# Patient Record
Sex: MALE | Race: WHITE | Employment: OTHER | URBAN - METROPOLITAN AREA
[De-identification: names, ages, dates, MRNs, and addresses within clinical notes are randomized per-mention and may not be internally consistent; named-entity substitution may affect disease eponyms.]

---

## 2017-05-01 ENCOUNTER — OFFICE VISIT (OUTPATIENT)
Dept: FAMILY MEDICINE CLINIC | Age: 67
End: 2017-05-01

## 2017-05-01 VITALS
WEIGHT: 200 LBS | HEART RATE: 62 BPM | DIASTOLIC BLOOD PRESSURE: 80 MMHG | BODY MASS INDEX: 28 KG/M2 | SYSTOLIC BLOOD PRESSURE: 122 MMHG | OXYGEN SATURATION: 98 % | HEIGHT: 71 IN

## 2017-05-01 DIAGNOSIS — G20 PARKINSON'S DISEASE (HCC): ICD-10-CM

## 2017-05-01 DIAGNOSIS — Z00.00 WELL ADULT EXAM: Primary | ICD-10-CM

## 2017-05-01 PROCEDURE — 90732 PPSV23 VACC 2 YRS+ SUBQ/IM: CPT | Performed by: FAMILY MEDICINE

## 2017-05-01 PROCEDURE — 99397 PER PM REEVAL EST PAT 65+ YR: CPT | Performed by: FAMILY MEDICINE

## 2017-05-01 PROCEDURE — 90471 IMMUNIZATION ADMIN: CPT | Performed by: FAMILY MEDICINE

## 2017-05-08 ENCOUNTER — OFFICE VISIT (OUTPATIENT)
Dept: NEUROLOGY | Age: 67
End: 2017-05-08

## 2017-05-08 VITALS
DIASTOLIC BLOOD PRESSURE: 72 MMHG | HEIGHT: 72 IN | BODY MASS INDEX: 26.95 KG/M2 | WEIGHT: 199 LBS | SYSTOLIC BLOOD PRESSURE: 119 MMHG | HEART RATE: 58 BPM

## 2017-05-08 DIAGNOSIS — G20 PARKINSON'S DISEASE (HCC): Primary | ICD-10-CM

## 2017-05-08 PROCEDURE — 99213 OFFICE O/P EST LOW 20 MIN: CPT | Performed by: PSYCHIATRY & NEUROLOGY

## 2017-06-10 ENCOUNTER — HOSPITAL ENCOUNTER (OUTPATIENT)
Dept: OTHER | Age: 67
Discharge: HOME | End: 2017-06-10
Attending: FAMILY MEDICINE | Admitting: FAMILY MEDICINE

## 2017-06-10 LAB
A/G RATIO: 1.7 (ref 1.1–2.2)
ALBUMIN SERPL-MCNC: 4.4 G/DL (ref 3.4–5)
ALP BLD-CCNC: 77 U/L (ref 40–129)
ALT SERPL-CCNC: 9 U/L (ref 10–40)
ANION GAP SERPL CALCULATED.3IONS-SCNC: 15 MMOL/L (ref 3–16)
AST SERPL-CCNC: 14 U/L (ref 15–37)
BILIRUB SERPL-MCNC: 0.7 MG/DL (ref 0–1)
BUN BLDV-MCNC: 9 MG/DL (ref 7–20)
CALCIUM SERPL-MCNC: 8.7 MG/DL (ref 8.3–10.6)
CHLORIDE BLD-SCNC: 102 MMOL/L (ref 99–110)
CHOLESTEROL, FASTING: 177 MG/DL (ref 0–199)
CO2: 25 MMOL/L (ref 21–32)
CREAT SERPL-MCNC: 0.7 MG/DL (ref 0.8–1.3)
GFR AFRICAN AMERICAN: >60
GFR NON-AFRICAN AMERICAN: >60
GLOBULIN: 2.6 G/DL
GLUCOSE FASTING: 91 MG/DL (ref 70–99)
HDLC SERPL-MCNC: 41 MG/DL (ref 40–60)
LDL CHOLESTEROL CALCULATED: 126 MG/DL
POTASSIUM SERPL-SCNC: 4.4 MMOL/L (ref 3.5–5.1)
SODIUM BLD-SCNC: 142 MMOL/L (ref 136–145)
TOTAL PROTEIN: 7 G/DL (ref 6.4–8.2)
TRIGLYCERIDE, FASTING: 48 MG/DL (ref 0–150)
VLDLC SERPL CALC-MCNC: 10 MG/DL

## 2017-07-12 ENCOUNTER — OFFICE VISIT (OUTPATIENT)
Dept: CARDIOLOGY CLINIC | Age: 67
End: 2017-07-12

## 2017-07-12 VITALS
WEIGHT: 197.4 LBS | HEART RATE: 60 BPM | DIASTOLIC BLOOD PRESSURE: 60 MMHG | BODY MASS INDEX: 24.67 KG/M2 | SYSTOLIC BLOOD PRESSURE: 110 MMHG

## 2017-07-12 DIAGNOSIS — I48.0 PAROXYSMAL ATRIAL FIBRILLATION (HCC): Primary | ICD-10-CM

## 2017-07-12 DIAGNOSIS — R00.2 PALPITATIONS: ICD-10-CM

## 2017-07-12 PROCEDURE — 99214 OFFICE O/P EST MOD 30 MIN: CPT | Performed by: NURSE PRACTITIONER

## 2017-07-12 PROCEDURE — 93000 ELECTROCARDIOGRAM COMPLETE: CPT | Performed by: NURSE PRACTITIONER

## 2017-07-27 PROCEDURE — 93224 XTRNL ECG REC UP TO 48 HRS: CPT | Performed by: INTERNAL MEDICINE

## 2017-08-05 DIAGNOSIS — I48.0 PAROXYSMAL ATRIAL FIBRILLATION (HCC): Primary | ICD-10-CM

## 2017-08-16 ENCOUNTER — OFFICE VISIT (OUTPATIENT)
Dept: CARDIOLOGY CLINIC | Age: 67
End: 2017-08-16

## 2017-08-16 VITALS
WEIGHT: 191.6 LBS | HEART RATE: 57 BPM | SYSTOLIC BLOOD PRESSURE: 110 MMHG | BODY MASS INDEX: 23.95 KG/M2 | DIASTOLIC BLOOD PRESSURE: 70 MMHG

## 2017-08-16 DIAGNOSIS — I48.0 PAROXYSMAL ATRIAL FIBRILLATION (HCC): Primary | ICD-10-CM

## 2017-08-16 DIAGNOSIS — I47.1 PSVT (PAROXYSMAL SUPRAVENTRICULAR TACHYCARDIA) (HCC): ICD-10-CM

## 2017-08-16 PROCEDURE — 93000 ELECTROCARDIOGRAM COMPLETE: CPT | Performed by: INTERNAL MEDICINE

## 2017-08-16 PROCEDURE — 93268 ECG RECORD/REVIEW: CPT | Performed by: INTERNAL MEDICINE

## 2017-08-16 PROCEDURE — 99214 OFFICE O/P EST MOD 30 MIN: CPT | Performed by: INTERNAL MEDICINE

## 2017-08-24 DIAGNOSIS — I48.0 PAROXYSMAL ATRIAL FIBRILLATION (HCC): ICD-10-CM

## 2017-11-06 ENCOUNTER — OFFICE VISIT (OUTPATIENT)
Dept: NEUROLOGY | Age: 67
End: 2017-11-06

## 2017-11-06 VITALS
DIASTOLIC BLOOD PRESSURE: 64 MMHG | HEART RATE: 53 BPM | SYSTOLIC BLOOD PRESSURE: 104 MMHG | WEIGHT: 198 LBS | BODY MASS INDEX: 26.82 KG/M2 | HEIGHT: 72 IN

## 2017-11-06 DIAGNOSIS — G20 PARKINSON'S DISEASE (HCC): Primary | ICD-10-CM

## 2017-11-06 PROCEDURE — G8417 CALC BMI ABV UP PARAM F/U: HCPCS | Performed by: PSYCHIATRY & NEUROLOGY

## 2017-11-06 PROCEDURE — G8427 DOCREV CUR MEDS BY ELIG CLIN: HCPCS | Performed by: PSYCHIATRY & NEUROLOGY

## 2017-11-06 PROCEDURE — 1036F TOBACCO NON-USER: CPT | Performed by: PSYCHIATRY & NEUROLOGY

## 2017-11-06 PROCEDURE — 1123F ACP DISCUSS/DSCN MKR DOCD: CPT | Performed by: PSYCHIATRY & NEUROLOGY

## 2017-11-06 PROCEDURE — G8484 FLU IMMUNIZE NO ADMIN: HCPCS | Performed by: PSYCHIATRY & NEUROLOGY

## 2017-11-06 PROCEDURE — 99213 OFFICE O/P EST LOW 20 MIN: CPT | Performed by: PSYCHIATRY & NEUROLOGY

## 2017-11-06 PROCEDURE — 4040F PNEUMOC VAC/ADMIN/RCVD: CPT | Performed by: PSYCHIATRY & NEUROLOGY

## 2017-11-06 PROCEDURE — 3017F COLORECTAL CA SCREEN DOC REV: CPT | Performed by: PSYCHIATRY & NEUROLOGY

## 2017-11-06 NOTE — PROGRESS NOTES
[facility]   Neurology followup    Subjective:   CC/HP  History was obtained from patient and his wife  Patient has known Parkinson's disease. His Parkinson symptoms seem relatively stable. Patient states that his symptoms are stable  Patient has Some tremors. He has noticed occasional difficulty with swallowing.   Patient still has some bradykinesia  Patient has chronic paroxysmal atrial fibrillation  Patient has known depression which seems to be improved  Patient states that his right hip pain is much improved    REVIEW OF SYSTEMS    Constitutional:  []   Chills   [x]  Fatigue   []  Fevers   []  Malaise   [x]  Weight loss     [] Denies all of the above    Respiratory:   []  Cough    [x]  Shortness of breath         [] Denies all of the above     Cardiovascular:   []  Chest pain    []  Exertional chest pressure/discomfort           [] Palpitations    []  Syncope     [x] Denies all of the above        Past Medical History:   Diagnosis Date    [facility] ([facility]     No significant past medical history     [facility]      Family History   Problem Relation Age of Onset    Heart Disease Mother     Diabetes Mother     High Blood Pressure Mother     Diabetes Father     High Blood Pressure Father     High Blood Pressure Brother     High Cholesterol Neg Hx     Kidney Disease Neg Hx      Social History     Social History    Marital status:      Spouse name: N/A    Number of children: 2    Years of education: N/A     Occupational History    [employer]      Social History Main Topics    Smoking status: Former Smoker    Smokeless tobacco: Never Used    Alcohol use No    Drug use: No    Sexual activity: Not Asked     Other Topics Concern    None     Social History Narrative    None        Objective:  Exam:  /64   Pulse 53   Ht 6' (1.829 m)   Wt 198 lb (89.8 kg)   BMI 26.85 kg/m²   This is a well-nourished patient in no acute distress  Patient is awake, alert and oriented x3. Speech is normal.  Pupils are equal round reacting to light. Extraocular movements intact. Face symmetrical. Tongue midline. Motor exam shows normal symmetrical strength. Deep tendon reflexes normal. Plantar reflexes downgoing. Tone increased bilaterally especially on the right side. Mild cogwheeling on the right side. Sensory exam normal. Coordination normal. Gait Shows decreased arm swing on the right side. No carotid bruit. No neck stiffness. Data :  MRI of brain images showed minimal nonspecific white matter changes but was otherwise normal.    Impression :  Parkinson's disease  Mild ataxia  Bradykinesia, improved Minimal tremor in the right arm  Tolerating Sinemet fairly well without side effects  Depression, improved  Paroxysmal atrial fibrillation  Right hip pain,improved  X-ray of the right hip did not show any significant degenerative change    Plan :  Discussed with patient and his wife  Continue Sinemet 25/100 one tablet 4 times daily   Side effects were discussed. Parkinson's Disease Medical and Surgical Treatment options reviewed: Treatment options for Parkinson Disease reviewed as appropriate with patient including but not limited to non-pharmacological treatment, pharmacological treatment and surgical treatment. Please note a portion of  this chart was generated using dragon dictation software. Although every effort was made to ensure the accuracy of this automated transcription, some errors in transcription may have occurred.

## 2017-12-07 ENCOUNTER — OFFICE VISIT (OUTPATIENT)
Dept: CARDIOLOGY CLINIC | Age: 67
End: 2017-12-07

## 2017-12-07 VITALS
DIASTOLIC BLOOD PRESSURE: 72 MMHG | SYSTOLIC BLOOD PRESSURE: 118 MMHG | BODY MASS INDEX: 27.01 KG/M2 | HEART RATE: 54 BPM | WEIGHT: 199.4 LBS | HEIGHT: 72 IN | OXYGEN SATURATION: 97 %

## 2017-12-07 DIAGNOSIS — I47.1 PSVT (PAROXYSMAL SUPRAVENTRICULAR TACHYCARDIA) (HCC): ICD-10-CM

## 2017-12-07 DIAGNOSIS — R00.0 WIDE-COMPLEX TACHYCARDIA: ICD-10-CM

## 2017-12-07 DIAGNOSIS — I48.0 PAROXYSMAL ATRIAL FIBRILLATION (HCC): Primary | ICD-10-CM

## 2017-12-07 PROBLEM — I47.10 PSVT (PAROXYSMAL SUPRAVENTRICULAR TACHYCARDIA): Status: ACTIVE | Noted: 2017-12-07

## 2017-12-07 PROCEDURE — 99214 OFFICE O/P EST MOD 30 MIN: CPT | Performed by: INTERNAL MEDICINE

## 2017-12-07 PROCEDURE — 1036F TOBACCO NON-USER: CPT | Performed by: INTERNAL MEDICINE

## 2017-12-07 PROCEDURE — 1123F ACP DISCUSS/DSCN MKR DOCD: CPT | Performed by: INTERNAL MEDICINE

## 2017-12-07 PROCEDURE — G8484 FLU IMMUNIZE NO ADMIN: HCPCS | Performed by: INTERNAL MEDICINE

## 2017-12-07 PROCEDURE — 93000 ELECTROCARDIOGRAM COMPLETE: CPT | Performed by: INTERNAL MEDICINE

## 2017-12-07 PROCEDURE — G8427 DOCREV CUR MEDS BY ELIG CLIN: HCPCS | Performed by: INTERNAL MEDICINE

## 2017-12-07 PROCEDURE — 4040F PNEUMOC VAC/ADMIN/RCVD: CPT | Performed by: INTERNAL MEDICINE

## 2017-12-07 PROCEDURE — G8417 CALC BMI ABV UP PARAM F/U: HCPCS | Performed by: INTERNAL MEDICINE

## 2017-12-07 PROCEDURE — 3017F COLORECTAL CA SCREEN DOC REV: CPT | Performed by: INTERNAL MEDICINE

## 2017-12-07 NOTE — PROGRESS NOTES
[address]   Cardiac Follow up       Chief Concern: PAF, PSVT    HPI:  Ariadna Rollins is a 78 y.o. male who is here in follow up for PSVT as well as PAF. Although he has a history of atrial fibrillation, his EKG and monitor showed evidence of PSVT. The patient states he never had SVT before. He was received multiple doses of adenosine, 6 mg, followed by 12 mg and 12 mg. Despite this, the patient never had a pause in his rhythm nor did he convert. However, his blood pressure continued to drop. Cardioversion was considered at that time. He was moved to a trauma room for cardioversion. The fentanyl was given when he suddenly developed a pause in his rhythm. He then converted to normal sinus rhythm with bigeminy. Although unusual, it appears that he had an approximate 10-15 minute delay in response to adenosine. He converted to SR and stable, then he subsequently discharged home. He saw [facility] and had a combination of 24 hours of Holter and 30 days of event monitors. The 24 hour Holter monitor on 7/13/17 showed SR with a brief PAT with nocturnal bradycardia with average HR 61 (). The 30 day monitor from 7/13/17 to 8/10/17 showed SR with nocturnal bradycardia with wide complex tachycardia with brief PAT with average HR: 62 bpm (). On 7/17/17 WCT with cycle length 280 (total 6 seconds). Metoprolol was instructed to start on 7/12/2017, but he did not take routine. He only used twice but unable to recall the exact the date and time. He stated he had worsening palpitation for 2 days while his mother passed away but he did not wear the monitor. His first episode of Afib occurred 15-20 years ago. He reports he was drinking a lot of caffeine at the time. On average, he has had a PAF episode about once a year. The longest episode lasted five days, occurring in September 2014. His HR was 150's per EKG.   He has been able to tell when he goes in and out of Afib because

## 2018-03-08 ENCOUNTER — TELEPHONE (OUTPATIENT)
Dept: CARDIOLOGY CLINIC | Age: 68
End: 2018-03-08

## 2018-03-08 ENCOUNTER — OFFICE VISIT (OUTPATIENT)
Dept: CARDIOLOGY CLINIC | Age: 68
End: 2018-03-08

## 2018-03-08 VITALS
BODY MASS INDEX: 27.78 KG/M2 | DIASTOLIC BLOOD PRESSURE: 62 MMHG | SYSTOLIC BLOOD PRESSURE: 98 MMHG | WEIGHT: 204.8 LBS | HEART RATE: 138 BPM

## 2018-03-08 DIAGNOSIS — I48.0 PAROXYSMAL ATRIAL FIBRILLATION (HCC): Primary | ICD-10-CM

## 2018-03-08 DIAGNOSIS — R42 DIZZINESS: ICD-10-CM

## 2018-03-08 DIAGNOSIS — R06.09 DYSPNEA ON EXERTION: ICD-10-CM

## 2018-03-08 PROCEDURE — G8427 DOCREV CUR MEDS BY ELIG CLIN: HCPCS | Performed by: NURSE PRACTITIONER

## 2018-03-08 PROCEDURE — 4040F PNEUMOC VAC/ADMIN/RCVD: CPT | Performed by: NURSE PRACTITIONER

## 2018-03-08 PROCEDURE — 1123F ACP DISCUSS/DSCN MKR DOCD: CPT | Performed by: NURSE PRACTITIONER

## 2018-03-08 PROCEDURE — G8484 FLU IMMUNIZE NO ADMIN: HCPCS | Performed by: NURSE PRACTITIONER

## 2018-03-08 PROCEDURE — 99214 OFFICE O/P EST MOD 30 MIN: CPT | Performed by: NURSE PRACTITIONER

## 2018-03-08 PROCEDURE — G8417 CALC BMI ABV UP PARAM F/U: HCPCS | Performed by: NURSE PRACTITIONER

## 2018-03-08 PROCEDURE — 1036F TOBACCO NON-USER: CPT | Performed by: NURSE PRACTITIONER

## 2018-03-08 PROCEDURE — 3017F COLORECTAL CA SCREEN DOC REV: CPT | Performed by: NURSE PRACTITIONER

## 2018-03-08 RX ORDER — FUROSEMIDE 20 MG/1
20 TABLET ORAL DAILY
Qty: 30 TABLET | Refills: 3 | Status: SHIPPED | OUTPATIENT
Start: 2018-03-08 | End: 2018-03-13 | Stop reason: ALTCHOICE

## 2018-03-08 RX ORDER — DIGOXIN 125 MCG
125 TABLET ORAL DAILY
Qty: 30 TABLET | Refills: 3 | Status: SHIPPED | OUTPATIENT
Start: 2018-03-08 | End: 2018-03-13 | Stop reason: SDUPTHER

## 2018-03-08 ASSESSMENT — ENCOUNTER SYMPTOMS
COUGH: 0
GASTROINTESTINAL NEGATIVE: 1
SHORTNESS OF BREATH: 1
WHEEZING: 0

## 2018-03-08 NOTE — PATIENT INSTRUCTIONS
diet.   ? Stay at a healthy weight. Lose weight if you need to.   ? Limit alcohol to 2 drinks a day for men and 1 drink a day for women. Too much alcohol can cause health problems. ? Avoid colds and flu. Get a pneumococcal vaccine shot. If you have had one before, ask your doctor whether you need another dose. Get a flu shot every year. If you must be around people with colds or flu, wash your hands often. Activity  ? If your doctor recommends it, get more exercise. Walking is a good choice. Bit by bit, increase the amount you walk every day. Try for at least 30 minutes on most days of the week. You also may want to swim, bike, or do other activities. Your doctor may suggest that you join a cardiac rehabilitation program so that you can have help increasing your physical activity safely. ? Start light exercise if your doctor says it is okay. Even a small amount will help you get stronger, have more energy, and manage stress. Walking is an easy way to get exercise. Start out by walking a little more than you did in the hospital. Gradually increase the amount you walk. ? When you exercise, watch for signs that your heart is working too hard. You are pushing too hard if you cannot talk while you are exercising. If you become short of breath or dizzy or have chest pain, sit down and rest immediately. ? Check your pulse regularly. Place two fingers on the artery at the palm side of your wrist, in line with your thumb. If your heartbeat seems uneven or fast, talk to your doctor. When should you call for help? Call 911 anytime you think you may need emergency care. For example, call if:  ? You have symptoms of a heart attack. These may include:  Chest pain or pressure, or a strange feeling in the chest.  Sweating. Shortness of breath. Nausea or vomiting. Pain, pressure, or a strange feeling in the back, neck, jaw, or upper belly or in one or both shoulders or arms. Lightheadedness or sudden weakness.   A

## 2018-03-08 NOTE — LETTER
[address] [place] [id number]  Phone: [phone]  Fax: [phone]    Vaishali Burns NP        March 8, 2018     Maite Baig, [address] [place] [address] [employer]    Patient: Abbi Gallardo  MR Number: [id number]  YOB: 1950  Date of Visit: 3/8/2018    Dear Dr. Maite Baig:      If you have questions, please do not hesitate to call me. I look forward to following Alejandro Laguna along with you.     Sincerely,        Vaishali Burns NP Clear bilaterally, pupils equal, round and reactive to light.

## 2018-03-08 NOTE — TELEPHONE ENCOUNTER
Wife says he is skipping beats, he thinks he is a fib. Says this has been going for a week, he is not sleeping or eating well because he feels so bad. She would like him seen this week, pradeep is worried something is terribly wrong and that he needs to be seen. She says they are needing to switch md's anyway due to they do not want to drive to [facility]      Assessment:  1. Paroxysmal Atrial fibrillation: symptomatic. [id number] Vasc score 1. In SB today per EKG. Has brief PAT episodes usually lasting less than two minutes. He has prolonged episodes 1-2 times/year. This AF burden is still probably not sufficient to justify daily pharmacologic preventive therapy. 2. PSVT- documented on ER ECGs and received adenosine. His palpitations are improved on Metoprolol 25 mg every am.  3. Wide complex tachycardia- 24 hours Holter and 30 days event monitor.      Plan:  1. Continue Metoprolol 25 mg daily  2. Monitor episodes of palpitations and contact our office for a worsening frequency or duration of episodes  3.   Follow up in 6 month or sooner if needed        Tae Ellison M.D.

## 2018-03-08 NOTE — PROGRESS NOTES
tartrate (LOPRESSOR) 25 MG tablet Take 1 tablet by mouth daily In the morning only 12/7/17  Yes Brandin Orozco MD   carbidopa-levodopa (SINEMET)  MG per tablet TAKE 1 TABLET AT 7:30 AM,  11:30 AM, 3:30 PM AND 7:30  PM 11/16/17  Yes Noe Haskins MD   aspirin 81 MG tablet Take 81 mg by mouth daily   Yes Historical Provider, MD   omeprazole (PRILOSEC) 20 MG delayed release capsule Take 20 mg by mouth as needed    Historical Provider, MD        Allergies:  Sertraline     ROS:   Review of Systems   Constitutional: Negative. HENT: Negative. Respiratory: Positive for shortness of breath. Negative for cough and wheezing. Cardiovascular: Positive for chest pain and palpitations. Negative for leg swelling. Gastrointestinal: Negative. Genitourinary: Negative. Musculoskeletal: Negative. Skin: Negative. Neurological: Positive for headaches. Hematological: Negative. Psychiatric/Behavioral: Negative. Physical Examination:    Vitals:    03/08/18 1422   BP: 98/62   Pulse: 138   Weight: 204 lb 12.8 oz (92.9 kg)           Physical Exam   Constitutional: He is oriented to person, place, and time. He appears well-developed and well-nourished. HENT:   Head: Normocephalic and atraumatic. Eyes: Conjunctivae are normal. Pupils are equal, round, and reactive to light. Neck: Normal range of motion. Neck supple. Cardiovascular: Normal heart sounds and intact distal pulses. An irregular rhythm present. Tachycardia present. Pulmonary/Chest: Breath sounds normal.   Abdominal: Soft. Musculoskeletal: Normal range of motion. Neurological: He is alert and oriented to person, place, and time. Skin: Skin is warm and dry. Psychiatric: He has a normal mood and affect.  His behavior is normal. Judgment and thought content normal.       Lab Data:    CBC:   Lab Results   Component Value Date    WBC 11.1 07/02/2017    WBC 10.5 10/30/2016    WBC 4.7 04/22/2016    RBC 4.82 07/02/2017    RBC Tobacco Cessation Counseling: NA  2. Retake of BP if >140/90:   NA  3. Documentation to PCP/referring for new patient:  Sent to PCP at close of office visit  4. CAD patient on anti-platelet: NA  5. CAD patient on STATIN therapy:  NA  6. Patient with CHF and aFib on anticoagulation:  No   7. Patient Education:  Yes   8. BB for LVSD :  NA   9. ACE/ARB for LVSD:  NA   10.  Left Ventricular Ejection Fraction (LVEF) Assessment:  Yes

## 2018-03-09 LAB
ANION GAP SERPL CALCULATED.3IONS-SCNC: 16 MMOL/L (ref 3–16)
BUN BLDV-MCNC: 12 MG/DL (ref 7–20)
CALCIUM SERPL-MCNC: 8.6 MG/DL (ref 8.3–10.6)
CHLORIDE BLD-SCNC: 101 MMOL/L (ref 99–110)
CO2: 27 MMOL/L (ref 21–32)
CREAT SERPL-MCNC: 0.8 MG/DL (ref 0.8–1.3)
GFR AFRICAN AMERICAN: >60
GFR NON-AFRICAN AMERICAN: >60
GLUCOSE BLD-MCNC: 107 MG/DL (ref 70–99)
HCT VFR BLD CALC: 44.8 % (ref 40.5–52.5)
HEMOGLOBIN: 15.1 G/DL (ref 13.5–17.5)
MAGNESIUM: 2.3 MG/DL (ref 1.8–2.4)
MCH RBC QN AUTO: 31.3 PG (ref 26–34)
MCHC RBC AUTO-ENTMCNC: 33.7 G/DL (ref 31–36)
MCV RBC AUTO: 92.8 FL (ref 80–100)
PDW BLD-RTO: 13.5 % (ref 12.4–15.4)
PLATELET # BLD: 233 K/UL (ref 135–450)
PMV BLD AUTO: 8.9 FL (ref 5–10.5)
POTASSIUM SERPL-SCNC: 4 MMOL/L (ref 3.5–5.1)
RBC # BLD: 4.83 M/UL (ref 4.2–5.9)
SODIUM BLD-SCNC: 144 MMOL/L (ref 136–145)
WBC # BLD: 5.3 K/UL (ref 4–11)

## 2018-03-12 ENCOUNTER — HOSPITAL ENCOUNTER (OUTPATIENT)
Dept: NON INVASIVE DIAGNOSTICS | Age: 68
Discharge: HOME | End: 2018-03-12
Attending: NURSE PRACTITIONER | Admitting: NURSE PRACTITIONER

## 2018-03-12 DIAGNOSIS — I48.0 PAROXYSMAL ATRIAL FIBRILLATION (HCC): ICD-10-CM

## 2018-03-12 LAB
LV EF: 50 %
LVEF MODALITY: NORMAL

## 2018-03-13 ENCOUNTER — OFFICE VISIT (OUTPATIENT)
Dept: CARDIOLOGY CLINIC | Age: 68
End: 2018-03-13

## 2018-03-13 VITALS
BODY MASS INDEX: 27.59 KG/M2 | WEIGHT: 203.4 LBS | HEART RATE: 123 BPM | SYSTOLIC BLOOD PRESSURE: 100 MMHG | DIASTOLIC BLOOD PRESSURE: 60 MMHG

## 2018-03-13 DIAGNOSIS — I47.1 PSVT (PAROXYSMAL SUPRAVENTRICULAR TACHYCARDIA) (HCC): ICD-10-CM

## 2018-03-13 DIAGNOSIS — I48.0 PAROXYSMAL ATRIAL FIBRILLATION (HCC): ICD-10-CM

## 2018-03-13 PROCEDURE — 3017F COLORECTAL CA SCREEN DOC REV: CPT | Performed by: NURSE PRACTITIONER

## 2018-03-13 PROCEDURE — G8427 DOCREV CUR MEDS BY ELIG CLIN: HCPCS | Performed by: NURSE PRACTITIONER

## 2018-03-13 PROCEDURE — 99214 OFFICE O/P EST MOD 30 MIN: CPT | Performed by: NURSE PRACTITIONER

## 2018-03-13 PROCEDURE — 4040F PNEUMOC VAC/ADMIN/RCVD: CPT | Performed by: NURSE PRACTITIONER

## 2018-03-13 PROCEDURE — G8417 CALC BMI ABV UP PARAM F/U: HCPCS | Performed by: NURSE PRACTITIONER

## 2018-03-13 PROCEDURE — 1123F ACP DISCUSS/DSCN MKR DOCD: CPT | Performed by: NURSE PRACTITIONER

## 2018-03-13 PROCEDURE — 1036F TOBACCO NON-USER: CPT | Performed by: NURSE PRACTITIONER

## 2018-03-13 PROCEDURE — G8484 FLU IMMUNIZE NO ADMIN: HCPCS | Performed by: NURSE PRACTITIONER

## 2018-03-13 RX ORDER — DIGOXIN 125 MCG
250 TABLET ORAL DAILY
Qty: 60 TABLET | Refills: 5 | Status: SHIPPED | OUTPATIENT
Start: 2018-03-13 | End: 2018-05-29 | Stop reason: CLARIF

## 2018-03-15 ENCOUNTER — OFFICE VISIT (OUTPATIENT)
Dept: PULMONOLOGY | Age: 68
End: 2018-03-15

## 2018-03-15 ENCOUNTER — HOSPITAL ENCOUNTER (OUTPATIENT)
Dept: SLEEP MEDICINE | Age: 68
Discharge: HOME | End: 2018-03-15
Attending: INTERNAL MEDICINE | Admitting: INTERNAL MEDICINE

## 2018-03-15 VITALS
WEIGHT: 204 LBS | HEART RATE: 103 BPM | HEIGHT: 72 IN | BODY MASS INDEX: 27.63 KG/M2 | SYSTOLIC BLOOD PRESSURE: 111 MMHG | DIASTOLIC BLOOD PRESSURE: 60 MMHG | OXYGEN SATURATION: 98 %

## 2018-03-15 DIAGNOSIS — I48.0 PAROXYSMAL ATRIAL FIBRILLATION (HCC): Chronic | ICD-10-CM

## 2018-03-15 DIAGNOSIS — G47.10 HYPERSOMNIA: ICD-10-CM

## 2018-03-15 DIAGNOSIS — R06.83 SNORING: ICD-10-CM

## 2018-03-15 DIAGNOSIS — G20 PARKINSON'S DISEASE (HCC): Chronic | ICD-10-CM

## 2018-03-15 DIAGNOSIS — G47.10 HYPERSOMNIA: Primary | ICD-10-CM

## 2018-03-15 DIAGNOSIS — K21.9 GERD WITHOUT ESOPHAGITIS: Chronic | ICD-10-CM

## 2018-03-15 PROCEDURE — 3017F COLORECTAL CA SCREEN DOC REV: CPT | Performed by: INTERNAL MEDICINE

## 2018-03-15 PROCEDURE — 1036F TOBACCO NON-USER: CPT | Performed by: INTERNAL MEDICINE

## 2018-03-15 PROCEDURE — G8417 CALC BMI ABV UP PARAM F/U: HCPCS | Performed by: INTERNAL MEDICINE

## 2018-03-15 PROCEDURE — 1123F ACP DISCUSS/DSCN MKR DOCD: CPT | Performed by: INTERNAL MEDICINE

## 2018-03-15 PROCEDURE — G8427 DOCREV CUR MEDS BY ELIG CLIN: HCPCS | Performed by: INTERNAL MEDICINE

## 2018-03-15 PROCEDURE — 95810 POLYSOM 6/> YRS 4/> PARAM: CPT | Performed by: INTERNAL MEDICINE

## 2018-03-15 PROCEDURE — 99204 OFFICE O/P NEW MOD 45 MIN: CPT | Performed by: INTERNAL MEDICINE

## 2018-03-15 PROCEDURE — 4040F PNEUMOC VAC/ADMIN/RCVD: CPT | Performed by: INTERNAL MEDICINE

## 2018-03-15 PROCEDURE — G8484 FLU IMMUNIZE NO ADMIN: HCPCS | Performed by: INTERNAL MEDICINE

## 2018-03-15 ASSESSMENT — ENCOUNTER SYMPTOMS
NAUSEA: 0
ABDOMINAL DISTENTION: 0
ABDOMINAL PAIN: 0
CHEST TIGHTNESS: 0
SHORTNESS OF BREATH: 0
APNEA: 0
PHOTOPHOBIA: 0
RHINORRHEA: 0
CHOKING: 0
VOMITING: 0
ALLERGIC/IMMUNOLOGIC NEGATIVE: 1
EYE PAIN: 0

## 2018-03-15 ASSESSMENT — SLEEP AND FATIGUE QUESTIONNAIRES
HOW LIKELY ARE YOU TO NOD OFF OR FALL ASLEEP WHILE SITTING QUIETLY AFTER LUNCH WITHOUT ALCOHOL: 2
HOW LIKELY ARE YOU TO NOD OFF OR FALL ASLEEP WHILE SITTING AND READING: 3
NECK CIRCUMFERENCE (INCHES): 15
HOW LIKELY ARE YOU TO NOD OFF OR FALL ASLEEP WHEN YOU ARE A PASSENGER IN A CAR FOR AN HOUR WITHOUT A BREAK: 1
HOW LIKELY ARE YOU TO NOD OFF OR FALL ASLEEP WHILE LYING DOWN TO REST IN THE AFTERNOON WHEN CIRCUMSTANCES PERMIT: 3
HOW LIKELY ARE YOU TO NOD OFF OR FALL ASLEEP WHILE WATCHING TV: 3
HOW LIKELY ARE YOU TO NOD OFF OR FALL ASLEEP WHILE SITTING AND TALKING TO SOMEONE: 0
ESS TOTAL SCORE: 14
HOW LIKELY ARE YOU TO NOD OFF OR FALL ASLEEP IN A CAR, WHILE STOPPED FOR A FEW MINUTES IN TRAFFIC: 0
HOW LIKELY ARE YOU TO NOD OFF OR FALL ASLEEP WHILE SITTING INACTIVE IN A PUBLIC PLACE: 2

## 2018-03-15 NOTE — PROGRESS NOTES
tartrate (LOPRESSOR) 25 MG tablet Take 1 tablet by mouth daily In the morning only 3/13/18  Yes Republicjack Patton CNP   digoxin (LANOXIN) 125 MCG tablet Take 2 tablets by mouth daily 3/13/18  Yes Republic PattonCAPRICE   carbidopa-levodopa (SINEMET)  MG per tablet TAKE 1 TABLET AT 7:30 AM,  11:30 AM, 3:30 PM AND 7:30  PM 11/16/17  Yes Elaine Nuñez MD   omeprazole (PRILOSEC) 20 MG delayed release capsule Take 20 mg by mouth as needed   Yes Historical Provider, MD       Allergies as of 03/15/2018 - Review Complete 03/15/2018   Allergen Reaction Noted    Sertraline Anxiety 08/17/2015       Patient Active Problem List   Diagnosis    Dupuytren's contracture    Parkinson's disease ([facility]    Carpal tunnel syndrome    Lesion of ulnar nerve    Paroxysmal atrial fibrillation (HCC)    Atrial fibrillation (HCC)    Trochanteric bursitis of right hip    Arthralgia of right hip    PSVT (paroxysmal supraventricular tachycardia) (HCC)    Wide-complex tachycardia (HCC)    Dyspnea on exertion    Dizziness       Past Medical History:   Diagnosis Date    Atrial fibrillation ([address]     No significant past medical history     Parkinson disease ([facility]        History reviewed. No pertinent surgical history. Family History   Problem Relation Age of Onset    Heart Disease Mother     Diabetes Mother     High Blood Pressure Mother     Diabetes Father     High Blood Pressure Father     High Blood Pressure Brother     High Cholesterol Neg Hx     Kidney Disease Neg Hx        Review of Systems   Constitutional: Positive for fatigue. Negative for activity change and appetite change. HENT: Positive for congestion. Negative for nosebleeds, postnasal drip, rhinorrhea and sneezing. Eyes: Negative for photophobia, pain and visual disturbance. Respiratory: Negative for apnea, choking, chest tightness and shortness of breath. Cardiovascular: Negative.     Gastrointestinal: Negative for abdominal distention, abdominal pain, deviation present. No thyroid mass and no thyromegaly present. Cardiovascular: Normal rate, S1 normal, S2 normal and normal heart sounds. An irregularly irregular rhythm present. Pulmonary/Chest: Effort normal and breath sounds normal. No apnea. No respiratory distress. He has no wheezes. He has no rhonchi. He has no rales. Abdominal: Soft. Bowel sounds are normal. He exhibits no distension. There is no tenderness. Musculoskeletal: Normal range of motion. He exhibits no edema. Lymphadenopathy:        Head (right side): No submental, no submandibular and no tonsillar adenopathy present. Head (left side): No submental, no submandibular and no tonsillar adenopathy present. Neurological: He is alert and oriented to person, place, and time. Skin: Skin is warm, dry and intact. No cyanosis. Nails show no clubbing. Psychiatric: He has a normal mood and affect. His speech is normal and behavior is normal. Thought content normal.   Nursing note and vitals reviewed. Assessment:      1. Hypersomnia  Baseline Diagnostic Sleep Study    new problem, needs w/u   2. Snoring  Baseline Diagnostic Sleep Study    new problem, needs w/u   3. Paroxysmal atrial fibrillation (HCC) Stable    4. GERD without esophagitis Stable    5. Parkinson's disease ([facility] Stable         Plan:      Differential diagnosis includes but not limited to: NICO, PLMD's, narcolepsy, parasomnias. Reviewed NICO (which is the highest likelihood diagnosis): pathophysiology, diagnosis, complications and treatment. Instructed him not to drive if drowsy. Continue medications per his PCP and other physicians. Limit caffeine use after 3pm. Will do PSG to rule-out NICO and other sleep disorders. 1 wk follow up after study to review his results. The chronic medical conditions listed are directly related to the primary diagnosis listed above. The management of the primary diagnosis affects the secondary diagnosis and vice versa.   Cont meds for a fib, GERD, and parkinson's.     Orders Placed This Encounter   Procedures    Baseline Diagnostic Sleep Study          Amalia Dalton MD, Raúl Bermudez, [address] [facility] Director  [facility] and [address]

## 2018-03-15 NOTE — LETTER
[facility] Sleep Medicine  [address]  [address]  [place] [address]  Phone: [phone]  Fax: [phone]      March 15, 2018       Patient: Melisa Perdomo   MR Number: [id number]   YOB: 1950   Date of Visit: 3/15/2018     Thank you for allowing me to participate in the care of Geoff Garg. Here is my assessment and plan. Also attached is a copy of his consult note:    ASSESSMENT:  Juancarlos Palma was seen today for snoring and daytime sleepiness. Diagnoses and all orders for this visit:    Hypersomnia  Comments:  new problem, needs w/u  Orders:  -     Baseline Diagnostic Sleep Study; Future    Snoring  Comments:  new problem, needs w/u  Orders:  -     Baseline Diagnostic Sleep Study; Future    Paroxysmal atrial fibrillation (HCC)    GERD without esophagitis    Parkinson's disease ([facility]        Plan:     Differential diagnosis includes but not limited to: NICO, PLMD's, narcolepsy, parasomnias. Reviewed NICO (which is the highest likelihood diagnosis): pathophysiology, diagnosis, complications and treatment. Instructed him not to drive if drowsy. Continue medications per his PCP and other physicians. Limit caffeine use after 3pm. Will do PSG to rule-out NICO and other sleep disorders. 1 wk follow up after study to review his results. The chronic medical conditions listed are directly related to the primary diagnosis listed above. The management of the primary diagnosis affects the secondary diagnosis and vice versa. Cont meds for a fib, GERD, and parkinson's. Orders Placed This Encounter   Procedures    Baseline Diagnostic Sleep Study         If you have questions or concerns, please do not hesitate to call me. I look forward to following Juancarlos Palma along with you.     Sincerely,      Corrie Olivares MD    CC providers:  [facility]  [address] [address]  [address] [place] [address] In [address] [place] [address]

## 2018-03-19 ENCOUNTER — NURSE ONLY (OUTPATIENT)
Dept: CARDIOLOGY CLINIC | Age: 68
End: 2018-03-19

## 2018-03-19 DIAGNOSIS — I48.0 PAROXYSMAL ATRIAL FIBRILLATION (HCC): Primary | ICD-10-CM

## 2018-03-19 DIAGNOSIS — I48.19 PERSISTENT ATRIAL FIBRILLATION (HCC): Primary | ICD-10-CM

## 2018-03-19 PROCEDURE — 93000 ELECTROCARDIOGRAM COMPLETE: CPT | Performed by: NURSE PRACTITIONER

## 2018-04-10 ENCOUNTER — TELEPHONE (OUTPATIENT)
Dept: SLEEP MEDICINE | Age: 68
End: 2018-04-10

## 2018-05-29 ENCOUNTER — OFFICE VISIT (OUTPATIENT)
Dept: NEUROLOGY | Age: 68
End: 2018-05-29

## 2018-05-29 VITALS
HEART RATE: 62 BPM | DIASTOLIC BLOOD PRESSURE: 76 MMHG | HEIGHT: 72 IN | SYSTOLIC BLOOD PRESSURE: 119 MMHG | BODY MASS INDEX: 26.41 KG/M2 | WEIGHT: 195 LBS

## 2018-05-29 DIAGNOSIS — R27.0 ATAXIA: ICD-10-CM

## 2018-05-29 DIAGNOSIS — R25.1 TREMOR: ICD-10-CM

## 2018-05-29 DIAGNOSIS — R20.0 FACIAL NUMBNESS: ICD-10-CM

## 2018-05-29 DIAGNOSIS — G20 PARKINSON'S DISEASE (HCC): Primary | ICD-10-CM

## 2018-05-29 DIAGNOSIS — R25.8 BRADYKINESIA: ICD-10-CM

## 2018-05-29 PROCEDURE — G8417 CALC BMI ABV UP PARAM F/U: HCPCS | Performed by: PSYCHIATRY & NEUROLOGY

## 2018-05-29 PROCEDURE — 1036F TOBACCO NON-USER: CPT | Performed by: PSYCHIATRY & NEUROLOGY

## 2018-05-29 PROCEDURE — 3017F COLORECTAL CA SCREEN DOC REV: CPT | Performed by: PSYCHIATRY & NEUROLOGY

## 2018-05-29 PROCEDURE — 99214 OFFICE O/P EST MOD 30 MIN: CPT | Performed by: PSYCHIATRY & NEUROLOGY

## 2018-05-29 PROCEDURE — 4040F PNEUMOC VAC/ADMIN/RCVD: CPT | Performed by: PSYCHIATRY & NEUROLOGY

## 2018-05-29 PROCEDURE — G8427 DOCREV CUR MEDS BY ELIG CLIN: HCPCS | Performed by: PSYCHIATRY & NEUROLOGY

## 2018-05-29 PROCEDURE — 1123F ACP DISCUSS/DSCN MKR DOCD: CPT | Performed by: PSYCHIATRY & NEUROLOGY

## 2018-08-09 ENCOUNTER — HOSPITAL ENCOUNTER (OUTPATIENT)
Dept: CARDIAC REHAB | Age: 68
Discharge: HOME | End: 2018-08-31
Attending: FAMILY MEDICINE | Admitting: INTERNAL MEDICINE

## 2018-09-01 ENCOUNTER — HOSPITAL ENCOUNTER (OUTPATIENT)
Dept: OTHER | Age: 68
Discharge: HOME | End: 2018-09-01
Attending: INTERNAL MEDICINE | Admitting: INTERNAL MEDICINE

## 2018-09-17 ENCOUNTER — OFFICE VISIT (OUTPATIENT)
Dept: FAMILY MEDICINE CLINIC | Age: 68
End: 2018-09-17

## 2018-09-17 VITALS
SYSTOLIC BLOOD PRESSURE: 104 MMHG | HEART RATE: 70 BPM | BODY MASS INDEX: 27.53 KG/M2 | WEIGHT: 203 LBS | DIASTOLIC BLOOD PRESSURE: 71 MMHG | OXYGEN SATURATION: 95 %

## 2018-09-17 DIAGNOSIS — R42 VERTIGO: Primary | ICD-10-CM

## 2018-09-17 PROCEDURE — 3017F COLORECTAL CA SCREEN DOC REV: CPT | Performed by: FAMILY MEDICINE

## 2018-09-17 PROCEDURE — 1036F TOBACCO NON-USER: CPT | Performed by: FAMILY MEDICINE

## 2018-09-17 PROCEDURE — G8427 DOCREV CUR MEDS BY ELIG CLIN: HCPCS | Performed by: FAMILY MEDICINE

## 2018-09-17 PROCEDURE — 1123F ACP DISCUSS/DSCN MKR DOCD: CPT | Performed by: FAMILY MEDICINE

## 2018-09-17 PROCEDURE — G8417 CALC BMI ABV UP PARAM F/U: HCPCS | Performed by: FAMILY MEDICINE

## 2018-09-17 PROCEDURE — 1101F PT FALLS ASSESS-DOCD LE1/YR: CPT | Performed by: FAMILY MEDICINE

## 2018-09-17 PROCEDURE — 99213 OFFICE O/P EST LOW 20 MIN: CPT | Performed by: FAMILY MEDICINE

## 2018-09-17 PROCEDURE — 4040F PNEUMOC VAC/ADMIN/RCVD: CPT | Performed by: FAMILY MEDICINE

## 2018-09-17 ASSESSMENT — PATIENT HEALTH QUESTIONNAIRE - PHQ9
SUM OF ALL RESPONSES TO PHQ9 QUESTIONS 1 & 2: 0
2. FEELING DOWN, DEPRESSED OR HOPELESS: 0
1. LITTLE INTEREST OR PLEASURE IN DOING THINGS: 0
SUM OF ALL RESPONSES TO PHQ QUESTIONS 1-9: 0
SUM OF ALL RESPONSES TO PHQ QUESTIONS 1-9: 0

## 2018-09-17 ASSESSMENT — ENCOUNTER SYMPTOMS
NAUSEA: 1
VOMITING: 0

## 2018-09-17 NOTE — PROGRESS NOTES
SUBJECTIVE:    Pooja Garza is a 80 y.o. male who presents for a follow up visit. Chief Complaint   Patient presents with    Dizziness     Patient has been feeling very dizzy when laying down and it goes away after a couple of minutes, same thing happens when he gets up quickly, symptoms have been going on for about three weeks. Dizziness   This is a new problem. Episode onset: 2 weeks ago. The problem occurs intermittently. The problem has been gradually improving. Associated symptoms include nausea and vertigo. Pertinent negatives include no headaches or vomiting. The symptoms are aggravated by bending and twisting. Treatments tried: Epley manuvers. The treatment provided mild relief. Admits to stopping the maneuvers. Patient's medications, allergies, past medical, surgical, social and family histories were reviewed and updated as appropriate. Past Medical History:   Diagnosis Date    [facility] ([facility]     No significant past medical history     Parkinson disease ([facility]      No past surgical history on file.   Family History   Problem Relation Age of Onset    Heart Disease Mother     Diabetes Mother     High Blood Pressure Mother     Sleep Apnea Mother     Diabetes Father     High Blood Pressure Father     High Blood Pressure Brother     High Cholesterol Neg Hx     Kidney Disease Neg Hx      Social History     Social History    Marital status:      Spouse name: N/A    Number of children: 2    Years of education: N/A     Occupational History    [employer]      Social History Main Topics    Smoking status: Former Smoker    Smokeless tobacco: Never Used      Comment: quit age 29    Alcohol use No    Drug use: No    Sexual activity: Not on file     Other Topics Concern    Not on file     Social History Narrative    No narrative on file     Allergies   Allergen Reactions    Sertraline Anxiety     Current Outpatient Prescriptions   Medication Sig Dispense mis-transcribed.

## 2018-09-24 ENCOUNTER — HOSPITAL ENCOUNTER (OUTPATIENT)
Dept: CARDIAC REHAB | Age: 68
Setting detail: THERAPIES SERIES
Discharge: HOME | End: 2018-09-24
Payer: MEDICARE

## 2018-09-24 PROCEDURE — 93798 PHYS/QHP OP CAR RHAB W/ECG: CPT

## 2018-09-26 ENCOUNTER — HOSPITAL ENCOUNTER (OUTPATIENT)
Dept: CARDIAC REHAB | Age: 68
Setting detail: THERAPIES SERIES
Discharge: HOME | End: 2018-09-26
Payer: MEDICARE

## 2018-09-26 PROCEDURE — 93798 PHYS/QHP OP CAR RHAB W/ECG: CPT

## 2018-09-28 ENCOUNTER — APPOINTMENT (OUTPATIENT)
Dept: CARDIAC REHAB | Age: 68
End: 2018-09-28
Payer: MEDICARE

## 2018-10-01 ENCOUNTER — APPOINTMENT (OUTPATIENT)
Dept: CARDIAC REHAB | Age: 68
End: 2018-10-01
Payer: MEDICARE

## 2018-10-03 ENCOUNTER — HOSPITAL ENCOUNTER (OUTPATIENT)
Dept: CARDIAC REHAB | Age: 68
Setting detail: THERAPIES SERIES
Discharge: HOME | End: 2018-10-03
Payer: MEDICARE

## 2018-10-03 PROCEDURE — 93798 PHYS/QHP OP CAR RHAB W/ECG: CPT

## 2018-10-05 ENCOUNTER — APPOINTMENT (OUTPATIENT)
Dept: CARDIAC REHAB | Age: 68
End: 2018-10-05
Payer: MEDICARE

## 2018-10-08 ENCOUNTER — HOSPITAL ENCOUNTER (OUTPATIENT)
Dept: CARDIAC REHAB | Age: 68
Setting detail: THERAPIES SERIES
Discharge: HOME | End: 2018-10-08
Payer: MEDICARE

## 2018-10-08 PROCEDURE — 93798 PHYS/QHP OP CAR RHAB W/ECG: CPT

## 2018-10-10 ENCOUNTER — HOSPITAL ENCOUNTER (OUTPATIENT)
Dept: CARDIAC REHAB | Age: 68
Setting detail: THERAPIES SERIES
Discharge: HOME | End: 2018-10-10
Payer: MEDICARE

## 2018-10-10 PROCEDURE — 93798 PHYS/QHP OP CAR RHAB W/ECG: CPT

## 2018-10-12 ENCOUNTER — HOSPITAL ENCOUNTER (OUTPATIENT)
Dept: CARDIAC REHAB | Age: 68
Setting detail: THERAPIES SERIES
Discharge: HOME | End: 2018-10-12
Payer: MEDICARE

## 2018-10-12 PROCEDURE — 93798 PHYS/QHP OP CAR RHAB W/ECG: CPT

## 2018-10-15 ENCOUNTER — HOSPITAL ENCOUNTER (OUTPATIENT)
Dept: CARDIAC REHAB | Age: 68
Setting detail: THERAPIES SERIES
Discharge: HOME | End: 2018-10-15
Payer: MEDICARE

## 2018-10-15 PROCEDURE — 93798 PHYS/QHP OP CAR RHAB W/ECG: CPT

## 2018-10-17 ENCOUNTER — APPOINTMENT (OUTPATIENT)
Dept: CARDIAC REHAB | Age: 68
End: 2018-10-17
Payer: MEDICARE

## 2018-10-19 ENCOUNTER — HOSPITAL ENCOUNTER (OUTPATIENT)
Dept: CARDIAC REHAB | Age: 68
Setting detail: THERAPIES SERIES
Discharge: HOME | End: 2018-10-19
Payer: MEDICARE

## 2018-10-19 PROCEDURE — 93798 PHYS/QHP OP CAR RHAB W/ECG: CPT

## 2018-10-22 ENCOUNTER — HOSPITAL ENCOUNTER (OUTPATIENT)
Dept: CARDIAC REHAB | Age: 68
Setting detail: THERAPIES SERIES
Discharge: HOME | End: 2018-10-22
Payer: MEDICARE

## 2018-10-22 PROCEDURE — 93798 PHYS/QHP OP CAR RHAB W/ECG: CPT

## 2018-10-24 ENCOUNTER — HOSPITAL ENCOUNTER (OUTPATIENT)
Dept: CARDIAC REHAB | Age: 68
Setting detail: THERAPIES SERIES
Discharge: HOME | End: 2018-10-24
Payer: MEDICARE

## 2018-10-24 PROCEDURE — 93798 PHYS/QHP OP CAR RHAB W/ECG: CPT

## 2018-10-26 ENCOUNTER — HOSPITAL ENCOUNTER (OUTPATIENT)
Dept: CARDIAC REHAB | Age: 68
Setting detail: THERAPIES SERIES
Discharge: HOME | End: 2018-10-26
Payer: MEDICARE

## 2018-10-26 PROCEDURE — 93798 PHYS/QHP OP CAR RHAB W/ECG: CPT

## 2018-10-29 ENCOUNTER — HOSPITAL ENCOUNTER (OUTPATIENT)
Dept: CARDIAC REHAB | Age: 68
Setting detail: THERAPIES SERIES
End: 2018-10-29
Payer: MEDICARE

## 2018-10-31 ENCOUNTER — HOSPITAL ENCOUNTER (OUTPATIENT)
Dept: CARDIAC REHAB | Age: 68
Setting detail: THERAPIES SERIES
Discharge: HOME | End: 2018-10-31
Payer: MEDICARE

## 2018-10-31 PROCEDURE — 93798 PHYS/QHP OP CAR RHAB W/ECG: CPT

## 2018-11-02 ENCOUNTER — HOSPITAL ENCOUNTER (OUTPATIENT)
Dept: CARDIAC REHAB | Age: 68
Setting detail: THERAPIES SERIES
Discharge: HOME | End: 2018-11-02
Payer: MEDICARE

## 2018-11-02 PROCEDURE — 93798 PHYS/QHP OP CAR RHAB W/ECG: CPT

## 2018-11-05 ENCOUNTER — HOSPITAL ENCOUNTER (OUTPATIENT)
Dept: CARDIAC REHAB | Age: 68
Setting detail: THERAPIES SERIES
Discharge: HOME | End: 2018-11-05
Payer: MEDICARE

## 2018-11-05 PROCEDURE — 93798 PHYS/QHP OP CAR RHAB W/ECG: CPT

## 2018-11-07 ENCOUNTER — HOSPITAL ENCOUNTER (OUTPATIENT)
Dept: CARDIAC REHAB | Age: 68
Setting detail: THERAPIES SERIES
Discharge: HOME | End: 2018-11-07
Payer: MEDICARE

## 2018-11-07 PROCEDURE — 93798 PHYS/QHP OP CAR RHAB W/ECG: CPT

## 2018-11-09 ENCOUNTER — HOSPITAL ENCOUNTER (OUTPATIENT)
Dept: CARDIAC REHAB | Age: 68
Setting detail: THERAPIES SERIES
Discharge: HOME | End: 2018-11-09
Payer: MEDICARE

## 2018-11-09 PROCEDURE — 93798 PHYS/QHP OP CAR RHAB W/ECG: CPT

## 2018-11-12 ENCOUNTER — HOSPITAL ENCOUNTER (OUTPATIENT)
Dept: CARDIAC REHAB | Age: 68
Setting detail: THERAPIES SERIES
Discharge: HOME | End: 2018-11-12
Payer: MEDICARE

## 2018-11-12 PROCEDURE — 93798 PHYS/QHP OP CAR RHAB W/ECG: CPT

## 2018-11-14 ENCOUNTER — HOSPITAL ENCOUNTER (OUTPATIENT)
Dept: CARDIAC REHAB | Age: 68
Setting detail: THERAPIES SERIES
Discharge: HOME | End: 2018-11-14
Payer: MEDICARE

## 2018-11-14 PROCEDURE — 93798 PHYS/QHP OP CAR RHAB W/ECG: CPT

## 2018-11-16 ENCOUNTER — HOSPITAL ENCOUNTER (OUTPATIENT)
Dept: CARDIAC REHAB | Age: 68
Setting detail: THERAPIES SERIES
Discharge: HOME | End: 2018-11-16
Payer: MEDICARE

## 2018-11-16 PROCEDURE — 93798 PHYS/QHP OP CAR RHAB W/ECG: CPT

## 2018-11-19 ENCOUNTER — HOSPITAL ENCOUNTER (OUTPATIENT)
Dept: CARDIAC REHAB | Age: 68
Setting detail: THERAPIES SERIES
Discharge: HOME | End: 2018-11-19
Payer: MEDICARE

## 2018-11-19 PROCEDURE — 93798 PHYS/QHP OP CAR RHAB W/ECG: CPT

## 2018-11-21 ENCOUNTER — APPOINTMENT (OUTPATIENT)
Dept: CARDIAC REHAB | Age: 68
End: 2018-11-21
Payer: MEDICARE

## 2018-11-26 ENCOUNTER — HOSPITAL ENCOUNTER (OUTPATIENT)
Dept: CARDIAC REHAB | Age: 68
Setting detail: THERAPIES SERIES
Discharge: HOME | End: 2018-11-26
Payer: MEDICARE

## 2018-11-26 PROCEDURE — 93798 PHYS/QHP OP CAR RHAB W/ECG: CPT

## 2018-11-27 ENCOUNTER — OFFICE VISIT (OUTPATIENT)
Dept: NEUROLOGY | Age: 68
End: 2018-11-27
Payer: MEDICARE

## 2018-11-27 VITALS
SYSTOLIC BLOOD PRESSURE: 116 MMHG | HEART RATE: 70 BPM | BODY MASS INDEX: 27.36 KG/M2 | HEIGHT: 72 IN | WEIGHT: 202 LBS | DIASTOLIC BLOOD PRESSURE: 78 MMHG

## 2018-11-27 DIAGNOSIS — I48.19 PERSISTENT ATRIAL FIBRILLATION (HCC): ICD-10-CM

## 2018-11-27 DIAGNOSIS — G20 PARKINSON'S DISEASE (HCC): Primary | ICD-10-CM

## 2018-11-27 DIAGNOSIS — R27.0 ATAXIA: ICD-10-CM

## 2018-11-27 DIAGNOSIS — R25.8 BRADYKINESIA: ICD-10-CM

## 2018-11-27 PROCEDURE — 4040F PNEUMOC VAC/ADMIN/RCVD: CPT | Performed by: PSYCHIATRY & NEUROLOGY

## 2018-11-27 PROCEDURE — 1101F PT FALLS ASSESS-DOCD LE1/YR: CPT | Performed by: PSYCHIATRY & NEUROLOGY

## 2018-11-27 PROCEDURE — G8427 DOCREV CUR MEDS BY ELIG CLIN: HCPCS | Performed by: PSYCHIATRY & NEUROLOGY

## 2018-11-27 PROCEDURE — G8484 FLU IMMUNIZE NO ADMIN: HCPCS | Performed by: PSYCHIATRY & NEUROLOGY

## 2018-11-27 PROCEDURE — 1036F TOBACCO NON-USER: CPT | Performed by: PSYCHIATRY & NEUROLOGY

## 2018-11-27 PROCEDURE — 99213 OFFICE O/P EST LOW 20 MIN: CPT | Performed by: PSYCHIATRY & NEUROLOGY

## 2018-11-27 PROCEDURE — 3017F COLORECTAL CA SCREEN DOC REV: CPT | Performed by: PSYCHIATRY & NEUROLOGY

## 2018-11-27 PROCEDURE — 1123F ACP DISCUSS/DSCN MKR DOCD: CPT | Performed by: PSYCHIATRY & NEUROLOGY

## 2018-11-27 PROCEDURE — G8417 CALC BMI ABV UP PARAM F/U: HCPCS | Performed by: PSYCHIATRY & NEUROLOGY

## 2018-11-27 NOTE — PROGRESS NOTES
dictation software. Although every effort was made to ensure the accuracy of this automated transcription, some errors in transcription may have occurred.

## 2018-11-27 NOTE — PATIENT INSTRUCTIONS
Please call with any questions or concerns:   [facility] Neu[facility]  @ [phone]. LAB RESULTS:  Please obtain any labs or diagnostic tests as discussed today. You should call the office to check the results. Please allow  3 to 7 days for us to get these results. MEDICATION LIST:  Please bring an accurate list of your medications to every visit. APPOINTMENT CONFIRMATION:  We will call you the day before your scheduled appointment to confirm. If we are unable to reach you, you MUST call back by the end of the day to confirm the appointment or we may be forced to cancel.

## 2018-11-28 ENCOUNTER — HOSPITAL ENCOUNTER (OUTPATIENT)
Dept: CARDIAC REHAB | Age: 68
Setting detail: THERAPIES SERIES
End: 2018-11-28
Payer: MEDICARE

## 2018-11-30 ENCOUNTER — HOSPITAL ENCOUNTER (OUTPATIENT)
Dept: CARDIAC REHAB | Age: 68
Setting detail: THERAPIES SERIES
Discharge: HOME | End: 2018-11-30
Payer: MEDICARE

## 2018-11-30 PROCEDURE — 93798 PHYS/QHP OP CAR RHAB W/ECG: CPT

## 2018-12-03 ENCOUNTER — APPOINTMENT (OUTPATIENT)
Dept: CARDIAC REHAB | Age: 68
End: 2018-12-03
Payer: MEDICARE

## 2018-12-05 ENCOUNTER — HOSPITAL ENCOUNTER (OUTPATIENT)
Dept: CARDIAC REHAB | Age: 68
Setting detail: THERAPIES SERIES
Discharge: HOME | End: 2018-12-05
Payer: MEDICARE

## 2018-12-05 PROCEDURE — 93798 PHYS/QHP OP CAR RHAB W/ECG: CPT

## 2018-12-07 ENCOUNTER — HOSPITAL ENCOUNTER (OUTPATIENT)
Dept: CARDIAC REHAB | Age: 68
Setting detail: THERAPIES SERIES
End: 2018-12-07
Payer: MEDICARE

## 2018-12-10 ENCOUNTER — APPOINTMENT (OUTPATIENT)
Dept: CARDIAC REHAB | Age: 68
End: 2018-12-10
Payer: MEDICARE

## 2018-12-12 ENCOUNTER — APPOINTMENT (OUTPATIENT)
Dept: CARDIAC REHAB | Age: 68
End: 2018-12-12
Payer: MEDICARE

## 2018-12-14 ENCOUNTER — APPOINTMENT (OUTPATIENT)
Dept: CARDIAC REHAB | Age: 68
End: 2018-12-14
Payer: MEDICARE

## 2018-12-17 ENCOUNTER — TELEPHONE (OUTPATIENT)
Dept: FAMILY MEDICINE CLINIC | Age: 68
End: 2018-12-17

## 2018-12-17 ENCOUNTER — APPOINTMENT (OUTPATIENT)
Dept: CARDIAC REHAB | Age: 68
End: 2018-12-17
Payer: MEDICARE

## 2018-12-17 ENCOUNTER — OFFICE VISIT (OUTPATIENT)
Dept: FAMILY MEDICINE CLINIC | Age: 68
End: 2018-12-17
Payer: MEDICARE

## 2018-12-17 VITALS
BODY MASS INDEX: 28.56 KG/M2 | SYSTOLIC BLOOD PRESSURE: 122 MMHG | WEIGHT: 204 LBS | OXYGEN SATURATION: 98 % | HEIGHT: 71 IN | DIASTOLIC BLOOD PRESSURE: 70 MMHG | HEART RATE: 70 BPM

## 2018-12-17 DIAGNOSIS — I25.10 CORONARY ARTERY DISEASE INVOLVING NATIVE HEART WITHOUT ANGINA PECTORIS, UNSPECIFIED VESSEL OR LESION TYPE: ICD-10-CM

## 2018-12-17 DIAGNOSIS — G20 PARKINSON'S DISEASE (HCC): Primary | ICD-10-CM

## 2018-12-17 PROCEDURE — 99213 OFFICE O/P EST LOW 20 MIN: CPT | Performed by: FAMILY MEDICINE

## 2018-12-17 PROCEDURE — G8598 ASA/ANTIPLAT THER USED: HCPCS | Performed by: FAMILY MEDICINE

## 2018-12-17 PROCEDURE — 1036F TOBACCO NON-USER: CPT | Performed by: FAMILY MEDICINE

## 2018-12-17 PROCEDURE — 3017F COLORECTAL CA SCREEN DOC REV: CPT | Performed by: FAMILY MEDICINE

## 2018-12-17 PROCEDURE — G8417 CALC BMI ABV UP PARAM F/U: HCPCS | Performed by: FAMILY MEDICINE

## 2018-12-17 PROCEDURE — 1101F PT FALLS ASSESS-DOCD LE1/YR: CPT | Performed by: FAMILY MEDICINE

## 2018-12-17 PROCEDURE — G8427 DOCREV CUR MEDS BY ELIG CLIN: HCPCS | Performed by: FAMILY MEDICINE

## 2018-12-17 PROCEDURE — G8482 FLU IMMUNIZE ORDER/ADMIN: HCPCS | Performed by: FAMILY MEDICINE

## 2018-12-17 PROCEDURE — 1123F ACP DISCUSS/DSCN MKR DOCD: CPT | Performed by: FAMILY MEDICINE

## 2018-12-17 PROCEDURE — 4040F PNEUMOC VAC/ADMIN/RCVD: CPT | Performed by: FAMILY MEDICINE

## 2018-12-17 NOTE — TELEPHONE ENCOUNTER
Pt states sister see's Dr Anglei Guzman - asking if both he and spouse see Dr Angeli Guzman after Dr Fernie Card    [id number] & [id number]    Dr Angeli Guzman please advise

## 2018-12-19 ENCOUNTER — APPOINTMENT (OUTPATIENT)
Dept: CARDIAC REHAB | Age: 68
End: 2018-12-19
Payer: MEDICARE

## 2019-05-21 ENCOUNTER — OFFICE VISIT (OUTPATIENT)
Dept: NEUROLOGY | Age: 69
End: 2019-05-21
Payer: MEDICARE

## 2019-05-21 VITALS
HEIGHT: 72 IN | SYSTOLIC BLOOD PRESSURE: 122 MMHG | HEART RATE: 73 BPM | BODY MASS INDEX: 27.36 KG/M2 | WEIGHT: 202 LBS | DIASTOLIC BLOOD PRESSURE: 82 MMHG

## 2019-05-21 DIAGNOSIS — R25.1 TREMOR: ICD-10-CM

## 2019-05-21 DIAGNOSIS — I48.19 PERSISTENT ATRIAL FIBRILLATION (HCC): ICD-10-CM

## 2019-05-21 DIAGNOSIS — R20.0 FACIAL NUMBNESS: ICD-10-CM

## 2019-05-21 DIAGNOSIS — G20 PARKINSON'S DISEASE (HCC): Primary | ICD-10-CM

## 2019-05-21 DIAGNOSIS — R25.8 BRADYKINESIA: ICD-10-CM

## 2019-05-21 DIAGNOSIS — R27.0 ATAXIA: ICD-10-CM

## 2019-05-21 PROCEDURE — 3017F COLORECTAL CA SCREEN DOC REV: CPT | Performed by: PSYCHIATRY & NEUROLOGY

## 2019-05-21 PROCEDURE — 99214 OFFICE O/P EST MOD 30 MIN: CPT | Performed by: PSYCHIATRY & NEUROLOGY

## 2019-05-21 PROCEDURE — G8427 DOCREV CUR MEDS BY ELIG CLIN: HCPCS | Performed by: PSYCHIATRY & NEUROLOGY

## 2019-05-21 PROCEDURE — G8417 CALC BMI ABV UP PARAM F/U: HCPCS | Performed by: PSYCHIATRY & NEUROLOGY

## 2019-05-21 PROCEDURE — 4040F PNEUMOC VAC/ADMIN/RCVD: CPT | Performed by: PSYCHIATRY & NEUROLOGY

## 2019-05-21 PROCEDURE — G8598 ASA/ANTIPLAT THER USED: HCPCS | Performed by: PSYCHIATRY & NEUROLOGY

## 2019-05-21 PROCEDURE — 1123F ACP DISCUSS/DSCN MKR DOCD: CPT | Performed by: PSYCHIATRY & NEUROLOGY

## 2019-05-21 PROCEDURE — 1036F TOBACCO NON-USER: CPT | Performed by: PSYCHIATRY & NEUROLOGY

## 2019-05-21 RX ORDER — ROPINIROLE 0.25 MG/1
0.25 TABLET, FILM COATED ORAL 3 TIMES DAILY
Qty: 90 TABLET | Refills: 2 | Status: SHIPPED | OUTPATIENT
Start: 2019-05-21 | End: 2019-08-20

## 2019-05-21 NOTE — PROGRESS NOTES
sensation of numbness in his forehead may be from stress and some anxiety    Plan :  Discussed with patient and his wife  Add ropinirole 0.25 mg 3 times daily  Continue Sinemet 25/100 one tablet 4 times daily   Side effects were discussed. Continue Xarelto to prevent embolic event from atrial fibrillation  Continue antiplatelet therapy and statin therapy for coronary artery disease  I'm unable to see any connection between the subjective numbness in his forehead and the Parkinson's disease. Parkinson's Disease Medical and Surgical Treatment options reviewed: Treatment options for Parkinson Disease reviewed as appropriate with patient including but not limited to non-pharmacological treatment, pharmacological treatment and surgical treatment. Please note a portion of  this chart was generated using dragon dictation software. Although every effort was made to ensure the accuracy of this automated transcription, some errors in transcription may have occurred.

## 2019-05-21 NOTE — PATIENT INSTRUCTIONS
Please call with any questions or concerns:   [facility]  @ [phone]. LAB RESULTS:  Please obtain any labs or diagnostic tests as discussed today. You should call the office to check the results. Please allow  3 to 7 days for us to get these results. MEDICATION LIST:  Please bring an accurate list of your medications to every visit. APPOINTMENT CONFIRMATION:  We will call you the day before your scheduled appointment to confirm. If we are unable to reach you, you MUST call back by the end of the day to confirm the appointment or we may be forced to cancel.

## 2019-08-20 ENCOUNTER — OFFICE VISIT (OUTPATIENT)
Dept: NEUROLOGY | Age: 69
End: 2019-08-20
Payer: MEDICARE

## 2019-08-20 VITALS
DIASTOLIC BLOOD PRESSURE: 76 MMHG | WEIGHT: 202 LBS | HEART RATE: 70 BPM | HEIGHT: 72 IN | BODY MASS INDEX: 27.36 KG/M2 | SYSTOLIC BLOOD PRESSURE: 116 MMHG

## 2019-08-20 DIAGNOSIS — G20 PARKINSON'S DISEASE (HCC): Primary | ICD-10-CM

## 2019-08-20 PROCEDURE — 99213 OFFICE O/P EST LOW 20 MIN: CPT | Performed by: PSYCHIATRY & NEUROLOGY

## 2019-08-20 PROCEDURE — G8417 CALC BMI ABV UP PARAM F/U: HCPCS | Performed by: PSYCHIATRY & NEUROLOGY

## 2019-08-20 PROCEDURE — 3017F COLORECTAL CA SCREEN DOC REV: CPT | Performed by: PSYCHIATRY & NEUROLOGY

## 2019-08-20 PROCEDURE — 1036F TOBACCO NON-USER: CPT | Performed by: PSYCHIATRY & NEUROLOGY

## 2019-08-20 PROCEDURE — 1123F ACP DISCUSS/DSCN MKR DOCD: CPT | Performed by: PSYCHIATRY & NEUROLOGY

## 2019-08-20 PROCEDURE — G8427 DOCREV CUR MEDS BY ELIG CLIN: HCPCS | Performed by: PSYCHIATRY & NEUROLOGY

## 2019-08-20 PROCEDURE — 4040F PNEUMOC VAC/ADMIN/RCVD: CPT | Performed by: PSYCHIATRY & NEUROLOGY

## 2019-08-20 PROCEDURE — G8598 ASA/ANTIPLAT THER USED: HCPCS | Performed by: PSYCHIATRY & NEUROLOGY

## 2019-09-19 ENCOUNTER — OFFICE VISIT (OUTPATIENT)
Dept: FAMILY MEDICINE CLINIC | Age: 69
End: 2019-09-19
Payer: MEDICARE

## 2019-09-19 VITALS
DIASTOLIC BLOOD PRESSURE: 64 MMHG | BODY MASS INDEX: 28.36 KG/M2 | SYSTOLIC BLOOD PRESSURE: 98 MMHG | WEIGHT: 202.6 LBS | HEART RATE: 78 BPM | HEIGHT: 71 IN | OXYGEN SATURATION: 98 %

## 2019-09-19 DIAGNOSIS — G20 PARKINSON'S DISEASE (HCC): ICD-10-CM

## 2019-09-19 DIAGNOSIS — Z12.5 SCREENING PSA (PROSTATE SPECIFIC ANTIGEN): ICD-10-CM

## 2019-09-19 DIAGNOSIS — I48.19 PERSISTENT ATRIAL FIBRILLATION (HCC): ICD-10-CM

## 2019-09-19 DIAGNOSIS — I25.10 CORONARY ARTERY DISEASE INVOLVING NATIVE HEART WITHOUT ANGINA PECTORIS, UNSPECIFIED VESSEL OR LESION TYPE: Primary | ICD-10-CM

## 2019-09-19 DIAGNOSIS — E78.00 HYPERCHOLESTEREMIA: ICD-10-CM

## 2019-09-19 DIAGNOSIS — D64.9 ANEMIA, UNSPECIFIED TYPE: ICD-10-CM

## 2019-09-19 PROBLEM — R06.09 DYSPNEA ON EXERTION: Status: RESOLVED | Noted: 2018-03-08 | Resolved: 2019-09-19

## 2019-09-19 PROBLEM — R42 DIZZINESS: Status: RESOLVED | Noted: 2018-03-08 | Resolved: 2019-09-19

## 2019-09-19 PROCEDURE — G8427 DOCREV CUR MEDS BY ELIG CLIN: HCPCS | Performed by: FAMILY MEDICINE

## 2019-09-19 PROCEDURE — 4040F PNEUMOC VAC/ADMIN/RCVD: CPT | Performed by: FAMILY MEDICINE

## 2019-09-19 PROCEDURE — 99214 OFFICE O/P EST MOD 30 MIN: CPT | Performed by: FAMILY MEDICINE

## 2019-09-19 PROCEDURE — 1123F ACP DISCUSS/DSCN MKR DOCD: CPT | Performed by: FAMILY MEDICINE

## 2019-09-19 PROCEDURE — 3017F COLORECTAL CA SCREEN DOC REV: CPT | Performed by: FAMILY MEDICINE

## 2019-09-19 PROCEDURE — 1036F TOBACCO NON-USER: CPT | Performed by: FAMILY MEDICINE

## 2019-09-19 PROCEDURE — G8417 CALC BMI ABV UP PARAM F/U: HCPCS | Performed by: FAMILY MEDICINE

## 2019-09-19 PROCEDURE — G8598 ASA/ANTIPLAT THER USED: HCPCS | Performed by: FAMILY MEDICINE

## 2019-09-19 RX ORDER — ATORVASTATIN CALCIUM 80 MG/1
80 TABLET, FILM COATED ORAL DAILY
COMMUNITY

## 2019-09-19 ASSESSMENT — PATIENT HEALTH QUESTIONNAIRE - PHQ9
SUM OF ALL RESPONSES TO PHQ QUESTIONS 1-9: 0
SUM OF ALL RESPONSES TO PHQ QUESTIONS 1-9: 0
SUM OF ALL RESPONSES TO PHQ9 QUESTIONS 1 & 2: 0
2. FEELING DOWN, DEPRESSED OR HOPELESS: 0
1. LITTLE INTEREST OR PLEASURE IN DOING THINGS: 0

## 2020-05-08 ENCOUNTER — VIRTUAL VISIT (OUTPATIENT)
Dept: NEUROLOGY | Age: 70
End: 2020-05-08
Payer: MEDICARE

## 2020-05-08 VITALS — HEIGHT: 71 IN | WEIGHT: 205 LBS | BODY MASS INDEX: 28.7 KG/M2

## 2020-05-08 PROCEDURE — 1123F ACP DISCUSS/DSCN MKR DOCD: CPT | Performed by: PSYCHIATRY & NEUROLOGY

## 2020-05-08 PROCEDURE — 99213 OFFICE O/P EST LOW 20 MIN: CPT | Performed by: PSYCHIATRY & NEUROLOGY

## 2020-05-08 PROCEDURE — G8427 DOCREV CUR MEDS BY ELIG CLIN: HCPCS | Performed by: PSYCHIATRY & NEUROLOGY

## 2020-05-08 PROCEDURE — 3017F COLORECTAL CA SCREEN DOC REV: CPT | Performed by: PSYCHIATRY & NEUROLOGY

## 2020-05-08 PROCEDURE — 4040F PNEUMOC VAC/ADMIN/RCVD: CPT | Performed by: PSYCHIATRY & NEUROLOGY

## 2020-05-08 RX ORDER — DILTIAZEM HYDROCHLORIDE 240 MG/1
CAPSULE, COATED, EXTENDED RELEASE ORAL
COMMUNITY
Start: 2020-03-04 | End: 2020-08-14 | Stop reason: ALTCHOICE

## 2020-05-08 RX ORDER — ROPINIROLE 0.25 MG/1
0.25 TABLET, FILM COATED ORAL 2 TIMES DAILY
COMMUNITY
Start: 2020-03-05 | End: 2020-05-08 | Stop reason: SDUPTHER

## 2020-05-08 RX ORDER — ROPINIROLE 0.25 MG/1
0.25 TABLET, FILM COATED ORAL 3 TIMES DAILY
Qty: 270 TABLET | Refills: 1 | Status: SHIPPED | OUTPATIENT
Start: 2020-05-08 | End: 2020-11-02

## 2020-05-08 RX ORDER — SOTALOL HYDROCHLORIDE 80 MG/1
80 TABLET ORAL 2 TIMES DAILY
COMMUNITY
Start: 2020-04-15

## 2020-05-08 NOTE — PROGRESS NOTES
occurred. Pursuant to the emergency declaration under the [facility], [facility] waiver authority and the [employer] and [employer] Act, this Virtual  Visit was conducted, with patient's consent, to reduce the patient's risk of exposure to COVID-19 and provide continuity of care for an established patient. Services were provided through a video synchronous discussion virtually to substitute for in-person clinic visit.

## 2020-05-08 NOTE — PATIENT INSTRUCTIONS
Please call with any questions or concerns:   [facility]  @ [phone]. LAB RESULTS:  Please obtain any labs or diagnostic tests as discussed today. You should call the office to check the results. Please allow  3 to 7 days for us to get these results. MEDICATION LIST:  Please bring an accurate list of your medications to every visit. APPOINTMENT CONFIRMATION:  We will call you the day before your scheduled appointment to confirm. If we are unable to reach you, you MUST call back by the end of the day to confirm the appointment or we may be forced to cancel.

## 2020-07-27 ENCOUNTER — TELEPHONE (OUTPATIENT)
Dept: NEUROLOGY | Age: 70
End: 2020-07-27

## 2020-07-27 NOTE — TELEPHONE ENCOUNTER
Patient's wife stated that patient has been complaining about numbness behind eye to the top of head for several years, but it has increased in the last month. Patient also seems to be more confused lately. Wife would like to know if he should come in to see Dr Jose Armando Chong about this or if there is someone else that he should see about it? Wife states that he has been really aggravated by it recently.

## 2020-08-14 ENCOUNTER — OFFICE VISIT (OUTPATIENT)
Dept: FAMILY MEDICINE CLINIC | Age: 70
End: 2020-08-14
Payer: MEDICARE

## 2020-08-14 VITALS
BODY MASS INDEX: 27.67 KG/M2 | TEMPERATURE: 97.1 F | HEART RATE: 57 BPM | OXYGEN SATURATION: 97 % | RESPIRATION RATE: 12 BRPM | WEIGHT: 198.38 LBS | DIASTOLIC BLOOD PRESSURE: 64 MMHG | SYSTOLIC BLOOD PRESSURE: 100 MMHG

## 2020-08-14 PROBLEM — J30.89 NON-SEASONAL ALLERGIC RHINITIS: Status: ACTIVE | Noted: 2020-08-14

## 2020-08-14 PROBLEM — I47.10 PSVT (PAROXYSMAL SUPRAVENTRICULAR TACHYCARDIA): Status: RESOLVED | Noted: 2017-12-07 | Resolved: 2020-08-14

## 2020-08-14 PROBLEM — I47.1 PSVT (PAROXYSMAL SUPRAVENTRICULAR TACHYCARDIA) (HCC): Status: RESOLVED | Noted: 2017-12-07 | Resolved: 2020-08-14

## 2020-08-14 PROBLEM — J30.9 ALLERGIC RHINITIS: Status: ACTIVE | Noted: 2020-08-14

## 2020-08-14 PROBLEM — R00.0 WIDE-COMPLEX TACHYCARDIA: Status: RESOLVED | Noted: 2017-12-07 | Resolved: 2020-08-14

## 2020-08-14 PROCEDURE — 99214 OFFICE O/P EST MOD 30 MIN: CPT | Performed by: FAMILY MEDICINE

## 2020-08-14 PROCEDURE — 1123F ACP DISCUSS/DSCN MKR DOCD: CPT | Performed by: FAMILY MEDICINE

## 2020-08-14 PROCEDURE — 4040F PNEUMOC VAC/ADMIN/RCVD: CPT | Performed by: FAMILY MEDICINE

## 2020-08-14 PROCEDURE — G8427 DOCREV CUR MEDS BY ELIG CLIN: HCPCS | Performed by: FAMILY MEDICINE

## 2020-08-14 PROCEDURE — 1036F TOBACCO NON-USER: CPT | Performed by: FAMILY MEDICINE

## 2020-08-14 PROCEDURE — G8417 CALC BMI ABV UP PARAM F/U: HCPCS | Performed by: FAMILY MEDICINE

## 2020-08-14 PROCEDURE — 3017F COLORECTAL CA SCREEN DOC REV: CPT | Performed by: FAMILY MEDICINE

## 2020-08-14 RX ORDER — FLUTICASONE PROPIONATE 50 MCG
2 SPRAY, SUSPENSION (ML) NASAL NIGHTLY
Qty: 3 BOTTLE | Refills: 3 | Status: SHIPPED | OUTPATIENT
Start: 2020-08-14 | End: 2021-08-16 | Stop reason: SDUPTHER

## 2020-08-14 ASSESSMENT — PATIENT HEALTH QUESTIONNAIRE - PHQ9
1. LITTLE INTEREST OR PLEASURE IN DOING THINGS: 0
2. FEELING DOWN, DEPRESSED OR HOPELESS: 0
SUM OF ALL RESPONSES TO PHQ9 QUESTIONS 1 & 2: 0
SUM OF ALL RESPONSES TO PHQ QUESTIONS 1-9: 0
SUM OF ALL RESPONSES TO PHQ QUESTIONS 1-9: 0

## 2020-08-14 NOTE — PROGRESS NOTES
Subjective:      Patient ID: Lelia Dawson is a 72 y.o. male. CC: Patient presents for re-evaluation of chronic health problems including chronic frontal discomfort. Hyperlipidemia, Parkinson disease, and heart disease. HPI Pt is here for a follow up with his wife and will like to discuss a numbness on his forehead that has been going on for a while but in the last 3 weeks has gotten worse. He is actually states he has had issues with this for a number of years. He originally denies any sneezing but his wife and states he sneezes fairly often. He does deny any nasal congestion or drainage. He awakens with these headaches per much every day and then they do improve throughout the day. Since last office appointment he was in the emergency room with atrial flutter and that medication adjustments were made. Since that time is not noticed any atrial flutter symptoms. During that ER evaluation he had a head CT performed which was normal.  He is also had a prior MRI scan done 5 years ago which was also normal.  Otherwise he still continues be very active with limitations related to his Parkinson's disease. He denies any chest pain or shortness of breath with activities. He continues under neurology care for Parkinson disease. Patient is very compliant with medications with no adverse reactions.     Review of Systems  Patient Active Problem List   Diagnosis    Dupuytren's contracture    [facility]'s disease ([facility] [address]    Carpal tunnel syndrome    Lesion of ulnar nerve    Paroxysmal atrial fibrillation (HCC)    Atrial fibrillation (HCC)    Arthralgia of right hip    PSVT (paroxysmal supraventricular tachycardia) (HCC)    Wide-complex tachycardia ([address]    Obstructive sleep apnea syndrome    Coronary artery disease involving native heart without angina pectoris    Hypercholesteremia       Outpatient Medications Marked as Taking for the 8/14/20 encounter (Office Visit) with Humphrey Johnson MD Medication Sig Dispense Refill    sotalol (BETAPACE) 80 MG tablet Take 80 mg by mouth 2 times daily      carbidopa-levodopa (SINEMET)  MG per tablet TAKE 1 TABLET AT 7:30 AM,  11:30 AM, 3:30 PM AND 7:30   tablet 1    rOPINIRole (REQUIP) 0.25 MG tablet Take 1 tablet by mouth 3 times daily 270 tablet 1    atorvastatin (LIPITOR) 80 MG tablet Take 80 mg by mouth daily      aspirin 81 MG tablet Take 81 mg by mouth daily         Allergies   Allergen Reactions    Sertraline Anxiety       Social History     Tobacco Use    Smoking status: Former Smoker     Packs/day: 1.00     Years: 14.00     Pack years: 14.00     Types: Cigarettes     Last attempt to quit: 1982     Years since quittin.9    Smokeless tobacco: Never Used    Tobacco comment: quit age 29   Substance Use Topics    Alcohol use: No       /64 (Site: Right Upper Arm, Position: Sitting, Cuff Size: Medium Adult)   Pulse 57   Temp 97.1 °F (36.2 °C) (Infrared)   Resp 12   Wt 198 lb 6 oz (90 kg)   SpO2 97%   BMI 27.67 kg/m²     Objective:   Physical Exam  Vitals signs reviewed. Constitutional:       General: He is not in acute distress. Appearance: He is well-developed. HENT:      Right Ear: Tympanic membrane normal.      Left Ear: Tympanic membrane normal.      Nose: Congestion present. No mucosal edema or rhinorrhea. Right Sinus: No maxillary sinus tenderness or frontal sinus tenderness. Left Sinus: No maxillary sinus tenderness or frontal sinus tenderness. Neck:      Musculoskeletal: Neck supple. Vascular: No carotid bruit. Cardiovascular:      Rate and Rhythm: Normal rate and regular rhythm. Pulses:           Dorsalis pedis pulses are 2+ on the right side and 2+ on the left side. Posterior tibial pulses are 2+ on the right side and 2+ on the left side. Heart sounds: Normal heart sounds. No murmur. No friction rub. No gallop.     Pulmonary:      Effort: Pulmonary effort is normal. No respiratory distress. Breath sounds: Normal breath sounds. Musculoskeletal: Normal range of motion. General: No tenderness. Right lower leg: No edema. Left lower leg: No edema. Lymphadenopathy:      Cervical: No cervical adenopathy. Neurological:      Mental Status: He is alert. Sensory: Sensation is intact. Motor: Motor function is intact. Psychiatric:         Behavior: Behavior is cooperative. Assessment:      Kaitlin Harden was seen today for follow-up. Diagnoses and all orders for this visit:    Hypercholesteremia  -     Lipid Panel; Future  -     Comprehensive Metabolic Panel - [facility]; Future    Coronary artery disease involving native heart without angina pectoris, unspecified vessel or lesion type  -     Lipid Panel; Future  -     Comprehensive Metabolic Panel - [facility]; Future    Non-seasonal allergic rhinitis, unspecified trigger    Parkinson's disease ([facility] [address]    Other orders  -     fluticasone (FLONASE) 50 MCG/ACT nasal spray; 2 sprays by Each Nostril route nightly            Plan:      Pt appears stable & labs ordered. Adjustments of medication will be done after laboratory testing results available. Reviewed CT scan and MRI scan with patient and wife. Symptoms are consistent with allergic rhinitis and patient is agreement to start cortisone nasal spray nightly. He may continue taking Tylenol PRN. Patient received counseling on the following healthy behaviors: nutrition and exercise     Patient given educational materials     Health maintenance updated    Discussed use, benefit, and side effects of prescribed medications. Barriers to medication compliance addressed. All patient questions answered. Pt voiced understanding. Patient needs RTC in 6 months. Please note that this chart was generated using Dragon dictation software.  Although every effort was made to ensure the accuracy of this automated transcription, some errors in transcription may have occurred.

## 2020-09-14 ENCOUNTER — HOSPITAL ENCOUNTER (OUTPATIENT)
Age: 70
Discharge: HOME | End: 2020-09-14
Payer: MEDICARE

## 2020-09-14 LAB
A/G RATIO: 1.5 (ref 1.1–2.2)
ALBUMIN SERPL-MCNC: 4.2 G/DL (ref 3.4–5)
ALP BLD-CCNC: 86 U/L (ref 40–129)
ALT SERPL-CCNC: 9 U/L (ref 10–40)
ANION GAP SERPL CALCULATED.3IONS-SCNC: 12 MMOL/L (ref 3–16)
AST SERPL-CCNC: 14 U/L (ref 15–37)
BILIRUB SERPL-MCNC: 0.6 MG/DL (ref 0–1)
BUN BLDV-MCNC: 15 MG/DL (ref 7–20)
CALCIUM SERPL-MCNC: 9.3 MG/DL (ref 8.3–10.6)
CHLORIDE BLD-SCNC: 104 MMOL/L (ref 99–110)
CHOLESTEROL, TOTAL: 130 MG/DL (ref 0–199)
CO2: 25 MMOL/L (ref 21–32)
CREAT SERPL-MCNC: 0.7 MG/DL (ref 0.8–1.3)
GFR AFRICAN AMERICAN: >60
GFR NON-AFRICAN AMERICAN: >60
GLOBULIN: 2.8 G/DL
GLUCOSE BLD-MCNC: 98 MG/DL (ref 70–99)
HDLC SERPL-MCNC: 41 MG/DL (ref 40–60)
LDL CHOLESTEROL CALCULATED: 78 MG/DL
POTASSIUM SERPL-SCNC: 5 MMOL/L (ref 3.5–5.1)
PROSTATE SPECIFIC ANTIGEN: 1.63 NG/ML (ref 0–4)
SODIUM BLD-SCNC: 141 MMOL/L (ref 136–145)
TOTAL PROTEIN: 7 G/DL (ref 6.4–8.2)
TRIGL SERPL-MCNC: 55 MG/DL (ref 0–150)
VLDLC SERPL CALC-MCNC: 11 MG/DL

## 2020-09-14 PROCEDURE — 80061 LIPID PANEL: CPT

## 2020-09-14 PROCEDURE — 80053 COMPREHEN METABOLIC PANEL: CPT

## 2020-09-14 PROCEDURE — 36415 COLL VENOUS BLD VENIPUNCTURE: CPT

## 2020-09-14 PROCEDURE — 84153 ASSAY OF PSA TOTAL: CPT

## 2020-12-07 RX ORDER — ROPINIROLE 0.25 MG/1
TABLET, FILM COATED ORAL
Qty: 90 TABLET | Refills: 0 | Status: SHIPPED | OUTPATIENT
Start: 2020-12-07 | End: 2020-12-15 | Stop reason: SDUPTHER

## 2021-01-22 ENCOUNTER — OFFICE VISIT (OUTPATIENT)
Dept: NEUROLOGY | Age: 71
End: 2021-01-22
Payer: MEDICARE

## 2021-01-22 VITALS
WEIGHT: 200 LBS | BODY MASS INDEX: 27.89 KG/M2 | HEART RATE: 56 BPM | SYSTOLIC BLOOD PRESSURE: 113 MMHG | TEMPERATURE: 96.8 F | DIASTOLIC BLOOD PRESSURE: 73 MMHG

## 2021-01-22 DIAGNOSIS — F32.A DEPRESSION, UNSPECIFIED DEPRESSION TYPE: ICD-10-CM

## 2021-01-22 DIAGNOSIS — G20 PARKINSON'S DISEASE (HCC): Primary | ICD-10-CM

## 2021-01-22 DIAGNOSIS — R25.1 TREMOR: ICD-10-CM

## 2021-01-22 PROCEDURE — G8484 FLU IMMUNIZE NO ADMIN: HCPCS | Performed by: PSYCHIATRY & NEUROLOGY

## 2021-01-22 PROCEDURE — 1123F ACP DISCUSS/DSCN MKR DOCD: CPT | Performed by: PSYCHIATRY & NEUROLOGY

## 2021-01-22 PROCEDURE — 1036F TOBACCO NON-USER: CPT | Performed by: PSYCHIATRY & NEUROLOGY

## 2021-01-22 PROCEDURE — G8417 CALC BMI ABV UP PARAM F/U: HCPCS | Performed by: PSYCHIATRY & NEUROLOGY

## 2021-01-22 PROCEDURE — 4040F PNEUMOC VAC/ADMIN/RCVD: CPT | Performed by: PSYCHIATRY & NEUROLOGY

## 2021-01-22 PROCEDURE — 99214 OFFICE O/P EST MOD 30 MIN: CPT | Performed by: PSYCHIATRY & NEUROLOGY

## 2021-01-22 PROCEDURE — 3017F COLORECTAL CA SCREEN DOC REV: CPT | Performed by: PSYCHIATRY & NEUROLOGY

## 2021-01-22 PROCEDURE — G8427 DOCREV CUR MEDS BY ELIG CLIN: HCPCS | Performed by: PSYCHIATRY & NEUROLOGY

## 2021-01-22 RX ORDER — SERTRALINE HYDROCHLORIDE 25 MG/1
25 TABLET, FILM COATED ORAL DAILY
Qty: 90 TABLET | Refills: 0 | Status: SHIPPED | OUTPATIENT
Start: 2021-01-22 | End: 2021-03-29

## 2021-01-22 NOTE — PROGRESS NOTES
Percy [facility]   Neurology followup    Subjective:   CC/HP  History was obtained from patient and his wife  Interval history:  Patient and his wife stated that he is doing a little worse. Patient walking may be a touch worse. He does not swing his arms like he used to. He seems to be more depressed. He is not sleeping well at night. She has had some tremors. These are fairly well controlled. Background history:  Patient has known Parkinson's disease. Patient has some tremors. He has noticed occasional difficulty with swallowing.   Patient still has some bradykinesia  Patient has chronic paroxysmal atrial fibrillation  Patient states that his right hip pain is much improved    REVIEW OF SYSTEMS    Constitutional:  []   Chills   [x]  Fatigue   []  Fevers   []  Malaise   [x]  Weight loss     [] Denies all of the above    Respiratory:   []  Cough    [x]  Shortness of breath         [] Denies all of the above     Cardiovascular:   []  Chest pain    []  Exertional chest pressure/discomfort           [] Palpitations    []  Syncope     [x] Denies all of the above        Past Medical History:   Diagnosis Date    [facility] fibrillation ([facility]     No significant past medical history     Parkinson disease ([facility]      Family History   Problem Relation Age of Onset    Heart Disease Mother     Diabetes Mother     High Blood Pressure Mother     Sleep Apnea Mother     Diabetes Father     High Blood Pressure Father     High Blood Pressure Brother     High Cholesterol Neg Hx     Kidney Disease Neg Hx      Social History     Socioeconomic History    Marital status:      Spouse name: None    Number of children: 2    Years of education: None    Highest education level: None   Occupational History    Occupation: [employer]   Social Needs    Financial resource strain: None    Food insecurity     Worry: None     Inability: None    Transportation needs     Medical: None     Non-medical: None   Tobacco Use    Smoking status: Former Smoker     Packs/day: 1.00     Years: 14.00     Pack years: 14.00     Types: Cigarettes     Quit date: 1982     Years since quittin.3    Smokeless tobacco: Never Used    Tobacco comment: quit age 29   Substance and Sexual Activity    Alcohol use: No    Drug use: No    Sexual activity: None   Lifestyle    Physical activity     Days per week: None     Minutes per session: None    Stress: None   Relationships    Social connections     Talks on phone: None     Gets together: None     Attends Jain service: None     Active member of club or organization: None     Attends meetings of clubs or organizations: None     Relationship status: None    Intimate partner violence     Fear of current or ex partner: None     Emotionally abused: None     Physically abused: None     Forced sexual activity: None   Other Topics Concern    None   Social History Narrative    None        Objective:  Exam:  /73 (Site: Left Upper Arm, Position: Sitting, Cuff Size: Large Adult)   Pulse 56   Temp 96.8 °F (36 °C) (Temporal)   Wt 200 lb (90.7 kg)   BMI 27.89 kg/m²   This is a well-nourished patient in no acute distress  Patient is awake, alert and oriented x3. Speech is normal.  Pupils are equal round reacting to light. Extraocular movements intact. Face symmetrical. Tongue midline. Motor exam shows normal symmetrical strength. Deep tendon reflexes normal. Plantar reflexes downgoing. Tone increased bilaterally especially on the right side. Mild cogwheeling on the right side. Sensory exam normal. Coordination normal. Gait Shows decreased arm swing on the right side. No carotid bruit. No neck stiffness.     Data :  MRI of brain images showed minimal nonspecific white matter changes but was otherwise normal.    Impression :  Parkinson's disease, symptoms not well controlled  Mild ataxia  Bradykinesia, improved   Minimal tremor in the right arm  Tolerating Sinemet fairly well without side effects  Depression, seems to be worse  Paroxysmal atrial fibrillation  Right hip pain,improved      Plan :  Discussed with patient and his wife  Continue Sinemet 25/100 one tablet 4 times daily   Continue ropinirole 0.25 mg 3 times daily  Add sertraline 25 mg at night  Side effects were discussed. Continue Xarelto to prevent embolic event from atrial fibrillation  Continue antiplatelet therapy and statin therapy for coronary artery disease                      Please note a portion of  this chart was generated using dragon dictation software. Although every effort was made to ensure the accuracy of this automated transcription, some errors in transcription may have occurred.

## 2021-02-15 ENCOUNTER — OFFICE VISIT (OUTPATIENT)
Dept: FAMILY MEDICINE CLINIC | Age: 71
End: 2021-02-15
Payer: MEDICARE

## 2021-02-15 VITALS
TEMPERATURE: 97.1 F | BODY MASS INDEX: 26.8 KG/M2 | DIASTOLIC BLOOD PRESSURE: 70 MMHG | OXYGEN SATURATION: 96 % | SYSTOLIC BLOOD PRESSURE: 102 MMHG | RESPIRATION RATE: 12 BRPM | HEART RATE: 66 BPM | WEIGHT: 192.13 LBS

## 2021-02-15 DIAGNOSIS — E78.00 HYPERCHOLESTEREMIA: Primary | ICD-10-CM

## 2021-02-15 DIAGNOSIS — I25.10 CORONARY ARTERY DISEASE INVOLVING NATIVE HEART WITHOUT ANGINA PECTORIS, UNSPECIFIED VESSEL OR LESION TYPE: ICD-10-CM

## 2021-02-15 DIAGNOSIS — I48.0 PAROXYSMAL ATRIAL FIBRILLATION (HCC): ICD-10-CM

## 2021-02-15 DIAGNOSIS — G20 PARKINSON'S DISEASE (HCC): ICD-10-CM

## 2021-02-15 DIAGNOSIS — Z12.5 SCREENING PSA (PROSTATE SPECIFIC ANTIGEN): ICD-10-CM

## 2021-02-15 PROCEDURE — 3017F COLORECTAL CA SCREEN DOC REV: CPT | Performed by: FAMILY MEDICINE

## 2021-02-15 PROCEDURE — 1036F TOBACCO NON-USER: CPT | Performed by: FAMILY MEDICINE

## 2021-02-15 PROCEDURE — G8484 FLU IMMUNIZE NO ADMIN: HCPCS | Performed by: FAMILY MEDICINE

## 2021-02-15 PROCEDURE — 99214 OFFICE O/P EST MOD 30 MIN: CPT | Performed by: FAMILY MEDICINE

## 2021-02-15 PROCEDURE — G8417 CALC BMI ABV UP PARAM F/U: HCPCS | Performed by: FAMILY MEDICINE

## 2021-02-15 PROCEDURE — G8427 DOCREV CUR MEDS BY ELIG CLIN: HCPCS | Performed by: FAMILY MEDICINE

## 2021-02-15 PROCEDURE — 4040F PNEUMOC VAC/ADMIN/RCVD: CPT | Performed by: FAMILY MEDICINE

## 2021-02-15 PROCEDURE — 1123F ACP DISCUSS/DSCN MKR DOCD: CPT | Performed by: FAMILY MEDICINE

## 2021-02-15 PROCEDURE — 3288F FALL RISK ASSESSMENT DOCD: CPT | Performed by: FAMILY MEDICINE

## 2021-02-15 ASSESSMENT — PATIENT HEALTH QUESTIONNAIRE - PHQ9
SUM OF ALL RESPONSES TO PHQ QUESTIONS 1-9: 2
SUM OF ALL RESPONSES TO PHQ QUESTIONS 1-9: 2

## 2021-02-15 NOTE — PROGRESS NOTES
Subjective:      Patient ID: Johanna Schmitt is a 80 y.o. male. CC: Patient presents for re-evaluation of chronic health problems including hyperlipidemia, atrial fibrillation, coronary disease, and Parkinson's disease. Michelle Corona HPI Pt is here for a follow up and states he still having numbness on his head everyday. Patient described as numbness sensation in his forehead predominantly. His wife states he recently was started on antidepressant medications per neurology and she feels he has made improvement although he is not has any real changes. He not complain as much about the numbness since that time. He had a recent valuation with cardiology no change in medical management. He did have his laboratory assessment performed recently demonstrating his cholesterol was very well controlled. His Parkinson's disease does limit his activity quite a bit. Patient is very compliant with medications with no adverse reactions.     Review of Systems  Patient Active Problem List   Diagnosis    Dupuytren's contracture    Parkinson's disease ([facility] [address]    Carpal tunnel syndrome    Lesion of ulnar nerve    Paroxysmal atrial fibrillation (HCC)    Typical atrial flutter (HCC)    Arthralgia of right hip    Obstructive sleep apnea syndrome    Coronary artery disease involving native heart without angina pectoris    Hypercholesteremia    Allergic rhinitis       Outpatient Medications Marked as Taking for the 2/15/21 encounter (Office Visit) with Anish Hernadez MD   Medication Sig Dispense Refill    sertraline (ZOLOFT) 25 MG tablet Take 1 tablet by mouth daily 90 tablet 0    carbidopa-levodopa (SINEMET)  MG per tablet TAKE 1 TABLET AT 7:30 AM,  11:30 AM, 3:30 PM AND 7:30   tablet 1    rOPINIRole (REQUIP) 0.25 MG tablet TAKE 1 TABLET BY MOUTH THREE TIMES A  tablet 1    fluticasone (FLONASE) 50 MCG/ACT nasal spray 2 sprays by Each Nostril route nightly 3 Bottle 3    sotalol (BETAPACE) 80 MG tablet Take 80 mg by mouth 2 times daily      atorvastatin (LIPITOR) 80 MG tablet Take 80 mg by mouth daily      aspirin 81 MG tablet Take 81 mg by mouth daily         Allergies   Allergen Reactions    Sertraline Anxiety       Social History     Tobacco Use    Smoking status: Former Smoker     Packs/day: 1.00     Years: 14.00     Pack years: 14.00     Types: Cigarettes     Quit date: 1982     Years since quittin.4    Smokeless tobacco: Never Used    Tobacco comment: quit age 29   Substance Use Topics    Alcohol use: No       There were no vitals taken for this visit. Objective:   Physical Exam  Constitutional:       General: He is not in acute distress. Appearance: He is well-developed. Neck:      Vascular: No carotid bruit. Cardiovascular:      Rate and Rhythm: Normal rate and regular rhythm. Pulses:           Dorsalis pedis pulses are 2+ on the right side and 2+ on the left side. Posterior tibial pulses are 2+ on the right side and 2+ on the left side. Heart sounds: Normal heart sounds. No murmur. No friction rub. No gallop. Pulmonary:      Effort: Pulmonary effort is normal.      Breath sounds: Normal breath sounds. Musculoskeletal: Normal range of motion. General: No tenderness. Right lower leg: No edema. Left lower leg: No edema. Neurological:      Mental Status: He is alert and oriented to person, place, and time. Sensory: Sensation is intact. Motor: Motor function is intact. Psychiatric:         Mood and Affect: Mood normal.         Speech: Speech normal.         Behavior: Behavior is cooperative. Cognition and Memory: Cognition normal.         Assessment:      Vero Wiley was seen today for follow-up. Diagnoses and all orders for this visit:    Hypercholesteremia  -     Comprehensive Metabolic Panel - [facility]; Future  -     Lipid Panel;  Future    Paroxysmal atrial fibrillation (HCC)  -     Comprehensive Metabolic Panel - [facility]; Future  -     TSH without Reflex; Future    Screening PSA (prostate specific antigen)  -     PSA screening; Future    Coronary artery disease involving native heart without angina pectoris, unspecified vessel or lesion type    Parkinson's disease ([facility] [address]            Plan:      Pt appears stable & reviewed labs with patient and wife. No change current medications. Agreed with the sertraline medication but did discuss this is a low dose Nipride would benefit from a higher dose. Continue with neurology care    Patient received counseling on the following healthy behaviors: nutrition and exercise     Patient given educational materials     Health maintenance updated    Discussed use, benefit, and side effects of prescribed medications. Barriers to medication compliance addressed. All patient questions answered. Pt voiced understanding. Patient needs RTC in 6 months for annual Medicare visit. Medical decision making of moderate complexity. Please note that this chart was generated using Dragon dictation software. Although every effort was made to ensure the accuracy of this automated transcription, some errors in transcription may have occurred.

## 2021-03-29 RX ORDER — SERTRALINE HYDROCHLORIDE 25 MG/1
TABLET, FILM COATED ORAL
Qty: 90 TABLET | Refills: 0 | Status: SHIPPED | OUTPATIENT
Start: 2021-03-29 | End: 2021-07-06

## 2021-04-22 ENCOUNTER — OFFICE VISIT (OUTPATIENT)
Dept: NEUROLOGY | Age: 71
End: 2021-04-22
Payer: MEDICARE

## 2021-04-22 VITALS
DIASTOLIC BLOOD PRESSURE: 72 MMHG | HEART RATE: 63 BPM | SYSTOLIC BLOOD PRESSURE: 124 MMHG | WEIGHT: 192 LBS | BODY MASS INDEX: 26.88 KG/M2 | HEIGHT: 71 IN

## 2021-04-22 DIAGNOSIS — R25.1 TREMOR: ICD-10-CM

## 2021-04-22 DIAGNOSIS — G20 PARKINSON'S DISEASE (HCC): ICD-10-CM

## 2021-04-22 DIAGNOSIS — F32.A DEPRESSION, UNSPECIFIED DEPRESSION TYPE: Primary | ICD-10-CM

## 2021-04-22 PROCEDURE — G8427 DOCREV CUR MEDS BY ELIG CLIN: HCPCS | Performed by: PSYCHIATRY & NEUROLOGY

## 2021-04-22 PROCEDURE — 3017F COLORECTAL CA SCREEN DOC REV: CPT | Performed by: PSYCHIATRY & NEUROLOGY

## 2021-04-22 PROCEDURE — 4040F PNEUMOC VAC/ADMIN/RCVD: CPT | Performed by: PSYCHIATRY & NEUROLOGY

## 2021-04-22 PROCEDURE — 99214 OFFICE O/P EST MOD 30 MIN: CPT | Performed by: PSYCHIATRY & NEUROLOGY

## 2021-04-22 PROCEDURE — 1036F TOBACCO NON-USER: CPT | Performed by: PSYCHIATRY & NEUROLOGY

## 2021-04-22 PROCEDURE — 1123F ACP DISCUSS/DSCN MKR DOCD: CPT | Performed by: PSYCHIATRY & NEUROLOGY

## 2021-04-22 PROCEDURE — G8417 CALC BMI ABV UP PARAM F/U: HCPCS | Performed by: PSYCHIATRY & NEUROLOGY

## 2021-04-22 NOTE — PROGRESS NOTES
Michelle Callahan   Neurology followup    Subjective:   CC/HP  History was obtained from patient   Interval history:  Patient states that he is doing better. His tremors and bradykinesia have remained stable. No side effects of medication. She has depression is better. She feels that the sertraline seems to be helping. Even though his wife was not in the office today, patient tells me that he had discussed with his wife and she also feels that he is doing better. .    Background history:  Patient has known Parkinson's disease. Patient has some tremors. He has noticed occasional difficulty with swallowing.   Patient still has some bradykinesia  Patient has chronic paroxysmal atrial fibrillation  Patient states that his right hip pain is much improved    REVIEW OF SYSTEMS    Constitutional:  []   Chills   []  Fatigue   []  Fevers   []  Malaise   []  Weight loss     [x] Denies all of the above    Respiratory:   []  Cough    []  Shortness of breath         [x] Denies all of the above     Cardiovascular:   []  Chest pain    []  Exertional chest pressure/discomfort           [] Palpitations    []  Syncope     [x] Denies all of the above        Past Medical History:   Diagnosis Date    [facility]     No significant past medical history     Parkinson disease ([facility]      Family History   Problem Relation Age of Onset    Heart Disease Mother     Diabetes Mother     High Blood Pressure Mother     Sleep Apnea Mother     Diabetes Father     High Blood Pressure Father     High Blood Pressure Brother     High Cholesterol Neg Hx     Kidney Disease Neg Hx      Social History     Socioeconomic History    Marital status:      Spouse name: Not on file    Number of children: 2    Years of education: Not on file    Highest education level: Not on file   Occupational History    Occupation: [employer]   Social Needs    Financial resource strain: Not on file    Food insecurity     Worry: Not on file Inability: Not on file    Transportation needs     Medical: Not on file     Non-medical: Not on file   Tobacco Use    Smoking status: Former Smoker     Packs/day: 1.00     Years: 14.00     Pack years: 14.00     Types: Cigarettes     Quit date: 1982     Years since quittin.6    Smokeless tobacco: Never Used    Tobacco comment: quit age 29   Substance and Sexual Activity    Alcohol use: No    Drug use: No    Sexual activity: Not on file   Lifestyle    Physical activity     Days per week: Not on file     Minutes per session: Not on file    Stress: Not on file   Relationships    Social connections     Talks on phone: Not on file     Gets together: Not on file     Attends Anabaptism service: Not on file     Active member of club or organization: Not on file     Attends meetings of clubs or organizations: Not on file     Relationship status: Not on file    Intimate partner violence     Fear of current or ex partner: Not on file     Emotionally abused: Not on file     Physically abused: Not on file     Forced sexual activity: Not on file   Other Topics Concern    Not on file   Social History Narrative    Not on file        Objective:  Exam:  /72   Pulse 63   Ht 5' 11\" (1.803 m)   Wt 192 lb (87.1 kg)   BMI 26.78 kg/m²   This is a well-nourished patient in no acute distress  Patient is awake, alert and oriented x3. Speech is normal.  Pupils are equal round reacting to light. Extraocular movements intact. Face symmetrical. Tongue midline. Motor exam shows normal symmetrical strength. Deep tendon reflexes normal. Plantar reflexes downgoing. Tone increased bilaterally especially on the right side. Mild cogwheeling on the right side. Sensory exam normal. Coordination normal. Gait Shows decreased arm swing on the right side. No carotid bruit. No neck stiffness.     Data :  MRI of brain images showed minimal nonspecific white matter changes but was otherwise normal.    Impression :  Parkinson's disease, symptoms reasonably stable   Mild ataxia  Bradykinesia, improved   Minimal tremor in the right arm  Tolerating Sinemet fairly well without side effects  Depression, stable  Paroxysmal atrial fibrillation  Right hip pain,improved      Plan :  Discussed with patient   Continue Sinemet 25/100 one tablet 4 times daily   Continue ropinirole 0.25 mg 3 times daily  Continue sertraline 25 mg at night  Side effects were discussed. Continue Xarelto to prevent embolic event from atrial fibrillation  Continue antiplatelet therapy and statin therapy for coronary artery disease  Return in 6 months                      Please note a portion of  this chart was generated using dragon dictation software. Although every effort was made to ensure the accuracy of this automated transcription, some errors in transcription may have occurred.

## 2021-07-06 RX ORDER — SERTRALINE HYDROCHLORIDE 25 MG/1
TABLET, FILM COATED ORAL
Qty: 90 TABLET | Refills: 0 | Status: SHIPPED | OUTPATIENT
Start: 2021-07-06 | End: 2021-10-07 | Stop reason: SDUPTHER

## 2021-08-16 ENCOUNTER — OFFICE VISIT (OUTPATIENT)
Dept: FAMILY MEDICINE CLINIC | Age: 71
End: 2021-08-16
Payer: MEDICARE

## 2021-08-16 VITALS
OXYGEN SATURATION: 99 % | BODY MASS INDEX: 26.14 KG/M2 | TEMPERATURE: 97.7 F | HEART RATE: 59 BPM | DIASTOLIC BLOOD PRESSURE: 62 MMHG | HEIGHT: 72 IN | WEIGHT: 193 LBS | SYSTOLIC BLOOD PRESSURE: 102 MMHG

## 2021-08-16 DIAGNOSIS — E78.00 HYPERCHOLESTEREMIA: ICD-10-CM

## 2021-08-16 DIAGNOSIS — N40.1 BENIGN PROSTATIC HYPERPLASIA WITH URINARY FREQUENCY: ICD-10-CM

## 2021-08-16 DIAGNOSIS — G20 PARKINSON'S DISEASE (HCC): ICD-10-CM

## 2021-08-16 DIAGNOSIS — Z12.5 SCREENING PSA (PROSTATE SPECIFIC ANTIGEN): ICD-10-CM

## 2021-08-16 DIAGNOSIS — I25.10 CORONARY ARTERY DISEASE INVOLVING NATIVE HEART WITHOUT ANGINA PECTORIS, UNSPECIFIED VESSEL OR LESION TYPE: ICD-10-CM

## 2021-08-16 DIAGNOSIS — J30.2 SEASONAL ALLERGIC RHINITIS, UNSPECIFIED TRIGGER: ICD-10-CM

## 2021-08-16 DIAGNOSIS — Z00.00 ROUTINE GENERAL MEDICAL EXAMINATION AT A HEALTH CARE FACILITY: Primary | ICD-10-CM

## 2021-08-16 DIAGNOSIS — R35.0 BENIGN PROSTATIC HYPERPLASIA WITH URINARY FREQUENCY: ICD-10-CM

## 2021-08-16 DIAGNOSIS — K63.5 POLYP OF COLON, UNSPECIFIED PART OF COLON, UNSPECIFIED TYPE: ICD-10-CM

## 2021-08-16 PROCEDURE — G0439 PPPS, SUBSEQ VISIT: HCPCS | Performed by: FAMILY MEDICINE

## 2021-08-16 PROCEDURE — 3017F COLORECTAL CA SCREEN DOC REV: CPT | Performed by: FAMILY MEDICINE

## 2021-08-16 PROCEDURE — 4040F PNEUMOC VAC/ADMIN/RCVD: CPT | Performed by: FAMILY MEDICINE

## 2021-08-16 PROCEDURE — 1036F TOBACCO NON-USER: CPT | Performed by: FAMILY MEDICINE

## 2021-08-16 PROCEDURE — G8427 DOCREV CUR MEDS BY ELIG CLIN: HCPCS | Performed by: FAMILY MEDICINE

## 2021-08-16 PROCEDURE — G8417 CALC BMI ABV UP PARAM F/U: HCPCS | Performed by: FAMILY MEDICINE

## 2021-08-16 PROCEDURE — 99214 OFFICE O/P EST MOD 30 MIN: CPT | Performed by: FAMILY MEDICINE

## 2021-08-16 PROCEDURE — 1123F ACP DISCUSS/DSCN MKR DOCD: CPT | Performed by: FAMILY MEDICINE

## 2021-08-16 RX ORDER — FLUTICASONE PROPIONATE 50 MCG
2 SPRAY, SUSPENSION (ML) NASAL NIGHTLY
Qty: 3 BOTTLE | Refills: 3 | Status: SHIPPED | OUTPATIENT
Start: 2021-08-16 | End: 2022-08-17 | Stop reason: SDUPTHER

## 2021-08-16 ASSESSMENT — PATIENT HEALTH QUESTIONNAIRE - PHQ9
SUM OF ALL RESPONSES TO PHQ QUESTIONS 1-9: 0
1. LITTLE INTEREST OR PLEASURE IN DOING THINGS: 0
SUM OF ALL RESPONSES TO PHQ QUESTIONS 1-9: 0
2. FEELING DOWN, DEPRESSED OR HOPELESS: 0
SUM OF ALL RESPONSES TO PHQ9 QUESTIONS 1 & 2: 0
SUM OF ALL RESPONSES TO PHQ QUESTIONS 1-9: 0

## 2021-08-16 ASSESSMENT — LIFESTYLE VARIABLES
HOW OFTEN DO YOU HAVE SIX OR MORE DRINKS ON ONE OCCASION: 0
HOW OFTEN DO YOU HAVE A DRINK CONTAINING ALCOHOL: 1
HOW OFTEN DURING THE LAST YEAR HAVE YOU HAD A FEELING OF GUILT OR REMORSE AFTER DRINKING: 0
HOW MANY STANDARD DRINKS CONTAINING ALCOHOL DO YOU HAVE ON A TYPICAL DAY: 0
HAS A RELATIVE, FRIEND, DOCTOR, OR ANOTHER HEALTH PROFESSIONAL EXPRESSED CONCERN ABOUT YOUR DRINKING OR SUGGESTED YOU CUT DOWN: 0
HOW OFTEN DURING THE LAST YEAR HAVE YOU BEEN UNABLE TO REMEMBER WHAT HAPPENED THE NIGHT BEFORE BECAUSE YOU HAD BEEN DRINKING: 0
AUDIT TOTAL SCORE: 1
HOW OFTEN DURING THE LAST YEAR HAVE YOU NEEDED AN ALCOHOLIC DRINK FIRST THING IN THE MORNING TO GET YOURSELF GOING AFTER A NIGHT OF HEAVY DRINKING: 0
HAVE YOU OR SOMEONE ELSE BEEN INJURED AS A RESULT OF YOUR DRINKING: 0
HOW OFTEN DURING THE LAST YEAR HAVE YOU FOUND THAT YOU WERE NOT ABLE TO STOP DRINKING ONCE YOU HAD STARTED: 0
AUDIT-C TOTAL SCORE: 1
HOW OFTEN DURING THE LAST YEAR HAVE YOU FAILED TO DO WHAT WAS NORMALLY EXPECTED FROM YOU BECAUSE OF DRINKING: 0

## 2021-08-16 NOTE — PROGRESS NOTES
CareTeam (Including outside providers/suppliers regularly involved in providing care):   Patient Care Team:  Duane Bauman MD as PCP - General (Family Medicine)  Duane Bauman MD as PCP - [facility]  Royal Hodgkins., MD as Consulting Physician (Cardiology)    Wt Readings from Last 3 Encounters:   08/16/21 193 lb (87.5 kg)   04/22/21 192 lb (87.1 kg)   02/15/21 192 lb 2 oz (87.1 kg)     Vitals:    08/16/21 0853   BP: 102/62   Site: Left Upper Arm   Position: Sitting   Cuff Size: Medium Adult   Pulse: 59   Temp: 97.7 °F (36.5 °C)   TempSrc: Infrared   SpO2: 99%   Weight: 193 lb (87.5 kg)   Height: 5' 11.5\" (1.816 m)     Body mass index is 26.54 kg/m². Based upon direct observation of the patient, evaluation of cognition reveals recent and remote memory intact. Patient's complete Health Risk Assessment and screening values have been reviewed and are found in Flowsheets. The following problems were reviewed today and where indicated follow up appointments were made and/or referrals ordered. Positive Risk Factor Screenings with Interventions:          General Health and ACP:  General  In general, how would you say your health is?: Excellent  In the past 7 days, have you experienced any of the following?  New or Increased Pain, New or Increased Fatigue, Loneliness, Social Isolation, Stress or Anger?: None of These  Do you get the social and emotional support that you need?: Yes  Do you have a Living Will?: Yes  Advance Directives     Power of [facility] Will ACP-Advance Directive ACP-Power of     Not on File Not on File Not on File Not on File      General Health Risk Interventions:  · Requested patient bring in copy of his living will    Health Habits/Nutrition:  Health Habits/Nutrition  Do you exercise for at least 20 minutes 2-3 times per week?: (!) No  Have you lost any weight without trying in the past 3 months?: No  Do you eat only one meal per day?: No  Have you seen the dentist within the past year?: Appointment is scheduled  Body mass index: (!) 26.54  Health Habits/Nutrition Interventions:  · Inadequate physical activity:  patient is not ready to increase his/her physical activity level at this time       Personalized Preventive Plan   Current Health Maintenance Status  Immunization History   Administered Date(s) Administered    COVID-19, Moderna, PF, 100mcg/0.5mL 01/30/2021, 02/27/2021    Influenza, High Dose (Fluzone 65 yrs and older) 08/01/2018    Pneumococcal Conjugate 13-valent (Dilxumw66) 04/19/2016    Pneumococcal Polysaccharide (Mqetnnidm22) 05/01/2017    Tdap (Boostrix, Adacel) 07/22/2013        Health Maintenance   Topic Date Due    AAA screen  Never done    Hepatitis C screen  Never done    Colon cancer screen colonoscopy  09/30/2018    Annual Wellness Visit (AWV)  Never done    Shingles Vaccine (1 of 2) 02/15/2022 (Originally 1/2/2001)    Flu vaccine (1) 09/01/2021    Lipid screen  09/14/2021    DTaP/Tdap/Td vaccine (2 - Td or Tdap) 07/22/2023    Pneumococcal 65+ years Vaccine  Completed    COVID-19 Vaccine  Completed    Hepatitis A vaccine  Aged Out    Hepatitis B vaccine  Aged Out    Hib vaccine  Aged Out    Meningococcal (ACWY) vaccine  Aged Out     Recommendations for [employer] Due: see orders and patient instructions/AVS.  . Recommended screening schedule for the next 5-10 years is provided to the patient in written form: see Patient Instructions/AVS.    Guille Chapman was seen today for medicare awv.     Diagnoses and all orders for this visit:    Routine general medical examination at a health care facility    Benign prostatic hyperplasia with urinary frequency    Polyp of colon, unspecified part of colon, unspecified type  -     AFL - Kristy Arshad MD, Gastroenterology, [facility]    Coronary artery disease involving native heart without angina pectoris, unspecified vessel or lesion type  -     Comprehensive Metabolic Panel; Future  -     Lipid Panel; Future    Hypercholesteremia  -     Comprehensive Metabolic Panel; Future  -     Lipid Panel; Future    Screening PSA (prostate specific antigen)  -     PSA screening;  Future    Parkinson's disease ([facility]    Other orders  -     fluticasone (FLONASE) 50 MCG/ACT nasal spray; 2 sprays by Each Nostril route nightly

## 2021-08-16 NOTE — PATIENT INSTRUCTIONS
Personalized Preventive Plan for Jessica Delvalle - 8/16/2021  Medicare offers a range of preventive health benefits. Some of the tests and screenings are paid in full while other may be subject to a deductible, co-insurance, and/or copay. Some of these benefits include a comprehensive review of your medical history including lifestyle, illnesses that may run in your family, and various assessments and screenings as appropriate. After reviewing your medical record and screening and assessments performed today your provider may have ordered immunizations, labs, imaging, and/or referrals for you. A list of these orders (if applicable) as well as your Preventive Care list are included within your After Visit Summary for your review. Other Preventive Recommendations:    · A preventive eye exam performed by an eye specialist is recommended every 1-2 years to screen for glaucoma; cataracts, macular degeneration, and other eye disorders. · A preventive dental visit is recommended every 6 months. · Try to get at least 150 minutes of exercise per week or 10,000 steps per day on a pedometer . · Order or download the FREE \"Exercise & Physical Activity: Your Everyday Guide\" from The [employer]. Call 5-691.713.3466 or search [employer] online. · You need 3810-5442 mg of calcium and 6655-5006 IU of vitamin D per day. It is possible to meet your calcium requirement with diet alone, but a vitamin D supplement is usually necessary to meet this goal.  · When exposed to the sun, use a sunscreen that protects against both UVA and UVB radiation with an SPF of 30 or greater. Reapply every 2 to 3 hours or after sweating, drying off with a towel, or swimming. · Always wear a seat belt when traveling in a car. Always wear a helmet when riding a bicycle or motorcycle.   Patient Education        Benign Prostatic Hyperplasia: Care Instructions  Your Care Instructions     Benign prostatic hyperplasia, or BPH, is an enlarged prostate gland. The prostate is a small gland that makes some of the fluid in semen. Prostate enlargement happens to almost all men as they age. It is usually not serious. BPH does not cause prostate cancer. As the prostate gets bigger, it may partly block the flow of urine. You may have a hard time getting a urine stream started or completely stopped. You may have a weak urine stream, or you may have to urinate more often than you used to, especially at night. Most men find these problems easy to manage. You do not need treatment unless your symptoms bother you a lot or you have other problems, such as bladder infections or stones. In these cases, medicines may help. Surgery is not needed unless the urine flow is blocked or the symptoms do not get better with medicine. Follow-up care is a key part of your treatment and safety. Be sure to make and go to all appointments, and call your doctor if you are having problems. It's also a good idea to know your test results and keep a list of the medicines you take. How can you care for yourself at home? Urinate as much as you can, relax for a few moments, and then try to urinate again. Sit on the toilet to urinate. Avoid caffeine and alcohol. These drinks will increase how often you need to urinate. Many over-the-counter cold and allergy medicines can make the symptoms of BPH worse. Avoid antihistamines, decongestants, and allergy pills, if you can. Read the warnings on the package. If you take any prescription medicines such as muscle relaxants, pain medicines, or medicines for depression or anxiety, ask your doctor or pharmacist if they can cause urination problems. When should you call for help? Call your doctor now or seek immediate medical care if:    You cannot urinate at all.     You have symptoms of a urinary infection. For example: You have blood or pus in your urine.   You have pain in your back just below your rib cage. This is called flank pain. You have a fever, chills, or body aches. It hurts to urinate. You have groin or belly pain. Watch closely for changes in your health, and be sure to contact your doctor if:    It hurts when you ejaculate.     Your urinary problems get a lot worse or bother you a lot. Where can you learn more? Go to https://[email or web address].[email or web address] org and sign in to your [facility] account. Enter O902 in the [employer] box to learn more about \"Benign Prostatic Hyperplasia: Care Instructions. \"     If you do not have an account, please click on the \"Sign Up Now\" link. Current as of: February 10, 2021               Content Version: 12.9  © 1231-6351 Healthwise, Incorporated. Care instructions adapted under license by [facility] ([facility]). If you have questions about a medical condition or this instruction, always ask your healthcare professional. [place] [address] any warranty or liability for your use of this information.

## 2021-08-16 NOTE — PROGRESS NOTES
Subjective:      Patient ID: Ramon Cazares is a 80 y.o. male. CC: Patient presents for AMV and follow-up of chronic health problems    HPI Patient presents today for a follow-up on chronic medications and medical conditions in accompaniment of wife. Since last appointment time he was reevaluated by neurology and thought he was doing well with medical management was Parkinson's disease and his depression symptoms. He is having more urinary issues with nocturia 2-3 times nightly and a lot of daytime frequency and urgency. He tries to stay active and denies any chest pain or shortness of breath with activities. He is due for laboratory testing. He does feel the Flonase medication helped him out significantly with allergies he would like to maintain that medication. He had a colonoscopy in 2013 removal of 1 polyp and was to have a repeat colonoscopy in 5 years but somehow has not had follow-up.     Review of Systems     Patient Active Problem List   Diagnosis    Dupuytren's contracture    Parkinson's disease ([facility]    Carpal tunnel syndrome    Lesion of ulnar nerve    Paroxysmal atrial fibrillation (HCC)    Typical atrial flutter (HCC)    Arthralgia of right hip    Obstructive sleep apnea syndrome    Coronary artery disease involving native heart without angina pectoris    Hypercholesteremia    Allergic rhinitis       Outpatient Medications Marked as Taking for the 8/16/21 encounter (Office Visit) with Idania Szymanski MD   Medication Sig Dispense Refill    sertraline (ZOLOFT) 25 MG tablet TAKE 1 TABLET BY MOUTH EVERY DAY 90 tablet 0    carbidopa-levodopa (SINEMET)  MG per tablet TAKE 1 TABLET AT 7:30 AM,  11:30 AM, 3:30 PM AND 7:30   tablet 1    rOPINIRole (REQUIP) 0.25 MG tablet TAKE 1 TABLET BY MOUTH THREE TIMES A  tablet 1    fluticasone (FLONASE) 50 MCG/ACT nasal spray 2 sprays by Each Nostril route nightly 3 Bottle 3    sotalol (BETAPACE) 80 MG tablet Take 80 mg by mouth 2 times daily      atorvastatin (LIPITOR) 80 MG tablet Take 80 mg by mouth daily      aspirin 81 MG tablet Take 81 mg by mouth daily         Allergies   Allergen Reactions    Sertraline Anxiety       Social History     Tobacco Use    Smoking status: Former Smoker     Packs/day: 1.00     Years: 14.00     Pack years: 14.00     Types: Cigarettes     Quit date: 1982     Years since quittin.9    Smokeless tobacco: Never Used    Tobacco comment: quit age 29   Substance Use Topics    Alcohol use: No       /62 (Site: Left Upper Arm, Position: Sitting, Cuff Size: Medium Adult)   Pulse 59   Temp 97.7 °F (36.5 °C) (Infrared)   Ht 5' 11.5\" (1.816 m)   Wt 193 lb (87.5 kg)   SpO2 99%   BMI 26.54 kg/m²       Objective:   Physical Exam  Constitutional:       General: He is not in acute distress. Appearance: He is well-developed. HENT:      Nose: Nose normal.   Neck:      Vascular: No carotid bruit. Cardiovascular:      Rate and Rhythm: Normal rate and regular rhythm. Pulses:           Dorsalis pedis pulses are 2+ on the right side and 2+ on the left side. Posterior tibial pulses are 2+ on the right side and 2+ on the left side. Heart sounds: Normal heart sounds. No murmur heard. No friction rub. No gallop. Pulmonary:      Effort: Pulmonary effort is normal.      Breath sounds: Normal breath sounds. Abdominal:      General: Bowel sounds are normal. There is no distension. Palpations: Abdomen is soft. There is no mass. Tenderness: There is no abdominal tenderness. There is no left CVA tenderness, guarding or rebound. Musculoskeletal:         General: No tenderness. Normal range of motion. Right lower leg: No edema. Left lower leg: No edema. Lymphadenopathy:      Cervical: No cervical adenopathy. Neurological:      Mental Status: He is alert and oriented to person, place, and time. Cranial Nerves: No cranial nerve deficit.       Sensory: Sensation is intact. Motor: Motor function is intact. Psychiatric:         Mood and Affect: Mood normal.         Speech: Speech normal.         Behavior: Behavior is cooperative. Cognition and Memory: Cognition normal.         Assessment:      Gianfranco Avitia was seen today for medicare aw. Diagnoses and all orders for this visit:    Routine general medical examination at a health care facility    Benign prostatic hyperplasia with urinary frequency    Polyp of colon, unspecified part of colon, unspecified type  -     AFL - Ahsan Li MD, Gastroenterology, [facility]    Coronary artery disease involving native heart without angina pectoris, unspecified vessel or lesion type  -     Comprehensive Metabolic Panel; Future  -     Lipid Panel; Future    Hypercholesteremia  -     Comprehensive Metabolic Panel; Future  -     Lipid Panel; Future    Screening PSA (prostate specific antigen)  -     PSA screening; Future    Parkinson's disease ([facility]    Seasonal allergic rhinitis, unspecified trigger    Other orders  -     fluticasone (FLONASE) 50 MCG/ACT nasal spray; 2 sprays by Each Nostril route nightly            Plan:      Proceed with laboratory testing. Adjustments of medication will be done after laboratory testing results available. For BPH symptoms we discussed adding medications such as Flomax and he is going to look through the information provided to him today and decide on management. Maintain current treatment plan regards to allergic rhinitis    Continue with neurology care regards to Parkinson's disease and agreed that the antidepressant medication has shown good benefit    Patient received counseling on the following healthy behaviors: nutrition and exercise     Patient given educational materials     Health maintenance updated    Discussed use, benefit, and side effects of prescribed medications. Barriers to medication compliance addressed. All patient questions answered.   Pt voiced understanding. Patient needs RTC in 6 months. Medical decision making of moderate complexity. Please note that this chart was generated using Dragon dictation software. Although every effort was made to ensure the accuracy of this automated transcription, some errors in transcription may have occurred.

## 2021-08-17 DIAGNOSIS — R25.1 TREMOR: ICD-10-CM

## 2021-08-17 DIAGNOSIS — G20 PARKINSON'S DISEASE (HCC): ICD-10-CM

## 2021-08-17 RX ORDER — ROPINIROLE 0.25 MG/1
TABLET, FILM COATED ORAL
Qty: 270 TABLET | Refills: 1 | Status: SHIPPED | OUTPATIENT
Start: 2021-08-17 | End: 2022-02-23

## 2021-10-07 ENCOUNTER — OFFICE VISIT (OUTPATIENT)
Dept: NEUROLOGY | Age: 71
End: 2021-10-07
Payer: MEDICARE

## 2021-10-07 ENCOUNTER — HOSPITAL ENCOUNTER (OUTPATIENT)
Age: 71
Discharge: HOME | End: 2021-10-07
Payer: MEDICARE

## 2021-10-07 VITALS
BODY MASS INDEX: 26.14 KG/M2 | WEIGHT: 193 LBS | DIASTOLIC BLOOD PRESSURE: 65 MMHG | HEIGHT: 72 IN | SYSTOLIC BLOOD PRESSURE: 99 MMHG | HEART RATE: 56 BPM

## 2021-10-07 DIAGNOSIS — G20 PARKINSON'S DISEASE (HCC): Primary | ICD-10-CM

## 2021-10-07 DIAGNOSIS — R25.1 TREMOR: ICD-10-CM

## 2021-10-07 DIAGNOSIS — I25.10 CORONARY ARTERY DISEASE INVOLVING NATIVE HEART WITHOUT ANGINA PECTORIS, UNSPECIFIED VESSEL OR LESION TYPE: ICD-10-CM

## 2021-10-07 DIAGNOSIS — E78.00 HYPERCHOLESTEREMIA: ICD-10-CM

## 2021-10-07 DIAGNOSIS — R27.0 ATAXIA: ICD-10-CM

## 2021-10-07 DIAGNOSIS — Z12.5 SCREENING PSA (PROSTATE SPECIFIC ANTIGEN): ICD-10-CM

## 2021-10-07 DIAGNOSIS — F32.A DEPRESSION, UNSPECIFIED DEPRESSION TYPE: ICD-10-CM

## 2021-10-07 DIAGNOSIS — I48.0 PAROXYSMAL ATRIAL FIBRILLATION (HCC): ICD-10-CM

## 2021-10-07 LAB
A/G RATIO: 1.7 (ref 1.1–2.2)
ALBUMIN SERPL-MCNC: 4.3 G/DL (ref 3.4–5)
ALP BLD-CCNC: 134 U/L (ref 40–129)
ALT SERPL-CCNC: <5 U/L (ref 10–40)
ANION GAP SERPL CALCULATED.3IONS-SCNC: 13 MMOL/L (ref 3–16)
AST SERPL-CCNC: 19 U/L (ref 15–37)
BILIRUB SERPL-MCNC: 0.6 MG/DL (ref 0–1)
BUN BLDV-MCNC: 9 MG/DL (ref 7–20)
CALCIUM SERPL-MCNC: 9.2 MG/DL (ref 8.3–10.6)
CHLORIDE BLD-SCNC: 104 MMOL/L (ref 99–110)
CHOLESTEROL, TOTAL: 121 MG/DL (ref 0–199)
CO2: 25 MMOL/L (ref 21–32)
CREAT SERPL-MCNC: 0.8 MG/DL (ref 0.8–1.3)
GFR AFRICAN AMERICAN: >60
GFR NON-AFRICAN AMERICAN: >60
GLOBULIN: 2.6 G/DL
GLUCOSE BLD-MCNC: 105 MG/DL (ref 70–99)
HDLC SERPL-MCNC: 36 MG/DL (ref 40–60)
LDL CHOLESTEROL CALCULATED: 76 MG/DL
POTASSIUM SERPL-SCNC: 4.9 MMOL/L (ref 3.5–5.1)
PROSTATE SPECIFIC ANTIGEN: 2.29 NG/ML (ref 0–4)
SODIUM BLD-SCNC: 142 MMOL/L (ref 136–145)
TOTAL PROTEIN: 6.9 G/DL (ref 6.4–8.2)
TRIGL SERPL-MCNC: 45 MG/DL (ref 0–150)
TSH SERPL DL<=0.05 MIU/L-ACNC: 2.22 UIU/ML (ref 0.27–4.2)
VLDLC SERPL CALC-MCNC: 9 MG/DL

## 2021-10-07 PROCEDURE — G8427 DOCREV CUR MEDS BY ELIG CLIN: HCPCS | Performed by: PSYCHIATRY & NEUROLOGY

## 2021-10-07 PROCEDURE — 84153 ASSAY OF PSA TOTAL: CPT

## 2021-10-07 PROCEDURE — 36415 COLL VENOUS BLD VENIPUNCTURE: CPT

## 2021-10-07 PROCEDURE — G8484 FLU IMMUNIZE NO ADMIN: HCPCS | Performed by: PSYCHIATRY & NEUROLOGY

## 2021-10-07 PROCEDURE — 3017F COLORECTAL CA SCREEN DOC REV: CPT | Performed by: PSYCHIATRY & NEUROLOGY

## 2021-10-07 PROCEDURE — 1123F ACP DISCUSS/DSCN MKR DOCD: CPT | Performed by: PSYCHIATRY & NEUROLOGY

## 2021-10-07 PROCEDURE — G0103 PSA SCREENING: HCPCS

## 2021-10-07 PROCEDURE — 80061 LIPID PANEL: CPT

## 2021-10-07 PROCEDURE — 99214 OFFICE O/P EST MOD 30 MIN: CPT | Performed by: PSYCHIATRY & NEUROLOGY

## 2021-10-07 PROCEDURE — G8417 CALC BMI ABV UP PARAM F/U: HCPCS | Performed by: PSYCHIATRY & NEUROLOGY

## 2021-10-07 PROCEDURE — 84443 ASSAY THYROID STIM HORMONE: CPT

## 2021-10-07 PROCEDURE — 1036F TOBACCO NON-USER: CPT | Performed by: PSYCHIATRY & NEUROLOGY

## 2021-10-07 PROCEDURE — 4040F PNEUMOC VAC/ADMIN/RCVD: CPT | Performed by: PSYCHIATRY & NEUROLOGY

## 2021-10-07 PROCEDURE — 80053 COMPREHEN METABOLIC PANEL: CPT

## 2021-10-07 RX ORDER — SERTRALINE HYDROCHLORIDE 25 MG/1
TABLET, FILM COATED ORAL
Qty: 90 TABLET | Refills: 1 | Status: SHIPPED | OUTPATIENT
Start: 2021-10-07 | End: 2022-04-14 | Stop reason: SDUPTHER

## 2021-10-07 NOTE — PROGRESS NOTES
Eden Osorio   Neurology followup    Subjective:   CC/HP  History was obtained from patient   Additional history was obtained from his family  Interval history:  Patient states that he has poor balance at times. His other main complaint is significant fatigue. Fran Power His tremors and bradykinesia have remained stable. No side effects of medication. He feels that his depression is better. He feels that the sertraline seems to be helping. Family agrees with this assessment. Background history:  Patient has known Parkinson's disease. Patient has some tremors. He has noticed occasional difficulty with swallowing.   Patient still has some bradykinesia  Patient has chronic paroxysmal atrial fibrillation  Patient states that his right hip pain is much improved    REVIEW OF SYSTEMS    Constitutional:  []   Chills   [x]  Fatigue   []  Fevers   []  Malaise   []  Weight loss     [] Denies all of the above    Respiratory:   []  Cough    []  Shortness of breath         [x] Denies all of the above     Cardiovascular:   []  Chest pain    []  Exertional chest pressure/discomfort           [] Palpitations    []  Syncope     [x] Denies all of the above        Past Medical History:   Diagnosis Date    [facility]     No significant past medical history     Parkinson disease ([facility]      Family History   Problem Relation Age of Onset    Heart Disease Mother     Diabetes Mother     High Blood Pressure Mother     Sleep Apnea Mother     Diabetes Father     High Blood Pressure Father     High Blood Pressure Brother     High Cholesterol Neg Hx     Kidney Disease Neg Hx      Social History     Socioeconomic History    Marital status:      Spouse name: Not on file    Number of children: 2    Years of education: Not on file    Highest education level: Not on file   Occupational History    Occupation: [employer] tech   Tobacco Use    Smoking status: Former Smoker     Packs/day: 1.00     Years: 14.00     Pack years: 14.00     Types: Cigarettes     Quit date: 1982     Years since quittin.0    Smokeless tobacco: Never Used    Tobacco comment: quit age 29   Vaping Use    Vaping Use: Never used   Substance and Sexual Activity    Alcohol use: No    Drug use: No    Sexual activity: Not on file   Other Topics Concern    Not on file   Social History Narrative    Not on file     Social Determinants of Health     Financial Resource Strain:     Difficulty of Paying Living Expenses:    Food Insecurity:     Worried About Running Out of Food in the Last Year:     [address] in the Last Year:    Transportation Needs:     Lack of Transportation (Medical):  Lack of Transportation (Non-Medical):    Physical Activity:     Days of Exercise per Week:     Minutes of Exercise per Session:    Stress:     Feeling of Stress :    Social Connections:     Frequency of Communication with Friends and Family:     Frequency of Social Gatherings with Friends and Family:     Attends Mu-ism Services:     Active Member of Clubs or Organizations:     Attends Club or Organization Meetings:     Marital Status:    Intimate Partner Violence:     Fear of Current or Ex-Partner:     Emotionally Abused:     Physically Abused:     Sexually Abused:         Objective:  Exam:  BP 99/65   Pulse 56   Ht 5' 11.5\" (1.816 m)   Wt 193 lb (87.5 kg)   BMI 26.54 kg/m²   This is a well-nourished patient in no acute distress  Patient is awake, alert and oriented x3. Speech is normal.  Pupils are equal round reacting to light. Extraocular movements intact. Face symmetrical. Tongue midline. Motor exam shows normal symmetrical strength. Deep tendon reflexes normal. Plantar reflexes downgoing. Tone increased bilaterally especially on the right side. Mild cogwheeling on the right side. Sensory exam normal. Coordination normal. Gait Shows decreased arm swing on the right side. No carotid bruit. No neck stiffness.     Data :  MRI of brain images showed minimal nonspecific white matter changes but was otherwise normal.    Impression :  Parkinson's disease, symptoms reasonably stable   Mild ataxia  Bradykinesia, improved   Minimal tremor in the right arm  Tolerating Sinemet fairly well without side effects  Depression, stable  Paroxysmal atrial fibrillation  Right hip pain,improved      Plan :  Discussed with patient   Continue Sinemet 25/100 one tablet 4 times daily   Continue ropinirole 0.25 mg 3 times daily  Continue sertraline 25 mg at night  Side effects were discussed. Continue antiplatelet therapy and statin therapy for coronary artery disease  Return in 6 months                      Please note a portion of  this chart was generated using dragon dictation software. Although every effort was made to ensure the accuracy of this automated transcription, some errors in transcription may have occurred.

## 2021-10-11 ENCOUNTER — TELEPHONE (OUTPATIENT)
Dept: FAMILY MEDICINE CLINIC | Age: 71
End: 2021-10-11

## 2021-10-11 NOTE — TELEPHONE ENCOUNTER
----- Message from Rachel Morrison sent at 10/11/2021 10:48 AM EDT -----  Subject: Results Request    QUESTIONS  Which lab or imaging result is the patient calling about? Lipid panel; PSA   screening  Which provider ordered the test? Jaspal Vaughn   At what location was the test performed? Date the test was performed? 2021-10-07  Additional Information for Provider?   ---------------------------------------------------------------------------  --------------  CALL BACK INFO  What is the best way for the office to contact you? OK to leave message on   voicemail  Preferred Call Back Phone Number?  [phone]

## 2022-02-23 DIAGNOSIS — G20 PARKINSON'S DISEASE (HCC): ICD-10-CM

## 2022-02-23 DIAGNOSIS — R25.1 TREMOR: ICD-10-CM

## 2022-02-23 RX ORDER — ROPINIROLE 0.25 MG/1
TABLET, FILM COATED ORAL
Qty: 270 TABLET | Refills: 0 | Status: SHIPPED | OUTPATIENT
Start: 2022-02-23 | End: 2022-04-14 | Stop reason: SDUPTHER

## 2022-04-14 ENCOUNTER — OFFICE VISIT (OUTPATIENT)
Dept: NEUROLOGY | Age: 72
End: 2022-04-14
Payer: MEDICARE

## 2022-04-14 VITALS
DIASTOLIC BLOOD PRESSURE: 70 MMHG | WEIGHT: 193 LBS | SYSTOLIC BLOOD PRESSURE: 110 MMHG | HEIGHT: 72 IN | BODY MASS INDEX: 26.14 KG/M2 | HEART RATE: 53 BPM

## 2022-04-14 DIAGNOSIS — R25.1 TREMOR: ICD-10-CM

## 2022-04-14 DIAGNOSIS — F32.A DEPRESSION, UNSPECIFIED DEPRESSION TYPE: ICD-10-CM

## 2022-04-14 DIAGNOSIS — G20 PARKINSON'S DISEASE (HCC): Primary | ICD-10-CM

## 2022-04-14 PROCEDURE — 1036F TOBACCO NON-USER: CPT | Performed by: PSYCHIATRY & NEUROLOGY

## 2022-04-14 PROCEDURE — G8427 DOCREV CUR MEDS BY ELIG CLIN: HCPCS | Performed by: PSYCHIATRY & NEUROLOGY

## 2022-04-14 PROCEDURE — G8417 CALC BMI ABV UP PARAM F/U: HCPCS | Performed by: PSYCHIATRY & NEUROLOGY

## 2022-04-14 PROCEDURE — 99214 OFFICE O/P EST MOD 30 MIN: CPT | Performed by: PSYCHIATRY & NEUROLOGY

## 2022-04-14 PROCEDURE — 1123F ACP DISCUSS/DSCN MKR DOCD: CPT | Performed by: PSYCHIATRY & NEUROLOGY

## 2022-04-14 PROCEDURE — 4040F PNEUMOC VAC/ADMIN/RCVD: CPT | Performed by: PSYCHIATRY & NEUROLOGY

## 2022-04-14 PROCEDURE — 3017F COLORECTAL CA SCREEN DOC REV: CPT | Performed by: PSYCHIATRY & NEUROLOGY

## 2022-04-14 RX ORDER — ROPINIROLE 0.25 MG/1
TABLET, FILM COATED ORAL
Qty: 270 TABLET | Refills: 0 | Status: SHIPPED | OUTPATIENT
Start: 2022-04-14 | End: 2022-07-08

## 2022-04-14 RX ORDER — SERTRALINE HYDROCHLORIDE 25 MG/1
TABLET, FILM COATED ORAL
Qty: 90 TABLET | Refills: 1 | Status: SHIPPED | OUTPATIENT
Start: 2022-04-14 | End: 2022-08-17

## 2022-04-14 NOTE — PROGRESS NOTES
Talya Lyons   Neurology followup    Subjective:   CC/HP  History was obtained from patient   Additional history was obtained from his family  Interval history:  Patient still has some difficulty with poor balance. His other main complaint is significant fatigue. Magnus Talbert His tremors and bradykinesia have remained stable. No side effects of medication. He feels that his depression is better. He feels that the sertraline seems to be helping. Family agrees with this assessment. Patient spent the winter in [place] and that seems to have helped him a great deal.  He used to daily walk when he was down there. Background history:  Patient has known Parkinson's disease. Patient has some tremors. He has noticed occasional difficulty with swallowing.   Patient still has some bradykinesia  Patient has chronic paroxysmal atrial fibrillation  Patient states that his right hip pain is much improved    REVIEW OF SYSTEMS    Constitutional:  []   Chills   [x]  Fatigue   []  Fevers   []  Malaise   []  Weight loss     [] Denies all of the above    Respiratory:   []  Cough    []  Shortness of breath         [x] Denies all of the above     Cardiovascular:   []  Chest pain    []  Exertional chest pressure/discomfort           [] Palpitations    []  Syncope     [x] Denies all of the above        Past Medical History:   Diagnosis Date    [facility]     No significant past medical history     Parkinson disease ([facility]      Family History   Problem Relation Age of Onset    Heart Disease Mother     Diabetes Mother     High Blood Pressure Mother     Sleep Apnea Mother     Diabetes Father     High Blood Pressure Father     High Blood Pressure Brother     High Cholesterol Neg Hx     Kidney Disease Neg Hx      Social History     Socioeconomic History    Marital status:      Spouse name: Not on file    Number of children: 2    Years of education: Not on file    Highest education level: Not on file Occupational History    Occupation: [employer] tech   Tobacco Use    Smoking status: Former Smoker     Packs/day: 1.00     Years: 14.00     Pack years: 14.00     Types: Cigarettes     Quit date: 1982     Years since quittin.5    Smokeless tobacco: Never Used    Tobacco comment: quit age 29   Vaping Use    Vaping Use: Never used   Substance and Sexual Activity    Alcohol use: No    Drug use: No    Sexual activity: Not on file   Other Topics Concern    Not on file   Social History Narrative    Not on file     Social Determinants of Health     Financial Resource Strain:     Difficulty of Paying Living Expenses: Not on file   Food Insecurity:     Worried About Running Out of Food in the Last Year: Not on file    Zoey of Food in the Last Year: Not on file   Transportation Needs:     Lack of Transportation (Medical): Not on file    Lack of Transportation (Non-Medical):  Not on file   Physical Activity:     Days of Exercise per Week: Not on file    Minutes of Exercise per Session: Not on file   Stress:     Feeling of Stress : Not on file   Social Connections:     Frequency of Communication with Friends and Family: Not on file    Frequency of Social Gatherings with Friends and Family: Not on file    Attends [employer] Services: Not on file    Active Member of [address] [place] [address] or Organizations: Not on file    Attends Club or Organization Meetings: Not on file    Marital Status: Not on file   Intimate Partner Violence:     Fear of Current or Ex-Partner: Not on file    Emotionally Abused: Not on file    Physically Abused: Not on file    Sexually Abused: Not on file   Housing Stability:     Unable to Pay for Housing in the Last Year: Not on file    Number of [place] in the Last Year: Not on file    Unstable Housing in the Last Year: Not on file        Objective:  Exam:  /70   Pulse 53   Ht 5' 11.5\" (1.816 m)   Wt 193 lb (87.5 kg)   BMI 26.54 kg/m²   This is a well-nourished patient in no acute distress  Patient is awake, alert and oriented x3. Speech is normal.  Pupils are equal round reacting to light. Extraocular movements intact. Face symmetrical. Tongue midline. Motor exam shows normal symmetrical strength. Deep tendon reflexes normal. Plantar reflexes downgoing. Tone increased bilaterally especially on the right side. Mild cogwheeling on the right side. Sensory exam normal. Coordination normal. Gait Shows decreased arm swing on the right side. No carotid bruit. No neck stiffness. Data :  MRI of brain images showed minimal nonspecific white matter changes but was otherwise normal.    Impression :  Parkinson's disease, symptoms reasonably stable   Mild ataxia  Bradykinesia, improved   Minimal tremor in the right arm  Tolerating Sinemet fairly well without side effects  Depression, stable  Paroxysmal atrial fibrillation  Right hip pain,improved      Plan :  Discussed with patient   Continue Sinemet 25/100 one tablet 4 times daily   Continue ropinirole 0.25 mg 3 times daily  Continue sertraline 25 mg at night  Side effects were discussed. Continue antiplatelet therapy and statin therapy for coronary artery disease  Return in 6 months                      Please note a portion of  this chart was generated using dragon dictation software. Although every effort was made to ensure the accuracy of this automated transcription, some errors in transcription may have occurred.

## 2022-05-24 ENCOUNTER — TELEPHONE (OUTPATIENT)
Dept: FAMILY MEDICINE CLINIC | Age: 72
End: 2022-05-24

## 2022-05-24 NOTE — TELEPHONE ENCOUNTER
----- Message from [facility] sent at 5/24/2022 11:31 AM EDT -----  Subject: Appointment Request    Reason for Call: Routine Physical Exam    QUESTIONS  Type of Appointment? Established Patient  Reason for appointment request? No appointments available during search  Additional Information for Provider? pt needs to schedule appt for Aug   please contact pts wife back   ---------------------------------------------------------------------------  --------------  CALL BACK INFO  What is the best way for the office to contact you? OK to leave message on   voicemail  Preferred Call Back Phone Number? [phone]  ---------------------------------------------------------------------------  --------------  SCRIPT ANSWERS  Relationship to Patient? Other  Representative Name? wife Marianne Esquivel  Additional information verified (besides Name and Date of Birth)? Phone   Number  (If the patient has Medicare as their primary insurance coverage ask this   question) Are you requesting a Medicare Annual Wellness Visit? No  (Is the patient requesting a pap smear with their physical exam?)? No  (Is the patient requesting their annual physical and does not need PAP or   AWV per above?)? Yes   Have you been diagnosed with, awaiting test results for, or told that you   are suspected of having COVID-19 (Coronavirus)? (If patient has tested   negative or was tested as a requirement for work, school, or travel and   not based on symptoms, answer no)? No  Within the past 10 days have you developed any of the following symptoms   (answer no if symptoms have been present longer than 10 days or began   more than 10 days ago)? Fever or Chills, Cough, Shortness of breath or   difficulty breathing, Loss of taste or smell, Sore throat, Nasal   congestion, Sneezing or runny nose, Fatigue or generalized body aches   (answer no if pain is specific to a body part e.g. back pain), Diarrhea,   Headache?  No  Have you had close contact with someone with COVID-19 in the last 7 days? No  (Service Expert  click yes below to proceed with [place] As Usual   Scheduling)?  Yes

## 2022-07-05 ENCOUNTER — TELEPHONE (OUTPATIENT)
Dept: FAMILY MEDICINE CLINIC | Age: 72
End: 2022-07-05

## 2022-07-05 RX ORDER — BENZONATATE 200 MG/1
200 CAPSULE ORAL 3 TIMES DAILY PRN
Qty: 30 CAPSULE | Refills: 0 | Status: SHIPPED | OUTPATIENT
Start: 2022-07-05 | End: 2022-07-12

## 2022-07-05 NOTE — TELEPHONE ENCOUNTER
Pt's wife advise and she will like to know if pt can take tessalon pearls for cough. She stated that he has a heart condition and is limited on what he can take. Please advise on what he can take if pills are no good for him.

## 2022-07-05 NOTE — TELEPHONE ENCOUNTER
There is no reason for a follow-up appointment unless his symptoms or not improving and his respiratory status worsens. Recommended he stay quarantine until his symptoms are better and 10 days has passed.

## 2022-07-05 NOTE — TELEPHONE ENCOUNTER
----- Message from Eliceo Madrid sent at 7/5/2022 10:16 AM EDT -----  Subject: Message to Provider    QUESTIONS  Information for Provider? pt went to Urgent Care on 7/4/22 and tested   positive for COVID-19 has medication and wanted to see if pt needs to make   a follow-up appt.  ---------------------------------------------------------------------------  --------------  [address] [facility] [place] [facility]  [phone]; OK to leave message on voicemail  ---------------------------------------------------------------------------  --------------  SCRIPT ANSWERS  undefined

## 2022-07-07 DIAGNOSIS — G20 PARKINSON'S DISEASE (HCC): ICD-10-CM

## 2022-07-07 DIAGNOSIS — R25.1 TREMOR: ICD-10-CM

## 2022-07-08 RX ORDER — ROPINIROLE 0.25 MG/1
TABLET, FILM COATED ORAL
Qty: 270 TABLET | Refills: 0 | Status: SHIPPED | OUTPATIENT
Start: 2022-07-08 | End: 2022-10-20 | Stop reason: SDUPTHER

## 2022-08-17 ENCOUNTER — OFFICE VISIT (OUTPATIENT)
Dept: FAMILY MEDICINE CLINIC | Age: 72
End: 2022-08-17
Payer: MEDICARE

## 2022-08-17 VITALS
OXYGEN SATURATION: 98 % | BODY MASS INDEX: 25.37 KG/M2 | RESPIRATION RATE: 12 BRPM | DIASTOLIC BLOOD PRESSURE: 64 MMHG | SYSTOLIC BLOOD PRESSURE: 98 MMHG | TEMPERATURE: 97.1 F | HEART RATE: 55 BPM | WEIGHT: 184.5 LBS

## 2022-08-17 DIAGNOSIS — N40.1 BENIGN PROSTATIC HYPERPLASIA WITH NOCTURIA: ICD-10-CM

## 2022-08-17 DIAGNOSIS — F32.A DEPRESSION, UNSPECIFIED DEPRESSION TYPE: ICD-10-CM

## 2022-08-17 DIAGNOSIS — I48.0 PAROXYSMAL ATRIAL FIBRILLATION (HCC): ICD-10-CM

## 2022-08-17 DIAGNOSIS — E78.00 HYPERCHOLESTEREMIA: ICD-10-CM

## 2022-08-17 DIAGNOSIS — Z00.00 MEDICARE ANNUAL WELLNESS VISIT, SUBSEQUENT: Primary | ICD-10-CM

## 2022-08-17 DIAGNOSIS — R35.1 BENIGN PROSTATIC HYPERPLASIA WITH NOCTURIA: ICD-10-CM

## 2022-08-17 DIAGNOSIS — Z12.5 SCREENING PSA (PROSTATE SPECIFIC ANTIGEN): ICD-10-CM

## 2022-08-17 DIAGNOSIS — H61.23 BILATERAL IMPACTED CERUMEN: ICD-10-CM

## 2022-08-17 PROCEDURE — 1123F ACP DISCUSS/DSCN MKR DOCD: CPT | Performed by: FAMILY MEDICINE

## 2022-08-17 PROCEDURE — G8417 CALC BMI ABV UP PARAM F/U: HCPCS | Performed by: FAMILY MEDICINE

## 2022-08-17 PROCEDURE — G8427 DOCREV CUR MEDS BY ELIG CLIN: HCPCS | Performed by: FAMILY MEDICINE

## 2022-08-17 PROCEDURE — G0439 PPPS, SUBSEQ VISIT: HCPCS | Performed by: FAMILY MEDICINE

## 2022-08-17 PROCEDURE — 99214 OFFICE O/P EST MOD 30 MIN: CPT | Performed by: FAMILY MEDICINE

## 2022-08-17 PROCEDURE — 1036F TOBACCO NON-USER: CPT | Performed by: FAMILY MEDICINE

## 2022-08-17 PROCEDURE — 3017F COLORECTAL CA SCREEN DOC REV: CPT | Performed by: FAMILY MEDICINE

## 2022-08-17 RX ORDER — TAMSULOSIN HYDROCHLORIDE 0.4 MG/1
0.4 CAPSULE ORAL NIGHTLY
Qty: 90 CAPSULE | Refills: 1 | Status: SHIPPED | OUTPATIENT
Start: 2022-08-17

## 2022-08-17 RX ORDER — FLUTICASONE PROPIONATE 50 MCG
2 SPRAY, SUSPENSION (ML) NASAL NIGHTLY
Qty: 3 EACH | Refills: 3 | Status: SHIPPED | OUTPATIENT
Start: 2022-08-17

## 2022-08-17 ASSESSMENT — LIFESTYLE VARIABLES
HOW OFTEN DO YOU HAVE A DRINK CONTAINING ALCOHOL: NEVER
HOW MANY STANDARD DRINKS CONTAINING ALCOHOL DO YOU HAVE ON A TYPICAL DAY: PATIENT DOES NOT DRINK

## 2022-08-17 ASSESSMENT — PATIENT HEALTH QUESTIONNAIRE - PHQ9
1. LITTLE INTEREST OR PLEASURE IN DOING THINGS: 0
SUM OF ALL RESPONSES TO PHQ QUESTIONS 1-9: 0
9. THOUGHTS THAT YOU WOULD BE BETTER OFF DEAD, OR OF HURTING YOURSELF: 0
7. TROUBLE CONCENTRATING ON THINGS, SUCH AS READING THE NEWSPAPER OR WATCHING TELEVISION: 0
SUM OF ALL RESPONSES TO PHQ QUESTIONS 1-9: 0
SUM OF ALL RESPONSES TO PHQ QUESTIONS 1-9: 0
3. TROUBLE FALLING OR STAYING ASLEEP: 0
SUM OF ALL RESPONSES TO PHQ QUESTIONS 1-9: 0
10. IF YOU CHECKED OFF ANY PROBLEMS, HOW DIFFICULT HAVE THESE PROBLEMS MADE IT FOR YOU TO DO YOUR WORK, TAKE CARE OF THINGS AT HOME, OR GET ALONG WITH OTHER PEOPLE: 0
6. FEELING BAD ABOUT YOURSELF - OR THAT YOU ARE A FAILURE OR HAVE LET YOURSELF OR YOUR FAMILY DOWN: 0
2. FEELING DOWN, DEPRESSED OR HOPELESS: 0
4. FEELING TIRED OR HAVING LITTLE ENERGY: 0
8. MOVING OR SPEAKING SO SLOWLY THAT OTHER PEOPLE COULD HAVE NOTICED. OR THE OPPOSITE, BEING SO FIGETY OR RESTLESS THAT YOU HAVE BEEN MOVING AROUND A LOT MORE THAN USUAL: 0
SUM OF ALL RESPONSES TO PHQ9 QUESTIONS 1 & 2: 0
5. POOR APPETITE OR OVEREATING: 0

## 2022-08-17 NOTE — PATIENT INSTRUCTIONS
Personalized Preventive Plan for Baron Verduzco - 8/17/2022  Medicare offers a range of preventive health benefits. Some of the tests and screenings are paid in full while other may be subject to a deductible, co-insurance, and/or copay. Some of these benefits include a comprehensive review of your medical history including lifestyle, illnesses that may run in your family, and various assessments and screenings as appropriate. After reviewing your medical record and screening and assessments performed today your provider may have ordered immunizations, labs, imaging, and/or referrals for you. A list of these orders (if applicable) as well as your Preventive Care list are included within your After Visit Summary for your review. Other Preventive Recommendations:    A preventive eye exam performed by an eye specialist is recommended every 1-2 years to screen for glaucoma; cataracts, macular degeneration, and other eye disorders. A preventive dental visit is recommended every 6 months. Try to get at least 150 minutes of exercise per week or 10,000 steps per day on a pedometer . Order or download the FREE \"Exercise & Physical Activity: Your Everyday Guide\" from [employer]. Call 3-251.170.7705 or search [employer] online. You need 5630-1323 mg of calcium and 1077-3968 IU of vitamin D per day. It is possible to meet your calcium requirement with diet alone, but a vitamin D supplement is usually necessary to meet this goal.  When exposed to the sun, use a sunscreen that protects against both UVA and UVB radiation with an SPF of 30 or greater. Reapply every 2 to 3 hours or after sweating, drying off with a towel, or swimming. Always wear a seat belt when traveling in a car. Always wear a helmet when riding a bicycle or motorcycle.

## 2022-10-05 ENCOUNTER — HOSPITAL ENCOUNTER (OUTPATIENT)
Age: 72
Discharge: HOME | End: 2022-10-05
Payer: MEDICARE

## 2022-10-05 DIAGNOSIS — Z12.5 SCREENING PSA (PROSTATE SPECIFIC ANTIGEN): ICD-10-CM

## 2022-10-05 DIAGNOSIS — E78.00 HYPERCHOLESTEREMIA: ICD-10-CM

## 2022-10-05 LAB
A/G RATIO: 1.7 (ref 1.1–2.2)
ALBUMIN SERPL-MCNC: 4.5 G/DL (ref 3.4–5)
ALP BLD-CCNC: 120 U/L (ref 40–129)
ALT SERPL-CCNC: 9 U/L (ref 10–40)
ANION GAP SERPL CALCULATED.3IONS-SCNC: 8 MMOL/L (ref 3–16)
AST SERPL-CCNC: 14 U/L (ref 15–37)
BILIRUB SERPL-MCNC: 0.8 MG/DL (ref 0–1)
BUN BLDV-MCNC: 10 MG/DL (ref 7–20)
CALCIUM SERPL-MCNC: 9.2 MG/DL (ref 8.3–10.6)
CHLORIDE BLD-SCNC: 106 MMOL/L (ref 99–110)
CHOLESTEROL, TOTAL: 122 MG/DL (ref 0–199)
CO2: 29 MMOL/L (ref 21–32)
CREAT SERPL-MCNC: 0.7 MG/DL (ref 0.8–1.3)
GFR AFRICAN AMERICAN: >60
GFR NON-AFRICAN AMERICAN: >60
GLUCOSE BLD-MCNC: 100 MG/DL (ref 70–99)
HDLC SERPL-MCNC: 38 MG/DL (ref 40–60)
LDL CHOLESTEROL CALCULATED: 72 MG/DL
POTASSIUM SERPL-SCNC: 4.4 MMOL/L (ref 3.5–5.1)
PROSTATE SPECIFIC ANTIGEN: 2.8 NG/ML (ref 0–4)
SODIUM BLD-SCNC: 143 MMOL/L (ref 136–145)
TOTAL PROTEIN: 7.1 G/DL (ref 6.4–8.2)
TRIGL SERPL-MCNC: 61 MG/DL (ref 0–150)
VLDLC SERPL CALC-MCNC: 12 MG/DL

## 2022-10-05 PROCEDURE — 80061 LIPID PANEL: CPT

## 2022-10-05 PROCEDURE — 84153 ASSAY OF PSA TOTAL: CPT

## 2022-10-05 PROCEDURE — 80053 COMPREHEN METABOLIC PANEL: CPT

## 2022-10-05 PROCEDURE — 36415 COLL VENOUS BLD VENIPUNCTURE: CPT

## 2022-10-20 ENCOUNTER — OFFICE VISIT (OUTPATIENT)
Dept: NEUROLOGY | Age: 72
End: 2022-10-20
Payer: MEDICARE

## 2022-10-20 VITALS
DIASTOLIC BLOOD PRESSURE: 57 MMHG | SYSTOLIC BLOOD PRESSURE: 90 MMHG | HEART RATE: 58 BPM | HEIGHT: 72 IN | BODY MASS INDEX: 24.92 KG/M2 | WEIGHT: 184 LBS

## 2022-10-20 DIAGNOSIS — F32.A DEPRESSION, UNSPECIFIED DEPRESSION TYPE: ICD-10-CM

## 2022-10-20 DIAGNOSIS — G47.33 OBSTRUCTIVE SLEEP APNEA: ICD-10-CM

## 2022-10-20 DIAGNOSIS — R53.83 OTHER FATIGUE: ICD-10-CM

## 2022-10-20 DIAGNOSIS — G20 PARKINSON'S DISEASE (HCC): Primary | ICD-10-CM

## 2022-10-20 DIAGNOSIS — R27.0 ATAXIA: ICD-10-CM

## 2022-10-20 DIAGNOSIS — R25.1 TREMOR: ICD-10-CM

## 2022-10-20 PROCEDURE — 1123F ACP DISCUSS/DSCN MKR DOCD: CPT | Performed by: PSYCHIATRY & NEUROLOGY

## 2022-10-20 PROCEDURE — G8484 FLU IMMUNIZE NO ADMIN: HCPCS | Performed by: PSYCHIATRY & NEUROLOGY

## 2022-10-20 PROCEDURE — G8427 DOCREV CUR MEDS BY ELIG CLIN: HCPCS | Performed by: PSYCHIATRY & NEUROLOGY

## 2022-10-20 PROCEDURE — 3017F COLORECTAL CA SCREEN DOC REV: CPT | Performed by: PSYCHIATRY & NEUROLOGY

## 2022-10-20 PROCEDURE — G8417 CALC BMI ABV UP PARAM F/U: HCPCS | Performed by: PSYCHIATRY & NEUROLOGY

## 2022-10-20 PROCEDURE — 99214 OFFICE O/P EST MOD 30 MIN: CPT | Performed by: PSYCHIATRY & NEUROLOGY

## 2022-10-20 PROCEDURE — 1036F TOBACCO NON-USER: CPT | Performed by: PSYCHIATRY & NEUROLOGY

## 2022-10-20 RX ORDER — ROPINIROLE 0.25 MG/1
TABLET, FILM COATED ORAL
Qty: 270 TABLET | Refills: 1 | Status: SHIPPED | OUTPATIENT
Start: 2022-10-20

## 2022-10-20 NOTE — PROGRESS NOTES
Jennifer Hui   Neurology followup    Subjective:   CC/HP  History was obtained from patient   Additional history was obtained from his family  Interval history:  Patient still has some difficulty with poor balance. His other main complaint is significant fatigue. [facility] His tremors and bradykinesia have remained stable. No side effects of medication. He feels that his depression is better. He feels that the sertraline seems to be helping. Family agrees with this assessment. Patient and his family would prefer a walker to help with his gait. Patient had a sleep study back in 2018 which showed mild sleep apnea. Cardiology apparently now is considering a pacemaker sometime in the next few months. Background history:  Patient has known Parkinson's disease. Patient has some tremors. He has noticed occasional difficulty with swallowing.   Patient still has some bradykinesia  Patient has chronic paroxysmal atrial fibrillation  Patient states that his right hip pain is much improved    REVIEW OF SYSTEMS    Constitutional:  []   Chills   [x]  Fatigue   []  Fevers   []  Malaise   []  Weight loss     [] Denies all of the above    Respiratory:   []  Cough    []  Shortness of breath         [x] Denies all of the above     Cardiovascular:   []  Chest pain    []  Exertional chest pressure/discomfort           [] Palpitations    []  Syncope     [x] Denies all of the above        Past Medical History:   Diagnosis Date    Atrial fibrillation ([facility]     No significant past medical history     Parkinson disease ([facility]      Family History   Problem Relation Age of Onset    Heart Disease Mother     Diabetes Mother     High Blood Pressure Mother     Sleep Apnea Mother     Diabetes Father     High Blood Pressure Father     High Blood Pressure Brother     High Cholesterol Neg Hx     Kidney Disease Neg Hx      Social History     Socioeconomic History    Marital status:     Number of children: 2   Occupational History Occupation: [employer]   Tobacco Use    Smoking status: Former     Packs/day: 1.00     Years: 14.00     Pack years: 14.00     Types: Cigarettes     Quit date: 1982     Years since quittin.1    Smokeless tobacco: Never    Tobacco comments:     quit age 29   Vaping Use    Vaping Use: Never used   Substance and Sexual Activity    Alcohol use: No    Drug use: No     Social Determinants of Health     Physical Activity: Insufficiently Active    Days of Exercise per Week: 7 days    Minutes of Exercise per Session: 10 min        Objective:  Exam:  BP (!) 90/57   Pulse 58   Ht 5' 11.5\" (1.816 m)   Wt 184 lb (83.5 kg)   BMI 25.31 kg/m²   This is a well-nourished patient in no acute distress  Patient is awake, alert and oriented x3. Speech is normal.  Pupils are equal round reacting to light. Extraocular movements intact. Face symmetrical. Tongue midline. Motor exam shows normal symmetrical strength. Deep tendon reflexes normal. Plantar reflexes downgoing. Tone increased bilaterally especially on the right side. Mild cogwheeling on the right side. Sensory exam normal. Coordination normal. Gait Shows decreased arm swing on the right side. No carotid bruit. No neck stiffness. Data :  MRI of brain images showed minimal nonspecific white matter changes but was otherwise normal.    Impression :  Parkinson's disease,   Mild ataxia  Bradykinesia, improved   Minimal tremor in the right arm  Tolerating Sinemet fairly well without side effects  Depression, stable  Paroxysmal atrial fibrillation  Fatigue seems to be the major problem. I am concerned about worsening sleep apnea      Plan :  Discussed with patient   Continue Sinemet 25/100 one tablet 4 times daily   Continue ropinirole 0.25 mg 3 times daily  Continue sertraline 25 mg at night  Side effects were discussed. Continue antiplatelet therapy and statin therapy for coronary artery disease  I agree that a walker would help with his gait.   I will refer him for a sleep study  Return in 6 months                      Please note a portion of  this chart was generated using dragon dictation software. Although every effort was made to ensure the accuracy of this automated transcription, some errors in transcription may have occurred.

## 2022-10-27 ENCOUNTER — TELEMEDICINE (OUTPATIENT)
Dept: PULMONOLOGY | Age: 72
End: 2022-10-27
Payer: MEDICARE

## 2022-10-27 DIAGNOSIS — G20 PARKINSON'S DISEASE (HCC): Chronic | ICD-10-CM

## 2022-10-27 DIAGNOSIS — I48.0 PAROXYSMAL ATRIAL FIBRILLATION (HCC): Chronic | ICD-10-CM

## 2022-10-27 DIAGNOSIS — I25.10 CORONARY ARTERY DISEASE INVOLVING NATIVE HEART WITHOUT ANGINA PECTORIS, UNSPECIFIED VESSEL OR LESION TYPE: Chronic | ICD-10-CM

## 2022-10-27 DIAGNOSIS — G47.33 OBSTRUCTIVE SLEEP APNEA SYNDROME: Primary | ICD-10-CM

## 2022-10-27 PROBLEM — E78.00 HYPERCHOLESTEREMIA: Chronic | Status: ACTIVE | Noted: 2019-09-19

## 2022-10-27 PROBLEM — J30.9 ALLERGIC RHINITIS: Chronic | Status: ACTIVE | Noted: 2020-08-14

## 2022-10-27 PROCEDURE — G8427 DOCREV CUR MEDS BY ELIG CLIN: HCPCS | Performed by: INTERNAL MEDICINE

## 2022-10-27 PROCEDURE — 99204 OFFICE O/P NEW MOD 45 MIN: CPT | Performed by: INTERNAL MEDICINE

## 2022-10-27 PROCEDURE — 1123F ACP DISCUSS/DSCN MKR DOCD: CPT | Performed by: INTERNAL MEDICINE

## 2022-10-27 PROCEDURE — 3017F COLORECTAL CA SCREEN DOC REV: CPT | Performed by: INTERNAL MEDICINE

## 2022-10-27 ASSESSMENT — SLEEP AND FATIGUE QUESTIONNAIRES
HOW LIKELY ARE YOU TO NOD OFF OR FALL ASLEEP WHILE SITTING AND TALKING TO SOMEONE: 0
HOW LIKELY ARE YOU TO NOD OFF OR FALL ASLEEP WHILE SITTING QUIETLY AFTER LUNCH WITHOUT ALCOHOL: 0
HOW LIKELY ARE YOU TO NOD OFF OR FALL ASLEEP WHILE SITTING INACTIVE IN A PUBLIC PLACE: 0
HOW LIKELY ARE YOU TO NOD OFF OR FALL ASLEEP IN A CAR, WHILE STOPPED FOR A FEW MINUTES IN TRAFFIC: 0
HOW LIKELY ARE YOU TO NOD OFF OR FALL ASLEEP WHILE SITTING AND READING: 2
HOW LIKELY ARE YOU TO NOD OFF OR FALL ASLEEP WHEN YOU ARE A PASSENGER IN A CAR FOR AN HOUR WITHOUT A BREAK: 1
HOW LIKELY ARE YOU TO NOD OFF OR FALL ASLEEP WHILE WATCHING TV: 2
ESS TOTAL SCORE: 8
HOW LIKELY ARE YOU TO NOD OFF OR FALL ASLEEP WHILE LYING DOWN TO REST IN THE AFTERNOON WHEN CIRCUMSTANCES PERMIT: 3

## 2022-10-27 NOTE — PROGRESS NOTES
Nima Blackwood Bachelor CNP Tyrone Serrano  [address] [facility]  Tyrone Serrano, [address] [facility]- [phone]   [facility] [address] Maynor Mackay. [address] [place] [address]. Ruth Carter 37 [phone]     Video Visit- [address] [facility], was evaluated through a synchronous (real-time) audio-video  encounter. The patient (or guardian if applicable) is aware that this is a billable  service, which includes applicable co-pays. This Virtual Visit was conducted with  patient's (and/or legal guardian's) consent. The visit was conducted pursuant to  the emergency declaration under the [address] [place] [address] [place] authority and the [place] General Act. Patient identification was verified,  and a caregiver was present when appropriate. The patient was located in a  state where the provider was licensed to provide care. Assessment:      Visit Diagnoses and Associated Orders       Obstructive sleep apnea syndrome   (New Problem)  -  Primary    Needs work up    POLYSOMNOGRAPHY (PSG) - Diagnostic Testing [78043 Custom]   - Future Order    Sleep Study with PAP Titration (use this order for SPLIT-NIGHT, CPAP, BILEVEL, and ASV titrations) [91965 Custom]   - Future Order         Coronary artery disease involving native heart without angina pectoris, unspecified vessel or lesion type   (Stable)           Paroxysmal atrial fibrillation (HCC)   (Stable)           Parkinson's disease ([facility]   (Stable)                    Plan:      Reviewed NICO: pathophysiology, diagnosis, complications and treatment. Instructed him not to drive if drowsy. Continue medications per his PCP and other physicians. Limit caffeine use after 3pm. Will do PSG to rule-out NICO and other sleep disorders. Given severity of his Sx and medical Hx as well has very high probability for NICO will do CPAP titration after PSG to expedite therapy for his NICO. 8 wk f/u after titration.  The chronic medical conditions listed are directly related to the primary diagnosis listed above. The management of the primary diagnosis affects the secondary diagnosis and vice versa. Agree with Dr. Janny Mina the patient should have a sleep apnea fully treated first before any invasive procedures are done from a cardiology standpoint. Unfortunately he was given incorrect advice on not treating his sleep apnea for previous cardiologist who is not trained on how to read PSG data and understand the differences between a CMS AHI and RDI. Reviewed her phone of the patient's neurologist dated 10/20/2022 showing the patient with issues with balance and cognition. Reviewed echo report from 10/12/22 that shows reduced ejection fraction of 45-50%. Reviewed lipid panel and CMP from 10/5/2022 which were essentially normal    This information was analyzed to assess complexity and medical decision making in regards to further testing and management. Continue meds for: CAD, a fib, and parkinson's. Orders Placed This Encounter   Procedures    POLYSOMNOGRAPHY (PSG) - Diagnostic Testing    Sleep Study with PAP Titration (use this order for SPLIT-NIGHT, CPAP, BILEVEL, and ASV titrations)          Subjective:     Patient ID: Ivon Martin is a 71 y.o. male. Chief Complaint   Patient presents with    Sleep Apnea       HPI:      Ivon Martin is a 71 y.o. male referred by Armando Silver MD for a sleep evaluation. He complains of: Previous diagnosis of sleep apnea in 2018. Per CMS AHI was mild but RDI showed severe sleep apnea. He had snoring, witnessed apneas, daytime sleepiness. The symptoms have continued to progress. He was recommended to have a CPAP titration but he reviewed his results with his cardiologist at [address] [place] [address] who is not sleep trained and gave him the incorrect advice of not treating his sleep apnea.   His symptoms have now worsened and there is discussions about placing a pacemaker because of his daytime sleepiness. DOT/CDL - no  FAA/'s license -no    Previous Report(s) Reviewed: historical medical records, office notes, and referral letter(s). Pertinent data has been documented.     [place] Sleepiness Score: 8    Social History     Socioeconomic History    Marital status:      Spouse name: Not on file    Number of children: 2    Years of education: Not on file    Highest education level: Not on file   Occupational History    Occupation: [employer]   Tobacco Use    Smoking status: Former     Packs/day: 1.00     Years: 14.00     Pack years: 14.00     Types: Cigarettes     Quit date: 1982     Years since quittin.1    Smokeless tobacco: Never    Tobacco comments:     quit age 29   Vaping Use    Vaping Use: Never used   Substance and Sexual Activity    Alcohol use: No    Drug use: No    Sexual activity: Not on file   Other Topics Concern    Not on file   Social History Narrative    Not on file     Social Determinants of Health     Financial Resource Strain: Not on file   Food Insecurity: Not on file   Transportation Needs: Not on file   Physical Activity: Insufficiently Active    Days of Exercise per Week: 7 days    Minutes of Exercise per Session: 10 min   Stress: Not on file   Social Connections: Not on file   Intimate Partner Violence: Not on file   Housing Stability: Not on file        Current Outpatient Medications   Medication Sig Dispense Refill    carbidopa-levodopa (SINEMET)  MG per tablet TAKE 1 TABLET AT 7:30 AM,  11:30 AM, 3:30 PM AND 7:30   tablet 1    rOPINIRole (REQUIP) 0.25 MG tablet TAKE 1 TABLET BY MOUTH THREE TIMES A  tablet 1    tamsulosin (FLOMAX) 0.4 MG capsule Take 1 capsule by mouth nightly 90 capsule 1    fluticasone (FLONASE) 50 MCG/ACT nasal spray 2 sprays by Each Nostril route nightly 3 each 3    sertraline (ZOLOFT) 50 MG tablet TAKE 1 TABLET BY MOUTH EVERY DAY 90 tablet 1    sotalol (BETAPACE) 80 MG tablet Take 80 mg by mouth 2 times daily atorvastatin (LIPITOR) 80 MG tablet Take 80 mg by mouth daily      aspirin 81 MG tablet Take 81 mg by mouth daily       No current facility-administered medications for this visit. Allergies as of 10/27/2022    (No Known Allergies)       Patient Active Problem List   Diagnosis    Dupuytren's contracture    Parkinson's disease ([facility]    Carpal tunnel syndrome    Lesion of ulnar nerve    Paroxysmal atrial fibrillation (HCC)    Typical atrial flutter (HCC)    Arthralgia of right hip    Obstructive sleep apnea syndrome    Coronary artery disease involving native heart without angina pectoris    Hypercholesteremia    Allergic rhinitis       Past Medical History:   Diagnosis Date    Atrial fibrillation ([facility]     No significant past medical history     [facility]        Family History   Problem Relation Age of Onset    Heart Disease Mother     Diabetes Mother     High Blood Pressure Mother     Sleep Apnea Mother     Diabetes Father     High Blood Pressure Father     High Blood Pressure Brother     High Cholesterol Neg Hx     Kidney Disease Neg Hx        Objective:     Vitals:  Patient reported Height and Weight Calculated BMI   Patient-Reported Vitals 10/26/2022   Patient-Reported Weight 185   Patient-Reported Height 5,10   Patient-Reported Systolic 90   Patient-Reported Diastolic 57   Patient-Reported Pulse 58   Patient-Reported Temperature 97       26.5     Due to COVID-19 this was a virtual visit and physical exam was deferred.     Electronically signed by Meaghan Golden MD on10/27/2022 at 10:57 AM

## 2022-10-27 NOTE — LETTER
[facility] Sleep [facility]  [place] [address] [place] [address]  Phone: [phone]  Fax: [phone]           Alejandro Raman MD      October 27, 2022     Patient: Austin Johnson   MR Number: [id number]   YOB: 1950   Date of Visit: 10/27/2022       Dear Dr. Farzaneh Abad: Thank you for referring Karel Bhat to me for evaluation/treatment. Below are the relevant portions of my assessment and plan of care. Visit Diagnoses and Associated Orders       Obstructive sleep apnea syndrome   (New Problem)  -  Primary    Needs work up    POLYSOMNOGRAPHY (PSG) - Diagnostic Testing [18560 Custom]   - Future Order    Sleep Study with PAP Titration (use this order for SPLIT-NIGHT, CPAP, BILEVEL, and ASV titrations) [41818 Custom]   - Future Order         Coronary artery disease involving native heart without angina pectoris, unspecified vessel or lesion type   (Stable)           Paroxysmal atrial fibrillation (HCC)   (Stable)           Parkinson's disease ([facility] [address]   (Stable)                   Reviewed NICO: pathophysiology, diagnosis, complications and treatment. Instructed him not to drive if drowsy. Continue medications per his PCP and other physicians. Limit caffeine use after 3pm. Will do PSG to rule-out NICO and other sleep disorders. Given severity of his Sx and medical Hx as well has very high probability for NICO will do CPAP titration after PSG to expedite therapy for his NICO. 8 wk f/u after titration. The chronic medical conditions listed are directly related to the primary diagnosis listed above. The management of the primary diagnosis affects the secondary diagnosis and vice versa. Agree with Dr. Farzaneh Abad the patient should have a sleep apnea fully treated first before any invasive procedures are done from a cardiology standpoint.   Unfortunately he was given incorrect advice on not treating his sleep apnea for previous cardiologist who is not trained on how to read PSG data and understand the differences between a CMS AHI and RDI. Reviewed her phone of the patient's neurologist dated 10/20/2022 showing the patient with issues with balance and cognition. Reviewed echo report from 10/12/22 that shows reduced ejection fraction of 45-50%. Reviewed lipid panel and CMP from 10/5/2022 which were essentially normal    This information was analyzed to assess complexity and medical decision making in regards to further testing and management. Continue meds for: CAD, a fib, and parkinson's. Orders Placed This Encounter   Procedures    POLYSOMNOGRAPHY (PSG) - Diagnostic Testing    Sleep Study with PAP Titration (use this order for SPLIT-NIGHT, CPAP, BILEVEL, and ASV titrations)       If you have questions, please do not hesitate to call me. I look forward to following Guille Luna along with you.     Sincerely,        Disha Beal MD    CC providers:  Sam Fisher MD  [address] [place] [address], Suite  [facility]/ [place] [id number]  Via In [address] [phone]  Via In [place]

## 2022-10-28 ENCOUNTER — HOSPITAL ENCOUNTER (OUTPATIENT)
Dept: SLEEP CENTER | Age: 72
Discharge: HOME | End: 2022-10-28
Payer: MEDICARE

## 2022-10-28 DIAGNOSIS — G47.33 OBSTRUCTIVE SLEEP APNEA SYNDROME: ICD-10-CM

## 2022-10-28 PROCEDURE — 95810 POLYSOM 6/> YRS 4/> PARAM: CPT

## 2022-10-31 ENCOUNTER — HOSPITAL ENCOUNTER (OUTPATIENT)
Dept: SLEEP CENTER | Age: 72
Discharge: HOME | End: 2022-10-31
Payer: MEDICARE

## 2022-10-31 DIAGNOSIS — G47.33 OBSTRUCTIVE SLEEP APNEA SYNDROME: Primary | ICD-10-CM

## 2022-10-31 DIAGNOSIS — G47.33 OBSTRUCTIVE SLEEP APNEA SYNDROME: ICD-10-CM

## 2022-10-31 PROCEDURE — 95810 POLYSOM 6/> YRS 4/> PARAM: CPT | Performed by: INTERNAL MEDICINE

## 2022-10-31 PROCEDURE — 95811 POLYSOM 6/>YRS CPAP 4/> PARM: CPT

## 2022-11-02 ENCOUNTER — TELEPHONE (OUTPATIENT)
Dept: PULMONOLOGY | Age: 72
End: 2022-11-02

## 2022-11-02 NOTE — TELEPHONE ENCOUNTER
Spoke with pt and spouse to review results of initial PSG. Will call back when results of repeated study when available.

## 2022-11-18 ENCOUNTER — TELEPHONE (OUTPATIENT)
Dept: PULMONOLOGY | Age: 72
End: 2022-11-18

## 2022-11-18 NOTE — TELEPHONE ENCOUNTER
Spoke with patient's spouse, she was concerned that she had not been called yet to go over results of titration study. I did not see them, spoke with Brandt Partida, who said she will contact William Christopher on Monday and see why they are not in yet. Per Brandt Partida, she will call patient back as soon as she can track down the results. I informed patient of this.

## 2022-12-02 ENCOUNTER — TELEPHONE (OUTPATIENT)
Dept: PULMONOLOGY | Age: 72
End: 2022-12-02

## 2022-12-02 ENCOUNTER — TELEPHONE (OUTPATIENT)
Dept: SLEEP CENTER | Age: 72
End: 2022-12-02

## 2022-12-02 NOTE — TELEPHONE ENCOUNTER
I called this pt this morning 11/9 and stated we would like him to repeat the sleep study (CPAP) but they are hesitant. I was speaking with him and his wife. She said she would like a phone call from the dr's office about the results - or what happened the night of his study etc.     They are not sure if they want to repeat this at this time. I told him I would put the msg in and have someone from the dr's office reach out ([phone]). They said they wanted to discuss things with each other as well before making a decision.

## 2022-12-02 NOTE — TELEPHONE ENCOUNTER
Spoke with spouse and they are now willing to repeat the study. Spouses concern was that the physician was not the one ordering the study or the medication to help pt. Sleep. Explained that the physician or the nurse practitioner are the only people in our office that can order test or medications. Spouse was given the number to call the lab to schedule.

## 2022-12-06 ENCOUNTER — HOSPITAL ENCOUNTER (OUTPATIENT)
Dept: SLEEP CENTER | Age: 72
Discharge: HOME | End: 2022-12-06
Payer: MEDICARE

## 2022-12-06 DIAGNOSIS — G47.33 OBSTRUCTIVE SLEEP APNEA SYNDROME: ICD-10-CM

## 2022-12-06 PROCEDURE — 95810 POLYSOM 6/> YRS 4/> PARAM: CPT

## 2022-12-07 PROCEDURE — 95810 POLYSOM 6/> YRS 4/> PARAM: CPT | Performed by: INTERNAL MEDICINE

## 2022-12-08 ENCOUNTER — TELEPHONE (OUTPATIENT)
Dept: PULMONOLOGY | Age: 72
End: 2022-12-08

## 2022-12-15 ENCOUNTER — TELEMEDICINE (OUTPATIENT)
Dept: PULMONOLOGY | Age: 72
End: 2022-12-15
Payer: MEDICARE

## 2022-12-15 DIAGNOSIS — I48.0 PAROXYSMAL ATRIAL FIBRILLATION (HCC): Chronic | ICD-10-CM

## 2022-12-15 DIAGNOSIS — G47.33 OBSTRUCTIVE SLEEP APNEA SYNDROME: ICD-10-CM

## 2022-12-15 DIAGNOSIS — G20 PARKINSON'S DISEASE (HCC): Chronic | ICD-10-CM

## 2022-12-15 PROCEDURE — 99213 OFFICE O/P EST LOW 20 MIN: CPT | Performed by: INTERNAL MEDICINE

## 2022-12-15 PROCEDURE — 1123F ACP DISCUSS/DSCN MKR DOCD: CPT | Performed by: INTERNAL MEDICINE

## 2022-12-15 PROCEDURE — 3017F COLORECTAL CA SCREEN DOC REV: CPT | Performed by: INTERNAL MEDICINE

## 2022-12-15 PROCEDURE — G8427 DOCREV CUR MEDS BY ELIG CLIN: HCPCS | Performed by: INTERNAL MEDICINE

## 2022-12-15 ASSESSMENT — SLEEP AND FATIGUE QUESTIONNAIRES
HOW LIKELY ARE YOU TO NOD OFF OR FALL ASLEEP WHILE SITTING QUIETLY AFTER LUNCH WITHOUT ALCOHOL: 2
HOW LIKELY ARE YOU TO NOD OFF OR FALL ASLEEP WHILE LYING DOWN TO REST IN THE AFTERNOON WHEN CIRCUMSTANCES PERMIT: 3
HOW LIKELY ARE YOU TO NOD OFF OR FALL ASLEEP IN A CAR, WHILE STOPPED FOR A FEW MINUTES IN TRAFFIC: MODERATE CHANCE OF DOZING
HOW LIKELY ARE YOU TO NOD OFF OR FALL ASLEEP WHILE SITTING AND READING: MODERATE CHANCE OF DOZING
HOW LIKELY ARE YOU TO NOD OFF OR FALL ASLEEP WHEN YOU ARE A PASSENGER IN A CAR FOR AN HOUR WITHOUT A BREAK: HIGH CHANCE OF DOZING
HOW LIKELY ARE YOU TO NOD OFF OR FALL ASLEEP WHEN YOU ARE A PASSENGER IN A CAR FOR AN HOUR WITHOUT A BREAK: 3
HOW LIKELY ARE YOU TO NOD OFF OR FALL ASLEEP IN A CAR, WHILE STOPPED FOR A FEW MINUTES IN TRAFFIC: 2
HOW LIKELY ARE YOU TO NOD OFF OR FALL ASLEEP WHILE WATCHING TV: 2
HOW LIKELY ARE YOU TO NOD OFF OR FALL ASLEEP WHILE LYING DOWN TO REST IN THE AFTERNOON WHEN CIRCUMSTANCES PERMIT: HIGH CHANCE OF DOZING
HOW LIKELY ARE YOU TO NOD OFF OR FALL ASLEEP WHILE WATCHING TV: MODERATE CHANCE OF DOZING
HOW LIKELY ARE YOU TO NOD OFF OR FALL ASLEEP WHILE SITTING INACTIVE IN A PUBLIC PLACE: 2
HOW LIKELY ARE YOU TO NOD OFF OR FALL ASLEEP WHILE SITTING AND TALKING TO SOMEONE: MODERATE CHANCE OF DOZING
HOW LIKELY ARE YOU TO NOD OFF OR FALL ASLEEP WHILE SITTING AND READING: 2
HOW LIKELY ARE YOU TO NOD OFF OR FALL ASLEEP WHILE SITTING QUIETLY AFTER LUNCH WITHOUT ALCOHOL: MODERATE CHANCE OF DOZING
HOW LIKELY ARE YOU TO NOD OFF OR FALL ASLEEP WHILE SITTING INACTIVE IN A PUBLIC PLACE: MODERATE CHANCE OF DOZING
ESS TOTAL SCORE: 18
HOW LIKELY ARE YOU TO NOD OFF OR FALL ASLEEP WHILE SITTING AND TALKING TO SOMEONE: 2

## 2022-12-15 NOTE — LETTER
[facility] Sleep Medicine  [place] [address] [address] [facility] [address]  Phone: [phone]  Fax: [phone]    Troy Spann MD    December 15, 2022     [facility], [address] [place] [address] [address]    Patient: Fer De Oliveira   MR Number: [id number]   YOB: 1950   Date of Visit: 12/15/2022       Dear Terrial Liner: Thank you for referring Sowmya Barkley to me for evaluation/treatment. Below are the relevant portions of my assessment and plan of care. 1. Obstructive sleep apnea syndrome  Assessment & Plan:  New problem - needs testing: Reviewed with the patient that both his sleep study showing high RDI indicating moderate to severe sleep apnea but because his Medicare we have to use restrictive scoring will and based on that rule his AHI is less than 5 and so Medicare will not pay for therapy. At this point only options are for him to pay out-of-pocket for CPAP therapy which she is unable to afford or repeat testing but the patient is open to to test and he would like to wait. The getting ready to go down to [place] for few months and he would like to revisit this when he comes back to [place] and repeat testing at that time. 2. Parkinson's disease ([address]  Assessment & Plan:  Chronic- Stable. Discussed the importance of treating sleep apnea as part of the management of this disorder. Cont any meds per PCP and other physicians. 3. Paroxysmal atrial fibrillation (HCC)  Assessment & Plan:  Chronic- Stable. Discussed the importance of treating sleep apnea as part of the management of this disorder. Cont any meds per PCP and other physicians. This information was analyzed to assess complexity and medical decision making in regards to further testing and management. Diagnoses of Obstructive sleep apnea syndrome, Parkinson's disease ([address] and Paroxysmal atrial fibrillation ([address] were pertinent to this visit.  The chronic medical conditions listed are directly related to the primary diagnosis listed above. The management of the primary diagnosis affects the secondary diagnosis and vice versa. If you have questions, please do not hesitate to call me. I look forward to following Larry Quinn along with you.     Sincerely,      Nghia Byrd MD

## 2022-12-15 NOTE — PROGRESS NOTES
Demarco Geiger [facility]  [address] [facility], [address] [facility]- [phone]   [facility] Megan Ricketts. [address] [place] [address]. [address] [phone]     Video Visit- Follow up    Arash Hall, was evaluated through a synchronous (real-time) audio-video  encounter. The patient (or guardian if applicable) is aware that this is a billable  service, which includes applicable co-pays. This Virtual Visit was conducted with  patient's (and/or legal guardian's) consent. The visit was conducted pursuant to  the emergency declaration under the [address] [place] [address], [address] [place] [address] waiver authority and the [employer] and  [employer] General Act. Patient identification was verified,  and a caregiver was present when appropriate. The patient was located in a  state where the provider was licensed to provide care. Assessment/Plan:      1. Obstructive sleep apnea syndrome  Assessment & Plan:  New problem - needs testing: Reviewed with the patient that both his sleep study showing high RDI indicating moderate to severe sleep apnea but because his Medicare we have to use restrictive scoring will and based on that rule his AHI is less than 5 and so Medicare will not pay for therapy. At this point only options are for him to pay out-of-pocket for CPAP therapy which she is unable to afford or repeat testing but the patient is open to to test and he would like to wait. The getting ready to go down to [place] for few months and he would like to revisit this when he comes back to [place] and repeat testing at that time. 2. Parkinson's disease ([facility] [address]  Assessment & Plan:  Chronic- Stable. Discussed the importance of treating sleep apnea as part of the management of this disorder. Cont any meds per PCP and other physicians. 3. Paroxysmal atrial fibrillation (HCC)  Assessment & Plan:  Chronic- Stable.   Discussed the importance of treating sleep apnea as part of the management of this disorder. Cont any meds per PCP and other physicians. This information was analyzed to assess complexity and medical decision making in regards to further testing and management. Diagnoses of Obstructive sleep apnea syndrome, Parkinson's disease ([facility] [address] and Paroxysmal atrial fibrillation ([facility] [address] were pertinent to this visit. The chronic medical conditions listed are directly related to the primary diagnosis listed above. The management of the primary diagnosis affects the secondary diagnosis and vice versa. Subjective:     Patient ID: Suki Mary is a 71 y.o. male. Chief Complaint   Patient presents with    Sleep Apnea     Subjective   HPI:    PSG done in October or November showed high RDI but a Medicare AHI was less than 5.     [place] Sleepiness Score: 18    Current Outpatient Medications   Medication Instructions    aspirin 81 mg, Oral, DAILY    atorvastatin (LIPITOR) 80 mg, Oral, DAILY    carbidopa-levodopa (SINEMET)  MG per tablet TAKE 1 TABLET AT 7:30 AM,  11:30 AM, 3:30 PM AND 7:30  PM    fluticasone (FLONASE) 50 MCG/ACT nasal spray 2 sprays, Each Nostril, NIGHTLY    rOPINIRole (REQUIP) 0.25 MG tablet TAKE 1 TABLET BY MOUTH THREE TIMES A DAY    sertraline (ZOLOFT) 50 MG tablet TAKE 1 TABLET BY MOUTH EVERY DAY    sotalol (BETAPACE) 80 mg, Oral, 2 TIMES DAILY    tamsulosin (FLOMAX) 0.4 mg, Oral, NIGHTLY        Electronically signed by Alfonzo Hayes MD on 12/15/2022 at 3:08 PM

## 2022-12-15 NOTE — ASSESSMENT & PLAN NOTE
New problem - needs testing: Reviewed with the patient that both his sleep study showing high RDI indicating moderate to severe sleep apnea but because his Medicare we have to use restrictive scoring will and based on that rule his AHI is less than 5 and so Medicare will not pay for therapy. At this point only options are for him to pay out-of-pocket for CPAP therapy which she is unable to afford or repeat testing but the patient is open to to test and he would like to wait. The getting ready to go down to [place] for few months and he would like to revisit this when he comes back to [place] and repeat testing at that time.

## 2023-01-02 DIAGNOSIS — F32.A DEPRESSION, UNSPECIFIED DEPRESSION TYPE: ICD-10-CM

## 2023-02-10 RX ORDER — TAMSULOSIN HYDROCHLORIDE 0.4 MG/1
CAPSULE ORAL
Qty: 90 CAPSULE | Refills: 0 | Status: SHIPPED | OUTPATIENT
Start: 2023-02-10

## 2023-03-05 DIAGNOSIS — R25.1 TREMOR: ICD-10-CM

## 2023-03-05 DIAGNOSIS — G20 PARKINSON'S DISEASE (HCC): ICD-10-CM

## 2023-03-06 RX ORDER — TAMSULOSIN HYDROCHLORIDE 0.4 MG/1
CAPSULE ORAL
Qty: 90 CAPSULE | Refills: 0 | Status: SHIPPED | OUTPATIENT
Start: 2023-03-06

## 2023-03-06 NOTE — TELEPHONE ENCOUNTER
Medication:   Requested Prescriptions     Pending Prescriptions Disp Refills    tamsulosin (FLOMAX) 0.4 MG capsule [Pharmacy Med Name: TAMSULOSIN HCL 0.4 MG CAPSULE] 90 capsule 0     Sig: TAKE 1 CAPSULE BY MOUTH EVERY NIGHT      Last Filled:      Patient Phone Number: [phone] (home) [phone] (work)    Last appt: 8/17/2022   Next appt: 4/18/2023    Last OARRS: No flowsheet data found. PDMP Monitoring:    Last PDMP Joanna Araya as Reviewed [facility]):  Review User Review Instant Review Result          Preferred Pharmacy:   [address] [place] [address] W  [address] [place] [address] [address] [place] [address] Road  Phone: [phone] Fax: [phone]    [facility] ([address] [facility], [facility] [address] [facility] [phone]  F [phone]   [address] [address]  Phone: [phone] Fax: [phone]    [facility]/pharmacy #[id number]- [facility] [phone]  [address] [place] [address] Rd.   Gerardo Guzman [address]  Phone: [phone] Fax:  #[phone]- [address] [address]  Phone: [phone] Fax: [phone]

## 2023-03-20 ENCOUNTER — TELEPHONE (OUTPATIENT)
Dept: FAMILY MEDICINE CLINIC | Age: 73
End: 2023-03-20

## 2023-03-20 NOTE — TELEPHONE ENCOUNTER
Patient is experiencing cold symptoms and are in [place], his wife wanted to know what medications she is allowed to give him given his health.    Please advise

## 2023-04-18 ENCOUNTER — OFFICE VISIT (OUTPATIENT)
Dept: FAMILY MEDICINE CLINIC | Age: 73
End: 2023-04-18

## 2023-04-18 VITALS
SYSTOLIC BLOOD PRESSURE: 100 MMHG | RESPIRATION RATE: 12 BRPM | OXYGEN SATURATION: 96 % | DIASTOLIC BLOOD PRESSURE: 64 MMHG | WEIGHT: 187.38 LBS | HEART RATE: 59 BPM | BODY MASS INDEX: 25.77 KG/M2 | TEMPERATURE: 97.2 F

## 2023-04-18 DIAGNOSIS — F32.A DEPRESSION, UNSPECIFIED DEPRESSION TYPE: Primary | ICD-10-CM

## 2023-04-18 DIAGNOSIS — G47.61 PLMD (PERIODIC LIMB MOVEMENT DISORDER): ICD-10-CM

## 2023-04-18 DIAGNOSIS — I49.5 TACHYCARDIA-BRADYCARDIA SYNDROME (HCC): ICD-10-CM

## 2023-04-18 DIAGNOSIS — N40.1 BENIGN PROSTATIC HYPERPLASIA WITH NOCTURIA: ICD-10-CM

## 2023-04-18 DIAGNOSIS — I48.0 PAROXYSMAL ATRIAL FIBRILLATION (HCC): ICD-10-CM

## 2023-04-18 DIAGNOSIS — Z12.11 COLON CANCER SCREENING: ICD-10-CM

## 2023-04-18 DIAGNOSIS — G20 PARKINSON'S DISEASE (HCC): ICD-10-CM

## 2023-04-18 DIAGNOSIS — R35.1 BENIGN PROSTATIC HYPERPLASIA WITH NOCTURIA: ICD-10-CM

## 2023-04-18 RX ORDER — TAMSULOSIN HYDROCHLORIDE 0.4 MG/1
0.8 CAPSULE ORAL NIGHTLY
Qty: 180 CAPSULE | Refills: 1 | Status: SHIPPED | OUTPATIENT
Start: 2023-04-18

## 2023-04-18 RX ORDER — SERTRALINE HYDROCHLORIDE 100 MG/1
100 TABLET, FILM COATED ORAL DAILY
Qty: 90 TABLET | Refills: 1 | Status: SHIPPED | OUTPATIENT
Start: 2023-04-18

## 2023-04-18 RX ORDER — ROPINIROLE 0.25 MG/1
TABLET, FILM COATED ORAL
Qty: 270 TABLET | Refills: 1 | Status: CANCELLED | OUTPATIENT
Start: 2023-04-18

## 2023-04-18 SDOH — ECONOMIC STABILITY: FOOD INSECURITY: WITHIN THE PAST 12 MONTHS, YOU WORRIED THAT YOUR FOOD WOULD RUN OUT BEFORE YOU GOT MONEY TO BUY MORE.: NEVER TRUE

## 2023-04-18 SDOH — ECONOMIC STABILITY: INCOME INSECURITY: HOW HARD IS IT FOR YOU TO PAY FOR THE VERY BASICS LIKE FOOD, HOUSING, MEDICAL CARE, AND HEATING?: NOT HARD AT ALL

## 2023-04-18 SDOH — ECONOMIC STABILITY: HOUSING INSECURITY
IN THE LAST 12 MONTHS, WAS THERE A TIME WHEN YOU DID NOT HAVE A STEADY PLACE TO SLEEP OR SLEPT IN A SHELTER (INCLUDING NOW)?: NO

## 2023-04-18 SDOH — ECONOMIC STABILITY: FOOD INSECURITY: WITHIN THE PAST 12 MONTHS, THE FOOD YOU BOUGHT JUST DIDN'T LAST AND YOU DIDN'T HAVE MONEY TO GET MORE.: NEVER TRUE

## 2023-04-18 ASSESSMENT — PATIENT HEALTH QUESTIONNAIRE - PHQ9
9. THOUGHTS THAT YOU WOULD BE BETTER OFF DEAD, OR OF HURTING YOURSELF: 0
SUM OF ALL RESPONSES TO PHQ QUESTIONS 1-9: 0
1. LITTLE INTEREST OR PLEASURE IN DOING THINGS: 0
SUM OF ALL RESPONSES TO PHQ QUESTIONS 1-9: 0
SUM OF ALL RESPONSES TO PHQ9 QUESTIONS 1 & 2: 0
SUM OF ALL RESPONSES TO PHQ QUESTIONS 1-9: 0
2. FEELING DOWN, DEPRESSED OR HOPELESS: 0
SUM OF ALL RESPONSES TO PHQ QUESTIONS 1-9: 0
4. FEELING TIRED OR HAVING LITTLE ENERGY: 0
5. POOR APPETITE OR OVEREATING: 0
6. FEELING BAD ABOUT YOURSELF - OR THAT YOU ARE A FAILURE OR HAVE LET YOURSELF OR YOUR FAMILY DOWN: 0
8. MOVING OR SPEAKING SO SLOWLY THAT OTHER PEOPLE COULD HAVE NOTICED. OR THE OPPOSITE, BEING SO FIGETY OR RESTLESS THAT YOU HAVE BEEN MOVING AROUND A LOT MORE THAN USUAL: 0
10. IF YOU CHECKED OFF ANY PROBLEMS, HOW DIFFICULT HAVE THESE PROBLEMS MADE IT FOR YOU TO DO YOUR WORK, TAKE CARE OF THINGS AT HOME, OR GET ALONG WITH OTHER PEOPLE: 0
3. TROUBLE FALLING OR STAYING ASLEEP: 0
7. TROUBLE CONCENTRATING ON THINGS, SUCH AS READING THE NEWSPAPER OR WATCHING TELEVISION: 0

## 2023-04-18 NOTE — PROGRESS NOTES
Subjective:      Patient ID: Nicolle Wiggins is a 68 y.o. male. CC: Patient presents for re-evaluation of chronic health problems including depression, BPH, paroxysmal atrial fibrillation with tachybradycardia syndrome. .    HPI pt is here for a follow up, med refill, and will like to discuss pacemaker plans per cardiologist.  Patient presents in accompaniment of wife. He feels the urinary symptoms have improved to some extent that he gets up still about 3 times every night but not quite every 2 hours. Does not seem to have any daytime symptoms. He does feel his depression symptoms have improved although he still has decreased energy. He still sleeping about 10 to 12 hours every day but is very broken sleep. He had recent evaluation with cardiology and was recommended to have a pacemaker so they can control his tachybradycardia syndrome and his atrial fibrillation better. He continues under neurology care in regards to Parkinson disease. He had 2 sleep studies performed and both of them did not demonstrate sleep apnea. Apparently there was a question whether she have a third study.     Review of Systems  Patient Active Problem List   Diagnosis    Dupuytren's contracture    Parkinson's disease ([facility] [address]    Carpal tunnel syndrome    Lesion of ulnar nerve    Paroxysmal atrial fibrillation (HCC)    Typical atrial flutter (HCC)    Arthralgia of right hip    Obstructive sleep apnea syndrome    Coronary artery disease involving native heart without angina pectoris    Hypercholesteremia    Allergic rhinitis       Outpatient Medications Marked as Taking for the 4/18/23 encounter (Office Visit) with Darshan Martin MD   Medication Sig Dispense Refill    carbidopa-levodopa (SINEMET)  MG per tablet TAKE 1 TABLET AT 7:30 AM, 11:30 AM, 3:30 PM AND 7:30  tablet 0    tamsulosin (FLOMAX) 0.4 MG capsule TAKE 1 CAPSULE BY MOUTH EVERY NIGHT 90 capsule 0    sertraline (ZOLOFT) 50 MG tablet TAKE 1 TABLET BY MOUTH EVERY

## 2023-04-20 ENCOUNTER — OFFICE VISIT (OUTPATIENT)
Dept: NEUROLOGY | Age: 73
End: 2023-04-20
Payer: MEDICARE

## 2023-04-20 VITALS
SYSTOLIC BLOOD PRESSURE: 95 MMHG | DIASTOLIC BLOOD PRESSURE: 63 MMHG | WEIGHT: 187 LBS | HEIGHT: 72 IN | HEART RATE: 68 BPM | BODY MASS INDEX: 25.33 KG/M2

## 2023-04-20 DIAGNOSIS — R25.1 TREMOR: ICD-10-CM

## 2023-04-20 DIAGNOSIS — R27.0 ATAXIA: ICD-10-CM

## 2023-04-20 DIAGNOSIS — R53.83 OTHER FATIGUE: ICD-10-CM

## 2023-04-20 DIAGNOSIS — G20 PARKINSON'S DISEASE (HCC): Primary | ICD-10-CM

## 2023-04-20 DIAGNOSIS — G47.33 OBSTRUCTIVE SLEEP APNEA: ICD-10-CM

## 2023-04-20 DIAGNOSIS — F32.A DEPRESSION, UNSPECIFIED DEPRESSION TYPE: ICD-10-CM

## 2023-04-20 PROCEDURE — 1123F ACP DISCUSS/DSCN MKR DOCD: CPT | Performed by: PSYCHIATRY & NEUROLOGY

## 2023-04-20 PROCEDURE — G8427 DOCREV CUR MEDS BY ELIG CLIN: HCPCS | Performed by: PSYCHIATRY & NEUROLOGY

## 2023-04-20 PROCEDURE — 99214 OFFICE O/P EST MOD 30 MIN: CPT | Performed by: PSYCHIATRY & NEUROLOGY

## 2023-04-20 PROCEDURE — G8417 CALC BMI ABV UP PARAM F/U: HCPCS | Performed by: PSYCHIATRY & NEUROLOGY

## 2023-04-20 PROCEDURE — 3017F COLORECTAL CA SCREEN DOC REV: CPT | Performed by: PSYCHIATRY & NEUROLOGY

## 2023-04-20 PROCEDURE — 1036F TOBACCO NON-USER: CPT | Performed by: PSYCHIATRY & NEUROLOGY

## 2023-04-20 RX ORDER — ROPINIROLE 0.25 MG/1
TABLET, FILM COATED ORAL
Qty: 270 TABLET | Refills: 1 | Status: SHIPPED | OUTPATIENT
Start: 2023-04-20

## 2023-04-20 NOTE — PROGRESS NOTES
Bozena Aguilar   Neurology followup    Subjective:   CC/HP  History was obtained from patient   Additional history was obtained from his family  Interval history:  Patient still has some difficulty with poor balance. Patient still continues to have some fatigue. Apparently had multiple sleep studies but they could not come up with a definite diagnosis and they wanted to do another one. Patient is not interested. His tremors and bradykinesia have remained stable. No side effects of medication. He feels that his depression is better. He feels that the sertraline seems to be helping. Family agrees with this assessment. Cardiology apparently now is considering a pacemaker sometime in the next few months. Patient has atrial fibrillation and cardiology has been considering some type of pacemaker procedure. Background history:  Patient has known Parkinson's disease. Patient has some tremors. He has noticed occasional difficulty with swallowing.   Patient still has some bradykinesia  Patient has chronic paroxysmal atrial fibrillation  Patient states that his right hip pain is much improved    REVIEW OF SYSTEMS    Constitutional:  []   Chills   [x]  Fatigue   []  Fevers   []  Malaise   []  Weight loss     [] Denies all of the above    Respiratory:   []  Cough    []  Shortness of breath         [x] Denies all of the above     Cardiovascular:   []  Chest pain    []  Exertional chest pressure/discomfort           [] Palpitations    []  Syncope     [x] Denies all of the above        Past Medical History:   Diagnosis Date    Atrial fibrillation ([facility] [address]     No significant past medical history     Parkinson disease ([facility] [address]      Family History   Problem Relation Age of Onset    Heart Disease Mother     Diabetes Mother     High Blood Pressure Mother     Sleep Apnea Mother     Diabetes Father     High Blood Pressure Father     High Blood Pressure Brother     High Cholesterol Neg Hx     Kidney Disease Neg Hx      Social

## 2023-07-25 ENCOUNTER — OFFICE VISIT (OUTPATIENT)
Dept: FAMILY MEDICINE CLINIC | Age: 73
End: 2023-07-25
Payer: MEDICARE

## 2023-07-25 VITALS
BODY MASS INDEX: 25.64 KG/M2 | OXYGEN SATURATION: 99 % | HEART RATE: 55 BPM | SYSTOLIC BLOOD PRESSURE: 100 MMHG | TEMPERATURE: 97.5 F | WEIGHT: 186.4 LBS | DIASTOLIC BLOOD PRESSURE: 60 MMHG

## 2023-07-25 DIAGNOSIS — B02.9 HERPES ZOSTER WITHOUT COMPLICATION: Primary | ICD-10-CM

## 2023-07-25 DIAGNOSIS — M25.511 ACUTE PAIN OF RIGHT SHOULDER: ICD-10-CM

## 2023-07-25 PROCEDURE — 3017F COLORECTAL CA SCREEN DOC REV: CPT | Performed by: FAMILY MEDICINE

## 2023-07-25 PROCEDURE — G8427 DOCREV CUR MEDS BY ELIG CLIN: HCPCS | Performed by: FAMILY MEDICINE

## 2023-07-25 PROCEDURE — G8417 CALC BMI ABV UP PARAM F/U: HCPCS | Performed by: FAMILY MEDICINE

## 2023-07-25 PROCEDURE — 99213 OFFICE O/P EST LOW 20 MIN: CPT | Performed by: FAMILY MEDICINE

## 2023-07-25 PROCEDURE — 1036F TOBACCO NON-USER: CPT | Performed by: FAMILY MEDICINE

## 2023-07-25 PROCEDURE — 1123F ACP DISCUSS/DSCN MKR DOCD: CPT | Performed by: FAMILY MEDICINE

## 2023-07-25 RX ORDER — PREDNISONE 20 MG/1
60 TABLET ORAL DAILY
Qty: 15 TABLET | Refills: 0 | Status: SHIPPED | OUTPATIENT
Start: 2023-07-25 | End: 2023-07-30

## 2023-08-01 ENCOUNTER — TELEPHONE (OUTPATIENT)
Dept: FAMILY MEDICINE CLINIC | Age: 73
End: 2023-08-01

## 2023-08-01 DIAGNOSIS — G20 PARKINSON'S DISEASE (HCC): ICD-10-CM

## 2023-08-01 DIAGNOSIS — R25.1 TREMOR: ICD-10-CM

## 2023-08-01 RX ORDER — CIPROFLOXACIN 500 MG/1
500 TABLET, FILM COATED ORAL 2 TIMES DAILY
Qty: 14 TABLET | Refills: 0 | Status: SHIPPED | OUTPATIENT
Start: 2023-08-01 | End: 2023-08-01 | Stop reason: ALTCHOICE

## 2023-08-01 RX ORDER — CEPHALEXIN 500 MG/1
500 CAPSULE ORAL 3 TIMES DAILY
Qty: 21 CAPSULE | Refills: 0 | Status: SHIPPED | OUTPATIENT
Start: 2023-08-01 | End: 2023-08-04

## 2023-08-01 NOTE — TELEPHONE ENCOUNTER
Patient is currently being treated for shingles on his upper back /shoulder was treated at urgent care, still taking zovirax, has one more day left. C/O urinary frequency/hesitancy x days. No blood in urine. Wife is concerned this has been caused from him having shingles and would like to know if you can send in an ATB,  he is currently in [facility] until Sunday. Pharmacy in [facility] listed in chart.      Please advise

## 2023-08-02 ENCOUNTER — TELEPHONE (OUTPATIENT)
Dept: FAMILY MEDICINE CLINIC | Age: 73
End: 2023-08-02

## 2023-08-02 NOTE — TELEPHONE ENCOUNTER
----- Message from [facility] sent at 8/2/2023  3:09 PM EDT -----  Subject: Appointment Request    Reason for Call: Established Patient Appointment needed: Routine Existing   Condition Follow Up    QUESTIONS    Reason for appointment request? Available appointments did not meet   patient need     Additional Information for Provider? Patient is needing to be seen sooner,   his wife stated he is not doing any better and feels that he is declining.    They are on the way back from [place] and would like an appointment Friday   if possible.  ---------------------------------------------------------------------------  --------------  Edna Spring Tamara  [phone]; OK to leave message on voicemail  ---------------------------------------------------------------------------  --------------  SCRIPT ANSWERS

## 2023-08-03 ENCOUNTER — OFFICE VISIT (OUTPATIENT)
Dept: FAMILY MEDICINE CLINIC | Age: 73
End: 2023-08-03

## 2023-08-03 VITALS
HEART RATE: 46 BPM | TEMPERATURE: 98.3 F | DIASTOLIC BLOOD PRESSURE: 60 MMHG | WEIGHT: 187.2 LBS | BODY MASS INDEX: 25.75 KG/M2 | SYSTOLIC BLOOD PRESSURE: 130 MMHG | OXYGEN SATURATION: 98 %

## 2023-08-03 DIAGNOSIS — R35.0 FREQUENT URINATION: Primary | ICD-10-CM

## 2023-08-03 DIAGNOSIS — R41.3 MEMORY CHANGES: ICD-10-CM

## 2023-08-03 DIAGNOSIS — N30.00 ACUTE CYSTITIS WITHOUT HEMATURIA: ICD-10-CM

## 2023-08-03 LAB
APPEARANCE FLUID: ABNORMAL
BILIRUBIN, POC: ABNORMAL
BLOOD URINE, POC: ABNORMAL
CLARITY, POC: ABNORMAL
COLOR, POC: ABNORMAL
GLUCOSE URINE, POC: ABNORMAL
KETONES, POC: ABNORMAL
LEUKOCYTE EST, POC: ABNORMAL
NITRITE, POC: ABNORMAL
PH, POC: 6
PROTEIN, POC: ABNORMAL
SPECIFIC GRAVITY, POC: 1.02
UROBILINOGEN, POC: 0.2

## 2023-08-03 RX ORDER — CEPHALEXIN 500 MG/1
500 CAPSULE ORAL 3 TIMES DAILY
Qty: 21 CAPSULE | Refills: 0 | Status: ON HOLD | OUTPATIENT
Start: 2023-08-03 | End: 2023-08-10

## 2023-08-04 ENCOUNTER — HOSPITAL ENCOUNTER (INPATIENT)
Age: 73
LOS: 5 days | Discharge: NURSING, REHAB OR LONG-TERM CARE | DRG: 682 | End: 2023-08-09
Attending: EMERGENCY MEDICINE | Admitting: STUDENT IN AN ORGANIZED HEALTH CARE EDUCATION/TRAINING PROGRAM
Payer: MEDICARE

## 2023-08-04 ENCOUNTER — TELEPHONE (OUTPATIENT)
Dept: FAMILY MEDICINE CLINIC | Age: 73
End: 2023-08-04

## 2023-08-04 ENCOUNTER — APPOINTMENT (OUTPATIENT)
Dept: GENERAL RADIOLOGY | Age: 73
DRG: 682 | End: 2023-08-04
Payer: MEDICARE

## 2023-08-04 ENCOUNTER — HOSPITAL ENCOUNTER (OUTPATIENT)
Age: 73
Discharge: HOME | End: 2023-08-04
Payer: MEDICARE

## 2023-08-04 ENCOUNTER — APPOINTMENT (OUTPATIENT)
Dept: CT IMAGING | Age: 73
DRG: 682 | End: 2023-08-04
Payer: MEDICARE

## 2023-08-04 DIAGNOSIS — N17.9 ACUTE RENAL FAILURE, UNSPECIFIED ACUTE RENAL FAILURE TYPE (HCC): Primary | ICD-10-CM

## 2023-08-04 DIAGNOSIS — R35.0 FREQUENT URINATION: ICD-10-CM

## 2023-08-04 DIAGNOSIS — N30.00 ACUTE CYSTITIS WITHOUT HEMATURIA: ICD-10-CM

## 2023-08-04 DIAGNOSIS — N32.0 BLADDER OUTLET OBSTRUCTION: ICD-10-CM

## 2023-08-04 DIAGNOSIS — G20 PARKINSON DISEASE (HCC): ICD-10-CM

## 2023-08-04 DIAGNOSIS — R53.1 GENERALIZED WEAKNESS: ICD-10-CM

## 2023-08-04 DIAGNOSIS — R41.3 MEMORY CHANGES: ICD-10-CM

## 2023-08-04 LAB
ALBUMIN SERPL-MCNC: 3.4 G/DL (ref 3.4–5)
ALBUMIN SERPL-MCNC: 3.5 G/DL (ref 3.4–5)
ALBUMIN/GLOB SERPL: 1.2 {RATIO} (ref 1.1–2.2)
ALBUMIN/GLOB SERPL: 1.2 {RATIO} (ref 1.1–2.2)
ALP SERPL-CCNC: 144 U/L (ref 40–129)
ALP SERPL-CCNC: 149 U/L (ref 40–129)
ALT SERPL-CCNC: <5 U/L (ref 10–40)
ALT SERPL-CCNC: <5 U/L (ref 10–40)
ANION GAP SERPL CALCULATED.3IONS-SCNC: 19 MMOL/L (ref 3–16)
ANION GAP SERPL CALCULATED.3IONS-SCNC: 22 MMOL/L (ref 3–16)
AST SERPL-CCNC: 10 U/L (ref 15–37)
AST SERPL-CCNC: 8 U/L (ref 15–37)
BACTERIA UR CULT: NORMAL
BACTERIA URNS QL MICRO: ABNORMAL /HPF
BASOPHILS # BLD: 0 K/UL (ref 0–0.2)
BASOPHILS # BLD: 0 K/UL (ref 0–0.2)
BASOPHILS NFR BLD: 0.2 %
BASOPHILS NFR BLD: 0.4 %
BILIRUB SERPL-MCNC: 0.5 MG/DL (ref 0–1)
BILIRUB SERPL-MCNC: 0.6 MG/DL (ref 0–1)
BILIRUB UR QL STRIP.AUTO: NEGATIVE
BUN SERPL-MCNC: 110 MG/DL (ref 7–20)
BUN SERPL-MCNC: 97 MG/DL (ref 7–20)
CALCIUM SERPL-MCNC: 8.8 MG/DL (ref 8.3–10.6)
CALCIUM SERPL-MCNC: 9 MG/DL (ref 8.3–10.6)
CHLORIDE SERPL-SCNC: 96 MMOL/L (ref 99–110)
CHLORIDE SERPL-SCNC: 97 MMOL/L (ref 99–110)
CK SERPL-CCNC: 100 U/L (ref 39–308)
CLARITY UR: ABNORMAL
CO2 SERPL-SCNC: 18 MMOL/L (ref 21–32)
CO2 SERPL-SCNC: 19 MMOL/L (ref 21–32)
COLOR UR: YELLOW
CREAT SERPL-MCNC: 8.8 MG/DL (ref 0.8–1.3)
CREAT SERPL-MCNC: 8.9 MG/DL (ref 0.8–1.3)
DEPRECATED RDW RBC AUTO: 14.5 % (ref 12.4–15.4)
DEPRECATED RDW RBC AUTO: 14.8 % (ref 12.4–15.4)
EOSINOPHIL # BLD: 0 K/UL (ref 0–0.6)
EOSINOPHIL # BLD: 0 K/UL (ref 0–0.6)
EOSINOPHIL NFR BLD: 0.1 %
EOSINOPHIL NFR BLD: 0.2 %
EPI CELLS #/AREA URNS AUTO: 5 /HPF (ref 0–5)
GFR SERPLBLD CREATININE-BSD FMLA CKD-EPI: 6 ML/MIN/{1.73_M2}
GFR SERPLBLD CREATININE-BSD FMLA CKD-EPI: 6 ML/MIN/{1.73_M2}
GLUCOSE SERPL-MCNC: 77 MG/DL (ref 70–99)
GLUCOSE SERPL-MCNC: 87 MG/DL (ref 70–99)
GLUCOSE UR STRIP.AUTO-MCNC: NEGATIVE MG/DL
HCT VFR BLD AUTO: 38.6 % (ref 40.5–52.5)
HCT VFR BLD AUTO: 39.6 % (ref 40.5–52.5)
HGB BLD-MCNC: 13.2 G/DL (ref 13.5–17.5)
HGB BLD-MCNC: 13.2 G/DL (ref 13.5–17.5)
HGB UR QL STRIP.AUTO: ABNORMAL
HYALINE CASTS #/AREA URNS AUTO: 0 /LPF (ref 0–8)
KETONES UR STRIP.AUTO-MCNC: NEGATIVE MG/DL
LACTATE BLDV-SCNC: 1.1 MMOL/L (ref 0.4–1.9)
LEUKOCYTE ESTERASE UR QL STRIP.AUTO: ABNORMAL
LIPASE SERPL-CCNC: 65 U/L (ref 13–60)
LYMPHOCYTES # BLD: 0.5 K/UL (ref 1–5.1)
LYMPHOCYTES # BLD: 0.6 K/UL (ref 1–5.1)
LYMPHOCYTES NFR BLD: 5.5 %
LYMPHOCYTES NFR BLD: 5.7 %
MCH RBC QN AUTO: 30.6 PG (ref 26–34)
MCH RBC QN AUTO: 31.3 PG (ref 26–34)
MCHC RBC AUTO-ENTMCNC: 33.4 G/DL (ref 31–36)
MCHC RBC AUTO-ENTMCNC: 34.3 G/DL (ref 31–36)
MCV RBC AUTO: 91.4 FL (ref 80–100)
MCV RBC AUTO: 91.6 FL (ref 80–100)
MONOCYTES # BLD: 0.8 K/UL (ref 0–1.3)
MONOCYTES # BLD: 0.9 K/UL (ref 0–1.3)
MONOCYTES NFR BLD: 9 %
MONOCYTES NFR BLD: 9 %
NEUTROPHILS # BLD: 7.4 K/UL (ref 1.7–7.7)
NEUTROPHILS # BLD: 8.8 K/UL (ref 1.7–7.7)
NEUTROPHILS NFR BLD: 84.8 %
NEUTROPHILS NFR BLD: 85.1 %
NITRITE UR QL STRIP.AUTO: NEGATIVE
NT-PROBNP SERPL-MCNC: 666 PG/ML (ref 0–124)
PH UR STRIP.AUTO: 5.5 [PH] (ref 5–8)
PLATELET # BLD AUTO: 211 K/UL (ref 135–450)
PLATELET # BLD AUTO: 220 K/UL (ref 135–450)
PMV BLD AUTO: 8.4 FL (ref 5–10.5)
PMV BLD AUTO: 8.8 FL (ref 5–10.5)
POTASSIUM SERPL-SCNC: 4.5 MMOL/L (ref 3.5–5.1)
POTASSIUM SERPL-SCNC: 4.8 MMOL/L (ref 3.5–5.1)
PROT SERPL-MCNC: 6.3 G/DL (ref 6.4–8.2)
PROT SERPL-MCNC: 6.5 G/DL (ref 6.4–8.2)
PROT UR STRIP.AUTO-MCNC: ABNORMAL MG/DL
RBC # BLD AUTO: 4.22 M/UL (ref 4.2–5.9)
RBC # BLD AUTO: 4.32 M/UL (ref 4.2–5.9)
RBC CLUMPS #/AREA URNS AUTO: 423 /HPF (ref 0–4)
SARS-COV-2 RDRP RESP QL NAA+PROBE: NOT DETECTED
SODIUM SERPL-SCNC: 135 MMOL/L (ref 136–145)
SODIUM SERPL-SCNC: 136 MMOL/L (ref 136–145)
SP GR UR STRIP.AUTO: 1.01 (ref 1–1.03)
TROPONIN, HIGH SENSITIVITY: 18 NG/L (ref 0–22)
TROPONIN, HIGH SENSITIVITY: 21 NG/L (ref 0–22)
UA COMPLETE W REFLEX CULTURE PNL UR: ABNORMAL
UA DIPSTICK W REFLEX MICRO PNL UR: YES
URN SPEC COLLECT METH UR: ABNORMAL
UROBILINOGEN UR STRIP-ACNC: 1 E.U./DL
WBC # BLD AUTO: 10.4 K/UL (ref 4–11)
WBC # BLD AUTO: 8.8 K/UL (ref 4–11)
WBC #/AREA URNS AUTO: 5 /HPF (ref 0–5)

## 2023-08-04 PROCEDURE — 80053 COMPREHEN METABOLIC PANEL: CPT

## 2023-08-04 PROCEDURE — 85025 COMPLETE CBC W/AUTO DIFF WBC: CPT

## 2023-08-04 PROCEDURE — 1200000000 HC SEMI PRIVATE

## 2023-08-04 PROCEDURE — 82550 ASSAY OF CK (CPK): CPT

## 2023-08-04 PROCEDURE — 83690 ASSAY OF LIPASE: CPT

## 2023-08-04 PROCEDURE — 93005 ELECTROCARDIOGRAM TRACING: CPT | Performed by: EMERGENCY MEDICINE

## 2023-08-04 PROCEDURE — 82570 ASSAY OF URINE CREATININE: CPT

## 2023-08-04 PROCEDURE — 83880 ASSAY OF NATRIURETIC PEPTIDE: CPT

## 2023-08-04 PROCEDURE — 83605 ASSAY OF LACTIC ACID: CPT

## 2023-08-04 PROCEDURE — 82436 ASSAY OF URINE CHLORIDE: CPT

## 2023-08-04 PROCEDURE — 70450 CT HEAD/BRAIN W/O DYE: CPT

## 2023-08-04 PROCEDURE — 87086 URINE CULTURE/COLONY COUNT: CPT

## 2023-08-04 PROCEDURE — 71045 X-RAY EXAM CHEST 1 VIEW: CPT

## 2023-08-04 PROCEDURE — 51702 INSERT TEMP BLADDER CATH: CPT

## 2023-08-04 PROCEDURE — 99285 EMERGENCY DEPT VISIT HI MDM: CPT

## 2023-08-04 PROCEDURE — 84300 ASSAY OF URINE SODIUM: CPT

## 2023-08-04 PROCEDURE — 81001 URINALYSIS AUTO W/SCOPE: CPT

## 2023-08-04 PROCEDURE — 84133 ASSAY OF URINE POTASSIUM: CPT

## 2023-08-04 PROCEDURE — 36415 COLL VENOUS BLD VENIPUNCTURE: CPT

## 2023-08-04 PROCEDURE — 74176 CT ABD & PELVIS W/O CONTRAST: CPT

## 2023-08-04 PROCEDURE — 6360000002 HC RX W HCPCS: Performed by: PHYSICIAN ASSISTANT

## 2023-08-04 PROCEDURE — 2580000003 HC RX 258: Performed by: INTERNAL MEDICINE

## 2023-08-04 PROCEDURE — 87635 SARS-COV-2 COVID-19 AMP PRB: CPT

## 2023-08-04 PROCEDURE — 6370000000 HC RX 637 (ALT 250 FOR IP): Performed by: PHYSICIAN ASSISTANT

## 2023-08-04 PROCEDURE — 84484 ASSAY OF TROPONIN QUANT: CPT

## 2023-08-04 RX ORDER — SOTALOL HYDROCHLORIDE 80 MG/1
80 TABLET ORAL
Status: DISCONTINUED | OUTPATIENT
Start: 2023-08-06 | End: 2023-08-09 | Stop reason: HOSPADM

## 2023-08-04 RX ORDER — SODIUM CHLORIDE 0.9 % (FLUSH) 0.9 %
5-40 SYRINGE (ML) INJECTION EVERY 12 HOURS SCHEDULED
Status: DISCONTINUED | OUTPATIENT
Start: 2023-08-04 | End: 2023-08-09 | Stop reason: HOSPADM

## 2023-08-04 RX ORDER — ACETAMINOPHEN 325 MG/1
650 TABLET ORAL EVERY 6 HOURS PRN
Status: DISCONTINUED | OUTPATIENT
Start: 2023-08-04 | End: 2023-08-09 | Stop reason: HOSPADM

## 2023-08-04 RX ORDER — FLUTICASONE PROPIONATE 50 MCG
2 SPRAY, SUSPENSION (ML) NASAL NIGHTLY
Status: DISCONTINUED | OUTPATIENT
Start: 2023-08-04 | End: 2023-08-09 | Stop reason: HOSPADM

## 2023-08-04 RX ORDER — SODIUM CHLORIDE 0.9 % (FLUSH) 0.9 %
5-40 SYRINGE (ML) INJECTION PRN
Status: DISCONTINUED | OUTPATIENT
Start: 2023-08-04 | End: 2023-08-09 | Stop reason: HOSPADM

## 2023-08-04 RX ORDER — ASPIRIN 81 MG/1
81 TABLET ORAL DAILY
Status: DISCONTINUED | OUTPATIENT
Start: 2023-08-05 | End: 2023-08-09 | Stop reason: HOSPADM

## 2023-08-04 RX ORDER — SENNOSIDES A AND B 8.6 MG/1
1 TABLET, FILM COATED ORAL DAILY PRN
Status: DISCONTINUED | OUTPATIENT
Start: 2023-08-04 | End: 2023-08-09 | Stop reason: HOSPADM

## 2023-08-04 RX ORDER — TAMSULOSIN HYDROCHLORIDE 0.4 MG/1
0.4 CAPSULE ORAL NIGHTLY
Status: DISCONTINUED | OUTPATIENT
Start: 2023-08-04 | End: 2023-08-09 | Stop reason: HOSPADM

## 2023-08-04 RX ORDER — HEPARIN SODIUM 5000 [USP'U]/ML
5000 INJECTION, SOLUTION INTRAVENOUS; SUBCUTANEOUS EVERY 8 HOURS SCHEDULED
Status: DISCONTINUED | OUTPATIENT
Start: 2023-08-04 | End: 2023-08-09 | Stop reason: HOSPADM

## 2023-08-04 RX ORDER — ROPINIROLE 0.25 MG/1
0.25 TABLET, FILM COATED ORAL 3 TIMES DAILY
Status: DISCONTINUED | OUTPATIENT
Start: 2023-08-04 | End: 2023-08-09 | Stop reason: HOSPADM

## 2023-08-04 RX ORDER — ACETAMINOPHEN 650 MG/1
650 SUPPOSITORY RECTAL EVERY 6 HOURS PRN
Status: DISCONTINUED | OUTPATIENT
Start: 2023-08-04 | End: 2023-08-09 | Stop reason: HOSPADM

## 2023-08-04 RX ORDER — SODIUM CHLORIDE 9 MG/ML
INJECTION, SOLUTION INTRAVENOUS CONTINUOUS
Status: DISCONTINUED | OUTPATIENT
Start: 2023-08-04 | End: 2023-08-09 | Stop reason: HOSPADM

## 2023-08-04 RX ORDER — SODIUM CHLORIDE 9 MG/ML
INJECTION, SOLUTION INTRAVENOUS PRN
Status: DISCONTINUED | OUTPATIENT
Start: 2023-08-04 | End: 2023-08-09 | Stop reason: HOSPADM

## 2023-08-04 RX ORDER — ATORVASTATIN CALCIUM 80 MG/1
80 TABLET, FILM COATED ORAL NIGHTLY
Status: DISCONTINUED | OUTPATIENT
Start: 2023-08-04 | End: 2023-08-09 | Stop reason: HOSPADM

## 2023-08-04 RX ADMIN — ROPINIROLE HYDROCHLORIDE 0.25 MG: 0.25 TABLET, FILM COATED ORAL at 23:47

## 2023-08-04 RX ADMIN — HEPARIN SODIUM 5000 UNITS: 5000 INJECTION INTRAVENOUS; SUBCUTANEOUS at 23:48

## 2023-08-04 RX ADMIN — TAMSULOSIN HYDROCHLORIDE 0.4 MG: 0.4 CAPSULE ORAL at 23:47

## 2023-08-04 RX ADMIN — CARBIDOPA AND LEVODOPA 1 TABLET: 25; 100 TABLET ORAL at 23:57

## 2023-08-04 RX ADMIN — SODIUM CHLORIDE: 9 INJECTION, SOLUTION INTRAVENOUS at 18:00

## 2023-08-04 RX ADMIN — SERTRALINE 100 MG: 50 TABLET, FILM COATED ORAL at 23:47

## 2023-08-04 RX ADMIN — ATORVASTATIN CALCIUM 80 MG: 80 TABLET, FILM COATED ORAL at 23:47

## 2023-08-04 ASSESSMENT — PAIN - FUNCTIONAL ASSESSMENT: PAIN_FUNCTIONAL_ASSESSMENT: NONE - DENIES PAIN

## 2023-08-04 NOTE — CONSULTS
Durene Reges, MD Tanna Justice, MD Jimmy Riedel, MD                Office: [phone]                      Fax: [phone]               [email or web address]                     Nephrology consult received. Full consult report will follow. Brief plan-    Patient seen and examined at bedside, and in the emergency room,  I discussed with patient's family at bedside and emergency room physician and nurse      Patient needs urgent CAT scan without IV contrast, of abdomen and pelvis. monitor PVR w/ bladder scan for argueta insertion need. UA w/ microscopy, urine lytes,     -Rx:   cc/hr   ECHO in past no HF,   Monitor for fluid overload     no need for dialysis,    at higher risk for decompensation, needing closer monitoring. Thank you for allowing us to participate in this patient's care. Please do not hesitate to contact us anytime. We will follow along with you. Jimmy Riedel, MD  Nephrology Asso. [address]   [phone] or [address] [employer].

## 2023-08-04 NOTE — TELEPHONE ENCOUNTER
Received call from [facility] in regards to patient blood work noted with two critical values: BUN 96.6 and Creatinine 8.9. Results in epic.

## 2023-08-04 NOTE — PROGRESS NOTES
Pharmacy Home Medication Reconciliation Note    A medication reconciliation has been completed for Demar Crespo 1950    Pharmacy: [facility]   Information provided by: Patient's Wife     The patient's home medication list is as follows: No current facility-administered medications on file prior to encounter. Current Outpatient Medications on File Prior to Encounter   Medication Sig Dispense Refill    cephALEXin (KEFLEX) 500 MG capsule Take 1 capsule by mouth 3 times daily for 7 days (Patient taking differently: Take 1 capsule by mouth 2 times daily) 21 capsule 0    [DISCONTINUED] cephALEXin (KEFLEX) 500 MG capsule Take 1 capsule by mouth 3 times daily for 7 days (Patient not taking: Reported on 8/3/2023) 21 capsule 0    carbidopa-levodopa (SINEMET)  MG per tablet TAKE 1 TABLET AT 7:30 AM, 11:30 AM, 3:30 PM AND 7:30  tablet 1    rOPINIRole (REQUIP) 0.25 MG tablet TAKE 1 TABLET BY MOUTH THREE TIMES A  tablet 1    tamsulosin (FLOMAX) 0.4 MG capsule Take 2 capsules by mouth nightly 180 capsule 1    sertraline (ZOLOFT) 100 MG tablet Take 1 tablet by mouth daily (Patient taking differently: Take 1 tablet by mouth nightly) 90 tablet 1    fluticasone (FLONASE) 50 MCG/ACT nasal spray 2 sprays by Each Nostril route nightly 3 each 3    sotalol (BETAPACE) 80 MG tablet Take 1 tablet by mouth daily      atorvastatin (LIPITOR) 80 MG tablet Take 1 tablet by mouth nightly      aspirin 81 MG tablet Take 1 tablet by mouth daily       Timing of last doses updated.     Thank you,  Mohini Pantoja [facility]

## 2023-08-04 NOTE — CONSULTS
Durene Reges, MD Tanna Justice, MD Jimmy Riedel, MD                  Office: [phone]                      Fax: [phone] [address]                   NEPHROLOGY INITIAL CONSULT NOTE:     PATIENT NAME: Karen Barry YOB: 1950  MRN: [id number]  REASON FOR CONSULT: For evaluation and management of Acute Kidney Injury . (My recommendations will be communicated by way of shared medical record.)  Consulted directly by Krystle Ledbetter DO from ER on the phone. RECOMMENDATIONS: *   -check :   CT scan a/p w/o IVC  monitor PVR w/ bladder scan for argueta insertion need. UA w/ microscopy,   Urine lytes,   Urine Cx    Work up for diarrhea  R/o C.diff    -Rx:   cc/hr   ECHO in past no HF,   Monitor for fluid overload  Daily labs  no need for dialysis,    at higher risk for decompensation, needing closer monitoring. D/C plan from renal stand point:  - likely ~4-5 days      IMPRESSION:       Admitted on:  2023  4:45 PM   For:  Parkinson disease ([address]) [G20]  Generalized weakness [R53.1]  DEBO (acute kidney injury) ([address]) [N17.9]  Bladder outlet obstruction [N32.0]  Acute renal failure, unspecified acute renal failure type ([address]) [N17.9]         DEBO on admission - severe  No h/o CKD:   - BL Scr- 0.7 as off 10-22  ->  8.9 on admission  - Etiology of DEBO - presumed ATN w/ hypovolemia + possibly obstructive uropathy , acute urinary retention   W/ BPH    - other differentials:  unlikely other GN / TI / TMA process  - UA :  order  - Renal imaging:  order       Associated problems:   - Volume status: hypo-volemic  ECHO in past no HF     : HTN : no need for tight control    : Na: hyponatremia - mild -    - Azotemia: pre-renal severe on admission    W/ some uremic encephalopathy , mild   - Electrolytes: WNL  - Acid-Base: acidosis - non - AGMA  - Anemia: of likely chronic disease        Other major problems: Management per primary and other consulting teams.          Hospital Surgical History:   Procedure Laterality Date    ATRIAL ABLATION SURGERY  2018    MAZE    CORONARY ARTERY BYPASS GRAFT  2018     FAMILY HISTORY:   Family History   Problem Relation Age of Onset    Heart Disease Mother     Diabetes Mother     High Blood Pressure Mother     Sleep Apnea Mother     Diabetes Father     High Blood Pressure Father     High Blood Pressure Brother     High Cholesterol Neg Hx     Kidney Disease Neg Hx      SOCIAL HISTORY:   Social History     Socioeconomic History    Marital status:      Spouse name: None    Number of children: 2    Years of education: None    Highest education level: None   Occupational History    Occupation: [employer] tech   Tobacco Use    Smoking status: Former     Packs/day: 1.00     Years: 14.00     Pack years: 14.00     Types: Cigarettes     Quit date: 1982     Years since quittin.9    Smokeless tobacco: Never    Tobacco comments:     quit age 29   Vaping Use    Vaping Use: Never used   Substance and Sexual Activity    Alcohol use: No    Drug use: No     Social Determinants of Health     Financial Resource Strain: Low Risk     Difficulty of Paying Living Expenses: Not hard at all   Food Insecurity: No Food Insecurity    Worried About Running Out of Food in the Last Year: Never true    Ran Out of Food in the Last Year: Never true   Transportation Needs: Unknown    Lack of Transportation (Non-Medical): No   Physical Activity: Insufficiently Active    Days of Exercise per Week: 7 days    Minutes of Exercise per Session: 10 min   Housing Stability: Unknown    Unstable Housing in the Last Year: No          MEDICATIONS: reviewed by me. Medications Prior to Admission:  No current facility-administered medications on file prior to encounter.      Current Outpatient Medications on File Prior to Encounter   Medication Sig Dispense Refill    cephALEXin (KEFLEX) 500 MG capsule Take 1 capsule by mouth 3 times daily for 7 days (Patient taking differently: Take 1

## 2023-08-05 PROBLEM — N13.8 BPH WITH OBSTRUCTION/LOWER URINARY TRACT SYMPTOMS: Status: ACTIVE | Noted: 2023-04-18

## 2023-08-05 PROBLEM — N13.30 BILATERAL HYDRONEPHROSIS: Status: ACTIVE | Noted: 2023-08-05

## 2023-08-05 LAB
ALBUMIN SERPL-MCNC: 3.1 G/DL (ref 3.4–5)
ANION GAP SERPL CALCULATED.3IONS-SCNC: 14 MMOL/L (ref 3–16)
BACTERIA UR CULT: NORMAL
BASOPHILS # BLD: 0 K/UL (ref 0–0.2)
BASOPHILS NFR BLD: 0.1 %
BUN SERPL-MCNC: 64 MG/DL (ref 7–20)
CALCIUM SERPL-MCNC: 8.9 MG/DL (ref 8.3–10.6)
CHLORIDE SERPL-SCNC: 108 MMOL/L (ref 99–110)
CHLORIDE UR-SCNC: 32 MMOL/L
CO2 SERPL-SCNC: 20 MMOL/L (ref 21–32)
CREAT SERPL-MCNC: 3.6 MG/DL (ref 0.8–1.3)
CREAT UR-MCNC: 88.5 MG/DL (ref 39–259)
DEPRECATED RDW RBC AUTO: 14.4 % (ref 12.4–15.4)
EOSINOPHIL # BLD: 0 K/UL (ref 0–0.6)
EOSINOPHIL NFR BLD: 0.1 %
GFR SERPLBLD CREATININE-BSD FMLA CKD-EPI: 17 ML/MIN/{1.73_M2}
GLUCOSE SERPL-MCNC: 69 MG/DL (ref 70–99)
HCT VFR BLD AUTO: 37.3 % (ref 40.5–52.5)
HGB BLD-MCNC: 12.6 G/DL (ref 13.5–17.5)
LYMPHOCYTES # BLD: 0.4 K/UL (ref 1–5.1)
LYMPHOCYTES NFR BLD: 5.4 %
MAGNESIUM SERPL-MCNC: 2.3 MG/DL (ref 1.8–2.4)
MCH RBC QN AUTO: 30.6 PG (ref 26–34)
MCHC RBC AUTO-ENTMCNC: 33.7 G/DL (ref 31–36)
MCV RBC AUTO: 90.8 FL (ref 80–100)
MONOCYTES # BLD: 0.8 K/UL (ref 0–1.3)
MONOCYTES NFR BLD: 9.3 %
NEUTROPHILS # BLD: 7 K/UL (ref 1.7–7.7)
NEUTROPHILS NFR BLD: 85.1 %
PHOSPHATE SERPL-MCNC: 4 MG/DL (ref 2.5–4.9)
PLATELET # BLD AUTO: 213 K/UL (ref 135–450)
PMV BLD AUTO: 7.8 FL (ref 5–10.5)
POTASSIUM SERPL-SCNC: 4.2 MMOL/L (ref 3.5–5.1)
POTASSIUM UR-SCNC: 23.7 MMOL/L
RBC # BLD AUTO: 4.1 M/UL (ref 4.2–5.9)
SODIUM SERPL-SCNC: 142 MMOL/L (ref 136–145)
SODIUM UR-SCNC: 69 MMOL/L
WBC # BLD AUTO: 8.2 K/UL (ref 4–11)

## 2023-08-05 PROCEDURE — 85025 COMPLETE CBC W/AUTO DIFF WBC: CPT

## 2023-08-05 PROCEDURE — 83735 ASSAY OF MAGNESIUM: CPT

## 2023-08-05 PROCEDURE — 2580000003 HC RX 258: Performed by: PHYSICIAN ASSISTANT

## 2023-08-05 PROCEDURE — 6370000000 HC RX 637 (ALT 250 FOR IP): Performed by: PHYSICIAN ASSISTANT

## 2023-08-05 PROCEDURE — 36415 COLL VENOUS BLD VENIPUNCTURE: CPT

## 2023-08-05 PROCEDURE — 80069 RENAL FUNCTION PANEL: CPT

## 2023-08-05 PROCEDURE — 1200000000 HC SEMI PRIVATE

## 2023-08-05 PROCEDURE — 6360000002 HC RX W HCPCS: Performed by: PHYSICIAN ASSISTANT

## 2023-08-05 RX ADMIN — HEPARIN SODIUM 5000 UNITS: 5000 INJECTION INTRAVENOUS; SUBCUTANEOUS at 05:44

## 2023-08-05 RX ADMIN — Medication 10 ML: at 11:51

## 2023-08-05 RX ADMIN — Medication 10 ML: at 00:01

## 2023-08-05 RX ADMIN — ROPINIROLE HYDROCHLORIDE 0.25 MG: 0.25 TABLET, FILM COATED ORAL at 08:53

## 2023-08-05 RX ADMIN — ASPIRIN 81 MG: 81 TABLET, COATED ORAL at 08:53

## 2023-08-05 RX ADMIN — SODIUM CHLORIDE: 9 INJECTION, SOLUTION INTRAVENOUS at 10:27

## 2023-08-05 RX ADMIN — Medication 240 ML: at 15:53

## 2023-08-05 RX ADMIN — CARBIDOPA AND LEVODOPA 1 TABLET: 25; 100 TABLET ORAL at 11:53

## 2023-08-05 RX ADMIN — CARBIDOPA AND LEVODOPA 1 TABLET: 25; 100 TABLET ORAL at 19:56

## 2023-08-05 RX ADMIN — ROPINIROLE HYDROCHLORIDE 0.25 MG: 0.25 TABLET, FILM COATED ORAL at 21:40

## 2023-08-05 RX ADMIN — CARBIDOPA AND LEVODOPA 1 TABLET: 25; 100 TABLET ORAL at 15:54

## 2023-08-05 RX ADMIN — SERTRALINE 100 MG: 50 TABLET, FILM COATED ORAL at 21:40

## 2023-08-05 RX ADMIN — SODIUM CHLORIDE: 9 INJECTION, SOLUTION INTRAVENOUS at 02:15

## 2023-08-05 RX ADMIN — ROPINIROLE HYDROCHLORIDE 0.25 MG: 0.25 TABLET, FILM COATED ORAL at 14:57

## 2023-08-05 RX ADMIN — ATORVASTATIN CALCIUM 80 MG: 80 TABLET, FILM COATED ORAL at 21:40

## 2023-08-05 RX ADMIN — HEPARIN SODIUM 5000 UNITS: 5000 INJECTION INTRAVENOUS; SUBCUTANEOUS at 21:40

## 2023-08-05 RX ADMIN — HEPARIN SODIUM 5000 UNITS: 5000 INJECTION INTRAVENOUS; SUBCUTANEOUS at 15:04

## 2023-08-05 RX ADMIN — Medication 10 ML: at 21:47

## 2023-08-05 RX ADMIN — CARBIDOPA AND LEVODOPA 1 TABLET: 25; 100 TABLET ORAL at 08:53

## 2023-08-05 RX ADMIN — TAMSULOSIN HYDROCHLORIDE 0.4 MG: 0.4 CAPSULE ORAL at 21:40

## 2023-08-05 NOTE — PROGRESS NOTES
4 Eyes Skin Assessment     NAME:  Danyelle Grullon OF BIRTH:  1950  MEDICAL RECORD NUMBER:  [id number]    The patient is being assessed for  Admission    I agree that at least one RN has performed a thorough Head to Toe Skin Assessment on the patient. ALL assessment sites listed below have been assessed. Areas assessed by both nurses:    Head, Face, Ears, Shoulders, Back, Chest, Arms, Elbows, Hands, Sacrum. Buttock, Coccyx, Ischium, Legs. Feet and Heels, Under Medical Devices , and Other          Does the Patient have a Wound?  No noted wound(s)       Broderick Prevention initiated by RN: Yes  Wound Care Orders initiated by RN: No    Pressure Injury (Stage 3,4, Unstageable, DTI, NWPT, and Complex wounds) if present, place Wound referral order by RN under : No    New Ostomies, if present place, Ostomy referral order under : No     Nurse 1 eSignature: Electronically signed by Raymundo Serrano RN on 8/5/23 at 6:30 AM EDT    **SHARE this note so that the co-signing nurse can place an eSignature**    Nurse 2 eSignature: Electronically signed by Katarzyna Wang RN on 8/5/23 at 6:31 AM EDT Problem: Pain:  Goal: Pain level will decrease  Description: Pain level will decrease  Outcome: Ongoing  Goal: Control of acute pain  Description: Control of acute pain  Outcome: Ongoing  Goal: Control of chronic pain  Description: Control of chronic pain  Outcome: Ongoing     Problem: Pain:  Goal: Pain level will decrease  Description: Pain level will decrease  Outcome: Ongoing  Goal: Control of acute pain  Description: Control of acute pain  Outcome: Ongoing  Goal: Control of chronic pain  Description: Control of chronic pain  Outcome: Ongoing     Problem: Pain:  Goal: Pain level will decrease  Description: Pain level will decrease  Outcome: Ongoing  Goal: Control of acute pain  Description: Control of acute pain  Outcome: Ongoing  Goal: Control of chronic pain  Description: Control of chronic pain  Outcome: Ongoing     Problem: Pain:  Goal: Pain level will decrease  Description: Pain level will decrease  Outcome: Ongoing  Goal: Control of acute pain  Description: Control of acute pain  Outcome: Ongoing  Goal: Control of chronic pain  Description: Control of chronic pain  Outcome: Ongoing     Problem: Pain:  Goal: Pain level will decrease  Description: Pain level will decrease  Outcome: Ongoing  Goal: Control of acute pain  Description: Control of acute pain  Outcome: Ongoing  Goal: Control of chronic pain  Description: Control of chronic pain  Outcome: Ongoing     Problem: Pain:  Goal: Pain level will decrease  Description: Pain level will decrease  Outcome: Ongoing  Goal: Control of acute pain  Description: Control of acute pain  Outcome: Ongoing  Goal: Control of chronic pain  Description: Control of chronic pain  Outcome: Ongoing

## 2023-08-05 NOTE — ED PROVIDER NOTES
EMERGENCY DEPARTMENT PROVIDER NOTE    Patient Identification  Pt Name: Marko Landaverde  MRN: [id number]  [address] [place] 1950  Date of evaluation: 8/4/2023  Provider: Teja Hassan DO  PCP: Dalila Charles MD    Chief Complaint  Abnormal Lab (Called by family doc regarding lab work done this morning with critically high BUN and Cr levels. Recently completed treatment for shingles. Wife states pt has been confused, wetting self at night, diarrhea.)      HPI  (History provided by patient and spouse)  This is a 68 y.o. male with pertinent past medical history of Parkinson's who was brought in by family for generalized weakness. Spouse states the patient has had worsening weakness, fatigue and confusion ongoing for the past several days. Associated with urinary incontinence at nighttime and diarrhea. Spouse states that the patient recently completed a course of oral acyclovir for a rash on his right arm which started after being outside and using a leaf blower, was seen at urgent care and diagnosed with shingles. No involvement of the face or neck. Patient had laboratory test performed the day which were concerning for acute renal failure and was called to come to the emergency department. I have reviewed the following nursing documentation:  Allergies: Patient has no known allergies.     Past medical history:   Past Medical History:   Diagnosis Date    Atrial fibrillation ([address])     No significant past medical history     Parkinson disease ([address])      Past surgical history:   Past Surgical History:   Procedure Laterality Date    ATRIAL ABLATION SURGERY  04/2018    MAZE    CORONARY ARTERY BYPASS GRAFT  04/2018       Home medications:   Previous Medications    ASPIRIN 81 MG TABLET    Take 1 tablet by mouth daily    ATORVASTATIN (LIPITOR) 80 MG TABLET    Take 1 tablet by mouth nightly    CARBIDOPA-LEVODOPA (SINEMET)  MG PER TABLET    TAKE 1 TABLET AT 7:30 AM, 11:30 AM, 3:30 PM AND 7:30 PM    CEPHALEXIN Referrals:  No follow-up provider specified. Discharge Medications:  New Prescriptions    No medications on file       Discontinued Medications:  Discontinued Medications    CEPHALEXIN (KEFLEX) 500 MG CAPSULE    Take 1 capsule by mouth 3 times daily for 7 days       This chart was generated using the Vignoation system. I created this record but it may contain dictation errors given the limitations of this technology.     Carmel Vines DO (electronically signed)  Attending Emergency Physician       Carmel Vines DO  08/04/23 [id number]

## 2023-08-05 NOTE — ACP (ADVANCE CARE PLANNING)
Advanced Care Planning Note. Purpose of Encounter: Advanced care planning in light of DEBO  Parties In Attendance: Patient, wife and daughter  Decisional Capacity: Limited  Subjective: Patient is constipated  Objective: Cr 3.6  Goals of Care Determination: Patient wants full support (CPR, vent, surgery, HD, trach, PEG)  Plan: Baig, IVF, Urology and Renal consults, PT/OT/SLP  Code Status: Full code   Time spent on Advanced care Plannin minutes  Advanced Care Planning Documents: Completed advanced directives on chart, wife is the POA.     Janes Stevens MD  2023 5:04 PM

## 2023-08-05 NOTE — H&P
Hospital Medicine History & Physical      Patient Name: Demar Crespo    : 1950    PCP: Marielena Corea MD    Date of Service:  Patient seen and examined on 2023     Chief Complaint:  Abnormal lab    History Of Present Illness:    Demar Crespo is a 68 y.o. male who presented to ED for evaluation of abnormal creatinine. Patient is a poor historian at this time due to confusion. History supplemented by wife at bedside. Patient was in [place] when confusion began. Wife contacted PCP and antibiotics were called in by PCP for concern of UTI. Patient followed up with PCP in the office yesterday and labs were drawn which revealed a significantly elevated Cr 8.9 (baseline 0.7). Patient was contacted by PCP and instructed to go to ED for further evaluation and management. Patient complaining of urinary frequency, urgency, urinary incontinence overnight, and bilateral flank pain. Patient also complaining of loose stools daily for the last 3 days. He also reports decreased p.o. intake due to poor appetite. Patient denies fever, dizziness, pain with urination, hematuria, abdominal pain, nausea, vomiting. He denies any recent fall or head trauma, neck pain or stiffness, change in vision, difficulty swallowing, numbness/tingling extremities, focal weakness. Past Medical History:    Patient has a past medical history of Atrial fibrillation ([address]), No significant past medical history, and Parkinson disease ([address] Past Surgical History:    Patient has a past surgical history that includes Coronary artery bypass graft (2018) and Atrial ablation surgery (2018). Medications Prior to Admission:      Prior to Admission medications    Medication Sig Start Date End Date Taking?  Authorizing Provider   cephALEXin (KEFLEX) 500 MG capsule Take 1 capsule by mouth 3 times daily for 7 days  Patient taking differently: Take 1 capsule by mouth 2 times daily 8/3/23 8/10/23  Brittny Hernandez appearance:  Awake, alert, no apparent distress  HEENT:  Normocephalic, atraumatic without obvious deformity. PERRL. EOM intact. Conjunctivae/corneas clear. Neck: Supple, with full range of motion. No JVD. Trachea midline. Respiratory:  Clear to auscultation bilaterally without rales, wheezes, or rhonchi. Normal respiratory effort. Cardiovascular:  +Bradycardia. Regular rhythm without murmurs, rubs or gallops. Abdomen: Soft, NT, ND, without rebound or guarding. Normal bowel sounds. Extremities:  No clubbing, cyanosis, or edema bilaterally. Full range of motion without deformity. +2 palpable pulses, equal bilaterally. Capillary refill brisk,< 3 seconds   Skin: No rashes or lesions. Warm/dry. Neurologic:  Neurovascularly intact without any focal sensory/motor deficits. Cranial nerves: II-XII intact, grossly non-focal. Alert and oriented x 1. No slurred speech. No gross facial droop  Psychiatric:  Pleasantly confused    Labs:   CBC   Recent Labs     08/04/23  0855 08/04/23  1735   WBC 10.4 8.8   HGB 13.2* 13.2*   HCT 38.6* 39.6*    220        RENAL  Recent Labs     08/04/23  1735   *   K 4.8   CL 97*   CO2 19*   *   CREATININE 8.8*       LFTS  Recent Labs     08/04/23  1735   AST 10*   ALT <5*   BILITOT 0.5   ALKPHOS 149*       COAG  No results for input(s): INR in the last 72 hours. CARDIAC ENZYMES  No results for input(s): TROPONINI in the last 72 hours. LIPIDS  Cholesterol, Total   Date/Time Value Ref Range Status   10/05/2022 09:44  0 - 199 mg/dL Final     Triglycerides   Date/Time Value Ref Range Status   10/05/2022 09:44 AM 61 0 - 150 mg/dL Final     HDL   Date/Time Value Ref Range Status   10/05/2022 09:44 AM 38 (L) 40 - 60 mg/dL Final     Comment:     An HDL cholesterol less than 40 mg/dL is low and  constitutes a coronary heart disease risk factor. An HDL cholesterol greater than 60 mg/dL is a  negative risk factor for coronary heart disease.        LDL Calculated

## 2023-08-05 NOTE — ED NOTES
0.5 (*)     All other components within normal limits   COMPREHENSIVE METABOLIC PANEL - Abnormal; Notable for the following components:    Sodium 135 (*)     Chloride 97 (*)     CO2 19 (*)     Anion Gap 19 (*)      (*)     Creatinine 8.8 (*)     Est, Glom Filt Rate 6 (*)     Alkaline Phosphatase 149 (*)     ALT <5 (*)     AST 10 (*)     All other components within normal limits    Narrative:     CALL  [facility] tel. [phone],  Chemistry results called to and read back by RK LYN, 08/04/2023  18:11, by PROAS   BRAIN NATRIURETIC PEPTIDE - Abnormal; Notable for the following components:    Pro- (*)     All other components within normal limits    Narrative:     [employer]  [facility] tel. [phone],  Chemistry results called to and read back by RK LYN, 08/04/2023  18:11, by PROAS   LIPASE - Abnormal; Notable for the following components:    Lipase 65.0 (*)     All other components within normal limits    Narrative:     [employer]  [facility] tel. [phone],  Chemistry results called to and read back by RK LYN, 08/04/2023  18:11, by PROAS     Critical values: yes     Abnormal Assessment Findings: CMP    Background  History:   Past Medical History:   Diagnosis Date    Atrial fibrillation ([address])     No significant past medical history     Parkinson disease ([address])        Assessment    Vitals/MEWS: MEWS Score: 1  Level of Consciousness: Alert (0)   Vitals:    08/04/23 2012 08/04/23 2015 08/04/23 2030 08/04/23 2045   BP: (!) 107/58 117/60 119/63 124/62   Pulse: 55 56 52 52   Resp: 14 20 18 19   Temp:       TempSrc:       SpO2: 99% 100% 99% 99%     FiO2 (%):   O2 Flow Rate: O2 Device: None (Room air)    Cardiac Rhythm:    Pain Assessment: none [] Verbal [] Tresea Craw Scale  Pain Scale: Pain Assessment  Pain Assessment: None - Denies Pain  Last documented pain score (0-10 scale)    Last documented pain medication administered:   Mental Status: oriented, alert, logical, thought processes intact, and able to concentrate and follow conversation  Orientation Level:    NIH Score:    C-SSRS: Risk of Suicide: No Risk  Bedside swallow:    [place] Coma Scale (GCS): Kendall Coma Scale  Eye Opening: Spontaneous  Best Verbal Response: Oriented  Best Motor Response: Obeys commands  [place] Coma Scale Score: 15  Active LDA's:   Peripheral IV 08/04/23 Right Hand (Active)     PO Status: Regular  Pertinent or High Risk Medications/Drips: no   If Yes, please provide details:   Pending Blood Product Administration: no       You may also review the ED PT Care Timeline found under the Summary Nursing Index tab. Recommendation    Pending orders   Plan for Discharge (if known):    Additional Comments:    If any further questions, please call Sending RN at [phone]    Electronically signed by: Electronically signed by Beth Brannon RN on 8/4/2023 at 9:23 PM      Beth Brannon RN  08/04/23 7218

## 2023-08-05 NOTE — ED NOTES
5T nurse states no questions or concerns at this time. Transport requested.      Tatyana Bashir RN  08/04/23 9907

## 2023-08-05 NOTE — ED NOTES
Baig catheter inserted per hospital policy, sterile procedure. Pt tolerated well. 1000ml immediately returned upon insertion, bag clamped for comfort at this time.      Tatyana Bashir RN  08/04/23 2032

## 2023-08-05 NOTE — CONSULTS
Urology Consult Note  [facility]     Patient: Mikhail Koroma MRN: [id number]  Room/Bed: [facility]0112/6183-32   YOB: 1950  Age/Sex: 68 y.o.male  Admission Date: 8/4/2023     Date of Service:  8/5/2023    Consulting Provider: Kip Friday, APRN - CNP  Admitting/Requesting Physician: Iva Fuentes MD  Primary Care Physician: Selena Padron MD    Reason for Consult: BPH, Bilateral Hydronephrosis, Urinary Retention, DEBO    ASSESSMENT/PLAN     67 yo male with history of parkinson's who presents to [facility] with an abnormal Cr, severely confused on arrival. Was on abx outpatient for possible UTI. Presents with a Cr of 8.9, baseline is 0.7. CT a/p shows urinary retention, bilateral hydronephrosis, BPH with  5.4 x 4.7 cm gland. Recommendations:  DEBO 2/2 to hydronephrosis 2/2 to BPH and likely parkinson's disease  His bladder is very likely neurogenic and he will likely learn to live with a catheter in one form or another - SPT, CIC vs Indwelling argueta. UDS may be ordered to evaluate the bladder prior to an outlet procedure. Follow up in the office x1-2 weeks. All patient questions were answered. He understands the plan as listed above. HISTORY     Chief Complaint:   Chief Complaint   Patient presents with    Abnormal Lab     Called by family doc regarding lab work done this morning with critically high BUN and Cr levels. Recently completed treatment for shingles. Wife states pt has been confused, wetting self at night, diarrhea. History of Present Illness: Mikhail Koroma is a 68 y.o. male with urinary retention. Onset of symptoms was several days ago with improving course since that time. Symptoms are aggravated by bph. Symptoms improved with argueta. Associated symptoms include confusion. Patient also reports nocturia, freq, weak steam. He has tried the following treatments: argueta.      Past Medical History:  He has a past medical history of Atrial fibrillation ([address]), No 10/05/2022    PSA 2.29 10/07/2021    PSA 1.63 09/14/2020         IMAGING     CT ABDOMEN PELVIS WO CONTRAST Additional Contrast? None    Result Date: 8/4/2023  EXAMINATION: CT OF THE ABDOMEN AND PELVIS WITHOUT CONTRAST; CT OF THE HEAD WITHOUT CONTRAST 8/4/2023 5:45 pm; 8/4/2023 5:46 pm TECHNIQUE: CT of the abdomen and pelvis was performed without the administration of intravenous contrast. Multiplanar reformatted images are provided for review. Automated exposure control, iterative reconstruction, and/or weight based adjustment of the mA/kV was utilized to reduce the radiation dose to as low as reasonably achievable.; CT of the head was performed without the administration of intravenous contrast. Automated exposure control, iterative reconstruction, and/or weight based adjustment of the mA/kV was utilized to reduce the radiation dose to as low as reasonably achievable. COMPARISON: None. HISTORY: ORDERING SYSTEM PROVIDED HISTORY: lower abdominal pain, renal failure TECHNOLOGIST PROVIDED HISTORY: Reason for exam:->lower abdominal pain, renal failure Additional Contrast?->None Decision Support Exception - unselect if not a suspected or confirmed emergency medical condition->Emergency Medical Condition (MA) Reason for Exam: Abnormal Lab (Called by family doc regarding lab work done this morning with critically high BUN and Cr levels. Recently completed treatment for shingles. Wife states pt has been confused, wetting self at night, diarrhea.); ORDERING SYSTEM PROVIDED HISTORY: altered mental status TECHNOLOGIST PROVIDED HISTORY: Reason for exam:->altered mental status Has a \"code stroke\" or \"stroke alert\" been called? ->No Decision Support Exception - unselect if not a suspected or confirmed emergency medical condition->Emergency Medical Condition (MA) Reason for Exam: Abnormal Lab (Called by family doc regarding lab work done this morning with critically high BUN and Cr levels. Recently completed treatment for shingles. Wife states pt has been confused, wetting self at night, diarrhea.) FINDINGS: CT head: No evidence of intra or extra-axial hemorrhage. No evidence of masses or mass effects or shifts. Ventricular system is normal and symmetrical. The skull, paranasal sinuses and orbits appear unremarkable. CT abdomen and pelvis: Lower Chest: No active disease. Organs: The liver demonstrates multiple hypodense lesions which have the density of cysts. The largest in the right lobe measures 2.7 cm. Gallbladder, pancreas, spleen, adrenals, aorta, and IVC appear stable. Bilateral hydronephrosis and hydroureter. Prostate is enlarged measuring 5.4 x 4.7 cm. The urinary bladder is distended. No focal thickening. Hydronephrosis is not related to ureteric stones or UV junction abnormality. There could be reflux. GI/Bowel: No evidence of bowel obstruction or perforation. Normal appendix. Mild constipation. Pelvis: As described, the urinary bladder is distended extending into the lower abdomen. No focal thickening. UV junctions appear unremarkable. Prostate is enlarged measuring 5.4 x 4.7 cm. No evidence of pelvic lymphadenopathy. Peritoneum/Retroperitoneum: No evidence of retroperitoneal lymphadenopathy or acute mesenteric findings. Bones/Soft Tissues: No acute abnormality. 1. No acute intracranial abnormality. 2. Bilateral hydronephrosis and hydroureter; however, no evidence of ureteric stones. Distended urinary bladder. Enlarged prostate. Hydronephrosis may be related to bladder outlet obstruction. 3. Benign-looking hepatic cysts. 4. No acute gastrointestinal abnormality. CT Head W/O Contrast    Result Date: 8/4/2023  EXAMINATION: CT OF THE ABDOMEN AND PELVIS WITHOUT CONTRAST; CT OF THE HEAD WITHOUT CONTRAST 8/4/2023 5:45 pm; 8/4/2023 5:46 pm TECHNIQUE: CT of the abdomen and pelvis was performed without the administration of intravenous contrast. Multiplanar reformatted images are provided for review.  Automated exposure

## 2023-08-05 NOTE — PROGRESS NOTES
MD Mayank Cagle MD Millard Carmine, MD                  Office: [phone]                      Fax: [phone] [address]                   NEPHROLOGY INPATIENT PROGRESS NOTE:     PATIENT NAME: Amy Willett  : 1950  MRN: [id number]       RECOMMENDATIONS: *      Postoperative polyuria, but need to avoid overall vicious cycle of polyuria.  cc/hr   ECHO in past no HF,   Monitor for fluid overload    Appreciate assistance by urologist, they suggest to continue Baig catheter for now,    Work up for diarrhea  R/o C.diff    Daily labs  no need for dialysis,    at higher risk for decompensation, needing closer monitoring. D/C plan from renal stand point:  - likely ~4-5 days      IMPRESSION:       Admitted on:  2023  4:45 PM   For:  Parkinson disease ([address]) [G20]  Generalized weakness [R53.1]  DEBO (acute kidney injury) ([address]) [N17.9]  Bladder outlet obstruction [N32.0]  Acute renal failure, unspecified acute renal failure type ([address]) [N17.9]         DEBO on admission - severe  No h/o CKD:   - BL Scr- 0.7 as off 10-22  ->  8.9 on admission  - Etiology of DEBO - presumed ATN w/ hypovolemia + possibly obstructive uropathy , acute urinary retention   W/ BPH    - other differentials:  unlikely other GN / TI / TMA process  - UA :   Reviewed, large blood, trace protein, trace leukocyte, many RBCs, likely suggestive of gross hematuria  - Renal imaging -CTscan on admission, without IV contrast showing urinary retention, bilateral hydronephrosis, with BPH with about 5 cm  -Urology consulted, reviewed the recommendation,      Associated problems:   - Volume status: hypo-volemic  ECHO in past no HF     : HTN : no need for tight control    : Na: hyponatremia - mild -    - Azotemia: pre-renal severe on admission    W/ some uremic encephalopathy , mild   - Electrolytes:  WNL  - Acid-Base: acidosis - non - AGMA  - Anemia: of likely chronic disease        Other major problems: Management per primary and other consulting teams. Hospital Problems             Last Modified POA    * (Principal) DEBO (acute kidney injury) ([address]) 8/4/2023 Yes       : other supportive care :   - Check daily renal function panel with electrolytes-phosphorus  - Strict monitoring of I/Os, daily weight  - Renal feeds/diet  - Current medications reviewed. - Nephrotoxic medications have been discontinued. - Dose adjusted and appropriate. - Dose meds for eGFR <15 mL/min/1.73m2 during DEBO    - Avoid heavy opioids due to renal failure - may use very low dose dilaudid / fentanyl with close monitoring of CNS and respiratory depression. Please refer to the orders. Medical decision making- High Complexity. Multiple complex problems. Discussed with patient and his family at bedside,   treatment team again today. Time spent ~ 35 minutes includes face-to-face meeting/discussion with patient, patient's family-as available, and treatment team (including primary/referring team and other consultants and nursing team- as needed; and included coordination of care with the treatment team; and review of patient's electronic medical records as needed for my evaluation and ordering appropriates tests. Thank you for allowing me to participate in this patient's care. Please do not hesitate to contact me anytime. We will follow along with you. Jyothi Parker MD,  [facility] [address] [place] [address]  Nw: [phone] or [employer]  Fax: [phone]        =======================================================================================   Subjective / interval history   Patient was seen comfortably sitting up in bed,   Reported no active complaints/distress,   Renal labs noted    As per family at bedside, patient is feeling overall better, more coherent,  Patient is more communicative today    Baig has been inserted, pink color of urine reported and noted. Ari Figueroa

## 2023-08-06 ENCOUNTER — APPOINTMENT (OUTPATIENT)
Dept: GENERAL RADIOLOGY | Age: 73
DRG: 682 | End: 2023-08-06
Payer: MEDICARE

## 2023-08-06 LAB
ALBUMIN SERPL-MCNC: 2.9 G/DL (ref 3.4–5)
ANION GAP SERPL CALCULATED.3IONS-SCNC: 8 MMOL/L (ref 3–16)
BASOPHILS # BLD: 0 K/UL (ref 0–0.2)
BASOPHILS NFR BLD: 0.2 %
BUN SERPL-MCNC: 11 MG/DL (ref 7–20)
CALCIUM SERPL-MCNC: 8.4 MG/DL (ref 8.3–10.6)
CHLORIDE SERPL-SCNC: 114 MMOL/L (ref 99–110)
CO2 SERPL-SCNC: 21 MMOL/L (ref 21–32)
CREAT SERPL-MCNC: 0.5 MG/DL (ref 0.8–1.3)
DEPRECATED RDW RBC AUTO: 15.2 % (ref 12.4–15.4)
EKG ATRIAL RATE: 51 BPM
EKG DIAGNOSIS: NORMAL
EKG P-R INTERVAL: 116 MS
EKG Q-T INTERVAL: 528 MS
EKG QRS DURATION: 86 MS
EKG QTC CALCULATION (BAZETT): 486 MS
EKG R AXIS: 80 DEGREES
EKG T AXIS: 72 DEGREES
EKG VENTRICULAR RATE: 51 BPM
EOSINOPHIL # BLD: 0 K/UL (ref 0–0.6)
EOSINOPHIL NFR BLD: 0.3 %
GFR SERPLBLD CREATININE-BSD FMLA CKD-EPI: >60 ML/MIN/{1.73_M2}
GLUCOSE SERPL-MCNC: 90 MG/DL (ref 70–99)
HCT VFR BLD AUTO: 36 % (ref 40.5–52.5)
HGB BLD-MCNC: 12 G/DL (ref 13.5–17.5)
LYMPHOCYTES # BLD: 0.8 K/UL (ref 1–5.1)
LYMPHOCYTES NFR BLD: 14.6 %
MAGNESIUM SERPL-MCNC: 1.8 MG/DL (ref 1.8–2.4)
MCH RBC QN AUTO: 30.8 PG (ref 26–34)
MCHC RBC AUTO-ENTMCNC: 33.2 G/DL (ref 31–36)
MCV RBC AUTO: 93 FL (ref 80–100)
MONOCYTES # BLD: 0.6 K/UL (ref 0–1.3)
MONOCYTES NFR BLD: 11.3 %
NEUTROPHILS # BLD: 4.2 K/UL (ref 1.7–7.7)
NEUTROPHILS NFR BLD: 73.6 %
PHOSPHATE SERPL-MCNC: 1.6 MG/DL (ref 2.5–4.9)
PLATELET # BLD AUTO: 186 K/UL (ref 135–450)
PMV BLD AUTO: 7.6 FL (ref 5–10.5)
POTASSIUM SERPL-SCNC: 3.8 MMOL/L (ref 3.5–5.1)
RBC # BLD AUTO: 3.88 M/UL (ref 4.2–5.9)
SODIUM SERPL-SCNC: 143 MMOL/L (ref 136–145)
WBC # BLD AUTO: 5.7 K/UL (ref 4–11)

## 2023-08-06 PROCEDURE — 92526 ORAL FUNCTION THERAPY: CPT

## 2023-08-06 PROCEDURE — 93010 ELECTROCARDIOGRAM REPORT: CPT | Performed by: INTERNAL MEDICINE

## 2023-08-06 PROCEDURE — 80069 RENAL FUNCTION PANEL: CPT

## 2023-08-06 PROCEDURE — 6360000002 HC RX W HCPCS: Performed by: PHYSICIAN ASSISTANT

## 2023-08-06 PROCEDURE — 1200000000 HC SEMI PRIVATE

## 2023-08-06 PROCEDURE — 85025 COMPLETE CBC W/AUTO DIFF WBC: CPT

## 2023-08-06 PROCEDURE — 92610 EVALUATE SWALLOWING FUNCTION: CPT

## 2023-08-06 PROCEDURE — 83735 ASSAY OF MAGNESIUM: CPT

## 2023-08-06 PROCEDURE — 99222 1ST HOSP IP/OBS MODERATE 55: CPT | Performed by: ORTHOPAEDIC SURGERY

## 2023-08-06 PROCEDURE — 6370000000 HC RX 637 (ALT 250 FOR IP): Performed by: PHYSICIAN ASSISTANT

## 2023-08-06 PROCEDURE — 2580000003 HC RX 258: Performed by: PHYSICIAN ASSISTANT

## 2023-08-06 PROCEDURE — 6370000000 HC RX 637 (ALT 250 FOR IP): Performed by: INTERNAL MEDICINE

## 2023-08-06 PROCEDURE — 36415 COLL VENOUS BLD VENIPUNCTURE: CPT

## 2023-08-06 PROCEDURE — 73030 X-RAY EXAM OF SHOULDER: CPT

## 2023-08-06 RX ORDER — POLYETHYLENE GLYCOL 3350 17 G/17G
17 POWDER, FOR SOLUTION ORAL DAILY
Status: DISCONTINUED | OUTPATIENT
Start: 2023-08-06 | End: 2023-08-09 | Stop reason: HOSPADM

## 2023-08-06 RX ADMIN — POLYETHYLENE GLYCOL 3350 17 G: 17 POWDER, FOR SOLUTION ORAL at 14:44

## 2023-08-06 RX ADMIN — HEPARIN SODIUM 5000 UNITS: 5000 INJECTION INTRAVENOUS; SUBCUTANEOUS at 22:08

## 2023-08-06 RX ADMIN — ROPINIROLE HYDROCHLORIDE 0.25 MG: 0.25 TABLET, FILM COATED ORAL at 07:56

## 2023-08-06 RX ADMIN — Medication 10 ML: at 19:42

## 2023-08-06 RX ADMIN — HEPARIN SODIUM 5000 UNITS: 5000 INJECTION INTRAVENOUS; SUBCUTANEOUS at 14:41

## 2023-08-06 RX ADMIN — TAMSULOSIN HYDROCHLORIDE 0.4 MG: 0.4 CAPSULE ORAL at 19:40

## 2023-08-06 RX ADMIN — ROPINIROLE HYDROCHLORIDE 0.25 MG: 0.25 TABLET, FILM COATED ORAL at 14:41

## 2023-08-06 RX ADMIN — CARBIDOPA AND LEVODOPA 1 TABLET: 25; 100 TABLET ORAL at 12:33

## 2023-08-06 RX ADMIN — CARBIDOPA AND LEVODOPA 1 TABLET: 25; 100 TABLET ORAL at 07:56

## 2023-08-06 RX ADMIN — CARBIDOPA AND LEVODOPA 1 TABLET: 25; 100 TABLET ORAL at 19:40

## 2023-08-06 RX ADMIN — ATORVASTATIN CALCIUM 80 MG: 80 TABLET, FILM COATED ORAL at 19:40

## 2023-08-06 RX ADMIN — SERTRALINE 100 MG: 50 TABLET, FILM COATED ORAL at 19:40

## 2023-08-06 RX ADMIN — ROPINIROLE HYDROCHLORIDE 0.25 MG: 0.25 TABLET, FILM COATED ORAL at 19:40

## 2023-08-06 RX ADMIN — SOTALOL HYDROCHLORIDE 80 MG: 80 TABLET ORAL at 07:56

## 2023-08-06 RX ADMIN — Medication 10 ML: at 07:57

## 2023-08-06 RX ADMIN — CARBIDOPA AND LEVODOPA 1 TABLET: 25; 100 TABLET ORAL at 17:02

## 2023-08-06 RX ADMIN — HEPARIN SODIUM 5000 UNITS: 5000 INJECTION INTRAVENOUS; SUBCUTANEOUS at 06:00

## 2023-08-06 RX ADMIN — ASPIRIN 81 MG: 81 TABLET, COATED ORAL at 07:56

## 2023-08-06 NOTE — ACP (ADVANCE CARE PLANNING)
Advanced Care Planning Note. Purpose of Encounter: Advanced care planning in light of DEBO  Parties In Attendance: Patient, wife and daughter  Decisional Capacity: Limited  Subjective: Patient with R arm weakness  Objective: Cr 0.5  Goals of Care Determination: Patient wants full support (CPR, vent, surgery, HD, trach, PEG)  Plan:  MRI Brain and C spine. Ortho and Neuro consults. Baig, IVF, Urology and Renal consults, PT/OT/SLP  Code Status: Full code   Time spent on Advanced care Plannin minutes  Advanced Care Planning Documents: Completed advanced directives on chart, wife is the POA.     Khalif Alvarado MD  2023 2:19 PM

## 2023-08-06 NOTE — PROGRESS NOTES
MD Charlie Snell MD Vinson Quinton, MD                  Office: [phone]                      Fax: [phone] [address]                   NEPHROLOGY INPATIENT PROGRESS NOTE:     PATIENT NAME: Thor Rangel  : 1950  MRN: [id number]       RECOMMENDATIONS: *      Postoperative polyuria (+) , but need to avoid overall vicious cycle of polyuria.  cc/hr     -> decrease to 75 cc/hr    ECHO in past no HF,   Monitor for fluid overload    Appreciate assistance by urologist, they suggest to continue Baig catheter for now,    Work up for diarrhea as per 1' team   Daily labs  no need for dialysis,    at higher risk for decompensation, needing closer monitoring. D/C plan from renal stand point:  - likely ~1-2 more day, to fup , if polyuria improves      IMPRESSION:       Admitted on:  2023  4:45 PM   For:  Parkinson disease ([address]) [G20]  Generalized weakness [R53.1]  DEBO (acute kidney injury) ([address]) [N17.9]  Bladder outlet obstruction [N32.0]  Acute renal failure, unspecified acute renal failure type ([address]) [N17.9]         DEBO on admission - severe  No h/o CKD:   - BL Scr- 0.7 as off 10-22  ->  8.9 on admission  - Etiology of DEBO - presumed ATN w/ hypovolemia + possibly obstructive uropathy , acute urinary retention   W/ BPH    - other differentials:  unlikely other GN / TI / TMA process  - UA :   Reviewed, large blood, trace protein, trace leukocyte, many RBCs, likely suggestive of gross hematuria  - Renal imaging -CTscan on admission, without IV contrast showing urinary retention, bilateral hydronephrosis, with BPH with about 5 cm  -Urology consulted, reviewed the recommendation,      Associated problems:   - Volume status: hypo-volemic  ECHO in past no HF     : HTN : no need for tight control    : Na: hyponatremia - mild -    - Azotemia: pre-renal severe on admission    W/ some uremic encephalopathy , mild   - Electrolytes:  WNL  - Acid-Base: acidosis - Hour Urine for Creatinine Clearance:  No components found for: CREAT4, UHRS10, UTV10  Urine Toxicology:  No components found for: IAMMENTA, IBARBIT, IBENZO, ICOCAINE, IMARTHC, IOPIATES, IPHENCYC    HgBA1c:  No results found for: LABA1C  RPR:  No results found for: RPR  HIV:  No results found for: HIV  GLORY:  No results found for: ANATITER, GLORY  RF:  No results found for: RF  DSDNA:  No components found for: DNA  AMYLASE:  No results found for: AMYLASE  LIPASE:    Lab Results   Component Value Date/Time    LIPASE 65.0 08/04/2023 05:35 PM     Fibrinogen Level:  No components found for: FIB       BELOW MENTIONED RADIOLOGY STUDY RESULTS BY ME (AS NEEDED FOR MY EVALUATION AND MANAGEMENT). No results found. Imaging: [unfilled]          Left ventricular cavity size is normal. Normal left ventricular wall   thickness. Overall left ventricular systolic function appears low normal   with an estimated ejection fraction of 50%, but difficult to determine due   to atrial fibrillation and RVR. Suspect inferolateral wall hypokinesis. Diastolic function is indeterminate due to abnormal heart rhythm. The left atrium is dilated. Mitral annular calcification. Mild mitral regurgitation is present. Aortic valve leaflets appear sclerotic and tricuspid. No aortic stenosis. Mild tricuspid regurgitation. Estimated pulmonary artery systolic pressure is at 27 mmHg assuming a right   atrial pressure of 8 mmHg. Signature      ------------------------------------------------------------------   Electronically signed by Pamela June MD (Interpreting   physician) on 03/12/2018 at 04:55 PM   ------------------------------------------------------------------      Findings             ======================================================================  Please note that this chart entry has been generated using using voice recognition software, mainly. So please excuse brevity and/or typos.   While every effort and attempts have been made to ensure the accuracy of this automated transcription, some errors may have occurred; and certain words and phrases in transcription may not be entered as intended. However, inadvertent computerized transcription errors may be present. So please contact us if any clarification needed.

## 2023-08-06 NOTE — PROGRESS NOTES
Assessment complete. VSS. Patient resting in bed. Respirations even and easy. Call light in reach. Fall precautions in place. No needs expressed at this time. Care plan ongoing.

## 2023-08-06 NOTE — PLAN OF CARE
Problem: Safety - Adult  Goal: Free from fall injury  Outcome: Progressing     Problem: ABCDS Injury Assessment  Goal: Absence of physical injury  Outcome: Progressing     Problem: Confusion  Goal: Confusion, delirium, dementia, or psychosis is improved or at baseline  Description: INTERVENTIONS:  1. Assess for possible contributors to thought disturbance, including medications, impaired vision or hearing, underlying metabolic abnormalities, dehydration, psychiatric diagnoses, and notify attending LIP  2. [place] high risk fall precautions, as indicated  3. Provide frequent short contacts to provide reality reorientation, refocusing and direction  4. Decrease environmental stimuli, including noise as appropriate  5. Monitor and intervene to maintain adequate nutrition, hydration, elimination, sleep and activity  6. If unable to ensure safety without constant attention obtain sitter and review sitter guidelines with assigned personnel  7.  Initiate Psychosocial CNS and Spiritual Care consult, as indicated  Outcome: Progressing

## 2023-08-07 ENCOUNTER — APPOINTMENT (OUTPATIENT)
Dept: MRI IMAGING | Age: 73
DRG: 682 | End: 2023-08-07
Payer: MEDICARE

## 2023-08-07 PROBLEM — I10 HTN (HYPERTENSION), BENIGN: Status: ACTIVE | Noted: 2023-08-07

## 2023-08-07 PROBLEM — B02.29 POST HERPETIC NEURALGIA: Status: ACTIVE | Noted: 2023-08-07

## 2023-08-07 PROBLEM — G54.5 PARSONAGE-TURNER SYNDROME: Status: ACTIVE | Noted: 2023-08-07

## 2023-08-07 LAB
ALBUMIN SERPL-MCNC: 2.7 G/DL (ref 3.4–5)
ANION GAP SERPL CALCULATED.3IONS-SCNC: 5 MMOL/L (ref 3–16)
BASOPHILS # BLD: 0.1 K/UL (ref 0–0.2)
BASOPHILS NFR BLD: 0.8 %
BUN SERPL-MCNC: 7 MG/DL (ref 7–20)
CALCIUM SERPL-MCNC: 8.1 MG/DL (ref 8.3–10.6)
CHLORIDE SERPL-SCNC: 111 MMOL/L (ref 99–110)
CO2 SERPL-SCNC: 26 MMOL/L (ref 21–32)
CREAT SERPL-MCNC: <0.5 MG/DL (ref 0.8–1.3)
DEPRECATED RDW RBC AUTO: 14.7 % (ref 12.4–15.4)
EOSINOPHIL # BLD: 0.1 K/UL (ref 0–0.6)
EOSINOPHIL NFR BLD: 1.3 %
GFR SERPLBLD CREATININE-BSD FMLA CKD-EPI: >60 ML/MIN/{1.73_M2}
GLUCOSE SERPL-MCNC: 112 MG/DL (ref 70–99)
HCT VFR BLD AUTO: 36.5 % (ref 40.5–52.5)
HGB BLD-MCNC: 12.1 G/DL (ref 13.5–17.5)
LYMPHOCYTES # BLD: 1.2 K/UL (ref 1–5.1)
LYMPHOCYTES NFR BLD: 17.2 %
MAGNESIUM SERPL-MCNC: 1.6 MG/DL (ref 1.8–2.4)
MCH RBC QN AUTO: 30.5 PG (ref 26–34)
MCHC RBC AUTO-ENTMCNC: 33.1 G/DL (ref 31–36)
MCV RBC AUTO: 92 FL (ref 80–100)
MONOCYTES # BLD: 0.7 K/UL (ref 0–1.3)
MONOCYTES NFR BLD: 10.2 %
NEUTROPHILS # BLD: 4.8 K/UL (ref 1.7–7.7)
NEUTROPHILS NFR BLD: 70.5 %
PHOSPHATE SERPL-MCNC: 1.8 MG/DL (ref 2.5–4.9)
PLATELET # BLD AUTO: 189 K/UL (ref 135–450)
PMV BLD AUTO: 7.6 FL (ref 5–10.5)
POTASSIUM SERPL-SCNC: 3.6 MMOL/L (ref 3.5–5.1)
RBC # BLD AUTO: 3.96 M/UL (ref 4.2–5.9)
SODIUM SERPL-SCNC: 142 MMOL/L (ref 136–145)
WBC # BLD AUTO: 6.7 K/UL (ref 4–11)

## 2023-08-07 PROCEDURE — 92526 ORAL FUNCTION THERAPY: CPT

## 2023-08-07 PROCEDURE — 6360000002 HC RX W HCPCS: Performed by: STUDENT IN AN ORGANIZED HEALTH CARE EDUCATION/TRAINING PROGRAM

## 2023-08-07 PROCEDURE — 70551 MRI BRAIN STEM W/O DYE: CPT

## 2023-08-07 PROCEDURE — 2580000003 HC RX 258: Performed by: PHYSICIAN ASSISTANT

## 2023-08-07 PROCEDURE — 6370000000 HC RX 637 (ALT 250 FOR IP): Performed by: INTERNAL MEDICINE

## 2023-08-07 PROCEDURE — 97116 GAIT TRAINING THERAPY: CPT

## 2023-08-07 PROCEDURE — 80069 RENAL FUNCTION PANEL: CPT

## 2023-08-07 PROCEDURE — 97535 SELF CARE MNGMENT TRAINING: CPT

## 2023-08-07 PROCEDURE — 92523 SPEECH SOUND LANG COMPREHEN: CPT

## 2023-08-07 PROCEDURE — 97530 THERAPEUTIC ACTIVITIES: CPT

## 2023-08-07 PROCEDURE — 97162 PT EVAL MOD COMPLEX 30 MIN: CPT

## 2023-08-07 PROCEDURE — 97166 OT EVAL MOD COMPLEX 45 MIN: CPT

## 2023-08-07 PROCEDURE — 99233 SBSQ HOSP IP/OBS HIGH 50: CPT | Performed by: NURSE PRACTITIONER

## 2023-08-07 PROCEDURE — 6370000000 HC RX 637 (ALT 250 FOR IP)

## 2023-08-07 PROCEDURE — 6360000002 HC RX W HCPCS: Performed by: PHYSICIAN ASSISTANT

## 2023-08-07 PROCEDURE — 83735 ASSAY OF MAGNESIUM: CPT

## 2023-08-07 PROCEDURE — 99223 1ST HOSP IP/OBS HIGH 75: CPT | Performed by: PSYCHIATRY & NEUROLOGY

## 2023-08-07 PROCEDURE — 36415 COLL VENOUS BLD VENIPUNCTURE: CPT

## 2023-08-07 PROCEDURE — 1200000000 HC SEMI PRIVATE

## 2023-08-07 PROCEDURE — 6370000000 HC RX 637 (ALT 250 FOR IP): Performed by: PHYSICIAN ASSISTANT

## 2023-08-07 PROCEDURE — 72141 MRI NECK SPINE W/O DYE: CPT

## 2023-08-07 PROCEDURE — 85025 COMPLETE CBC W/AUTO DIFF WBC: CPT

## 2023-08-07 RX ORDER — GABAPENTIN 100 MG/1
100 CAPSULE ORAL 3 TIMES DAILY
Status: DISCONTINUED | OUTPATIENT
Start: 2023-08-07 | End: 2023-08-09 | Stop reason: HOSPADM

## 2023-08-07 RX ORDER — MAGNESIUM SULFATE IN WATER 40 MG/ML
2000 INJECTION, SOLUTION INTRAVENOUS ONCE
Status: COMPLETED | OUTPATIENT
Start: 2023-08-07 | End: 2023-08-07

## 2023-08-07 RX ADMIN — CARBIDOPA AND LEVODOPA 1 TABLET: 25; 100 TABLET ORAL at 08:28

## 2023-08-07 RX ADMIN — HEPARIN SODIUM 5000 UNITS: 5000 INJECTION INTRAVENOUS; SUBCUTANEOUS at 06:09

## 2023-08-07 RX ADMIN — HEPARIN SODIUM 5000 UNITS: 5000 INJECTION INTRAVENOUS; SUBCUTANEOUS at 15:06

## 2023-08-07 RX ADMIN — ROPINIROLE HYDROCHLORIDE 0.25 MG: 0.25 TABLET, FILM COATED ORAL at 08:28

## 2023-08-07 RX ADMIN — MAGNESIUM SULFATE HEPTAHYDRATE 2000 MG: 40 INJECTION, SOLUTION INTRAVENOUS at 12:00

## 2023-08-07 RX ADMIN — SERTRALINE 100 MG: 50 TABLET, FILM COATED ORAL at 20:10

## 2023-08-07 RX ADMIN — GABAPENTIN 100 MG: 100 CAPSULE ORAL at 20:10

## 2023-08-07 RX ADMIN — GABAPENTIN 100 MG: 100 CAPSULE ORAL at 15:05

## 2023-08-07 RX ADMIN — ATORVASTATIN CALCIUM 80 MG: 80 TABLET, FILM COATED ORAL at 20:11

## 2023-08-07 RX ADMIN — CARBIDOPA AND LEVODOPA 1 TABLET: 25; 100 TABLET ORAL at 20:09

## 2023-08-07 RX ADMIN — POLYETHYLENE GLYCOL 3350 17 G: 17 POWDER, FOR SOLUTION ORAL at 08:29

## 2023-08-07 RX ADMIN — TAMSULOSIN HYDROCHLORIDE 0.4 MG: 0.4 CAPSULE ORAL at 20:11

## 2023-08-07 RX ADMIN — ACETAMINOPHEN 650 MG: 325 TABLET ORAL at 03:54

## 2023-08-07 RX ADMIN — Medication 10 ML: at 22:28

## 2023-08-07 RX ADMIN — CARBIDOPA AND LEVODOPA 1 TABLET: 25; 100 TABLET ORAL at 11:56

## 2023-08-07 RX ADMIN — ASPIRIN 81 MG: 81 TABLET, COATED ORAL at 08:29

## 2023-08-07 RX ADMIN — ROPINIROLE HYDROCHLORIDE 0.25 MG: 0.25 TABLET, FILM COATED ORAL at 15:05

## 2023-08-07 RX ADMIN — HEPARIN SODIUM 5000 UNITS: 5000 INJECTION INTRAVENOUS; SUBCUTANEOUS at 22:27

## 2023-08-07 RX ADMIN — CARBIDOPA AND LEVODOPA 1 TABLET: 25; 100 TABLET ORAL at 15:06

## 2023-08-07 RX ADMIN — ROPINIROLE HYDROCHLORIDE 0.25 MG: 0.25 TABLET, FILM COATED ORAL at 20:10

## 2023-08-07 ASSESSMENT — PAIN SCALES - GENERAL
PAINLEVEL_OUTOF10: 0
PAINLEVEL_OUTOF10: 8

## 2023-08-07 ASSESSMENT — PAIN DESCRIPTION - LOCATION: LOCATION: SHOULDER

## 2023-08-07 NOTE — PROGRESS NOTES
[address] [facility] Department   Phone: [phone]    Occupational Therapy    [x] Initial Evaluation            [] Daily Treatment Note         [] Discharge Summary      Patient: Demar Crespo   : 1950   MRN: [id number]   Date of Service:  2023    Admitting Diagnosis:  DEBO (acute kidney injury) [facility])  Current Admission Summary: 69 yo M with Parkinson's disease, CAD, pAfib who came to ER with DEBO from outpatient labs. Admitted as inpatient for BPH with obstruction with BL hydronephrosis and constipation. Baig placed in ED and started on IVF. Seen by Urology and Renal.  On Flomax. Constipation resolved with molasses enema. Patient recently battled RUE shingles. Wife and daughter noted R arm/shoulder weakness over past 2 weeks. MRI Brain and C spine requested. Neuro and Ortho consults requested. Past Medical History:  has a past medical history of Atrial fibrillation ([address]), No significant past medical history, and Parkinson disease ([address]). Past Surgical History:  has a past surgical history that includes Coronary artery bypass graft (2018) and Atrial ablation surgery (2018). Discharge Recommendations:Bharat Pina scored a  on the AM-PAC ADL Inpatient form. Current research shows that an AM-PAC score of 17 or less is typically not associated with a discharge to the patient's home setting. Based on the patient's AM-PAC score and their current ADL deficits, it is recommended that the patient have 5-7 sessions per week of Occupational Therapy at d/c to increase the patient's independence. At this time, this patient demonstrates complex nursing, medical, and rehabilitative needs, and would benefit from intensive rehabilitation services upon discharge from the Inpatient setting.   This patient demonstrates the ability to participate in and benefit from an intensive therapy program with a coordinated interdisciplinary team approach to foster frequent, control  Prognosis: good  Clinical Assessment: patient presents with above deficits and is below baseline, requires CGA with FWW for functional mobility and dep A for LB ADLs. Pt mainly limited by  R UE weakness/ decreased ROM, impaired balance and decreased activity tolerance. Pt would benefit from continued OT services to facilitate return to PLOF  Safety Interventions: patient left in chair, chair alarm in place, call light within reach, gait belt, nurse notified, and family/caregiver present    Plan  Frequency: 3-5 x/per week  Current Treatment Recommendations: strengthening, balance training, functional mobility training, transfer training, endurance training, patient/caregiver education, ADL/self-care training, and equipment evaluation/education    Goals  Patient Goals: to return home   Short Term Goals:  Time Frame: discharge  Patient will complete upper body ADL at minimal assistance   Patient will complete lower body ADL at moderate assistance   Patient will complete toileting at moderate assistance   Patient will complete functional transfers at stand by assistance   Patient will complete functional mobility at stand by assistance      Above goals reviewed on 8/7/2023. All goals are ongoing at this time unless indicated above.      Therapy Session Time     Individual Group Co-treatment   Time In    7610   Time Out    8006   Minutes    83        Timed Code Treatment Minutes:   68  Total Treatment Minutes:  83       Electronically Signed By: CHLOÉ Saeed/MIGUELINA [id number]

## 2023-08-07 NOTE — PROGRESS NOTES
[address] [facility] Department   Phone: [phone]    Physical Therapy    [x] Initial Evaluation            [] Daily Treatment Note         [] Discharge Summary      Patient: Emma Daly   : 1950   MRN: [id number]   Date of Service:  2023  Admitting Diagnosis: DEBO (acute kidney injury) [facility])  Current Admission Summary:  69 yo M with Parkinson's disease, CAD, pAfib who came to ER with DEBO from outpatient labs. Admitted as inpatient for BPH with obstruction with BL hydronephrosis and constipation. Baig placed in ED and started on IVF. Seen by Urology and Renal.  On Flomax. Constipation resolved with molasses enema. Patient recently battled RUE shingles. Wife and daughter noted R arm/shoulder weakness over past 2 weeks. MRI Brain and C spine requested. Neuro and Ortho consults requested. Past Medical History:  has a past medical history of Atrial fibrillation ([address]), No significant past medical history, and Parkinson disease ([address]). Past Surgical History:  has a past surgical history that includes Coronary artery bypass graft (2018) and Atrial ablation surgery (2018). Discharge Recommendations: Emma Daly scored a 15/24 on the AM-PAC short mobility form. Current research shows that an AM-PAC score of 17 or less is typically not associated with a discharge to the patient's home setting. Based on the patient's AM-PAC score and their current functional mobility deficits, it is recommended that the patient have 5-7 sessions per week of Physical Therapy at d/c to increase the patient's independence. At this time, this patient demonstrates complex nursing, medical, and rehabilitative needs, and would benefit from intensive rehabilitation services upon discharge from the Inpatient setting.   This patient demonstrates the ability to participate in and benefit from an intensive therapy program with a coordinated interdisciplinary team approach to foster Requiring Therapeutic Intervention: decreased functional mobility, decreased ADL status, decreased strength, decreased safety awareness, decreased cognition, decreased endurance, decreased balance, decreased coordination, decreased posture  Prognosis: good  Clinical Assessment: Patient is a 68 y.o. male presenting with decreased strength and mobility deficits with PMHx of Parkinson's Disease. Patient was Independent with ambulation and transfers at baseline. Today, pt requires CGA for ambulating with RW and external assistance for toilet transfer. Patient is below baseline and will benefit from continued skilled therapy to improve deficits listed above. Safety Interventions: chair alarm in place, call light within reach, gait belt, patient at risk for falls, nurse notified, and family/caregiver present   Recommend use of RW and CGA for transfers and ambulation with nursing; discussed with Lennox Cram, RN and written on pt whiteboard    Plan  Frequency: 3-5 x/per week  Current Treatment Recommendations: strengthening, ROM, balance training, functional mobility training, transfer training, gait training, stair training, endurance training, neuromuscular re-education, modalities, patient/caregiver education, ADL/self-care training, home exercise program, safety education, and equipment evaluation/education    Goals  Patient Goals: To return to PLOF   Short Term Goals:  Time Frame: By discharge  Patient will complete bed mobility at stand by assistance   Patient will complete transfers at stand by assistance   Patient will ambulate 30 ft with use of LRAD at contact guard assistance  Patient will ascend/descend 1 stairs with (R) ascending handrail at contact guard assistance  Patient to maintain standing at contact guard assistance for 5 minutes. Above goals reviewed on 8/7/2023. All goals are ongoing at this time unless indicated above.     Therapy Session Time      Individual Group Co-treatment   Time In     0000 Time Out     1138   Minutes     83     Timed Code Treatment Minutes:  68 Minutes  Total Treatment Minutes:  83 Minutes       Electronically Signed By: Abran Dukes, PATRICK Saavedra, [facility]  This evaluation/treatment was observed and supervised by Abran Dukes, [id number] [address]

## 2023-08-07 NOTE — PROGRESS NOTES
Shift assessment completed. Routine vitals obtained. Scheduled medications given. Patient is awake, alert and oriented. Respirations are easy and unlabored. Patient does not appear to be in distress, resting comfortably at this time. Call light within reach. Wife at bedside.

## 2023-08-07 NOTE — PROGRESS NOTES
Facility/Department: [address] ONCOLOGY  Speech Language Pathology   Dysphagia Treatment Note and Speech Language/Cognitive Evaluation    Patient: Martha Salas   : 1950   MRN: [id number]      Evaluation Date: 2023      Admitting Dx: Parkinson disease ([address]) [G20]  Generalized weakness [R53.1]  DEBO (acute kidney injury) ([address]) [N17.9]  Bladder outlet obstruction [N32.0]  Acute renal failure, unspecified acute renal failure type ([address]) [N17.9]  Treatment Diagnosis: Oropharyngeal Dysphagia   Pain: Denies                                            Subjective: Dysphagia evaluation completed on 23. Further assessment of speech language/cognitive skills appear indicated therefore, full assessment completed this date. See below. Dysphagia Treatment:  Diet and Treatment Recommendations 2023:  Diet Solids Recommendation:  Regular texture diet  Liquid Consistency Recommendation: Thin liquids  Recommended form of Meds: Meds whole with water     Compensatory strategies: Alternate solids/liquids , Upright as possible with all PO intake , No straws , Small bites/sips , Eat/feed slowly, Remain upright 30-45 min     Assessment of Texture Tolerance:  Diet level prior to treatment: Regular texture diet , Thin liquids   Tolerance of Current Diet Level:RN reported pt appears to be tolerating current diet level     Impressions: Pt was positioned Upright in bed , awake and alert. Currently on room air. Trials of thin liquids and regular solids  were provided to assess swallow function. Pt able to self feed with SLP set up assistance. Pt presenting with no anterior loss, decreased oral prep and AP transit, decreased mastication, good oral clearance and delayed swallow initiation. No overt signs or symptoms associated with aspiration or decline in respiratory status. Pt demonstrates increased risk for aspiration due to cognitive state  and co morbidities .  Based on today's assessment recommend Regular texture Minutes: 0  Total Treatment time: 24 minutes    If patient discharges prior to next session this note will serve as a discharge summary.      Signature:  Claudio CarlosHarrison County Hospitalwhit  [facility] Pathologist  [id number]

## 2023-08-07 NOTE — CARE COORDINATION
Discharge Planning:     (CM) called and left a voicemail for [facility] staff, Negra Mclean, informing of therapy recommendation for ARU. CM [facility] patient's hysician, Dr. Flaquito Armstrong, requesting that an ARU consult be placed.       ANGELINE Singh, [facility] -   [phone]    Electronically signed by ALISHA Henry on 8/7/2023 at 2:07 PM

## 2023-08-07 NOTE — PROGRESS NOTES
------------------------------------------------------------------      Findings             ======================================================================  Please note that this chart entry has been generated using using voice recognition software, mainly. So please excuse brevity and/or typos. While every effort and attempts have been made to ensure the accuracy of this automated transcription, some errors may have occurred; and certain words and phrases in transcription may not be entered as intended. However, inadvertent computerized transcription errors may be present. So please contact us if any clarification needed.

## 2023-08-08 PROBLEM — I63.232 ARTERIAL ISCHEMIC STROKE, ICA, LEFT, ACUTE (HCC): Status: ACTIVE | Noted: 2023-08-08

## 2023-08-08 LAB
ALBUMIN SERPL-MCNC: 2.8 G/DL (ref 3.4–5)
ANION GAP SERPL CALCULATED.3IONS-SCNC: 8 MMOL/L (ref 3–16)
BUN SERPL-MCNC: 7 MG/DL (ref 7–20)
CALCIUM SERPL-MCNC: 8.2 MG/DL (ref 8.3–10.6)
CHLORIDE SERPL-SCNC: 111 MMOL/L (ref 99–110)
CO2 SERPL-SCNC: 25 MMOL/L (ref 21–32)
CREAT SERPL-MCNC: <0.5 MG/DL (ref 0.8–1.3)
GFR SERPLBLD CREATININE-BSD FMLA CKD-EPI: >60 ML/MIN/{1.73_M2}
GLUCOSE SERPL-MCNC: 98 MG/DL (ref 70–99)
LV EF: 58 %
LVEF MODALITY: NORMAL
MAGNESIUM SERPL-MCNC: 2.1 MG/DL (ref 1.8–2.4)
PHOSPHATE SERPL-MCNC: 2.3 MG/DL (ref 2.5–4.9)
POTASSIUM SERPL-SCNC: 4.4 MMOL/L (ref 3.5–5.1)
SODIUM SERPL-SCNC: 144 MMOL/L (ref 136–145)

## 2023-08-08 PROCEDURE — 36415 COLL VENOUS BLD VENIPUNCTURE: CPT

## 2023-08-08 PROCEDURE — 99233 SBSQ HOSP IP/OBS HIGH 50: CPT | Performed by: NURSE PRACTITIONER

## 2023-08-08 PROCEDURE — 92526 ORAL FUNCTION THERAPY: CPT

## 2023-08-08 PROCEDURE — 6370000000 HC RX 637 (ALT 250 FOR IP)

## 2023-08-08 PROCEDURE — 99223 1ST HOSP IP/OBS HIGH 75: CPT | Performed by: INTERNAL MEDICINE

## 2023-08-08 PROCEDURE — 6370000000 HC RX 637 (ALT 250 FOR IP): Performed by: INTERNAL MEDICINE

## 2023-08-08 PROCEDURE — 6370000000 HC RX 637 (ALT 250 FOR IP): Performed by: NURSE PRACTITIONER

## 2023-08-08 PROCEDURE — 6360000002 HC RX W HCPCS: Performed by: PHYSICIAN ASSISTANT

## 2023-08-08 PROCEDURE — 2580000003 HC RX 258: Performed by: INTERNAL MEDICINE

## 2023-08-08 PROCEDURE — 80069 RENAL FUNCTION PANEL: CPT

## 2023-08-08 PROCEDURE — 99233 SBSQ HOSP IP/OBS HIGH 50: CPT | Performed by: PSYCHIATRY & NEUROLOGY

## 2023-08-08 PROCEDURE — 1200000000 HC SEMI PRIVATE

## 2023-08-08 PROCEDURE — 97130 THER IVNTJ EA ADDL 15 MIN: CPT

## 2023-08-08 PROCEDURE — 2580000003 HC RX 258: Performed by: PHYSICIAN ASSISTANT

## 2023-08-08 PROCEDURE — 93880 EXTRACRANIAL BILAT STUDY: CPT

## 2023-08-08 PROCEDURE — 97129 THER IVNTJ 1ST 15 MIN: CPT

## 2023-08-08 PROCEDURE — 83735 ASSAY OF MAGNESIUM: CPT

## 2023-08-08 PROCEDURE — 6370000000 HC RX 637 (ALT 250 FOR IP): Performed by: PHYSICIAN ASSISTANT

## 2023-08-08 PROCEDURE — 93306 TTE W/DOPPLER COMPLETE: CPT

## 2023-08-08 RX ORDER — TRAMADOL HYDROCHLORIDE 50 MG/1
50 TABLET ORAL EVERY 6 HOURS PRN
Status: DISCONTINUED | OUTPATIENT
Start: 2023-08-08 | End: 2023-08-09 | Stop reason: HOSPADM

## 2023-08-08 RX ORDER — ACETAMINOPHEN 325 MG/1
650 TABLET ORAL 3 TIMES DAILY
Status: DISCONTINUED | OUTPATIENT
Start: 2023-08-08 | End: 2023-08-09 | Stop reason: HOSPADM

## 2023-08-08 RX ADMIN — ROPINIROLE HYDROCHLORIDE 0.25 MG: 0.25 TABLET, FILM COATED ORAL at 14:03

## 2023-08-08 RX ADMIN — CARBIDOPA AND LEVODOPA 1 TABLET: 25; 100 TABLET ORAL at 14:03

## 2023-08-08 RX ADMIN — ROPINIROLE HYDROCHLORIDE 0.25 MG: 0.25 TABLET, FILM COATED ORAL at 10:07

## 2023-08-08 RX ADMIN — ACETAMINOPHEN 650 MG: 325 TABLET ORAL at 16:02

## 2023-08-08 RX ADMIN — ROPINIROLE HYDROCHLORIDE 0.25 MG: 0.25 TABLET, FILM COATED ORAL at 21:48

## 2023-08-08 RX ADMIN — SOTALOL HYDROCHLORIDE 80 MG: 80 TABLET ORAL at 10:07

## 2023-08-08 RX ADMIN — CARBIDOPA AND LEVODOPA 1 TABLET: 25; 100 TABLET ORAL at 21:51

## 2023-08-08 RX ADMIN — CARBIDOPA AND LEVODOPA 1 TABLET: 25; 100 TABLET ORAL at 11:45

## 2023-08-08 RX ADMIN — SODIUM CHLORIDE: 9 INJECTION, SOLUTION INTRAVENOUS at 02:11

## 2023-08-08 RX ADMIN — ASPIRIN 81 MG: 81 TABLET, COATED ORAL at 10:07

## 2023-08-08 RX ADMIN — POLYETHYLENE GLYCOL 3350 17 G: 17 POWDER, FOR SOLUTION ORAL at 10:07

## 2023-08-08 RX ADMIN — CARBIDOPA AND LEVODOPA 1 TABLET: 25; 100 TABLET ORAL at 06:44

## 2023-08-08 RX ADMIN — HEPARIN SODIUM 5000 UNITS: 5000 INJECTION INTRAVENOUS; SUBCUTANEOUS at 16:02

## 2023-08-08 RX ADMIN — GABAPENTIN 100 MG: 100 CAPSULE ORAL at 21:48

## 2023-08-08 RX ADMIN — ACETAMINOPHEN 650 MG: 325 TABLET ORAL at 21:47

## 2023-08-08 RX ADMIN — GABAPENTIN 100 MG: 100 CAPSULE ORAL at 14:04

## 2023-08-08 RX ADMIN — Medication 10 ML: at 21:47

## 2023-08-08 RX ADMIN — SODIUM CHLORIDE: 9 INJECTION, SOLUTION INTRAVENOUS at 16:01

## 2023-08-08 RX ADMIN — ACETAMINOPHEN 650 MG: 325 TABLET ORAL at 10:11

## 2023-08-08 RX ADMIN — SERTRALINE 100 MG: 50 TABLET, FILM COATED ORAL at 21:47

## 2023-08-08 RX ADMIN — ATORVASTATIN CALCIUM 80 MG: 80 TABLET, FILM COATED ORAL at 21:48

## 2023-08-08 RX ADMIN — TAMSULOSIN HYDROCHLORIDE 0.4 MG: 0.4 CAPSULE ORAL at 21:47

## 2023-08-08 RX ADMIN — HEPARIN SODIUM 5000 UNITS: 5000 INJECTION INTRAVENOUS; SUBCUTANEOUS at 06:44

## 2023-08-08 RX ADMIN — HEPARIN SODIUM 5000 UNITS: 5000 INJECTION INTRAVENOUS; SUBCUTANEOUS at 21:47

## 2023-08-08 RX ADMIN — GABAPENTIN 100 MG: 100 CAPSULE ORAL at 10:07

## 2023-08-08 ASSESSMENT — PAIN SCALES - GENERAL: PAINLEVEL_OUTOF10: 7

## 2023-08-08 NOTE — PROGRESS NOTES
Facility/Department: [address] ONCOLOGY  Speech Language Pathology   Dysphagia and Speech Language/Cognitive Treatment Note    Patient: Chaim Aranda   : 1950   MRN: [id number]      Evaluation Date: 2023      Admitting Dx: Parkinson disease ([address]) [G20]  Generalized weakness [R53.1]  DEBO (acute kidney injury) ([address]) [N17.9]  Bladder outlet obstruction [N32.0]  Acute renal failure, unspecified acute renal failure type ([address]) [N17.9]  Treatment Diagnosis: Word Finding Difficulty, Cognitive-Linguistic Deficits , Speech Language Deficits   Pain: Did not state                                                MRI: 23- 2 punctate acute infarcts in the left middle cerebral artery territory. Subjective:  Pt alert and cooperative with his wife, RN and NP present. Limited assessment of the pt's swallowing completed during the first session. Dysphagia Treatment:   Diet and Treatment Recommendations 2023:  Diet Solids Recommendation:  Regular texture diet  Liquid Consistency Recommendation: Thin liquids  Recommended form of Meds: Meds whole with water or Meds in puree           Compensatory strategies: Alternate solids/liquids , Upright as possible with all PO intake , No straws , Small bites/sips , Eat/feed slowly, Remain upright 30-45 min , set up assistance with meals    Assessment of Texture Tolerance:  Diet level prior to treatment: Regular texture diet , Thin liquids   Tolerance of Current Diet Level:Per chart, no noted difficulty with current diet level      -Impressions: First session- pt was positioned Upright in bed , awake and alert with his wife, RN, and NP present. Currently on room air. Trials of thin liquids and medications (whole, presented by the pt's RN) were provided to assess swallow function. Pt's wife assisted the pt with taking his medications one at a time while the pt independently took sips of thin liquids via cup rim.  The pt was able to achieve full oral clearance and

## 2023-08-08 NOTE — PROGRESS NOTES
36.5 08/07/2023 06:39 AM    MCV 92.0 08/07/2023 06:39 AM    RDW 14.7 08/07/2023 06:39 AM     08/07/2023 06:39 AM     Lab Results   Component Value Date    INR 1.06 10/30/2016    PROTIME 12.1 10/30/2016       Neuroimaging was independently reviewed by me and discussed results with the patient  I reviewed blood testing and other test results and discussed results with the patient      Impression:    New onset right shoulder pain, weakness. MRI brain showed 2 small punctate strokes in the left MCA territory. Do not feel the strokes are causing his right shoulder pain and weakness. 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 Could be thromboembolic versus cardioembolic  History of Parkinson disease with dementia. Recent worsening secondary to UTI, pain, and shingles. Chronic cognitive impairment  Hypertension  Hyperlipidemia  History of A-fib, MAZE procedure and LANDON closure. Reviewed cardiology notes. Patient wore a 48-hour Holter monitor (10/2022) which showed SR, avg 57 bpm, rare PVCs. On visit in April 2023,  patient was recommended pacemaker, but patient deferred at that time. Recommendation    Echocardiogram  Carotid Dopplers  Continue gabapentin 100 mg 3 times daily  Aspirin statin  PT and OT  Speech  Neurochecks  Telemetry  Monitor and control blood pressure. Continue current blood pressure medications  Continue same dose Sinemet 4 times daily  Monitor orthostatic blood pressures  Agree with ARU  Patient will likely need 30-day cardiac event monitor. We will follow      LIDIA Bartholomew CNP    Attending Supervising Physician's Attestation Statement   I reviewed and agree with the findings and plan as documented in NP consult note   Patient seen and examined today. MRI reviewed and showed possible subacute to acute small left MCA stroke. MRI of the C-spine showed no severe stenosis.   He is about the same.  Feels better after gabapentin. Still weak in his right shoulder. Wife is concerned with MRI results. On exam:  AAO x 3  Poor immediate recall but good fund of knowledge  Good attention concentration  Soft speech  Mild right facial asymmetry  No ptosis today  No gaze preference  Tongue is midline same the same right shoulder weakness but no new weakness today  Symmetric DTRs 2+ throughout    Impression:  New onset right shoulder weakness with pain. Possible differential could include postinflammatory or viral brachial plexitis in addition to small left MCA stroke  Recent herpes zoster and shingles  Postherpetic neuralgia  PD  History of A-fib    Plan:  Agree with above note  Repeat echo and Doppler  PT and OT  Speech  Inpatient rehab  Will discuss with cardiology regarding history of A-fib and the need for further workup which could include repeating event monitor  Continue aspirin and statin for now  Continue current dose of gabapentin and will increase gradually if symptoms are not better  Continue current dose of Sinemet and Requip  Will follow    Electronically signed by Alondra Pak MD on 8/8/23 at 3:08 PM EDT         This dictation was generated by voice recognition computer software. Although all attempts are made to edit the dictation for accuracy, there may be errors in the transcription that are not intended.

## 2023-08-08 NOTE — CONSULTS
[facility]   Electrophysiology Consultation   Date: 8/8/2023  Reason for Consultation: Atrial fibrillation  Consult Requesting Physician: Leslye Euceda MD     Chief Complaint   Patient presents with    Abnormal Lab     Called by family doc regarding lab work done this morning with critically high BUN and Cr levels. Recently completed treatment for shingles. Wife states pt has been confused, wetting self at night, diarrhea. HPI: Thor Rangel is a 68 y.o. male with history of Parkinson's, CAD, paroxysmal A-fib, history of CABG and LANDON clip and maze in 2018, atrial flutter status post cardioversion, sick sinus syndrome  Being on sotalol. He was admitted due to DEBO. He had right shoulder pain and weakness later on. Patient has short episodes of tachycardias. Telemetry monitor shows what appears to be either an atrial tachycardia or atrial flutter. However these episodes only last a few minutes historically and he had 1 episode that lasted 5 minutes in the hospital.    Past Medical History:   Diagnosis Date    Atrial fibrillation ([address])     No significant past medical history     Parkinson disease [facility])         Past Surgical History:   Procedure Laterality Date    ATRIAL ABLATION SURGERY  04/2018    MAZE    CORONARY ARTERY BYPASS GRAFT  04/2018       No Known Allergies    Social History:  Reviewed. reports that he quit smoking about 40 years ago. His smoking use included cigarettes. He has a 14.00 pack-year smoking history. He has never used smokeless tobacco. He reports that he does not drink alcohol and does not use drugs. Family History:  Reviewed. family history includes Diabetes in his father and mother; Heart Disease in his mother; High Blood Pressure in his brother, father, and mother; Sleep Apnea in his mother. Review of System:  All other systems reviewed and are negative except for that noted above.  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    Post herpetic neuralgia [B02.29] 08/07/2023    HTN (hypertension), benign [I10] 08/07/2023    Bilateral hydronephrosis [N13.30] 08/05/2023    DEBO (acute kidney injury) ([address]) [N17.9] 08/04/2023    BPH with obstruction/lower urinary tract symptoms [N40.1, N13.8] 04/18/2023    Coronary artery disease involving native heart without angina pectoris [I25.10] 12/17/2018    Paroxysmal atrial fibrillation ([address]) [I48.0] 09/05/2014    Dementia due to Parkinson's disease without behavioral disturbance ([address]) [G20, F02.80] 10/04/2013       Assessment:       Plan:    -Paroxysmal atrial fibrillation and SVT    Status post maze and left atrial appendage clip. Episodes are infrequent and short. They are likely SVT/ atrial tachycardia. Patient does feel them. He is on sotalol. ANN MARIE done after surgery showed complete occlusion of left atrial appendage. Therefore at this point any embolic event is very unlikely to be related to atrial fibrillation therefore any anticoagulation for atrial fibrillation does not seem to be necessary. Choice of anticoagulation or antiplatelet therapy as based on neurology assessment regardless of atrial fibrillation. -CAD  Status post CABG. No chest pain. No ischemia. Continue aspirin and statin    -Hypertension     BP is well controlled. Continue current meds.      -Anemia    Mild and stable. I independently reviewed and interpreted ECG, cardiac labs, other relevant labs,  echo   CT scan   . Unless otherwise reflected in the note, my interpretation is in agreement with the report.     Thank you for allowing me to participate in the care of [facility]     NOTE: This

## 2023-08-08 NOTE — PROGRESS NOTES
Full consult to follow    Patient with history of CAD status post CABG x 4, maze, LANDON clip in 2018, paroxysmal atrial fibrillation, tachybradycardia syndrome was admitted with confusion and later had a stroke. A ANN MARIE after surgery showed complete occlusion of the appendage. The abdomen and pelvis CT scan on this admission also shows occlusion of the appendage. Echo did not show any intracardiac shunt. Patient had a Holter monitor in October 2022 which did not show any atrial fibrillation and the EKG and available rhythm strips in epic do not show any atrial fibrillation. Therefore the source of her stroke is not related to atrial fibrillation.       At this point the decision about antiplatelet and anticoagulation will be as per neurology like any other stroke unrelated to atrial fibrillation

## 2023-08-08 NOTE — PROGRESS NOTES
MD Judy Cruz MD Rina Meyer, MD                  Office: [phone]                      Fax: [phone]                   NEPHROLOGY INPATIENT PROGRESS NOTE:     PATIENT NAME: Chaim Aranda  : 1950  MRN: [id number]             Acute kidney injury improved to baseline.  - Repeat creatinine 0.5. Electrolytes are stable. Multiple electrolyte imbalance. Low potassium levels. Low magnesium levels. Decreased phosphate levels. Phosphate have improved after replacement    Magnesium levels improving after replacement    Postop recommendations for urinary retention as per urology. High complexity. Admitted on:  2023  4:45 PM   For:  Parkinson disease ([address]) [G20]  Generalized weakness [R53.1]  DEBO (acute kidney injury) ([address]) [N17.9]  Bladder outlet obstruction [N32.0]  Acute renal failure, unspecified acute renal failure type ([address]) [N17.9]         DEBO on admission - severe  No h/o CKD:   - BL Scr- 0.7 as off 10-22  ->  8.9 on admission  - Etiology of DEBO - presumed ATN w/ hypovolemia + possibly obstructive uropathy , acute urinary retention   W/ BPH    - other differentials:  unlikely other GN / TI / TMA process  - UA :   Reviewed, large blood, trace protein, trace leukocyte, many RBCs, likely suggestive of gross hematuria  - Renal imaging -CTscan on admission, without IV contrast showing urinary retention, bilateral hydronephrosis, with BPH with about 5 cm  -Urology consulted, reviewed the recommendation,      Associated problems:   - Volume status: hypo-volemic  ECHO in past no HF     : HTN : no need for tight control    : Na: hyponatremia - mild -    - Azotemia: pre-renal severe on admission    W/ some uremic encephalopathy , mild   - Electrolytes: WNL  - Acid-Base: acidosis - non - AGMA  - Anemia: of likely chronic disease        Other major problems: Management per primary and other consulting teams.          Hospital

## 2023-08-09 ENCOUNTER — HOSPITAL ENCOUNTER (INPATIENT)
Age: 73
LOS: 2 days | Discharge: STILL ADMITTED OR ADMINISTRATIVE | DRG: 057 | End: 2023-08-11
Attending: PHYSICAL MEDICINE & REHABILITATION | Admitting: PHYSICAL MEDICINE & REHABILITATION
Payer: MEDICARE

## 2023-08-09 VITALS
RESPIRATION RATE: 16 BRPM | DIASTOLIC BLOOD PRESSURE: 69 MMHG | HEIGHT: 72 IN | WEIGHT: 195.6 LBS | HEART RATE: 67 BPM | BODY MASS INDEX: 26.49 KG/M2 | SYSTOLIC BLOOD PRESSURE: 108 MMHG | OXYGEN SATURATION: 97 % | TEMPERATURE: 97.7 F

## 2023-08-09 PROBLEM — D64.9 ANEMIA: Status: ACTIVE | Noted: 2023-08-09

## 2023-08-09 PROBLEM — G20 PARKINSON'S DISEASE (HCC): Status: ACTIVE | Noted: 2023-08-09

## 2023-08-09 PROBLEM — G20.A1 PARKINSON'S DISEASE: Status: ACTIVE | Noted: 2023-08-09

## 2023-08-09 LAB
ALBUMIN SERPL-MCNC: 2.6 G/DL (ref 3.4–5)
ANION GAP SERPL CALCULATED.3IONS-SCNC: 7 MMOL/L (ref 3–16)
BUN SERPL-MCNC: 8 MG/DL (ref 7–20)
CALCIUM SERPL-MCNC: 8.1 MG/DL (ref 8.3–10.6)
CHLORIDE SERPL-SCNC: 111 MMOL/L (ref 99–110)
CO2 SERPL-SCNC: 25 MMOL/L (ref 21–32)
CREAT SERPL-MCNC: <0.5 MG/DL (ref 0.8–1.3)
GFR SERPLBLD CREATININE-BSD FMLA CKD-EPI: >60 ML/MIN/{1.73_M2}
GLUCOSE SERPL-MCNC: 97 MG/DL (ref 70–99)
PHOSPHATE SERPL-MCNC: 2.4 MG/DL (ref 2.5–4.9)
POTASSIUM SERPL-SCNC: 4 MMOL/L (ref 3.5–5.1)
SODIUM SERPL-SCNC: 143 MMOL/L (ref 136–145)

## 2023-08-09 PROCEDURE — 6360000002 HC RX W HCPCS: Performed by: PHYSICIAN ASSISTANT

## 2023-08-09 PROCEDURE — 6370000000 HC RX 637 (ALT 250 FOR IP): Performed by: NURSE PRACTITIONER

## 2023-08-09 PROCEDURE — 2580000003 HC RX 258: Performed by: PHYSICIAN ASSISTANT

## 2023-08-09 PROCEDURE — 36415 COLL VENOUS BLD VENIPUNCTURE: CPT

## 2023-08-09 PROCEDURE — 6360000002 HC RX W HCPCS: Performed by: PHYSICAL MEDICINE & REHABILITATION

## 2023-08-09 PROCEDURE — 99232 SBSQ HOSP IP/OBS MODERATE 35: CPT

## 2023-08-09 PROCEDURE — 92526 ORAL FUNCTION THERAPY: CPT

## 2023-08-09 PROCEDURE — 97530 THERAPEUTIC ACTIVITIES: CPT

## 2023-08-09 PROCEDURE — 97535 SELF CARE MNGMENT TRAINING: CPT

## 2023-08-09 PROCEDURE — 6370000000 HC RX 637 (ALT 250 FOR IP)

## 2023-08-09 PROCEDURE — 99232 SBSQ HOSP IP/OBS MODERATE 35: CPT | Performed by: NURSE PRACTITIONER

## 2023-08-09 PROCEDURE — 6370000000 HC RX 637 (ALT 250 FOR IP): Performed by: INTERNAL MEDICINE

## 2023-08-09 PROCEDURE — 1280000000 HC REHAB R&B

## 2023-08-09 PROCEDURE — 97129 THER IVNTJ 1ST 15 MIN: CPT

## 2023-08-09 PROCEDURE — 97110 THERAPEUTIC EXERCISES: CPT

## 2023-08-09 PROCEDURE — 80069 RENAL FUNCTION PANEL: CPT

## 2023-08-09 PROCEDURE — 6370000000 HC RX 637 (ALT 250 FOR IP): Performed by: PHYSICAL MEDICINE & REHABILITATION

## 2023-08-09 PROCEDURE — 6370000000 HC RX 637 (ALT 250 FOR IP): Performed by: PHYSICIAN ASSISTANT

## 2023-08-09 RX ORDER — ATORVASTATIN CALCIUM 80 MG/1
80 TABLET, FILM COATED ORAL NIGHTLY
Status: DISCONTINUED | OUTPATIENT
Start: 2023-08-09 | End: 2023-08-11 | Stop reason: HOSPADM

## 2023-08-09 RX ORDER — ENOXAPARIN SODIUM 100 MG/ML
40 INJECTION SUBCUTANEOUS DAILY
Status: CANCELLED | OUTPATIENT
Start: 2023-08-09

## 2023-08-09 RX ORDER — SOTALOL HYDROCHLORIDE 80 MG/1
80 TABLET ORAL
Status: DISCONTINUED | OUTPATIENT
Start: 2023-08-10 | End: 2023-08-11 | Stop reason: HOSPADM

## 2023-08-09 RX ORDER — ACETAMINOPHEN 325 MG/1
650 TABLET ORAL EVERY 4 HOURS PRN
Status: DISCONTINUED | OUTPATIENT
Start: 2023-08-09 | End: 2023-08-11 | Stop reason: HOSPADM

## 2023-08-09 RX ORDER — BISACODYL 5 MG/1
5 TABLET, DELAYED RELEASE ORAL DAILY
Status: CANCELLED | OUTPATIENT
Start: 2023-08-09

## 2023-08-09 RX ORDER — SOTALOL HYDROCHLORIDE 80 MG/1
80 TABLET ORAL
Status: CANCELLED | OUTPATIENT
Start: 2023-08-10

## 2023-08-09 RX ORDER — FLUTICASONE PROPIONATE 50 MCG
2 SPRAY, SUSPENSION (ML) NASAL NIGHTLY
Status: DISCONTINUED | OUTPATIENT
Start: 2023-08-09 | End: 2023-08-11 | Stop reason: HOSPADM

## 2023-08-09 RX ORDER — POLYETHYLENE GLYCOL 3350 17 G/17G
17 POWDER, FOR SOLUTION ORAL DAILY PRN
Status: DISCONTINUED | OUTPATIENT
Start: 2023-08-09 | End: 2023-08-11 | Stop reason: HOSPADM

## 2023-08-09 RX ORDER — GABAPENTIN 100 MG/1
100 CAPSULE ORAL 3 TIMES DAILY
Status: CANCELLED | OUTPATIENT
Start: 2023-08-09

## 2023-08-09 RX ORDER — ROPINIROLE 0.25 MG/1
0.25 TABLET, FILM COATED ORAL 3 TIMES DAILY
Status: DISCONTINUED | OUTPATIENT
Start: 2023-08-09 | End: 2023-08-11 | Stop reason: HOSPADM

## 2023-08-09 RX ORDER — SODIUM CHLORIDE 0.9 % (FLUSH) 0.9 %
5-40 SYRINGE (ML) INJECTION EVERY 12 HOURS SCHEDULED
Status: CANCELLED | OUTPATIENT
Start: 2023-08-09

## 2023-08-09 RX ORDER — SODIUM CHLORIDE 0.9 % (FLUSH) 0.9 %
5-40 SYRINGE (ML) INJECTION PRN
Status: DISCONTINUED | OUTPATIENT
Start: 2023-08-09 | End: 2023-08-11 | Stop reason: HOSPADM

## 2023-08-09 RX ORDER — TRAMADOL HYDROCHLORIDE 50 MG/1
50 TABLET ORAL EVERY 6 HOURS PRN
Status: CANCELLED | OUTPATIENT
Start: 2023-08-09

## 2023-08-09 RX ORDER — ACETAMINOPHEN 325 MG/1
650 TABLET ORAL EVERY 4 HOURS PRN
Status: CANCELLED | OUTPATIENT
Start: 2023-08-09

## 2023-08-09 RX ORDER — CLOPIDOGREL BISULFATE 75 MG/1
75 TABLET ORAL DAILY
Qty: 21 TABLET | Refills: 0 | Status: ON HOLD | OUTPATIENT
Start: 2023-08-09 | End: 2023-08-13 | Stop reason: HOSPADM

## 2023-08-09 RX ORDER — POLYETHYLENE GLYCOL 3350 17 G/17G
17 POWDER, FOR SOLUTION ORAL DAILY PRN
Status: CANCELLED | OUTPATIENT
Start: 2023-08-09

## 2023-08-09 RX ORDER — ENOXAPARIN SODIUM 100 MG/ML
40 INJECTION SUBCUTANEOUS NIGHTLY
Status: DISCONTINUED | OUTPATIENT
Start: 2023-08-09 | End: 2023-08-11 | Stop reason: HOSPADM

## 2023-08-09 RX ORDER — SODIUM CHLORIDE 0.9 % (FLUSH) 0.9 %
5-40 SYRINGE (ML) INJECTION EVERY 12 HOURS SCHEDULED
Status: DISCONTINUED | OUTPATIENT
Start: 2023-08-09 | End: 2023-08-10

## 2023-08-09 RX ORDER — TRAMADOL HYDROCHLORIDE 50 MG/1
50 TABLET ORAL EVERY 6 HOURS PRN
Status: DISCONTINUED | OUTPATIENT
Start: 2023-08-09 | End: 2023-08-11 | Stop reason: HOSPADM

## 2023-08-09 RX ORDER — BISACODYL 5 MG/1
5 TABLET, DELAYED RELEASE ORAL DAILY
Status: DISCONTINUED | OUTPATIENT
Start: 2023-08-09 | End: 2023-08-11 | Stop reason: HOSPADM

## 2023-08-09 RX ORDER — ASPIRIN 81 MG/1
81 TABLET ORAL DAILY
Status: DISCONTINUED | OUTPATIENT
Start: 2023-08-10 | End: 2023-08-11 | Stop reason: HOSPADM

## 2023-08-09 RX ORDER — FLUTICASONE PROPIONATE 50 MCG
2 SPRAY, SUSPENSION (ML) NASAL NIGHTLY
Status: CANCELLED | OUTPATIENT
Start: 2023-08-09

## 2023-08-09 RX ORDER — SODIUM CHLORIDE 0.9 % (FLUSH) 0.9 %
5-40 SYRINGE (ML) INJECTION PRN
Status: CANCELLED | OUTPATIENT
Start: 2023-08-09

## 2023-08-09 RX ORDER — SOTALOL HYDROCHLORIDE 80 MG/1
80 TABLET ORAL 2 TIMES DAILY
Qty: 60 TABLET | Refills: 0 | Status: ON HOLD | OUTPATIENT
Start: 2023-08-09 | End: 2023-08-13 | Stop reason: HOSPADM

## 2023-08-09 RX ORDER — GABAPENTIN 100 MG/1
100 CAPSULE ORAL 3 TIMES DAILY
Status: DISCONTINUED | OUTPATIENT
Start: 2023-08-09 | End: 2023-08-11 | Stop reason: HOSPADM

## 2023-08-09 RX ORDER — TAMSULOSIN HYDROCHLORIDE 0.4 MG/1
0.4 CAPSULE ORAL NIGHTLY
Status: CANCELLED | OUTPATIENT
Start: 2023-08-09

## 2023-08-09 RX ORDER — GABAPENTIN 100 MG/1
100 CAPSULE ORAL 3 TIMES DAILY
Qty: 90 CAPSULE | Refills: 0 | Status: ON HOLD | OUTPATIENT
Start: 2023-08-09 | End: 2023-09-08

## 2023-08-09 RX ORDER — ROPINIROLE 0.25 MG/1
0.25 TABLET, FILM COATED ORAL 3 TIMES DAILY
Status: CANCELLED | OUTPATIENT
Start: 2023-08-09

## 2023-08-09 RX ORDER — ASPIRIN 81 MG/1
81 TABLET ORAL DAILY
Status: CANCELLED | OUTPATIENT
Start: 2023-08-10

## 2023-08-09 RX ORDER — SENNOSIDES A AND B 8.6 MG/1
1 TABLET, FILM COATED ORAL DAILY PRN
Qty: 10 TABLET | Refills: 0 | Status: ON HOLD | OUTPATIENT
Start: 2023-08-09 | End: 2023-08-13 | Stop reason: HOSPADM

## 2023-08-09 RX ORDER — TAMSULOSIN HYDROCHLORIDE 0.4 MG/1
0.4 CAPSULE ORAL NIGHTLY
Status: DISCONTINUED | OUTPATIENT
Start: 2023-08-09 | End: 2023-08-11 | Stop reason: HOSPADM

## 2023-08-09 RX ORDER — ATORVASTATIN CALCIUM 80 MG/1
80 TABLET, FILM COATED ORAL NIGHTLY
Status: CANCELLED | OUTPATIENT
Start: 2023-08-09

## 2023-08-09 RX ADMIN — HEPARIN SODIUM 5000 UNITS: 5000 INJECTION INTRAVENOUS; SUBCUTANEOUS at 15:26

## 2023-08-09 RX ADMIN — TAMSULOSIN HYDROCHLORIDE 0.4 MG: 0.4 CAPSULE ORAL at 20:48

## 2023-08-09 RX ADMIN — ROPINIROLE HYDROCHLORIDE 0.25 MG: 0.25 TABLET, FILM COATED ORAL at 15:24

## 2023-08-09 RX ADMIN — CARBIDOPA AND LEVODOPA 1 TABLET: 25; 100 TABLET ORAL at 15:22

## 2023-08-09 RX ADMIN — ROPINIROLE HYDROCHLORIDE 0.25 MG: 0.25 TABLET, FILM COATED ORAL at 08:53

## 2023-08-09 RX ADMIN — ROPINIROLE HYDROCHLORIDE 0.25 MG: 0.25 TABLET, FILM COATED ORAL at 20:48

## 2023-08-09 RX ADMIN — GABAPENTIN 100 MG: 100 CAPSULE ORAL at 15:23

## 2023-08-09 RX ADMIN — ACETAMINOPHEN 650 MG: 325 TABLET ORAL at 08:54

## 2023-08-09 RX ADMIN — GABAPENTIN 100 MG: 100 CAPSULE ORAL at 20:48

## 2023-08-09 RX ADMIN — ENOXAPARIN SODIUM 40 MG: 100 INJECTION SUBCUTANEOUS at 20:48

## 2023-08-09 RX ADMIN — CARBIDOPA AND LEVODOPA 1 TABLET: 25; 100 TABLET ORAL at 19:01

## 2023-08-09 RX ADMIN — ASPIRIN 81 MG: 81 TABLET, COATED ORAL at 08:53

## 2023-08-09 RX ADMIN — HEPARIN SODIUM 5000 UNITS: 5000 INJECTION INTRAVENOUS; SUBCUTANEOUS at 06:27

## 2023-08-09 RX ADMIN — SERTRALINE 100 MG: 50 TABLET, FILM COATED ORAL at 20:48

## 2023-08-09 RX ADMIN — ACETAMINOPHEN 650 MG: 325 TABLET ORAL at 15:24

## 2023-08-09 RX ADMIN — BISACODYL 5 MG: 5 TABLET, COATED ORAL at 17:23

## 2023-08-09 RX ADMIN — CARBIDOPA AND LEVODOPA 1 TABLET: 25; 100 TABLET ORAL at 06:27

## 2023-08-09 RX ADMIN — ATORVASTATIN CALCIUM 80 MG: 80 TABLET, FILM COATED ORAL at 20:48

## 2023-08-09 RX ADMIN — POLYETHYLENE GLYCOL 3350 17 G: 17 POWDER, FOR SOLUTION ORAL at 08:52

## 2023-08-09 RX ADMIN — CARBIDOPA AND LEVODOPA 1 TABLET: 25; 100 TABLET ORAL at 12:51

## 2023-08-09 RX ADMIN — Medication 10 ML: at 08:50

## 2023-08-09 RX ADMIN — GABAPENTIN 100 MG: 100 CAPSULE ORAL at 08:55

## 2023-08-09 ASSESSMENT — PAIN SCALES - GENERAL
PAINLEVEL_OUTOF10: 0

## 2023-08-09 NOTE — PROGRESS NOTES
Head to toe assessment complete. Vital signs obtained. Pt resting in bed at this time. AM medication administered. Pt tolerated well. Pt denies pain. Pt denies further needs at this time. Call light in hand. Pt verbalizes correct use. Bed alarm set.  Electronically signed by Jessica Morales RN on 8/9/2023 at 9:06 AM

## 2023-08-09 NOTE — PROGRESS NOTES
4 Eyes Skin Assessment     NAME:  Danyelle Grullon OF BIRTH:  1950  MEDICAL RECORD NUMBER:  [id number]    The patient is being assessed for  Admission    I agree that at least one RN has performed a thorough Head to Toe Skin Assessment on the patient. ALL assessment sites listed below have been assessed. Areas assessed by both nurses:    Head, Face, Ears, Shoulders, Back, Chest, Arms, Elbows, Hands, Sacrum. Buttock, Coccyx, Ischium, Legs. Feet and Heels, and Under Medical Devices         Does the Patient have a Wound?  No noted wound(s) scattered brusings and scabbings       Broderick Prevention initiated by RN: Yes  Wound Care Orders initiated by RN: No    Pressure Injury (Stage 3,4, Unstageable, DTI, NWPT, and Complex wounds) if present, place Wound referral order by RN under : No    New Ostomies, if present place, Ostomy referral order under : No     Nurse 1 eSignature: Electronically signed by Nunu Narayanan RN on 8/9/23 at 4:54 PM EDT    **SHARE this note so that the co-signing nurse can place an eSignature**    Nurse 2 eSignature: Electronically signed by Courtney Shipman RN on 8/9/23 at 5:53 PM EDT

## 2023-08-09 NOTE — PROGRESS NOTES
Physical Therapy  Pt currently working with OT. Will follow-up as schedule permits.    Thanks, Milena Damian, DPT [id number]

## 2023-08-09 NOTE — PROGRESS NOTES
V2.0  Discharge Summary    Name:  Marce Price /Age/Sex: 1950 (68 y.o. male)   Admit Date: 2023  Discharge Date: 23    MRN & CSN:  [id number] & [id number] Encounter Date and Time 23 3:04 PM EDT    Attending:  Geronimo Connor MD Discharging Provider: Geronimo Connor MD       Hospital Course:     Brief HPI: Marce Price is a 68 y.o. male who presented with referral from PCP due to abnormal urine    Brief Problem Based Course: This patient initially presented for the evaluation of an abnormal lab with suggestion of acute kidney injury. This acute kidney injury was multifactorial in nature stemming from a postobstructive kidney dysfunction in the setting of BPH and prerenal as well. The patient was treated per protocol including IV fluid hydration and Baig catheter placement with stabilization of kidney function. Urology service was consulted and recommended to continue Baig catheter for now, Flomax and follow-up as an outpatient  Patient also had issues with right upper extremity weakness. Neurology service was consulted and the patient ultimately underwent imaging that revealed the patient had 2 small punctate strokes. In the setting of a history of atrial fibrillation was consulted cardiology to determine whether anticoagulation was warranted as he had had a left atrial appendage closure in the past.  Determination was made that the strokes were not A-fib related. Therefore given small strokes decision was made to continue dual antiplatelet therapy with 21 days and statins on release after which he can follow-up with PCP on monotherapy with aspirin. That is antiplatelet wise. Given that the patient is hemodynamically stable able to tolerate p.o. route, he feels much better is more talkative more interactive we will proceed on with his symptoms or rehab with indications to follow-up with PCP as an outpatient.       The patient expressed appropriate understanding of, and agreement There is no acute hemorrhage, herniation, or hydrocephalus. Scattered white matter abnormalities are nonspecific but commonly attributed to chronic microvascular ischemia. ORBITS: The visualized portion of the orbits demonstrate no acute abnormality. SINUSES: The visualized paranasal sinuses and mastoid air cells demonstrate no acute abnormality. BONES/SOFT TISSUES: The bone marrow signal intensity appears normal. The soft tissues demonstrate no acute abnormality. 2 punctate acute infarcts in the left middle cerebral artery territory. CBC:   Recent Labs     08/07/23  0639   WBC 6.7   HGB 12.1*        BMP:    Recent Labs     08/07/23  0639 08/08/23  0430 08/09/23  0519    144 143   K 3.6 4.4 4.0   * 111* 111*   CO2 26 25 25   BUN 7 7 8   CREATININE <0.5* <0.5* <0.5*   GLUCOSE 112* 98 97     Hepatic: No results for input(s): AST, ALT, ALB, BILITOT, ALKPHOS in the last 72 hours. Lipids:   Lab Results   Component Value Date/Time    CHOL 122 10/05/2022 09:44 AM    HDL 38 10/05/2022 09:44 AM    TRIG 61 10/05/2022 09:44 AM     Hemoglobin A1C: No results found for: LABA1C  TSH:   Lab Results   Component Value Date/Time    TSH 2.22 10/07/2021 12:43 PM     Troponin: No results found for: TROPONINT  Lactic Acid: No results for input(s): LACTA in the last 72 hours. BNP: No results for input(s): PROBNP in the last 72 hours.   UA:  Lab Results   Component Value Date/Time    NITRU Negative 08/04/2023 08:27 PM    COLORU Yellow 08/04/2023 08:27 PM    PHUR 5.5 08/04/2023 08:27 PM    WBCUA 5 08/04/2023 08:27 PM    RBCUA 423 08/04/2023 08:27 PM    BACTERIA None Seen 08/04/2023 08:27 PM    CLARITYU CLOUDY 08/04/2023 08:27 PM    SPECGRAV 1.015 08/04/2023 08:27 PM    LEUKOCYTESUR SMALL 08/04/2023 08:27 PM    UROBILINOGEN 1.0 08/04/2023 08:27 PM    BILIRUBINUR Negative 08/04/2023 08:27 PM    BILIRUBINUR Neg 08/03/2023 05:13 PM    BLOODU LARGE 08/04/2023 08:27 PM    GLUCOSEU Negative 08/04/2023 08:27 PM

## 2023-08-09 NOTE — PROGRESS NOTES
Facility/Department: [address] ONCOLOGY  Speech Language Pathology   Dysphagia and Speech Language/Cognitive Treatment Note    Patient: Martha Salas   : 1950   MRN: [id number]      Evaluation Date: 2023      Admitting Dx: Parkinson disease ([address]) [G20]  Generalized weakness [R53.1]  DEBO (acute kidney injury) ([address]) [N17.9]  Bladder outlet obstruction [N32.0]  Acute renal failure, unspecified acute renal failure type ([address]) [N17.9]  Treatment Diagnosis: Word Finding Difficulty, Cognitive-Linguistic Deficits , Speech Language Deficits   Pain: Did not state                                                Subjective:  Pt alert and receptive, wife present during session. Dysphagia Treatment:   Diet and Treatment Recommendations 2023:  Diet Solids Recommendation:  Regular texture diet  Liquid Consistency Recommendation: Thin liquids  Recommended form of Meds: Meds whole with water or Meds in puree        Compensatory strategies: Alternate solids/liquids , Upright as possible with all PO intake , No straws , Small bites/sips , Eat/feed slowly, Remain upright 30-45 min , set up assistance with meals    Assessment of Texture Tolerance:  Diet level prior to treatment: Regular texture diet , Thin liquids   Tolerance of Current Diet Level:Per chart, no noted difficulty with current diet level      -Impressions: Pt was positioned Upright in chair, awake and alert with his wife at bedside. Currently on room air. Trials of thin liquids, soft solids, and regular solids  were provided to assess swallow function. Pt fed himself following set up assistance. Oral phase characterized by mildly prolonged munching mastication and mildly delayed A-P transit, but with adequate oral clearance. No overt clinical s/s aspiration throughout PO intake. SLP provided education on use of compensatory swallowing strategies as above, including taking meds in puree as needed.  Pt demonstrates increased risk for aspiration due to cognitive state  and co morbidities . Based on today's assessment recommend continuing the Regular texture diet  with Thin liquids , Meds whole with water or Meds in puree  with use of compensatory swallow strategies (see above). Dysphagia Goals:  Pt will functionally tolerate recommended diet with no overt clinical s/s of aspiration (Ongoing 08/09/23)  Pt will functionally tolerate ongoing assessment of swallow function with diet to be determined as indicated (Goal Met 8/8/23)  Pt will demonstrate understanding of aspiration risk and precautions via education/demonstration with occasional prompting (Ongoing 08/09/23)  If clinical s/s of aspiration/penetration continue to be noted, Pt will participate in Modified Barium Swallow Study (Ongoing 08/09/23)      Speech Language/Cognitive Treatment:  Impressions: Pt alert and receptive but with flat affect and delayed/reduced verbal responses. Pt answered questions and followed simple commands with initiation cues; initiation cues also implemented for self-feeding of PO trials. Pt was independently oriented to self, date, month, year, JORGE, and his current location. Pt independently referred to external orientation aid of whiteboard in room. Reduced recall and verbal expression for orientation of situation. Pt continues to rely on communication partner, eg his wife, to provide details or elaborated responses. Pt with good general recall of previous speech therapy interventions and recommendations. Education completed with pt and his wife re: rationale for ongoing skilled intervention following discharged from inpatient, wife verbalized understanding. Based on today's session recommend continued skilled speech intervention targeting cognitive-linguistic skills to promote return to PLOF.     Speech Language Short Term Goals:  Pt will improve speech intelligibility in connected speech via graded tasks to 80% (Ongoing 08/09/23)  Pt will improve auditory

## 2023-08-09 NOTE — PROGRESS NOTES
Ada Ormond, MD Meryl Plain, MD Roselyn Petite, MD                  Office: [phone]                      Fax: [phone]                   NEPHROLOGY INPATIENT PROGRESS NOTE:     PATIENT NAME: David Jackson  : 1950  MRN: [id number]             Acute kidney injury improved to baseline.  - Repeat creatinine 0.5. Electrolytes are stable. Multiple electrolyte imbalance. Have improved after replacement. Phosphate have improved after replacement    Magnesium levels improving after replacement    Postop recommendations for urinary retention as per urology. High complexity. Patient going to ARU        Admitted on:  2023  4:45 PM   For:  Parkinson disease ([address]) [G20]  Generalized weakness [R53.1]  DEBO (acute kidney injury) ([address]) [N17.9]  Bladder outlet obstruction [N32.0]  Acute renal failure, unspecified acute renal failure type ([address]) [N17.9]         DEBO on admission - severe  No h/o CKD:   - BL Scr- 0.7 as off 10-22  ->  8.9 on admission  - Etiology of DEBO - presumed ATN w/ hypovolemia + possibly obstructive uropathy , acute urinary retention   W/ BPH    - other differentials:  unlikely other GN / TI / TMA process  - UA :   Reviewed, large blood, trace protein, trace leukocyte, many RBCs, likely suggestive of gross hematuria  - Renal imaging -CTscan on admission, without IV contrast showing urinary retention, bilateral hydronephrosis, with BPH with about 5 cm  -Urology consulted, reviewed the recommendation,      Associated problems:   - Volume status: hypo-volemic  ECHO in past no HF     : HTN : no need for tight control    : Na: hyponatremia - mild -    - Azotemia: pre-renal severe on admission    W/ some uremic encephalopathy , mild   - Electrolytes: WNL  - Acid-Base: acidosis - non - AGMA  - Anemia: of likely chronic disease        Other major problems: Management per primary and other consulting teams.          Hospital Problems ------------------------------------------------------------------      Findings             ======================================================================  Please note that this chart entry has been generated using using voice recognition software, mainly. So please excuse brevity and/or typos. While every effort and attempts have been made to ensure the accuracy of this automated transcription, some errors may have occurred; and certain words and phrases in transcription may not be entered as intended. However, inadvertent computerized transcription errors may be present. So please contact us if any clarification needed.

## 2023-08-09 NOTE — PROGRESS NOTES
ARU Admission Assessment    Ethnicity  \"Are you of , /a, or Jamaican origin? \"  Check all that apply:  [x] A. No, not of , /a, or [place] Origin  [] B.  Yes, [place] American, Chicano/a  [] C.  Yes, [facility]  [] D.  Yes, [place]  [] E.  Yes, another , , or Jamaican origin  [] X. Patient unable to respond  [] Y. Patient declines to respond    Race  \"What is your race? \"  Check all that apply:  [x] A. White  [] B. Black or   [] C. American Suzanne or [place] Native  [] D.  Suzanne  [] E. [place]  [] F. South African  [] G. [place]  [] Tsosie Melody  [] I. [place]  [] J.  Other   [] K.   [] L. Syrian or Hetal  [] M. Albanian  [] N. Other [address] [place] [address]  [] X. Patient unable to respond  [] Y. Patient declines to respond  [] Z. None of the above    Language  A. \"What is your preferred language? \"   English    B. \"Do you need or want an  to communicate with a doctor or health care staff? \"  Check only one:  [x] 0. No  [] 1. Yes  [] 9. Unable to determine    Transportation  \"Has lack of transportation kept you from medical appointments, meetings, work, or from getting things needed for daily living? \"Check all that apply:  [] A.  Yes, it has kept me from medical appointments or from getting my medications  [] B.  Yes, it has kept me from non-medical meetings, appointments, work, or from getting things that I need  [x] C.  No  [] X. Patient unable to respond  [] Y. Patient declines to respond    Hearing  Ability to hear (with hearing aid or hearing appliances if normally used)  [x]  0. Adequate - no difficulty in normal conversation, social interaction, listening to TV  []  1. Minimal difficulty - difficulty in some environments (e.g. when person speaks softly or setting is noisy)  []  2. Moderate difficulty - speaker has to increase volume and speak distinctly   []  3.   Highly impaired - absence of useful hearing    Vision  Ability to see in adequate light (with glasses or other visual appliances)  [x]  0. Adequate - sees fine detail, such as regular print in newspapers/books  []  1. Impaired - sees large print, but not regular print in newspapers/books  []  2. Moderately impaired - limited vision; not able to see newspaper headlines but can identify objects  []  3. Highly impaired - object identification in question, but eyes appear to follow objects  []  4. Severely impaired - no vision or sees only light, colors, or shapes; eyes do not appear to follow objects    Health Literacy  \"How often do you need to have someone help you when you read instructions, pamphlets, or other written material from your doctor or pharmacy? \"  []  0. Never  [x]  1. Rarely  []  2. Sometimes  []  3. Often  []  4. Always  []  7. Patient declines to respond  []  8. Patient unable to respond    BIMS - **Must be completed in the flowsheet at admission prior to proceeding with Delirium Assessment**  [x] BIMS completed in flowsheet at admission  [] BIMS not completed due to patient unable to give appropriate related answers, see Staff Assessment in flowsheet    Signs and Symptoms of Delirium  A. Acute Onset Mental Status Change - Is there evidence of an acute change in mental status from the patient's baseline? [x] 0. No  [] 1. Yes    B. Inattention - Did the patient have difficulty focusing attention, for example being easily distractible or having difficulty keeping track of what was being said? [x]  0. Behavior not present  []  1. Behavior continuously present, does not fluctuate  []  2. Behavior present, fluctuates (comes and goes, changes in severity)    C. Disorganized thinking - Was the patient's thinking disorganized or incoherent (rambling or irrelevant conversation, unclear or illogical flow of ideas, or unpredictable switching from subject to subject)? [x]  0. Behavior not present  []  1.   Behavior score 0-27, or enter 99 if unable to complete (if symptom frequency (column 2) is blank for 3 or more items). 0     Social Isolation  \"How often do you feel lonely or isolated from those around you? \"  [] 0. Never  [x] 1. Rarely  [] 2. Sometimes  [] 3. Often  [] 4. Always  [] 7. Patient declines to respond  [] 8. Patient unable to respond    Pain Effect on Sleep  \"Over the past 5 days, how much of the time has pain made it hard for you to sleep at night? \"  []  0. Does not apply - I have not had any pain or hurting in the past 5 days  [x]  1. Rarely or not at all  []  2. Occasionally  []  3. Frequently  []  4. Almost constantly  []  8. Unable to answer    **If the patient answers \"0. Does not apply\" to this question, skip the next two \"Pain Effect. Raymond Craw Raymond Craw \" questions**    Pain Interference with Therapy Activities  \"Over the past 5 days, how often have you limited your participation in rehabilitation therapy sessions due to pain? \"  []  0. Does not apply - I have not received rehabilitation therapy in the past 5 days  [x]  1. Rarely or not at all  []  2. Occasionally  []  3. Frequently  []  4. Almost constantly  []  8. Unable to answer    Pain Interference with Day-to-Day Activities: \"Over the past 5 days, how often have you limited your day-to-day activities (excluding rehabilitation therapy session)? \"  [x]  1. Rarely or not at all  []  2. Occasionally  []  3. Frequently  []  4. Almost constantly  []  8. Unable to answer    Nutritional Approaches  Check all of the following nutritional approaches that apply on admission:  []  A. Parenteral/IV feeding (including IV fluids if needed for hydration, but not as part of dialysis/chemo)  []  B. Feeding tube (e.g., nasogastric or abdominal (PEG))  []  C. Mechanically altered diet - requires change in texture of food or liquids (e.g., pureed food, thickened liquids)  []  D. Therapeutic diet (e.g., low salt, diabetic, low cholesterol)  [x]  Z.   None of

## 2023-08-09 NOTE — PLAN OF CARE
Problem: Safety - Adult  Goal: Free from fall injury  Outcome: Progressing     Problem: ABCDS Injury Assessment  Goal: Absence of physical injury  Outcome: Progressing     Problem: Confusion  Goal: Confusion, delirium, dementia, or psychosis is improved or at baseline  Description: INTERVENTIONS:  1. Assess for possible contributors to thought disturbance, including medications, impaired vision or hearing, underlying metabolic abnormalities, dehydration, psychiatric diagnoses, and notify attending LIP  2. [place] high risk fall precautions, as indicated  3. Provide frequent short contacts to provide reality reorientation, refocusing and direction  4. Decrease environmental stimuli, including noise as appropriate  5. Monitor and intervene to maintain adequate nutrition, hydration, elimination, sleep and activity  6. If unable to ensure safety without constant attention obtain sitter and review sitter guidelines with assigned personnel  7. Initiate Psychosocial CNS and Spiritual Care consult, as indicated  Outcome: Progressing     Problem: Pain  Goal: Verbalizes/displays adequate comfort level or baseline comfort level  Outcome: Progressing     Problem: Skin/Tissue Integrity  Goal: Absence of new skin breakdown  Description: 1. Monitor for areas of redness and/or skin breakdown  2. Assess vascular access sites hourly  3. Every 4-6 hours minimum:  Change oxygen saturation probe site  4. Every 4-6 hours:  If on nasal continuous positive airway pressure, respiratory therapy assess nares and determine need for appliance change or resting period.   Outcome: Progressing

## 2023-08-09 NOTE — PROGRESS NOTES
[facility]   Electrophysiology Progress Note     Date: 8/9/2023  Admit Date: 8/4/2023     Reason for consultation: Atrial fibrillation    Chief Complaint:   Chief Complaint   Patient presents with    Abnormal Lab     Called by family doc regarding lab work done this morning with critically high BUN and Cr levels. Recently completed treatment for shingles. Wife states pt has been confused, wetting self at night, diarrhea. History of Present Illness: History obtained from patient and medical record. Dejan Green is a 68 y.o. male with a past medical history of Parkinson's, CAD, paroxysmal A-fib, history of CABG and LANDON clip and maze in 2018, atrial flutter status post cardioversion, sick sinus syndrome. She has been on sotalol. Pt admitted due to DEBO and confusion. He was recently diagnosed with shingles. Patient has short episodes of tachycardias. Telemetry monitor shows what appears to be either an atrial tachycardia or atrial flutter. However these episodes only last a few minutes historically and he had 1 episode that lasted 5 minutes in the hospital.    Interval Hx: Today, he is being seen for follow up. He is up in the chair and his wife is at the bedside. He is feeling fair. He remains very weak from his stroke. He remains in sinus rhythm with stable BP. Patient seen and examined. Clinical notes reviewed. Telemetry reviewed. No new complaints today. No major events overnight. Denies having chest pain, palpitations, shortness of breath, orthopnea/PND, cough, or dizziness at the time of this visit. Allergies:  No Known Allergies    Home Meds:  Prior to Visit Medications    Medication Sig Taking?  Authorizing Provider   cephALEXin (KEFLEX) 500 MG capsule Take 1 capsule by mouth 3 times daily for 7 days  Patient taking differently: Take 1 capsule by mouth 2 times daily  Shayna Cuevas MD   carbidopa-levodopa (SINEMET)  MG per tablet TAKE 1 TABLET AT 7:30 AM, 11:30 AM, List:   Patient Active Problem List    Diagnosis Date Noted    Anemia 08/09/2023    Arterial ischemic stroke, ICA, left, acute ([address]) 08/08/2023    [place]-Ho syndrome 08/07/2023    Post herpetic neuralgia 08/07/2023    Benign essential HTN 08/07/2023    Bilateral hydronephrosis 08/05/2023    DEBO (acute kidney injury) ([address]) 08/04/2023    BPH with obstruction/lower urinary tract symptoms 04/18/2023    Tachycardia-bradycardia syndrome ([address]) 04/18/2023    PLMD (periodic limb movement disorder) 04/18/2023    Allergic rhinitis 08/14/2020    Hypercholesteremia 09/19/2019    Coronary artery disease involving native heart without angina pectoris 12/17/2018    Arthralgia of right hip 05/16/2016    Typical atrial flutter ([address]) 05/06/2015    PAF (paroxysmal atrial fibrillation) ([address]) 09/05/2014    SVT (supraventricular tachycardia) ([address]) 09/02/2014    Dementia due to Parkinson's disease without behavioral disturbance ([address]) 10/04/2013    Carpal tunnel syndrome 10/04/2013    Lesion of ulnar nerve 10/04/2013    Dupuytren's contracture 08/19/2013        Assessment and Plan: 1. Atrial Fibrillation/Flutter, SVT  - S/p MAZE and LANDON clip (2018)    - Currently in NSR  - Continue sotalol 80 mg BID   ~ Monitor QTc  - No need for anti-coagulation s/p ANN MARIE with complete occlusion of appendage    2. CAD  - Hx of CABG  - Stable  - No complaints of angina  - Continue ASA, BB, and statin    3. HTN  - Stable  ~ Goal <130/80  - Continue current medications    4. Acute Stroke   - Noted on MRI. Left middle cerebral artery   - ? Etiology. He did have recent shingles infection. His stroke is unlikely be related AF related given adequate LANDON closure   - Continue ASA, statin. Recommendation for addition of plavix or anti-coagulation upon discretion of neurology   - Neurology following    5.  PFO   - Noted on ANN MARIE in 2019   - Follow up with cardiology at [address] [place] [address] regarding possibility of closure in future given new CVA    No further EP

## 2023-08-09 NOTE — PLAN OF CARE
[address], [address] [place] [address]  Sabrina    : 1950  Acct #: [de-identified]  MRN: [id number]  PHYSICIAN:  Mary Carmen Wall DO  Primary Problem    Patient Active Problem List   Diagnosis    Dupuytren's contracture    Dementia due to Parkinson's disease without behavioral disturbance (HCC)    Carpal tunnel syndrome    Lesion of ulnar nerve    SVT (supraventricular tachycardia) (HCC)    PAF (paroxysmal atrial fibrillation) (HCC)    Typical atrial flutter (HCC)    Arthralgia of right hip    Coronary artery disease involving native heart without angina pectoris    Hypercholesteremia    Allergic rhinitis    BPH with obstruction/lower urinary tract symptoms    Tachycardia-bradycardia syndrome ([address])    PLMD (periodic limb movement disorder)    Acute renal failure ([address])    Bilateral hydronephrosis    P[place]-Ho syndrome    Post herpetic neuralgia    Benign essential HTN    Arterial ischemic stroke, ICA, left, acute ([address])    Anemia    Parkinson's disease ([address])       Rehabilitation Diagnosis:  ***    ADMIT DATE:2023    Patient Goals: ***  Admitting Impairments: ***  Activities: ***  Participation: ***   CARE PLAN     NURSING:  Brigido Stinson while on this unit will:  [] Be continent of bowel and bladder     [x] Have an adequate number of bowel movements  [x] Urinate with no urinary retention >300ml in bladder  [] Complete bladder protocol with argueta removal  [x] Maintain O2 SATs at ___%  [] Have pain managed while on ARU       [] Be pain free by discharge   [] Have no skin breakdown while on ARU  [x] Have improved skin integrity via wound measurements  [] Have no signs/symptoms of infection at the wound site  [] Be free from injury during hospitalization   [x] Complete education with patient/family with understanding demonstrated for:  [] Adjustment   [] Other:     Nursing Interventions will include:  [x] bowel/bladder

## 2023-08-09 NOTE — PROGRESS NOTES
[facility] Department   Phone: [phone]    Occupational Therapy    [] Initial Evaluation            [x] Daily Treatment Note         [] Discharge Summary      Patient: Aj Barron   : 1950   MRN: [id number]   Date of Service:  2023    Admitting Diagnosis:  DEBO (acute kidney injury) [facility])  Current Admission Summary: 69 yo M with Parkinson's disease, CAD, pAfib who came to ER with DEBO from outpatient labs. Admitted as inpatient for BPH with obstruction with BL hydronephrosis and constipation. Baig placed in ED and started on IVF. Seen by Urology and Renal.  On Flomax. Constipation resolved with molasses enema. Patient recently battled RUE shingles. Wife and daughter noted R arm/shoulder weakness over past 2 weeks. MRI Brain and C spine requested. Neuro and Ortho consults requested. MRI: 23- 2 punctate acute infarcts in the left middle cerebral artery territory. Past Medical History:  has a past medical history of Atrial fibrillation ([address]), No significant past medical history, and Parkinson disease ([address]). Past Surgical History:  has a past surgical history that includes Coronary artery bypass graft (2018) and Atrial ablation surgery (2018). Discharge Recommendations:Bharat Welsh scored a 14/24 on the AM-PAC ADL Inpatient form. Current research shows that an AM-PAC score of 17 or less is typically not associated with a discharge to the patient's home setting. Based on the patient's AM-PAC score and their current ADL deficits, it is recommended that the patient have 5-7 sessions per week of Occupational Therapy at d/c to increase the patient's independence. At this time, this patient demonstrates complex nursing, medical, and rehabilitative needs, and would benefit from intensive rehabilitation services upon discharge from the Inpatient setting.   This patient demonstrates the ability to participate in and benefit from an intensive

## 2023-08-10 PROCEDURE — 6370000000 HC RX 637 (ALT 250 FOR IP): Performed by: PHYSICAL MEDICINE & REHABILITATION

## 2023-08-10 PROCEDURE — 97166 OT EVAL MOD COMPLEX 45 MIN: CPT

## 2023-08-10 PROCEDURE — 6360000002 HC RX W HCPCS: Performed by: PHYSICAL MEDICINE & REHABILITATION

## 2023-08-10 PROCEDURE — 97116 GAIT TRAINING THERAPY: CPT

## 2023-08-10 PROCEDURE — 92526 ORAL FUNCTION THERAPY: CPT

## 2023-08-10 PROCEDURE — 92610 EVALUATE SWALLOWING FUNCTION: CPT

## 2023-08-10 PROCEDURE — 94150 VITAL CAPACITY TEST: CPT

## 2023-08-10 PROCEDURE — 97162 PT EVAL MOD COMPLEX 30 MIN: CPT

## 2023-08-10 PROCEDURE — 1280000000 HC REHAB R&B

## 2023-08-10 PROCEDURE — 92523 SPEECH SOUND LANG COMPREHEN: CPT

## 2023-08-10 PROCEDURE — 97530 THERAPEUTIC ACTIVITIES: CPT

## 2023-08-10 PROCEDURE — 97535 SELF CARE MNGMENT TRAINING: CPT

## 2023-08-10 RX ORDER — PANTOPRAZOLE SODIUM 40 MG/1
40 TABLET, DELAYED RELEASE ORAL
Status: DISCONTINUED | OUTPATIENT
Start: 2023-08-11 | End: 2023-08-11 | Stop reason: HOSPADM

## 2023-08-10 RX ADMIN — ASPIRIN 81 MG: 81 TABLET, COATED ORAL at 09:03

## 2023-08-10 RX ADMIN — ENOXAPARIN SODIUM 40 MG: 100 INJECTION SUBCUTANEOUS at 21:30

## 2023-08-10 RX ADMIN — CARBIDOPA AND LEVODOPA 1 TABLET: 25; 100 TABLET ORAL at 11:03

## 2023-08-10 RX ADMIN — TAMSULOSIN HYDROCHLORIDE 0.4 MG: 0.4 CAPSULE ORAL at 21:31

## 2023-08-10 RX ADMIN — CARBIDOPA AND LEVODOPA 1 TABLET: 25; 100 TABLET ORAL at 05:39

## 2023-08-10 RX ADMIN — CARBIDOPA AND LEVODOPA 1 TABLET: 25; 100 TABLET ORAL at 19:25

## 2023-08-10 RX ADMIN — ROPINIROLE HYDROCHLORIDE 0.25 MG: 0.25 TABLET, FILM COATED ORAL at 14:43

## 2023-08-10 RX ADMIN — BISACODYL 5 MG: 5 TABLET, COATED ORAL at 09:03

## 2023-08-10 RX ADMIN — SERTRALINE 100 MG: 50 TABLET, FILM COATED ORAL at 21:31

## 2023-08-10 RX ADMIN — ATORVASTATIN CALCIUM 80 MG: 80 TABLET, FILM COATED ORAL at 21:31

## 2023-08-10 RX ADMIN — ROPINIROLE HYDROCHLORIDE 0.25 MG: 0.25 TABLET, FILM COATED ORAL at 09:03

## 2023-08-10 RX ADMIN — GABAPENTIN 100 MG: 100 CAPSULE ORAL at 21:31

## 2023-08-10 RX ADMIN — CARBIDOPA AND LEVODOPA 1 TABLET: 25; 100 TABLET ORAL at 14:43

## 2023-08-10 RX ADMIN — GABAPENTIN 100 MG: 100 CAPSULE ORAL at 09:03

## 2023-08-10 RX ADMIN — ROPINIROLE HYDROCHLORIDE 0.25 MG: 0.25 TABLET, FILM COATED ORAL at 21:31

## 2023-08-10 RX ADMIN — GABAPENTIN 100 MG: 100 CAPSULE ORAL at 14:43

## 2023-08-10 RX ADMIN — SOTALOL HYDROCHLORIDE 80 MG: 80 TABLET ORAL at 09:03

## 2023-08-10 ASSESSMENT — PAIN SCALES - GENERAL
PAINLEVEL_OUTOF10: 0
PAINLEVEL_OUTOF10: 0

## 2023-08-10 NOTE — CARE COORDINATION
Social Work Admission Assessment    Objective:  Met with pt to complete initial assessment and review role of  in rehab process. Pt oriented to unit. Pt states understanding of this. Current Home Situation:  Patient lives  in a ranch style home at discharge with his wife. The patient reports prior to coming into the hospital he was independent. The patient states his support system consist of his wife, two daughters and grandchildren. The patient reports his wife is very helpful and can help at discharge. Pt's plans re:  Return to work/school/volunteer: The patient is retired from [employer]. Accessibility to community resources/transportation:  The patient is not a active  and is not active with any community resources. Has pt experienced a recent loss or signigicant life event that would impact their care or ability to participate? _x_No  __Yes - Explain    Has pt ever been treated for emotional disorders? _x_No  __Yes--How does that affect current situation:    How does pt and family cope with stressful events and this hospitalization? The patient states that he enjoys to go to his [employer] sporting events. Special Problem Areas:  None    Discharge Plan:The discharge plan is for the patient to return home with [facility] and any other needed supports. The patient's wife will transport the patient home at discharge. Impression/Plan:Bharat Fernandez (patient ) is a 68year old male that has been admitted to ARU. Provided patient with this SW's card to contact as needed.  Will continue to follow for support and discharge planning

## 2023-08-10 NOTE — PLAN OF CARE
Problem: Discharge Planning  Goal: Discharge to home or other facility with appropriate resources  8/10/2023 0928 by Eugenio Hamman, RN  Outcome: Progressing     Problem: Safety - Adult  Goal: Free from fall injury  8/10/2023 0928 by Eugenio Hamman, RN  Outcome: Progressing     Problem: Pain  Goal: Verbalizes/displays adequate comfort level or baseline comfort level  8/10/2023 0928 by Eugenio Hamman, RN  Outcome: Progressing

## 2023-08-10 NOTE — PROGRESS NOTES
[address] [facility] Department   Phone: [phone]    Speech Therapy    [x] Initial Evaluation            [] Daily Treatment Note         [] Discharge Summary      Patient: Trena Broussard   : 1950   MRN: [id number]   Date of Service:  8/10/2023  Admitting Diagnosis: Parkinson's disease [facility])  Current Admission Summary: Trena Broussard is a 68 y.o. male who presented to [facility]  with encephalopathy and found to have significant DEBO. PMH reflects PD, CAD, Afib. 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 He is agreeable to [facility] and wants to work in therapy before planned Dc home with his wife. Past Medical History:  has a past medical history of Atrial fibrillation ([address]), No significant past medical history, and Parkinson disease ([address]). Past Surgical History:  has a past surgical history that includes Coronary artery bypass graft (2018) and Atrial ablation surgery (2018). Recent Chest xray: 23  IMPRESSION:  No acute process. Stable postthoracotomy changes. Recent MRI Brain: 23  IMPRESSION:  2 punctate acute infarcts in the left middle cerebral artery territory. This result has not been signed. Information might be incomplete. Precautions/Restrictions: high fall risk      Pre-Admission Information   Living Status: Pt lives at home with wife. 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 Wife present during session.    Pain: Did not state    Safety Interventions: patient left in chair, chair alarm in place, call light within reach, and patient at risk for falls      Dysphagia Bedside Swallow Evaluation     Prior Dysphagia History: Per chart review, pt was seen during IP stay 8/6-8/9 with recommendations for a regular texture diet and thin liquids. Patient Complaint: Pt reported no swallowing difficulty. However, pt's wife reported he was coughing with large pills and PO post stroke, although this has improved. Patient Positioning:   Upright in chair  Respiratory Status:   Room air  Pre-Evaluation Consistency Recommendation:   Regular texture diet  with Thin liquids    Dentition:   Adequate dentition   Oral Hygiene:   Clean   Baseline Vocal Quality:   normal  and other low vocal intensity  Volitional Cough:   strong  Volitional Swallow:   Adequate   Oral Mechanism Exam:   Impairment Severity:Within functional limits      Oral Phase:   Impairment Severity:Mild   Impaired mastication   Decreased anterior to posterior transit   Pharyngeal Phase:   Impairment Severity:Mild   Throat Clearing  Audible swallow   Eating Assistance:   Independent       Clinical Dysphagia Assessment:   Pt was positioned upright in recliner, awake, and alert. Currently on room air. Pt's wife present at bedside and reported he was coughing with large pills and PO post stroke, although this has improved. Laryngeal examination: present swallow reflex, strong cough, and low vocal intensity. Manual laryngeal palpation revealed mildly decreased laryngeal elevation. Various textures were provided to assess swallow function. Pt self-fed. Oral phase characterized by mildly prolonged but adequate mastication, mildly delayed A-P transit, and no anterior bolus loss. Pt able to achieve oral clearance given extended time. Pt with delayed throat clearing post PO trials, suspect s/s delayed/decreased pharyngeal clearance. Overall, pt is at increased risk for aspiration due to co morbidities and deconditioning.  Based on today's assessment recommend a Regular texture diet with Thin liquids,, Meds whole with water with use of compensatory swallow strategies and ongoing dysphagia intervention to ensure tolerance of diet recommendations. Speech Language Cognitive Evaluation     Patient complaint: Pt reported word finding and memory difficulty. Pt's wife reported pt's voice getting softer and softer after the stroke. Comprehension  Auditory Comprehension:   Impairment Severity:Mild   Functional Ability to Comprehend Basic Commands/Questions   Impaired Complex/Abstract commands  Reading Comprehension:   Impairment Severity: To be assessed   Expression  Primary Mode of Expression:   Verbal  Verbal Expression:   Impairment Severity:Within functional limits  and To be further assessed   Impaired Initiation  Written Expression:   Impairment Severity: To be assessed   Pragmatics/Social Functioning:   Impairment Severity:Mild   Monotone  Motor Speech  Impairment Severity:Within functional limits      Voice  Impairment Severity:Mild   Vocal Intensity: low  Maximum Phonation Time: 9 seconds   Conversational Loudness: reduced  Cognition   [] Unable to be assessed secondary to Aphasia     Overall Orientation:   Impairment Severity:Mild   Comment:  disoriented to year (2003), and JORGE  Attention:   Impairment Severity:Mild  and Moderate   Impaired Selective Attention  Impaired Sustained Attention  Impaired Alternating Attention  Impaired Divided Attention  Memory:   Impairment Severity:Moderate   Impaired Short-term Memory  Impaired Recall of New Learning   Impaired Immediate/working Memory  Problem Solving:   Impairment Severity:Mild   Able to complete simple functional tasks  Impaired Complex Tasks   Safety/Judgement:   Impairment Severity: To be assessed        Additional Assessment   Pt was assessed using the Brief Cognitive Assessment Tool (BCAT).      COGNITIVE DOMAIN Pt Score Points Possible  COMMENTS   ORIENTATION  4 6    IMMEDIATE VERBAL RECALL 3 4    VISUAL RECOGNITION/NAMING 3 3    ATTENTION 5 7    ABSTRACTION 3 3    LANGUAGE 2 3    EXECUTIVE  0 4    VISUOSPATIAL  DNT 4 D/t right hand weakness   DELAYED VERBAL RECALL 1 4    IMMEDIATE STORY RECALL 0 2    DELAYED VISUAL MEMORY  0 3    DELAYED STORY RECALL 0 2    STORY RECOGNITION  3 5    SUM 24 50        BCAT CROSSWALK TO FUNCTIONAL STATUS:   Pt's cumulative score indicates 24 (MILD DEMENTIA). NORMAL (score range 46-50): No functional deficits; independent living; may be subjective memory complaints but little to no objective evidence. MILD COGNITIVE IMPAIRMENT (MCI) (score range 34-46): Generally functionally normal, but early specific functional declines (IADL); subjective and objective memory deficits. Individuals at lower range more likely to have more significant cognitive deficits. Lower scores more suggestive of residential support needs. MILD DEMENTIA (score range 26-34): IADL deficits; typically requires residential support services; clear objective evidence of memory and other cognitive declines. MODERATE TO SEVERE DEMENTIA (score range 0-25): Moderate (uppre end of the range) -Pervasive functional deficits (IADLs), but ADLs generally intact; marked deficits in memory and executive functions; behavior and psychological symptoms are common; requires significant residential support. Severe (lower end of the range) - Needs assistance in ADLs/IADLs; pervasive cognitive deficits; requires complex care     * Of note, Pt does not have a formal dementia diagnosis. The above score and cognitive stage is not indicative of diagnosis rather used to direct treatment tasks and establish goals. Education  Barriers To Learning: cognition  Patient Education: Provided education regarding role of SLP, results of assessment, recommendations and general speech pathology plan of care.    Learning Assessment: Pt verbalized understanding   Pt requires ongoing learning     Assessment  Impairments Requiring

## 2023-08-10 NOTE — PLAN OF CARE
Problem: Discharge Planning  Goal: Discharge to home or other facility with appropriate resources  8/10/2023 0336 by Nishi Baltazar RN  Outcome: Progressing  8/9/2023 1702 by Keara Boland RN  Outcome: Progressing     Problem: Safety - Adult  Goal: Free from fall injury  8/10/2023 0336 by Nishi Baltazar RN  Outcome: Progressing  8/9/2023 1702 by Keara Boland RN  Outcome: Progressing     Problem: Pain  Goal: Verbalizes/displays adequate comfort level or baseline comfort level  8/10/2023 0336 by Nishi Baltazar RN  Outcome: Progressing  8/9/2023 1702 by Keara Boland RN  Outcome: Progressing     Problem: ABCDS Injury Assessment  Goal: Absence of physical injury  Outcome: Progressing

## 2023-08-10 NOTE — PROGRESS NOTES
[address] [facility] Department   Phone: [phone]    Occupational Therapy    [x] Initial Evaluation            [] Daily Treatment Note         [] Discharge Summary      Patient: Pauline Rubio   : 1950   MRN: [id number]   Date of Service:  8/10/2023    Admitting Diagnosis:  Parkinson's disease [facility])  Current Admission Summary: Pauline Rubio is a 68 y.o. male who presented to [facility]  with encephalopathy and found to have significant DEBO. MRI brain : 2 punctate acute infarcts in the left middle cerebral artery territory. Rotator cuff tear in (R) UE: per ortho appropriate for WBAT/ROM as tolerated  PMH reflects PD, CAD, Afib. 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 He is agreeable to [facility] and wants to work in therapy before planned Dc home with his wife  Past Medical History:  has a past medical history of Atrial fibrillation ([address]), No significant past medical history, and Parkinson disease ([address]). Past Surgical History:  has a past surgical history that includes Coronary artery bypass graft (2018) and Atrial ablation surgery (2018). Discharge Recommendations: HH OT S3    DME Required For Discharge: DME to be determined pending patient progress    Precautions/Restrictions: high fall risk  Weight Bearing Restrictions: no restrictions     (R) UE: weight bearing as tolerated; per ortho note \"WBAT/ROM as tolerated\"  Required Braces/Orthotics: no braces required     Positional Restrictions:no positional restrictions    Pre-Admission Information   Lives With: spouse                  Type of Home: house  Home Layout: one level  Home Access: 1 step to enter with handrail. Handrails are located on R side.   Bathroom Layout: walk in shower, curtain   Bathroom Equipment: shower chair, hand held shower head, portable/suction cup grab bars, small built in shower shelf (wife reports it's too small to sit on)   Toilet Height: standard height  Home Equipment: rollator - 4 wheeled walker, single point cane, quad cane, electric scooter, reacher, sock aide, transport chair  Transfer Assistance: Independent without use of device  Ambulation Assistance: Independent without use of device  ADL Assistance: independent with all ADL's, wife helped shave  IADL Assistance: shares household tasks with wife; pt would use riding   Active :        [] Yes                 [x] No  Hand Dominance: [] Left                 [x] Right  Current Employment: retired. Occupation: [employer]   Hobbies: Enjoys watching TV; [employer] and [employer] [address]: Reports no falls within last 6 months     Patient's wife reports 2 weeks up until hospitalization, pt required assistance with all ADLs and IADLs; Pt used SPC to amb and transfer around home/community. The above information is prior to pt's decline up until hospitalization.        Examination   Vision:   Vision Gross Assessment: Impaired and Vision Corrective Device: wears glasses at all times- pt denied any visual changes   Hearing:   WFL  Observation:   General Observation:  mild edema in R UE, slight head forward posturing; slight lateral R lean upon sitting EOB initially; argueta cath   Sensation:   accurately detects gross touch to all extremities; N/T in R UE shoulder<>digits; denied N/T in B LE  Proprioception:    WFL  Tone:   Normotonic  Coordination Testing:   Coordination and Movement Description: (R) UE, (L) UE, fine motor impairments, decreased speed, decreased accuracy  Finger/Thumb Opposition: Impaired on Right ; L UE with decreased speed   ROM:   L UE WFL; R UE impaired, RTC impairments with impaired ab/adduction- partial elbow flexion, wrist flex/extension, full grasp- trunk compensation noted with all shoulder ROM attempts    Strength:   L UE WFL; R UE not formally tested secondary to reports of pain, R grasp 3+/5    Therapist Clinical decreased cognition, decreased endurance, decreased balance, decreased IADL, decreased fine motor control, decreased coordination  Prognosis: fair  Clinical Assessment: Pt is a 68 yr old male who presents with the above deficits impacting his occupational performance and placing him below his baseline level of function. Pt with decline in ADL status prior to admission to hospital however is typically IND with mobility and ADL/iADL roles. Skilled OT services indicated in order to maximize his IND and safety with all occupational pursuits. Safety Interventions: patient left in chair, chair alarm in place, call light within reach, and patient at risk for falls    Plan  Frequency: 5 x/week, 60 min/day  Current Treatment Recommendations: strengthening, ROM, balance training, functional mobility training, transfer training, endurance training, patient/caregiver education, ADL/self-care training, IADL training, home management training, home exercise program, safety education, and equipment evaluation/education    Goals  Patient Goals: get my IND back, walk \"normally again\"   Short Term Goals:  Time Frame: 10-14 days   Patient will complete upper body ADL at [facility]   Patient will complete lower body ADL at [facility]   Patient will complete toileting at modified independent   Patient will complete grooming at [facility] independent   Patient will complete functional transfers at [facility] independent   Patient will complete functional mobility at modified independent   Patient to gather and transport IADL items at modified independent      Above goals reviewed on 8/10/2023. All goals are ongoing at this time unless indicated above.      Therapy Session Time     Individual Group Co-treatment   Time In  5083      Time Out  1345      Minutes  60           Timed Code Treatment Minutes:   45  Total Treatment Minutes:  60       Electronically Signed By: Russell Nunez, [id number]/MIGUELINA #[id number]

## 2023-08-10 NOTE — PROGRESS NOTES
Incentive Spirometry education and demonstration completed by Respiratory Therapy Yes      Response to education: Excellent     Teaching Time: 5 minutes    Minimum Predicted Vital Capacity - 776 mL. Patient's Actual Vital Capacity - 1000 mL. Turning over to Nursing for routine follow-up Yes.     Comments:     Electronically signed by Sarah Laura RCP on 8/10/2023 at 2:44 PM

## 2023-08-10 NOTE — PROGRESS NOTES
Admission Period/Goal QM Codes for Dennie Spalding. QM Admit Code Goal Code   Eating     Oral Hygiene     Toileting Hygiene     Shower/Bathing     UB Dressing     LB Dressing     Putting on/off Footwear     Rolling Left and Right     Sit To Lying     Lying to Sitting on Bedside     Sit to Stand     Chair/Bed to Chair Transfer     Toilet Transfers     Car Transfers     Walk 10 Feet     Walk 50 Feet with Two Turns     Walk 150 Feet     Walk 10 Feet on Uneven Surfaces     1 Step (Curb)     4 Steps     12 Steps     Picking up Object from [facility] 50 Feet with 2 Turns     Type     Wheel 150 Feet     Type         The above codes were determined by the treatment team to be the patient's accurate admission assessment codes based on assessment performed soon after the patient's admission and prior to the benefit of services provided by staff, or if appropriate, the patient's usual performance at admission.     OT:       PT:       RN:       ST:       :

## 2023-08-10 NOTE — CONSULTS
MD Galen Casey MD Doretha Brigham, MD                                  Office: [phone]                 Fax: [phone]          Superprotonic                     NEPHROLOGY  consultation     PATIENT NAME: Martha Salas  : 1950  MRN: [id number]      Name:  Martha Salas Date/Time of Admission: 2023  4:25 PM    CSN: [id number] Attending Provider: Cassy Wall DO   Room[facility]: [facility]6279/7995-65 : 1950 Age: 68 y.o. Reason for Nephrology consult : Monitoring of patient recovering from acute renal failure            History of Presenting complaint:       Martha Salas 68 y.o. Has been discharged from the hospital and now admitted to the ARU for inpatient rehab. Patient developed acute renal failure at the time of admission.  - Creatinine on admission was 8.9.  - Etiology was thought to be multifactorial including prerenal and possible ATN. Patient was also noted to have bladder outlet obstruction and urology was consulted. Patient made good recovery at the time of admission. .  - Creatinine 0.5 with stable electrolytes. He has a history of Parkinson's. No hypotension. Medications reviewed. Medical records reviewed. Past Medical History :         Past Medical History:   Diagnosis Date    Atrial fibrillation ([address])     No significant past medical history     Parkinson disease ([address])        Medications  Which i have  Reviewed, also have reviewed home medications  Prior to Admission medications    Medication Sig Start Date End Date Taking? Authorizing Provider   gabapentin (NEURONTIN) 100 MG capsule Take 1 capsule by mouth 3 times daily for 30 days.  23  Brown Merlos MD   sotalol (BETAPACE) 80 MG tablet Take 1 tablet by mouth 2 times daily 23  Brown Merlos MD   senna (SENOKOT) 8.6 MG tablet Take 1 tablet by mouth daily as needed (Constipation) 23  Brown Merlos MD   clopidogrel flow. The left subclavian artery is visualized and demonstrates multiphasic flow. The left distal common carotid artery demonstrates moderate focal plaque formation. Conclusions   Summary   -The right internal carotid artery appears to have a <50% diameter reducing  stenosis based on velocity criteria. -The left internal carotid artery appears to have a <50% diameter reducing  stenosis based on velocity criteria. Recommendations   Recommend follow up study in 12 months. Signature   ------------------------------------------------------------------  Electronically signed by Noe Guerrero MD, [facility] (Interpreting  physician) on 08/08/2023 at 05:28 PM  ------------------------------------------------------------------  Patient Status:Routine. [facility] - Vascular Lab. Technical Quality:Limited visualization due to movement. Plaque   - A plaque was found in the Right ICA. irregular. The plaque characteristics are: moderate severity and heterogeneous texture. - A plaque was found in the Left ICA. smooth. The plaque characteristics are: moderate severity and heterogeneous texture. Velocities are measured in cm/s ; Diameters are measured in mm Carotid Right Measurements +---------------+----+----+-----+----+ ! Location       ! PSV ! EDV ! Angle! RI  ! +---------------+----+----+-----+----+ ! Prox CCA       !101 !14. 8!60   !0.85! +---------------+----+----+-----+----+ ! Mid CCA        !100 !14. 3!60   !0.86! +---------------+----+----+-----+----+ ! Dist CCA       !77.4!12. 6!60   !0.84! +---------------+----+----+-----+----+ ! Prox ICA       !111 !23  !60   !0.79! +---------------+----+----+-----+----+ ! Mid ICA        !101 !15. 6!60   !0.85! +---------------+----+----+-----+----+ ! Dist ICA       !85.8!17. 3! 60   !0.8 ! +---------------+----+----+-----+----+ ! Prox ECA       !139 !    !61   !    ! +---------------+----+----+-----+----+ ! Vertebral      !50.5!    !60   !    ! commonly attributed to chronic microvascular ischemia. ORBITS: The visualized portion of the orbits demonstrate no acute abnormality. SINUSES: The visualized paranasal sinuses and mastoid air cells demonstrate no acute abnormality. BONES/SOFT TISSUES: The bone marrow signal intensity appears normal. The soft tissues demonstrate no acute abnormality. 2 punctate acute infarcts in the left middle cerebral artery territory. Imaging Results. Chest X Ray reviwed by me          Electronically Signed:  Reyna Bonilla MD 8/10/2023 Improvement at the time of his discharge.

## 2023-08-10 NOTE — PROGRESS NOTES
[address] [facility] [place] Department   Phone: [phone]    Physical Therapy    [x] Initial Evaluation            [] Daily Treatment Note         [] Discharge Summary      Patient: Muriel Calderon   : 1950   MRN: [id number]   Date of Service:  8/10/2023  Admitting Diagnosis: Parkinson's disease [facility])    Current Admission Summary: 68 y.o. male who presented to [facility]  with encephalopathy and found to have significant DEBO. PMH reflects PD, CAD, Afib. 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 He is agreeable to ARU and wants to work in therapy before planned Dc home with his wife. MRI brain : 2 punctate acute infarcts in the left middle cerebral artery territory. Rotator cuff tear in (R) UE: per ortho appropriate for WBAT/ROM as tolerated    Past Medical History:  has a past medical history of Atrial fibrillation ([address]), No significant past medical history, and Parkinson disease ([address]). Past Surgical History:  has a past surgical history that includes Coronary artery bypass graft (2018) and Atrial ablation surgery (2018). Discharge Recommendations: Home with HHPT  DME Required For Discharge: DME to be determined pending patient progress  Precautions/Restrictions: high fall risk  Weight Bearing Restrictions: weight bearing as tolerated; per ortho note \"WBAT/ROM as tolerated\"  [x] Right Upper Extremity  [] Left Upper Extremity [] Right Lower Extremity  [] Left Lower Extremity     Required Braces/Orthotics: no braces required   [] Right  [] Left  Positional Restrictions:no positional restrictions    Pre-Admission Information   Lives With: spouse                  Type of Home: house  Home Layout: one level  Home Access: 1 step to enter with handrail. Handrails are located on R side.   Bathroom Layout: walk in shower  Bathroom Equipment: shower chair, hand held shower head, portable/suction cup reciprocal pattern, smooth jasmin and transitions when ascending; step to pattern, (B) UE utilizing (R) HR, increased time and slight hesitation with each step taken when descending  Wheelchair Mobility:  No w/c mobility completed on this date. Comments:  Balance:  Static Sitting Balance: fair (+): maintains balance at SBA/supervision without use of UE support  Dynamic Sitting Balance: fair: maintains balance at CGA without use of UE support  Static Standing Balance: fair: maintains balance at CGA without use of UE support  Dynamic Standing Balance: fair: maintains balance at CGA without use of UE support  Patient completes object retrieval from floor in standing position at contact guard assistance; with increased time   Comments: no device used     Other Therapeutic Interventions    Functional Outcomes                 Cognition  Overall Cognitive Status: WFL  Arousal/Alertness: appropriate responses to stimuli  Following Commands: follows all commands without difficulty  Memory: decreased short term memory  Safety Judgement: decreased awareness of need for assistance, decreased awareness of need for safety  Problem Solving: assistance required to implement solutions, decreased awareness of errors  Initiation: requires cues for some  Sequencing: requires cues for some  Comments: Pt requires increased time for all tasks and some reminders/repeated instructions for some; pt repeatedly asking if the chair was behind him in order to safely sit. In the middle of tasks, pt would require reminders for tasks at hand.   Orientation:    oriented to person, oriented to place, oriented to situation, and disoriented to time ; pt reported it was Saturday  Command Following:   accurately follows one step commands    Education  Barriers To Learning: cognition  Patient Education: patient educated on goals, PT role and benefits, plan of care, general safety, functional mobility training, orientation, disease specific education  Learning Assessment:  patient verbalizes understanding, would benefit from continued reinforcement    Assessment  Activity Tolerance: Pt demonstrates good activity tolerance despite short rest breaks needed secondary to fatigue; HR and SpO2 remain stable throughout session  Impairments Requiring Therapeutic Intervention: decreased functional mobility, decreased ADL status, decreased ROM, decreased strength, decreased safety awareness, decreased cognition, decreased endurance, decreased balance, decreased IADL, decreased posture  Prognosis: good  Clinical Assessment: Patient is a 67 y/o. Male who is admitted to ARU following recent hospitalization from left CVA with PMH of Parkinson's Disease. Pt presents with decreased gait speed, decreased balance, decreased active use of (R) UE movements, and lack of safety awareness. Prior to hospitalization, pt was independent with all ADLs and IADLs without use of assistive device. Currently, pt requires CGA with all tranfers and ambulation, without assistive device with increased time. Pt would benefit from continued skilled PT services to improve these deficits, decrease his falls risk and return to PLOF. Safety Interventions: patient left in chair, chair alarm in place, call light within reach, gait belt, patient at risk for falls, and family/caregiver present; wife present    Plan  Frequency: 5 x/week, 60 min/day  Current Treatment Recommendations: strengthening, ROM, balance training, functional mobility training, transfer training, gait training, stair training, endurance training, neuromuscular re-education, wheelchair mobility training, modalities, patient/caregiver education, ADL/self-care training, IADL training, home exercise program, and safety education    Goals  Patient Goals:  To go home   Short Term Goals:  Time Frame: 10-14 days  Patient will complete bed mobility at Independent   Patient will complete stand step transfers at Independent   Patient will ambulate 150 ft with use of no device at Independent  Patient will ascend/descend 12 stairs with (R) ascending handrail at modified independent  Patient will complete car transfer at Independent  Patient will complete formal balance assessment. Above goals reviewed on 8/10/2023. All goals are ongoing at this time unless indicated above. Therapy Session Time      Individual Group Co-treatment   Time In  0730       Time Out  0835       Minutes  65         Timed Code Treatment Minutes:   65  Total Treatment Minutes:  72       Electronically Signed By:     CARRIE Jaramillo    Therapist was present, directed the patient's care, made skilled judgement, and was responsible for assessment and treatment of the patient.   Co-signed and supervised by: Lucas Fernández PT, DPT [id number]

## 2023-08-10 NOTE — PROGRESS NOTES
Nutrition Note    RECOMMENDATIONS  No nutrition rec's @ this time. NUTRITION ASSESSMENT   Pt admitted with dx Parkinson's disease. Receives a regular diet with po intake greater than 75% of meals recently.  lb, which if stable. Skin is intact. Pt is at low risk for nutrition compromise @ this time. Nutrition Related Findings: LBM 8/9; no edema noted; low phos 2.4  Wounds: None  Nutrition Education:  Education not indicated   Nutrition Goals: PO intake 75% or greater     MALNUTRITION ASSESSMENT      Malnutrition Status: No malnutrition    NUTRITION DIAGNOSIS   No nutrition diagnosis at this time      CURRENT NUTRITION THERAPIES  ADULT DIET; Regular     PO Intake: %   PO Supplement Intake:None Ordered    ANTHROPOMETRICS  Current Height: 6' (182.9 cm)  Current Weight - Scale: 194 lb (88 kg)    Ideal Body Weight (IBW): 178 lbs  (81 kg)        BMI: 26.3    The patient will be monitored per nutrition standards of care. Consult dietitian if additional nutrition interventions are needed prior to RD reassessment.      Yaya Taylor RD, LD    Contact: [phone]

## 2023-08-10 NOTE — PROGRESS NOTES
Morning assessment completed, vss, alert and oriented, walker x 1, argueta intact, schedule meds given, denies pain, The care plan and education has been reviewed and mutually agreed upon with the patient. Pt remains free from falls. Fall precautions in place--bed in lowest position, call light within reach, bed alarm in use. Pt aware to call for assistance before getting up.

## 2023-08-11 ENCOUNTER — HOSPITAL ENCOUNTER (INPATIENT)
Age: 73
LOS: 2 days | Discharge: NURSING, REHAB OR LONG-TERM CARE | DRG: 310 | End: 2023-08-13
Attending: SURGERY | Admitting: INTERNAL MEDICINE
Payer: MEDICARE

## 2023-08-11 VITALS
RESPIRATION RATE: 18 BRPM | HEIGHT: 72 IN | BODY MASS INDEX: 26.28 KG/M2 | TEMPERATURE: 97.4 F | HEART RATE: 156 BPM | SYSTOLIC BLOOD PRESSURE: 86 MMHG | OXYGEN SATURATION: 100 % | DIASTOLIC BLOOD PRESSURE: 61 MMHG | WEIGHT: 194 LBS

## 2023-08-11 DIAGNOSIS — M25.551 ARTHRALGIA OF RIGHT HIP: Primary | ICD-10-CM

## 2023-08-11 LAB
ANION GAP SERPL CALCULATED.3IONS-SCNC: 6 MMOL/L (ref 3–16)
ANION GAP SERPL CALCULATED.3IONS-SCNC: 7 MMOL/L (ref 3–16)
BASOPHILS # BLD: 0.1 K/UL (ref 0–0.2)
BASOPHILS NFR BLD: 0.8 %
BUN SERPL-MCNC: 26 MG/DL (ref 7–20)
BUN SERPL-MCNC: 26 MG/DL (ref 7–20)
CALCIUM SERPL-MCNC: 8 MG/DL (ref 8.3–10.6)
CALCIUM SERPL-MCNC: 8.1 MG/DL (ref 8.3–10.6)
CHLORIDE SERPL-SCNC: 105 MMOL/L (ref 99–110)
CHLORIDE SERPL-SCNC: 105 MMOL/L (ref 99–110)
CO2 SERPL-SCNC: 23 MMOL/L (ref 21–32)
CO2 SERPL-SCNC: 24 MMOL/L (ref 21–32)
CREAT SERPL-MCNC: 0.9 MG/DL (ref 0.8–1.3)
CREAT SERPL-MCNC: 0.9 MG/DL (ref 0.8–1.3)
DEPRECATED RDW RBC AUTO: 15.4 % (ref 12.4–15.4)
EKG ATRIAL RATE: 131 BPM
EKG ATRIAL RATE: 61 BPM
EKG ATRIAL RATE: 89 BPM
EKG DIAGNOSIS: NORMAL
EKG P AXIS: 82 DEGREES
EKG P-R INTERVAL: 168 MS
EKG Q-T INTERVAL: 290 MS
EKG Q-T INTERVAL: 300 MS
EKG Q-T INTERVAL: 420 MS
EKG QRS DURATION: 162 MS
EKG QRS DURATION: 86 MS
EKG QRS DURATION: 90 MS
EKG QTC CALCULATION (BAZETT): 422 MS
EKG QTC CALCULATION (BAZETT): 476 MS
EKG QTC CALCULATION (BAZETT): 480 MS
EKG R AXIS: 84 DEGREES
EKG R AXIS: 92 DEGREES
EKG R AXIS: 99 DEGREES
EKG T AXIS: 42 DEGREES
EKG T AXIS: 64 DEGREES
EKG T AXIS: 76 DEGREES
EKG VENTRICULAR RATE: 154 BPM
EKG VENTRICULAR RATE: 162 BPM
EKG VENTRICULAR RATE: 61 BPM
EOSINOPHIL # BLD: 0.2 K/UL (ref 0–0.6)
EOSINOPHIL NFR BLD: 2.7 %
GFR SERPLBLD CREATININE-BSD FMLA CKD-EPI: >60 ML/MIN/{1.73_M2}
GFR SERPLBLD CREATININE-BSD FMLA CKD-EPI: >60 ML/MIN/{1.73_M2}
GLUCOSE BLD-MCNC: 89 MG/DL (ref 70–99)
GLUCOSE SERPL-MCNC: 92 MG/DL (ref 70–99)
GLUCOSE SERPL-MCNC: 92 MG/DL (ref 70–99)
HCT VFR BLD AUTO: 36.3 % (ref 40.5–52.5)
HGB BLD-MCNC: 11.8 G/DL (ref 13.5–17.5)
LYMPHOCYTES # BLD: 0.5 K/UL (ref 1–5.1)
LYMPHOCYTES NFR BLD: 6.9 %
MAGNESIUM SERPL-MCNC: 1.9 MG/DL (ref 1.8–2.4)
MCH RBC QN AUTO: 30.7 PG (ref 26–34)
MCHC RBC AUTO-ENTMCNC: 32.5 G/DL (ref 31–36)
MCV RBC AUTO: 94.4 FL (ref 80–100)
MONOCYTES # BLD: 0.5 K/UL (ref 0–1.3)
MONOCYTES NFR BLD: 7.2 %
NEUTROPHILS # BLD: 6 K/UL (ref 1.7–7.7)
NEUTROPHILS NFR BLD: 82.4 %
PERFORMED ON: NORMAL
PLATELET # BLD AUTO: 169 K/UL (ref 135–450)
PMV BLD AUTO: 7.6 FL (ref 5–10.5)
POTASSIUM SERPL-SCNC: 4.4 MMOL/L (ref 3.5–5.1)
POTASSIUM SERPL-SCNC: 4.6 MMOL/L (ref 3.5–5.1)
RBC # BLD AUTO: 3.84 M/UL (ref 4.2–5.9)
SODIUM SERPL-SCNC: 135 MMOL/L (ref 136–145)
SODIUM SERPL-SCNC: 135 MMOL/L (ref 136–145)
WBC # BLD AUTO: 7.2 K/UL (ref 4–11)

## 2023-08-11 PROCEDURE — 2580000003 HC RX 258: Performed by: SURGERY

## 2023-08-11 PROCEDURE — 2580000003 HC RX 258: Performed by: STUDENT IN AN ORGANIZED HEALTH CARE EDUCATION/TRAINING PROGRAM

## 2023-08-11 PROCEDURE — 2700000000 HC OXYGEN THERAPY PER DAY

## 2023-08-11 PROCEDURE — 94760 N-INVAS EAR/PLS OXIMETRY 1: CPT

## 2023-08-11 PROCEDURE — 83735 ASSAY OF MAGNESIUM: CPT

## 2023-08-11 PROCEDURE — 6360000002 HC RX W HCPCS: Performed by: SURGERY

## 2023-08-11 PROCEDURE — 2000000000 HC ICU R&B

## 2023-08-11 PROCEDURE — 80048 BASIC METABOLIC PNL TOTAL CA: CPT

## 2023-08-11 PROCEDURE — 6370000000 HC RX 637 (ALT 250 FOR IP): Performed by: SURGERY

## 2023-08-11 PROCEDURE — 93010 ELECTROCARDIOGRAM REPORT: CPT | Performed by: INTERNAL MEDICINE

## 2023-08-11 PROCEDURE — 85025 COMPLETE CBC W/AUTO DIFF WBC: CPT

## 2023-08-11 PROCEDURE — 93005 ELECTROCARDIOGRAM TRACING: CPT | Performed by: SURGERY

## 2023-08-11 RX ORDER — BISACODYL 5 MG/1
5 TABLET, DELAYED RELEASE ORAL DAILY
Status: CANCELLED | OUTPATIENT
Start: 2023-08-11

## 2023-08-11 RX ORDER — ENOXAPARIN SODIUM 100 MG/ML
40 INJECTION SUBCUTANEOUS NIGHTLY
Status: DISCONTINUED | OUTPATIENT
Start: 2023-08-11 | End: 2023-08-13 | Stop reason: HOSPADM

## 2023-08-11 RX ORDER — GABAPENTIN 100 MG/1
100 CAPSULE ORAL 3 TIMES DAILY
Status: CANCELLED | OUTPATIENT
Start: 2023-08-11

## 2023-08-11 RX ORDER — DILTIAZEM HCL IN NACL,ISO-OSM 125 MG/125
2.5-15 PLASTIC BAG, INJECTION (ML) INTRAVENOUS CONTINUOUS
Status: CANCELLED | OUTPATIENT
Start: 2023-08-11

## 2023-08-11 RX ORDER — SOTALOL HYDROCHLORIDE 80 MG/1
80 TABLET ORAL
Status: DISCONTINUED | OUTPATIENT
Start: 2023-08-12 | End: 2023-08-12

## 2023-08-11 RX ORDER — SOTALOL HYDROCHLORIDE 80 MG/1
80 TABLET ORAL
Status: CANCELLED | OUTPATIENT
Start: 2023-08-12

## 2023-08-11 RX ORDER — POLYETHYLENE GLYCOL 3350 17 G/17G
17 POWDER, FOR SOLUTION ORAL DAILY PRN
Status: CANCELLED | OUTPATIENT
Start: 2023-08-11

## 2023-08-11 RX ORDER — BISACODYL 5 MG/1
5 TABLET, DELAYED RELEASE ORAL DAILY
Status: DISCONTINUED | OUTPATIENT
Start: 2023-08-11 | End: 2023-08-13 | Stop reason: HOSPADM

## 2023-08-11 RX ORDER — SODIUM CHLORIDE 0.9 % (FLUSH) 0.9 %
5-40 SYRINGE (ML) INJECTION PRN
Status: DISCONTINUED | OUTPATIENT
Start: 2023-08-11 | End: 2023-08-13 | Stop reason: HOSPADM

## 2023-08-11 RX ORDER — 0.9 % SODIUM CHLORIDE 0.9 %
1000 INTRAVENOUS SOLUTION INTRAVENOUS ONCE
Status: COMPLETED | OUTPATIENT
Start: 2023-08-11 | End: 2023-08-11

## 2023-08-11 RX ORDER — SODIUM CHLORIDE 0.9 % (FLUSH) 0.9 %
5-40 SYRINGE (ML) INJECTION PRN
Status: CANCELLED | OUTPATIENT
Start: 2023-08-11

## 2023-08-11 RX ORDER — METOPROLOL TARTRATE 5 MG/5ML
5 INJECTION INTRAVENOUS ONCE
Status: DISCONTINUED | OUTPATIENT
Start: 2023-08-11 | End: 2023-08-11

## 2023-08-11 RX ORDER — TAMSULOSIN HYDROCHLORIDE 0.4 MG/1
0.4 CAPSULE ORAL NIGHTLY
Status: CANCELLED | OUTPATIENT
Start: 2023-08-11

## 2023-08-11 RX ORDER — ATORVASTATIN CALCIUM 80 MG/1
80 TABLET, FILM COATED ORAL NIGHTLY
Status: CANCELLED | OUTPATIENT
Start: 2023-08-11

## 2023-08-11 RX ORDER — TRAMADOL HYDROCHLORIDE 50 MG/1
50 TABLET ORAL EVERY 6 HOURS PRN
Status: DISCONTINUED | OUTPATIENT
Start: 2023-08-11 | End: 2023-08-13 | Stop reason: HOSPADM

## 2023-08-11 RX ORDER — ACETAMINOPHEN 325 MG/1
650 TABLET ORAL EVERY 4 HOURS PRN
Status: CANCELLED | OUTPATIENT
Start: 2023-08-11

## 2023-08-11 RX ORDER — PANTOPRAZOLE SODIUM 40 MG/1
40 TABLET, DELAYED RELEASE ORAL
Status: CANCELLED | OUTPATIENT
Start: 2023-08-11

## 2023-08-11 RX ORDER — ROPINIROLE 0.25 MG/1
0.25 TABLET, FILM COATED ORAL 3 TIMES DAILY
Status: CANCELLED | OUTPATIENT
Start: 2023-08-11

## 2023-08-11 RX ORDER — ROPINIROLE 0.25 MG/1
0.25 TABLET, FILM COATED ORAL 3 TIMES DAILY
Status: DISCONTINUED | OUTPATIENT
Start: 2023-08-11 | End: 2023-08-13 | Stop reason: HOSPADM

## 2023-08-11 RX ORDER — FLUTICASONE PROPIONATE 50 MCG
2 SPRAY, SUSPENSION (ML) NASAL NIGHTLY
Status: CANCELLED | OUTPATIENT
Start: 2023-08-11

## 2023-08-11 RX ORDER — TRAMADOL HYDROCHLORIDE 50 MG/1
50 TABLET ORAL EVERY 6 HOURS PRN
Status: CANCELLED | OUTPATIENT
Start: 2023-08-11

## 2023-08-11 RX ORDER — TAMSULOSIN HYDROCHLORIDE 0.4 MG/1
0.4 CAPSULE ORAL NIGHTLY
Status: DISCONTINUED | OUTPATIENT
Start: 2023-08-11 | End: 2023-08-13 | Stop reason: HOSPADM

## 2023-08-11 RX ORDER — GABAPENTIN 100 MG/1
100 CAPSULE ORAL 3 TIMES DAILY
Status: DISCONTINUED | OUTPATIENT
Start: 2023-08-11 | End: 2023-08-13 | Stop reason: HOSPADM

## 2023-08-11 RX ORDER — ASPIRIN 81 MG/1
81 TABLET ORAL DAILY
Status: CANCELLED | OUTPATIENT
Start: 2023-08-11

## 2023-08-11 RX ORDER — ASPIRIN 81 MG/1
81 TABLET ORAL DAILY
Status: DISCONTINUED | OUTPATIENT
Start: 2023-08-11 | End: 2023-08-13 | Stop reason: HOSPADM

## 2023-08-11 RX ORDER — 0.9 % SODIUM CHLORIDE 0.9 %
500 INTRAVENOUS SOLUTION INTRAVENOUS ONCE
Status: COMPLETED | OUTPATIENT
Start: 2023-08-11 | End: 2023-08-11

## 2023-08-11 RX ORDER — PANTOPRAZOLE SODIUM 40 MG/1
40 TABLET, DELAYED RELEASE ORAL
Status: DISCONTINUED | OUTPATIENT
Start: 2023-08-11 | End: 2023-08-13 | Stop reason: HOSPADM

## 2023-08-11 RX ORDER — ATORVASTATIN CALCIUM 80 MG/1
80 TABLET, FILM COATED ORAL NIGHTLY
Status: DISCONTINUED | OUTPATIENT
Start: 2023-08-11 | End: 2023-08-13 | Stop reason: HOSPADM

## 2023-08-11 RX ORDER — FLUTICASONE PROPIONATE 50 MCG
2 SPRAY, SUSPENSION (ML) NASAL NIGHTLY
Status: DISCONTINUED | OUTPATIENT
Start: 2023-08-11 | End: 2023-08-13 | Stop reason: HOSPADM

## 2023-08-11 RX ORDER — 0.9 % SODIUM CHLORIDE 0.9 %
1000 INTRAVENOUS SOLUTION INTRAVENOUS ONCE
Status: DISCONTINUED | OUTPATIENT
Start: 2023-08-11 | End: 2023-08-11 | Stop reason: HOSPADM

## 2023-08-11 RX ORDER — ENOXAPARIN SODIUM 100 MG/ML
40 INJECTION SUBCUTANEOUS NIGHTLY
Status: CANCELLED | OUTPATIENT
Start: 2023-08-11

## 2023-08-11 RX ORDER — ACETAMINOPHEN 325 MG/1
650 TABLET ORAL EVERY 4 HOURS PRN
Status: DISCONTINUED | OUTPATIENT
Start: 2023-08-11 | End: 2023-08-13 | Stop reason: HOSPADM

## 2023-08-11 RX ORDER — POLYETHYLENE GLYCOL 3350 17 G/17G
17 POWDER, FOR SOLUTION ORAL DAILY PRN
Status: DISCONTINUED | OUTPATIENT
Start: 2023-08-11 | End: 2023-08-13 | Stop reason: HOSPADM

## 2023-08-11 RX ORDER — DILTIAZEM HCL IN NACL,ISO-OSM 125 MG/125
2.5-15 PLASTIC BAG, INJECTION (ML) INTRAVENOUS CONTINUOUS
Status: DISCONTINUED | OUTPATIENT
Start: 2023-08-11 | End: 2023-08-11

## 2023-08-11 RX ADMIN — GABAPENTIN 100 MG: 100 CAPSULE ORAL at 09:13

## 2023-08-11 RX ADMIN — ATORVASTATIN CALCIUM 80 MG: 80 TABLET, FILM COATED ORAL at 21:13

## 2023-08-11 RX ADMIN — CARBIDOPA AND LEVODOPA 1 TABLET: 25; 100 TABLET ORAL at 11:44

## 2023-08-11 RX ADMIN — SERTRALINE 100 MG: 50 TABLET, FILM COATED ORAL at 21:13

## 2023-08-11 RX ADMIN — SODIUM CHLORIDE 500 ML: 9 INJECTION, SOLUTION INTRAVENOUS at 19:34

## 2023-08-11 RX ADMIN — GABAPENTIN 100 MG: 100 CAPSULE ORAL at 21:13

## 2023-08-11 RX ADMIN — PANTOPRAZOLE SODIUM 40 MG: 40 TABLET, DELAYED RELEASE ORAL at 07:07

## 2023-08-11 RX ADMIN — SODIUM CHLORIDE 1000 ML: 9 INJECTION, SOLUTION INTRAVENOUS at 03:44

## 2023-08-11 RX ADMIN — ROPINIROLE HYDROCHLORIDE 0.25 MG: 0.25 TABLET, FILM COATED ORAL at 14:31

## 2023-08-11 RX ADMIN — CARBIDOPA AND LEVODOPA 1 TABLET: 25; 100 TABLET ORAL at 15:26

## 2023-08-11 RX ADMIN — ENOXAPARIN SODIUM 40 MG: 100 INJECTION SUBCUTANEOUS at 21:13

## 2023-08-11 RX ADMIN — CARBIDOPA AND LEVODOPA 1 TABLET: 25; 100 TABLET ORAL at 07:07

## 2023-08-11 RX ADMIN — ASPIRIN 81 MG: 81 TABLET, COATED ORAL at 09:13

## 2023-08-11 RX ADMIN — GABAPENTIN 100 MG: 100 CAPSULE ORAL at 14:31

## 2023-08-11 RX ADMIN — TAMSULOSIN HYDROCHLORIDE 0.4 MG: 0.4 CAPSULE ORAL at 21:14

## 2023-08-11 RX ADMIN — CARBIDOPA AND LEVODOPA 1 TABLET: 25; 100 TABLET ORAL at 19:31

## 2023-08-11 RX ADMIN — ROPINIROLE HYDROCHLORIDE 0.25 MG: 0.25 TABLET, FILM COATED ORAL at 21:13

## 2023-08-11 RX ADMIN — ROPINIROLE HYDROCHLORIDE 0.25 MG: 0.25 TABLET, FILM COATED ORAL at 09:12

## 2023-08-11 ASSESSMENT — PAIN SCALES - GENERAL
PAINLEVEL_OUTOF10: 0

## 2023-08-11 NOTE — PROGRESS NOTES
Rapid Response Quick Summary    Room: [facility]81/8395-70    Assessment of concern / patient:  pt having palpitations. Hx afib rvr. Physician involved:  Dr Efrain Cody    Interventions:  placed on monitor and EKG completed. Afib vs svt. Pt reports lasting about 10-15 min. Iv started and fluid bolus started. Preparing to transfer pt to ICU and noted rhythm converted back to NSR spontaneously. Disposition:  pt to remain at current level of care.

## 2023-08-11 NOTE — H&P
------------------------------------------------------------------  Patient Status:Routine. [facility] - Vascular Lab. Technical Quality:Limited visualization due to movement. Plaque   - A plaque was found in the Right ICA. irregular. The plaque characteristics are: moderate severity and heterogeneous texture. - A plaque was found in the Left ICA. smooth. The plaque characteristics are: moderate severity and heterogeneous texture. Velocities are measured in cm/s ; Diameters are measured in mm Carotid Right Measurements +---------------+----+----+-----+----+ ! Location       ! PSV ! EDV ! Angle! RI  ! +---------------+----+----+-----+----+ ! Prox CCA       !101 !14. 8!60   !0.85! +---------------+----+----+-----+----+ ! Mid CCA        !100 !14. 3!60   !0.86! +---------------+----+----+-----+----+ ! Dist CCA       !77.4!12. 6!60   !0.84! +---------------+----+----+-----+----+ ! Prox ICA       !111 !23  !60   !0.79! +---------------+----+----+-----+----+ ! Mid ICA        !101 !15. 6!60   !0.85! +---------------+----+----+-----+----+ ! Dist ICA       !85.8!17. 3! 60   !0.8 ! +---------------+----+----+-----+----+ ! Prox ECA       !139 !    !61   !    ! +---------------+----+----+-----+----+ ! Vertebral      !50.5!    !60   !    ! +---------------+----+----+-----+----+ ! Prox Subclavian!85.1!    !60   !    ! +---------------+----+----+-----+----+   - There is antegrade vertebral flow noted on the right side. - Additional Measurements:ICAPSV/CCAPSV 1.11. ICAEDV/CCAEDV 1.55. Carotid Left Measurements +---------------+----+----+-----+----+ ! Location       ! PSV ! EDV ! Angle! RI  ! +---------------+----+----+-----+----+ ! Prox CCA       !109 !18. 2! 60   !0.83! +---------------+----+----+-----+----+ ! Mid CCA        !105 !18. 9!60   !0.82! +---------------+----+----+-----+----+ ! Dist CCA       !83. 3!93. 1! 60   !0.72! +---------------+----+----+-----+----+ ! Prox ICA       !83. 4!17. 5!60   !0.79!

## 2023-08-11 NOTE — PLAN OF CARE
Problem: Discharge Planning  Goal: Discharge to home or other facility with appropriate resources  8/10/2023 2319 by Gumaro Mckeon RN  Outcome: Progressing  Flowsheets (Taken 8/10/2023 2115)  Discharge to home or other facility with appropriate resources: Identify barriers to discharge with patient and caregiver  8/10/2023 0928 by Trena Hackett RN  Outcome: Progressing     Problem: Safety - Adult  Goal: Free from fall injury  8/10/2023 2319 by Gumaro Mckeon RN  Outcome: Progressing  Flowsheets (Taken 8/10/2023 2317)  Free From Fall Injury: Instruct family/caregiver on patient safety  8/10/2023 0928 by Trena Hackett RN  Outcome: Progressing     Problem: Pain  Goal: Verbalizes/displays adequate comfort level or baseline comfort level  8/10/2023 2319 by Gumaro Mckeon RN  Outcome: Progressing  Flowsheets (Taken 8/10/2023 2115)  Verbalizes/displays adequate comfort level or baseline comfort level: Encourage patient to monitor pain and request assistance  8/10/2023 0928 by Trena Hackett RN  Outcome: Progressing     Problem: ABCDS Injury Assessment  Goal: Absence of physical injury  Outcome: Progressing  Flowsheets (Taken 8/10/2023 2317)  Absence of Physical Injury: Implement safety measures based on patient assessment

## 2023-08-11 NOTE — PROGRESS NOTES
ARU Discharge Assessment   Patient emergently discharged to acute care unit. Transportation  \"Has lack of transportation kept you from medical appointments, meetings, work, or from getting things needed for daily living? \"Check all that apply:  [] A.  Yes, it has kept me from medical appointments or from getting my medications  [] B.  Yes, it has kept me from non-medical meetings, appointments, work, or from getting things that I need  [] C.  No  [x] X. Patient unable to respond  [] Y. Patient declines to respond    Provision of Current Reconciled Medication List to Subsequent Provider at Discharge  [x] No, current reconciled medication list not provided to the subsequent provider.  [] Yes, current reconciled medication list provided to the subsequent provider. (**Select route of transmission below**)   [] Via Electronic Health Record   [] [employer] [place]  [] Verbal (e.g. in person, telephone, video conferencing)  [] Paper-based (e.g. fax, copies, printouts)   [] Other Methods (e.g. texting, email, CDs)    Provision of Current Reconciled Medication List to Patient at Discharge  [x] No, current reconciled medication list not provided to the patient, family and/or caregiver.   [] Yes, current reconciled medication list provided to the patient, family and/or caregiver.  (**Select route of transmission below**)   [] [facility] Record (e.g., electronic access to patient portal)   [] [facility] [employer] [place] [facility]  [] Verbal (e.g. in person, telephone, video conferencing)  [] Paper-based (e.g. fax, copies, printouts)   [] Other Methods (e.g. texting, email, CDs)    Health Literacy  \"How often do you need to have someone help you when you read instructions, pamphlets, or other written material from your doctor or pharmacy? \"  []  0. Never  []  1. Rarely  []  2. Sometimes  []  3. Often  []  4. Always  []  7. Patient declines to respond  [x]  8.   Patient unable to respond    BIMS - **Must be completed in the flowsheet at discharge prior to proceeding with Delirium Assessment**  [] BIMS completed in flowsheet at discharge  [x] BIMS not completed due to patient unable to give appropriate related answers, see Staff Assessment in flowsheet    Signs and Symptoms of Delirium  A. Acute Onset Mental Status Change - Is there evidence of an acute change in mental status from the patient's baseline? [x] 0. No  [] 1. Yes    B. Inattention - Did the patient have difficulty focusing attention, for example being easily distractible or having difficulty keeping track of what was being said? [x]  0. Behavior not present  []  1. Behavior continuously present, does not fluctuate  []  2. Behavior present, fluctuates (comes and goes, changes in severity)    C. Disorganized thinking - Was the patient's thinking disorganized or incoherent (rambling or irrelevant conversation, unclear or illogical flow of ideas, or unpredictable switching from subject to subject)? [x]  0. Behavior not present  []  1. Behavior continuously present, does not fluctuate  []  2. Behavior present, fluctuates (comes and goes, changes in severity)    D. Altered level of consciousness - Did the patient have altered level of consciousness as indicated by any of the following criteria? Vigilant - startled easily to any sound or touch  Lethargic - repeatedly dozed off while being asked questions, but responded to voice or touch  Stuporous - very difficulty to arouse and keep aroused for the interview  Comatose - could not be aroused  [x]  0. Behavior not present  []  1. Behavior continuously present, does not fluctuate  []  2. Behavior present, fluctuates (comes and goes, changes in severity)    Mood    \"Over the last 2 weeks, have you been bothered by any of the following problems?\" 1. Symptom Presence    0 = No  1 = Yes  9 = No Response 2.  Symptom Frequency    0 = Never or 1 day  1 = 2-6 days (several

## 2023-08-11 NOTE — SIGNIFICANT EVENT
Rapid response     Afib RVR vs SVT sustaining at 160 bmp for a period of 10 minutes. Spontaneously converted back to sinus. Will bolus a liter of NS. ECG showing peaked t-waves, will f/u BMP and Mg. Patient is on sotalol. Cardiology consulted. Second rapid called about an hour later for tachy to 150s again. Seems regular, probably flutter. I imagine he will spontaneously convert before the drip even gets started, but since he is diaphoretic with ST depressions will proceed with trasnfer to CVICU for diltiazem drip.

## 2023-08-11 NOTE — PROGRESS NOTES
Physician Progress Note      PATIENTDiguillaume Solo  CSN #:                  [id number]  :                       1950  ADMIT DATE:       2023 4:45 PM  [address] [facility] DATE:        2023 4:16 PM  RESPONDING  PROVIDER #:        Navya Guzman MD          QUERY TEXT:    Pt admitted with DEBO  and has encephalopathy documented. If possible, please   document in progress notes and discharge summary further specificity regarding   the type of encephalopathy:    The medical record reflects the following:  Risk Factors: DEBO, dementia, CVA ,parkinsons  Clinical Indicators:  Per ED-Drowsy, opens eyes to voice and light touch. Oriented to person and place, not oriented to time. He is drowsy however   arousable and protecting his airway. Acute encephalopathy . Per h&p-Patient is   a poor historian at this time due to confusion, Pleasantly confused, Alert   and oriented x 1. severely confused on arrival. Per consult note- W/ some   uremic encephalopathy , mild . CT head- no acute . MRI Brain- 2 punctate   acute infarcts in the left middle cerebral artery territory  bun -110,   creat-8.8 on admission  Treatment: monitor mental status, reorient to environment, monitor labs   ,Neurology consult    Thank you, Letitia Rosas RN CDS CCDS  [email or web address]  Options provided:  -- Metabolic encephalopathy  -- Toxic encephalopathy  -- Toxic metabolic encephalopathy  -- Other - I will add my own diagnosis  -- Disagree - Not applicable / Not valid  -- Disagree - Clinically unable to determine / Unknown  -- Refer to Clinical Documentation Reviewer    PROVIDER RESPONSE TEXT:    Provider disagreed with this query. This is not my patient. Please forward to Dr Tiara Dillard created by:  Supriya Rosas on 2023 11:37 AM      Electronically signed by:  Navya Guzman MD 2023 5:23 PM

## 2023-08-11 NOTE — PROGRESS NOTES
Admitted to room 2912 via stretcher. Attached to monitor. Family (wife) was in discussed with Dr. Jeanna Rosenbaum of care with family and patient. Questions were answered and offered call light for assistance. Carried out all orders. Dr. Tina Olea notified its taking too long for records to reach and be accessed.

## 2023-08-11 NOTE — PROGRESS NOTES
Rapid Response Quick Summary    Room: ARU 8469    Assessment of concern / patient:  pt again with rapid heart rate. Physician involved:  Dr Shahid Saab    Interventions:  EKG completed showing SVT    Disposition:  pt to transfer to inpatient unit for cardizem gtt and monitoring.

## 2023-08-12 PROCEDURE — 6370000000 HC RX 637 (ALT 250 FOR IP): Performed by: SURGERY

## 2023-08-12 PROCEDURE — 2580000003 HC RX 258: Performed by: SURGERY

## 2023-08-12 PROCEDURE — 94760 N-INVAS EAR/PLS OXIMETRY 1: CPT

## 2023-08-12 PROCEDURE — 1200000000 HC SEMI PRIVATE

## 2023-08-12 PROCEDURE — 99223 1ST HOSP IP/OBS HIGH 75: CPT | Performed by: INTERNAL MEDICINE

## 2023-08-12 PROCEDURE — 6360000002 HC RX W HCPCS: Performed by: SURGERY

## 2023-08-12 RX ORDER — SOTALOL HYDROCHLORIDE 80 MG/1
80 TABLET ORAL DAILY
Status: DISCONTINUED | OUTPATIENT
Start: 2023-08-13 | End: 2023-08-13 | Stop reason: HOSPADM

## 2023-08-12 RX ADMIN — GABAPENTIN 100 MG: 100 CAPSULE ORAL at 08:42

## 2023-08-12 RX ADMIN — CARBIDOPA AND LEVODOPA 1 TABLET: 25; 100 TABLET ORAL at 06:25

## 2023-08-12 RX ADMIN — ROPINIROLE HYDROCHLORIDE 0.25 MG: 0.25 TABLET, FILM COATED ORAL at 13:55

## 2023-08-12 RX ADMIN — ENOXAPARIN SODIUM 40 MG: 100 INJECTION SUBCUTANEOUS at 21:30

## 2023-08-12 RX ADMIN — ROPINIROLE HYDROCHLORIDE 0.25 MG: 0.25 TABLET, FILM COATED ORAL at 21:30

## 2023-08-12 RX ADMIN — CARBIDOPA AND LEVODOPA 1 TABLET: 25; 100 TABLET ORAL at 21:30

## 2023-08-12 RX ADMIN — PANTOPRAZOLE SODIUM 40 MG: 40 TABLET, DELAYED RELEASE ORAL at 06:25

## 2023-08-12 RX ADMIN — SERTRALINE 100 MG: 50 TABLET, FILM COATED ORAL at 21:34

## 2023-08-12 RX ADMIN — ACETAMINOPHEN 650 MG: 325 TABLET ORAL at 18:41

## 2023-08-12 RX ADMIN — SODIUM CHLORIDE, PRESERVATIVE FREE 10 ML: 5 INJECTION INTRAVENOUS at 08:43

## 2023-08-12 RX ADMIN — ASPIRIN 81 MG: 81 TABLET, COATED ORAL at 08:42

## 2023-08-12 RX ADMIN — ACETAMINOPHEN 650 MG: 325 TABLET ORAL at 22:49

## 2023-08-12 RX ADMIN — TAMSULOSIN HYDROCHLORIDE 0.4 MG: 0.4 CAPSULE ORAL at 21:34

## 2023-08-12 RX ADMIN — GABAPENTIN 100 MG: 100 CAPSULE ORAL at 13:55

## 2023-08-12 RX ADMIN — CARBIDOPA AND LEVODOPA 1 TABLET: 25; 100 TABLET ORAL at 12:05

## 2023-08-12 RX ADMIN — BISACODYL 5 MG: 5 TABLET, COATED ORAL at 08:42

## 2023-08-12 RX ADMIN — ACETAMINOPHEN 650 MG: 325 TABLET ORAL at 13:55

## 2023-08-12 RX ADMIN — ATORVASTATIN CALCIUM 80 MG: 80 TABLET, FILM COATED ORAL at 21:34

## 2023-08-12 RX ADMIN — CARBIDOPA AND LEVODOPA 1 TABLET: 25; 100 TABLET ORAL at 16:51

## 2023-08-12 RX ADMIN — SOTALOL HYDROCHLORIDE 80 MG: 80 TABLET ORAL at 08:42

## 2023-08-12 RX ADMIN — GABAPENTIN 100 MG: 100 CAPSULE ORAL at 21:34

## 2023-08-12 RX ADMIN — ROPINIROLE HYDROCHLORIDE 0.25 MG: 0.25 TABLET, FILM COATED ORAL at 08:42

## 2023-08-12 ASSESSMENT — PAIN SCALES - GENERAL
PAINLEVEL_OUTOF10: 0
PAINLEVEL_OUTOF10: 6
PAINLEVEL_OUTOF10: 0
PAINLEVEL_OUTOF10: 2
PAINLEVEL_OUTOF10: 0

## 2023-08-12 ASSESSMENT — PAIN DESCRIPTION - DESCRIPTORS: DESCRIPTORS: ACHING

## 2023-08-12 ASSESSMENT — PAIN DESCRIPTION - LOCATION: LOCATION: HEAD

## 2023-08-12 NOTE — PLAN OF CARE
Problem: Chronic Conditions and Co-morbidities  Goal: Patient's chronic conditions and co-morbidity symptoms are monitored and maintained or improved  8/11/2023 2020 by Irwin Menjivar RN  Outcome: Progressing  Flowsheets (Taken 8/11/2023 2000)  Care Plan - Patient's Chronic Conditions and Co-Morbidity Symptoms are Monitored and Maintained or Improved: Monitor and assess patient's chronic conditions and comorbid symptoms for stability, deterioration, or improvement       Problem: Safety - Adult  Goal: Free from fall injury  8/11/2023 2020 by Irwin Menjivar RN  Outcome: Progressing    Problem: Skin/Tissue Integrity  Goal: Absence of new skin breakdown  Description: 1. Monitor for areas of redness and/or skin breakdown  2. Assess vascular access sites hourly  3. Every 4-6 hours minimum:  Change oxygen saturation probe site  4. Every 4-6 hours:  If on nasal continuous positive airway pressure, respiratory therapy assess nares and determine need for appliance change or resting period.   8/11/2023 2020 by Irwin Menjivar RN  Outcome: Progressing    Problem: ABCDS Injury Assessment  Goal: Absence of physical injury  8/11/2023 2020 by Irwin Menjivar RN  Outcome: Progressing

## 2023-08-12 NOTE — PLAN OF CARE
Problem: Chronic Conditions and Co-morbidities  Goal: Patient's chronic conditions and co-morbidity symptoms are monitored and maintained or improved  8/12/2023 0719 by Mauricio Clement RN  Outcome: Progressing  8/11/2023 2020 by Lizandro Hand RN  Outcome: Progressing  Flowsheets (Taken 8/11/2023 2000)  Care Plan - Patient's Chronic Conditions and Co-Morbidity Symptoms are Monitored and Maintained or Improved: Monitor and assess patient's chronic conditions and comorbid symptoms for stability, deterioration, or improvement     Problem: Safety - Adult  Goal: Free from fall injury  8/12/2023 0719 by Mauricio Clement RN  Outcome: Progressing  Flowsheets (Taken 8/12/2023 0716)  Free From Fall Injury: Instruct family/caregiver on patient safety  8/11/2023 2020 by Lizandro Hand RN  Outcome: Progressing     Problem: Skin/Tissue Integrity  Goal: Absence of new skin breakdown  Description: 1. Monitor for areas of redness and/or skin breakdown  2. Assess vascular access sites hourly  3. Every 4-6 hours minimum:  Change oxygen saturation probe site  4. Every 4-6 hours:  If on nasal continuous positive airway pressure, respiratory therapy assess nares and determine need for appliance change or resting period.   8/12/2023 0719 by Mauricio Clement RN  Outcome: Progressing  8/11/2023 2020 by Lizandro Hand RN  Outcome: Progressing     Problem: ABCDS Injury Assessment  Goal: Absence of physical injury  8/12/2023 0719 by Mauricio Clement RN  Outcome: Progressing  Flowsheets (Taken 8/12/2023 0716)  Absence of Physical Injury: Implement safety measures based on patient assessment  8/11/2023 2020 by Lizandro Hand RN  Outcome: Progressing

## 2023-08-12 NOTE — PROGRESS NOTES
Received patient lying in bed, awake, alert and orientedx4, hemodynamically fair, weakness in the right side was noted, can  hand moderately, GCS score 15, questions were answered, call bell at side, for close monitoring.

## 2023-08-12 NOTE — CONSULTS
Cardiovascular Consultation     Attending Physician: Edyta Murillo MD    PATIENT: William Davidson  : 1950  MRN: [id number]    Reason for Consultation:   Kathie Julian    History of present illness:   Mr. William Davidson is a 68 y.o. male patient with a history of atrial fibrillation status post surgical maze left atrial appendage clip in 2018 at the time of CABG. Left atrial appendage was proven to be occluded by postoperative ANN MARIE as well as more recently by CT. He was recently discharged from hospital to acute rehab unit following small stroke. Neurology saw and recommended dual antiplatelet therapy following electrophysiology consultation confirmed no indication for anticoagulation. Patient was in acute rehab unit when there were concerns for rapid atrial fibrillation. He and his significant other at bedside states episodes do not last more than 5 minutes. He does report to feel them as fast palpitations. No lightheadedness or syncope. No shortness of breath or edema. No chest pain. He was transferred out of the acute rehab unit for these concerns. They report he was recommended a permanent pacemaker by an outside electrophysiologist approximately 2 years ago. He has been reluctant to pursue this. They have concern that Sotalol dosing had been changed in light of change in renal clearance.     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

## 2023-08-13 ENCOUNTER — HOSPITAL ENCOUNTER (INPATIENT)
Age: 73
DRG: 057 | End: 2023-08-13
Attending: PHYSICAL MEDICINE & REHABILITATION | Admitting: PHYSICAL MEDICINE & REHABILITATION
Payer: MEDICARE

## 2023-08-13 VITALS
WEIGHT: 199.74 LBS | HEIGHT: 72 IN | OXYGEN SATURATION: 98 % | HEART RATE: 55 BPM | TEMPERATURE: 98.1 F | RESPIRATION RATE: 18 BRPM | DIASTOLIC BLOOD PRESSURE: 75 MMHG | BODY MASS INDEX: 27.05 KG/M2 | SYSTOLIC BLOOD PRESSURE: 143 MMHG

## 2023-08-13 PROBLEM — I61.9 CVA (CEREBROVASCULAR ACCIDENT DUE TO INTRACEREBRAL HEMORRHAGE) (HCC): Status: ACTIVE | Noted: 2023-08-13

## 2023-08-13 PROCEDURE — 6370000000 HC RX 637 (ALT 250 FOR IP): Performed by: INTERNAL MEDICINE

## 2023-08-13 PROCEDURE — 6370000000 HC RX 637 (ALT 250 FOR IP): Performed by: PHYSICAL MEDICINE & REHABILITATION

## 2023-08-13 PROCEDURE — 6370000000 HC RX 637 (ALT 250 FOR IP): Performed by: SURGERY

## 2023-08-13 PROCEDURE — 94150 VITAL CAPACITY TEST: CPT

## 2023-08-13 PROCEDURE — 94760 N-INVAS EAR/PLS OXIMETRY 1: CPT

## 2023-08-13 PROCEDURE — 1280000000 HC REHAB R&B

## 2023-08-13 RX ORDER — GABAPENTIN 100 MG/1
100 CAPSULE ORAL 3 TIMES DAILY
Status: DISCONTINUED | OUTPATIENT
Start: 2023-08-13 | End: 2023-08-15

## 2023-08-13 RX ORDER — SOTALOL HYDROCHLORIDE 80 MG/1
80 TABLET ORAL DAILY
Status: DISCONTINUED | OUTPATIENT
Start: 2023-08-14 | End: 2023-08-30 | Stop reason: HOSPADM

## 2023-08-13 RX ORDER — POLYETHYLENE GLYCOL 3350 17 G/17G
17 POWDER, FOR SOLUTION ORAL DAILY PRN
Status: DISCONTINUED | OUTPATIENT
Start: 2023-08-13 | End: 2023-08-13

## 2023-08-13 RX ORDER — TRAMADOL HYDROCHLORIDE 50 MG/1
50 TABLET ORAL EVERY 6 HOURS PRN
Qty: 12 TABLET | Refills: 0 | Status: ON HOLD | OUTPATIENT
Start: 2023-08-13 | End: 2023-08-16

## 2023-08-13 RX ORDER — SODIUM CHLORIDE 0.9 % (FLUSH) 0.9 %
5-40 SYRINGE (ML) INJECTION PRN
Status: DISCONTINUED | OUTPATIENT
Start: 2023-08-13 | End: 2023-08-30 | Stop reason: HOSPADM

## 2023-08-13 RX ORDER — BISACODYL 5 MG/1
5 TABLET, DELAYED RELEASE ORAL DAILY
Status: CANCELLED | OUTPATIENT
Start: 2023-08-13

## 2023-08-13 RX ORDER — POLYETHYLENE GLYCOL 3350 17 G/17G
17 POWDER, FOR SOLUTION ORAL DAILY
Status: DISCONTINUED | OUTPATIENT
Start: 2023-08-14 | End: 2023-08-17

## 2023-08-13 RX ORDER — ENOXAPARIN SODIUM 100 MG/ML
40 INJECTION SUBCUTANEOUS DAILY
Status: CANCELLED | OUTPATIENT
Start: 2023-08-13

## 2023-08-13 RX ORDER — ATORVASTATIN CALCIUM 80 MG/1
80 TABLET, FILM COATED ORAL NIGHTLY
Status: DISCONTINUED | OUTPATIENT
Start: 2023-08-13 | End: 2023-08-30 | Stop reason: HOSPADM

## 2023-08-13 RX ORDER — SOTALOL HYDROCHLORIDE 80 MG/1
80 TABLET ORAL DAILY
Qty: 60 TABLET | Refills: 1 | Status: ON HOLD | OUTPATIENT
Start: 2023-08-13

## 2023-08-13 RX ORDER — PANTOPRAZOLE SODIUM 40 MG/1
40 TABLET, DELAYED RELEASE ORAL
Qty: 30 TABLET | Refills: 3 | Status: ON HOLD | OUTPATIENT
Start: 2023-08-14

## 2023-08-13 RX ORDER — ENOXAPARIN SODIUM 100 MG/ML
40 INJECTION SUBCUTANEOUS DAILY
Status: DISCONTINUED | OUTPATIENT
Start: 2023-08-13 | End: 2023-08-30 | Stop reason: HOSPADM

## 2023-08-13 RX ORDER — ROPINIROLE 0.25 MG/1
0.25 TABLET, FILM COATED ORAL 3 TIMES DAILY
Status: CANCELLED | OUTPATIENT
Start: 2023-08-13

## 2023-08-13 RX ORDER — FLUTICASONE PROPIONATE 50 MCG
2 SPRAY, SUSPENSION (ML) NASAL NIGHTLY
Status: CANCELLED | OUTPATIENT
Start: 2023-08-13

## 2023-08-13 RX ORDER — ROPINIROLE 0.25 MG/1
0.25 TABLET, FILM COATED ORAL 3 TIMES DAILY
Status: DISCONTINUED | OUTPATIENT
Start: 2023-08-13 | End: 2023-08-30 | Stop reason: HOSPADM

## 2023-08-13 RX ORDER — GABAPENTIN 100 MG/1
100 CAPSULE ORAL 3 TIMES DAILY
Status: CANCELLED | OUTPATIENT
Start: 2023-08-13

## 2023-08-13 RX ORDER — ACETAMINOPHEN 325 MG/1
650 TABLET ORAL EVERY 4 HOURS PRN
Status: DISCONTINUED | OUTPATIENT
Start: 2023-08-13 | End: 2023-08-30 | Stop reason: HOSPADM

## 2023-08-13 RX ORDER — ASPIRIN 81 MG/1
81 TABLET ORAL DAILY
Status: DISCONTINUED | OUTPATIENT
Start: 2023-08-14 | End: 2023-08-30 | Stop reason: HOSPADM

## 2023-08-13 RX ORDER — SOTALOL HYDROCHLORIDE 80 MG/1
80 TABLET ORAL DAILY
Status: CANCELLED | OUTPATIENT
Start: 2023-08-14

## 2023-08-13 RX ORDER — SODIUM CHLORIDE 0.9 % (FLUSH) 0.9 %
5-40 SYRINGE (ML) INJECTION PRN
Status: CANCELLED | OUTPATIENT
Start: 2023-08-13

## 2023-08-13 RX ORDER — BISACODYL 5 MG/1
5 TABLET, DELAYED RELEASE ORAL DAILY
Status: DISCONTINUED | OUTPATIENT
Start: 2023-08-13 | End: 2023-08-13

## 2023-08-13 RX ORDER — ACETAMINOPHEN 325 MG/1
650 TABLET ORAL EVERY 4 HOURS PRN
Status: CANCELLED | OUTPATIENT
Start: 2023-08-13

## 2023-08-13 RX ORDER — TAMSULOSIN HYDROCHLORIDE 0.4 MG/1
0.4 CAPSULE ORAL NIGHTLY
Status: CANCELLED | OUTPATIENT
Start: 2023-08-13

## 2023-08-13 RX ORDER — ATORVASTATIN CALCIUM 80 MG/1
80 TABLET, FILM COATED ORAL NIGHTLY
Status: CANCELLED | OUTPATIENT
Start: 2023-08-13

## 2023-08-13 RX ORDER — TAMSULOSIN HYDROCHLORIDE 0.4 MG/1
0.4 CAPSULE ORAL NIGHTLY
Status: DISCONTINUED | OUTPATIENT
Start: 2023-08-13 | End: 2023-08-30 | Stop reason: HOSPADM

## 2023-08-13 RX ORDER — POLYETHYLENE GLYCOL 3350 17 G/17G
17 POWDER, FOR SOLUTION ORAL DAILY PRN
Status: CANCELLED | OUTPATIENT
Start: 2023-08-13

## 2023-08-13 RX ORDER — ASPIRIN 81 MG/1
81 TABLET ORAL DAILY
Status: CANCELLED | OUTPATIENT
Start: 2023-08-14

## 2023-08-13 RX ORDER — FLUTICASONE PROPIONATE 50 MCG
2 SPRAY, SUSPENSION (ML) NASAL NIGHTLY
Status: DISCONTINUED | OUTPATIENT
Start: 2023-08-13 | End: 2023-08-26

## 2023-08-13 RX ORDER — TRAMADOL HYDROCHLORIDE 50 MG/1
50 TABLET ORAL EVERY 6 HOURS PRN
Status: DISCONTINUED | OUTPATIENT
Start: 2023-08-13 | End: 2023-08-30 | Stop reason: HOSPADM

## 2023-08-13 RX ORDER — TRAMADOL HYDROCHLORIDE 50 MG/1
50 TABLET ORAL EVERY 6 HOURS PRN
Status: CANCELLED | OUTPATIENT
Start: 2023-08-13

## 2023-08-13 RX ORDER — PANTOPRAZOLE SODIUM 40 MG/1
40 TABLET, DELAYED RELEASE ORAL
Status: CANCELLED | OUTPATIENT
Start: 2023-08-14

## 2023-08-13 RX ORDER — BISACODYL 5 MG/1
5 TABLET, DELAYED RELEASE ORAL DAILY PRN
Status: DISCONTINUED | OUTPATIENT
Start: 2023-08-13 | End: 2023-08-30 | Stop reason: HOSPADM

## 2023-08-13 RX ORDER — PANTOPRAZOLE SODIUM 40 MG/1
40 TABLET, DELAYED RELEASE ORAL
Status: DISCONTINUED | OUTPATIENT
Start: 2023-08-14 | End: 2023-08-30 | Stop reason: HOSPADM

## 2023-08-13 RX ADMIN — GABAPENTIN 100 MG: 100 CAPSULE ORAL at 08:32

## 2023-08-13 RX ADMIN — TAMSULOSIN HYDROCHLORIDE 0.4 MG: 0.4 CAPSULE ORAL at 21:33

## 2023-08-13 RX ADMIN — ROPINIROLE HYDROCHLORIDE 0.25 MG: 0.25 TABLET, FILM COATED ORAL at 08:32

## 2023-08-13 RX ADMIN — CARBIDOPA AND LEVODOPA 1 TABLET: 25; 100 TABLET ORAL at 11:31

## 2023-08-13 RX ADMIN — SOTALOL HYDROCHLORIDE 80 MG: 80 TABLET ORAL at 08:32

## 2023-08-13 RX ADMIN — CARBIDOPA AND LEVODOPA 1 TABLET: 25; 100 TABLET ORAL at 17:23

## 2023-08-13 RX ADMIN — ATORVASTATIN CALCIUM 80 MG: 80 TABLET, FILM COATED ORAL at 21:33

## 2023-08-13 RX ADMIN — GABAPENTIN 100 MG: 100 CAPSULE ORAL at 21:33

## 2023-08-13 RX ADMIN — PANTOPRAZOLE SODIUM 40 MG: 40 TABLET, DELAYED RELEASE ORAL at 06:30

## 2023-08-13 RX ADMIN — SERTRALINE 100 MG: 50 TABLET, FILM COATED ORAL at 21:33

## 2023-08-13 RX ADMIN — ROPINIROLE HYDROCHLORIDE 0.25 MG: 0.25 TABLET, FILM COATED ORAL at 15:06

## 2023-08-13 RX ADMIN — ROPINIROLE HYDROCHLORIDE 0.25 MG: 0.25 TABLET, FILM COATED ORAL at 21:33

## 2023-08-13 RX ADMIN — CARBIDOPA AND LEVODOPA 1 TABLET: 25; 100 TABLET ORAL at 21:33

## 2023-08-13 RX ADMIN — GABAPENTIN 100 MG: 100 CAPSULE ORAL at 15:06

## 2023-08-13 RX ADMIN — CARBIDOPA AND LEVODOPA 1 TABLET: 25; 100 TABLET ORAL at 06:30

## 2023-08-13 RX ADMIN — ASPIRIN 81 MG: 81 TABLET, COATED ORAL at 08:32

## 2023-08-13 ASSESSMENT — PAIN SCALES - GENERAL
PAINLEVEL_OUTOF10: 0
PAINLEVEL_OUTOF10: 0

## 2023-08-13 NOTE — PROGRESS NOTES
4 Eyes Skin Assessment     NAME:  Danyelle Grullon OF BIRTH:  1950  MEDICAL RECORD NUMBER:  [id number]    The patient is being assessed for  Admission    I agree that at least one RN has performed a thorough Head to Toe Skin Assessment on the patient. ALL assessment sites listed below have been assessed. Areas assessed by both nurses:    Head, Face, Ears, Shoulders, Back, Chest, Arms, Elbows, Hands, Sacrum. Buttock, Coccyx, Ischium, Legs. Feet and Heels, and Under Medical Devices         Does the Patient have a Wound?  No noted wound(s)       Broderick Prevention initiated by RN: No  Wound Care Orders initiated by RN: No    Pressure Injury (Stage 3,4, Unstageable, DTI, NWPT, and Complex wounds) if present, place Wound referral order by RN under : No    New Ostomies, if present place, Ostomy referral order under : No     Nurse 1 eSignature: Electronically signed by Conner Mansfield RN on 8/13/23 at 1:54 PM EDT    **SHARE this note so that the co-signing nurse can place an eSignature**    Nurse 2 eSignature: Electronically signed by Alma Garcia RN on 8/13/23 at 5:03 PM EDT

## 2023-08-13 NOTE — CARE COORDINATION
08/11/23 0855   Readmission Assessment   Number of Days since last admission? 1-7 days   Previous Disposition Acute Rehab  (pt was rapid response from ARU)   Who is being Interviewed Patient   What was the patient's/caregiver's perception as to why they think they needed to return back to the hospital? Other (Comment)  (rapid response from ARU for rapid heart rate)   Did you visit your Primary Care Physician after you left the hospital, before you returned this time? No   Why weren't you able to visit your PCP? Other (Comment)  (rapid response from ARU)   Did you see a specialist, such as Cardiac, Pulmonary, Orthopedic Physician, etc. after you left the hospital? Other (Comment)  (rapid response from ARU)   Who advised the patient to return to the hospital? Other (Comment)  (rapid response from ARU)   In our efforts to provide the best possible care to you and others like you, can you think of anything that we could have done to help you after you left the hospital the first time, so that you might not have needed to return so soon?  Other (Comment)  (rapid response from ARU transeferred to CVU)
@3892 CM to CVU to deliver IMM. Patient is not in room, already discharged.      Electronically signed by Krystle Zaldivar on 8/13/2023 at 2:38 PM
CM placed call to [place] in [facility] collaborating to inquire if patient is accepted back to return to [facility] reports he waiting for Dr. Yue Pedraza to see patient. CM to see patient for IMM.   Electronically signed by Sidney Dawson on 8/13/2023 at 11:38 AM
Shahid Salas and his family were provided with a choice of provider and agrees with the discharge plan. Freedom of choice list with basic dialogue that supports the patient's individualized plan of care/goals and shares the quality data associated with the providers was provided to: Patient   Patient Representative Name:       The Patient and/or Patient Representative Agree with the Discharge Plan?  Yes    Alejandra Zambrano RN, BSN  [phone]

## 2023-08-13 NOTE — CONSULTS
William Davidson  8/13/2023  [id number]    Chief Complaint: CVA (cerebrovascular accident due to intracerebral hemorrhage) ([address])    Subjective:   William Davidson is a 68 y.o. male who presented to [facility] 8/4 with encephalopathy and found to have significant DEBO. PMH reflects PD, CAD, Afib. 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 He is agreeable to [facility] and wants to work in therapy before planned Dc home with his wife. DC from ARU due to irregular heart beat. He is now improved and wants to come back to ARU.     ROS: No CP, SOB, dyspnea    Objective:  No data found. Gen: No distress, pleasant. HEENT: Normocephalic, atraumatic. CV: No audible murmurs, well perfused extremities  Resp: No respiratory distress. No increased WOB  Abd: Soft, nontender nondistended  Ext: No edema. Neuro: Alert, oriented, appropriately interactive. Laboratory data: Available via EMR. Therapy progress:    PT    Rolling: Level of difficulty -     Sit to Stand from a Chair: Level of difficulty -    Supine to Sit: Level of difficulty -       Bed to Chair: Physical Assistance Required -    Ambulate Across Room: Physical Assistance Required -    Ascend 3-5 Steps With HR: Physical Assistance Required -      OT    Eating: Physical Assistance Required -    Grooming: Physical Assistance Required -    LB Dressing: Physical Assistance Required -    UB Dressing: Physical Assistance Required -    Bathing: Physical Assistance Required -    Toileting: Physical Assistance Required -      SLP         There is no height or weight on file to calculate BMI.     Assessment:  Patient Active Problem List   Diagnosis    Dupuytren's contracture    Dementia due to Parkinson's disease without behavioral disturbance (HCC)    Carpal tunnel syndrome    Lesion of ulnar nerve    SVT (supraventricular tachycardia) (HCC)    PAF (paroxysmal atrial fibrillation) ([address])

## 2023-08-14 LAB
ANION GAP SERPL CALCULATED.3IONS-SCNC: 10 MMOL/L (ref 3–16)
BASOPHILS # BLD: 0 K/UL (ref 0–0.2)
BASOPHILS NFR BLD: 0.4 %
BUN SERPL-MCNC: 42 MG/DL (ref 7–20)
CALCIUM SERPL-MCNC: 8.3 MG/DL (ref 8.3–10.6)
CHLORIDE SERPL-SCNC: 97 MMOL/L (ref 99–110)
CO2 SERPL-SCNC: 20 MMOL/L (ref 21–32)
CREAT SERPL-MCNC: 1.1 MG/DL (ref 0.8–1.3)
DEPRECATED RDW RBC AUTO: 14.7 % (ref 12.4–15.4)
EOSINOPHIL # BLD: 0.2 K/UL (ref 0–0.6)
EOSINOPHIL NFR BLD: 2.6 %
GFR SERPLBLD CREATININE-BSD FMLA CKD-EPI: >60 ML/MIN/{1.73_M2}
GLUCOSE SERPL-MCNC: 93 MG/DL (ref 70–99)
HCT VFR BLD AUTO: 36.1 % (ref 40.5–52.5)
HGB BLD-MCNC: 12.1 G/DL (ref 13.5–17.5)
LYMPHOCYTES # BLD: 0.6 K/UL (ref 1–5.1)
LYMPHOCYTES NFR BLD: 9.3 %
MCH RBC QN AUTO: 31 PG (ref 26–34)
MCHC RBC AUTO-ENTMCNC: 33.4 G/DL (ref 31–36)
MCV RBC AUTO: 92.7 FL (ref 80–100)
MONOCYTES # BLD: 0.7 K/UL (ref 0–1.3)
MONOCYTES NFR BLD: 9.8 %
NEUTROPHILS # BLD: 5.4 K/UL (ref 1.7–7.7)
NEUTROPHILS NFR BLD: 77.9 %
PLATELET # BLD AUTO: 147 K/UL (ref 135–450)
PMV BLD AUTO: 8.1 FL (ref 5–10.5)
POTASSIUM SERPL-SCNC: 4.5 MMOL/L (ref 3.5–5.1)
RBC # BLD AUTO: 3.89 M/UL (ref 4.2–5.9)
SODIUM SERPL-SCNC: 127 MMOL/L (ref 136–145)
WBC # BLD AUTO: 6.9 K/UL (ref 4–11)

## 2023-08-14 PROCEDURE — 2580000003 HC RX 258: Performed by: INTERNAL MEDICINE

## 2023-08-14 PROCEDURE — 97162 PT EVAL MOD COMPLEX 30 MIN: CPT

## 2023-08-14 PROCEDURE — 97164 PT RE-EVAL EST PLAN CARE: CPT

## 2023-08-14 PROCEDURE — 36415 COLL VENOUS BLD VENIPUNCTURE: CPT

## 2023-08-14 PROCEDURE — 2580000003 HC RX 258: Performed by: PHYSICAL MEDICINE & REHABILITATION

## 2023-08-14 PROCEDURE — 92523 SPEECH SOUND LANG COMPREHEN: CPT

## 2023-08-14 PROCEDURE — 97168 OT RE-EVAL EST PLAN CARE: CPT

## 2023-08-14 PROCEDURE — 80048 BASIC METABOLIC PNL TOTAL CA: CPT

## 2023-08-14 PROCEDURE — 51702 INSERT TEMP BLADDER CATH: CPT

## 2023-08-14 PROCEDURE — 92610 EVALUATE SWALLOWING FUNCTION: CPT

## 2023-08-14 PROCEDURE — 97530 THERAPEUTIC ACTIVITIES: CPT

## 2023-08-14 PROCEDURE — 6370000000 HC RX 637 (ALT 250 FOR IP): Performed by: PHYSICAL MEDICINE & REHABILITATION

## 2023-08-14 PROCEDURE — 6360000002 HC RX W HCPCS: Performed by: PHYSICAL MEDICINE & REHABILITATION

## 2023-08-14 PROCEDURE — 85025 COMPLETE CBC W/AUTO DIFF WBC: CPT

## 2023-08-14 PROCEDURE — 1280000000 HC REHAB R&B

## 2023-08-14 PROCEDURE — 97535 SELF CARE MNGMENT TRAINING: CPT

## 2023-08-14 PROCEDURE — 97116 GAIT TRAINING THERAPY: CPT

## 2023-08-14 RX ORDER — SODIUM CHLORIDE 9 MG/ML
INJECTION, SOLUTION INTRAVENOUS CONTINUOUS
Status: DISCONTINUED | OUTPATIENT
Start: 2023-08-14 | End: 2023-08-16

## 2023-08-14 RX ADMIN — GABAPENTIN 100 MG: 100 CAPSULE ORAL at 21:37

## 2023-08-14 RX ADMIN — ASPIRIN 81 MG: 81 TABLET, COATED ORAL at 08:20

## 2023-08-14 RX ADMIN — ATORVASTATIN CALCIUM 80 MG: 80 TABLET, FILM COATED ORAL at 21:37

## 2023-08-14 RX ADMIN — ROPINIROLE HYDROCHLORIDE 0.25 MG: 0.25 TABLET, FILM COATED ORAL at 21:37

## 2023-08-14 RX ADMIN — ROPINIROLE HYDROCHLORIDE 0.25 MG: 0.25 TABLET, FILM COATED ORAL at 14:25

## 2023-08-14 RX ADMIN — ENOXAPARIN SODIUM 40 MG: 100 INJECTION SUBCUTANEOUS at 08:20

## 2023-08-14 RX ADMIN — SODIUM CHLORIDE: 9 INJECTION, SOLUTION INTRAVENOUS at 16:49

## 2023-08-14 RX ADMIN — SODIUM CHLORIDE, PRESERVATIVE FREE 10 ML: 5 INJECTION INTRAVENOUS at 08:21

## 2023-08-14 RX ADMIN — CARBIDOPA AND LEVODOPA 1 TABLET: 25; 100 TABLET ORAL at 18:47

## 2023-08-14 RX ADMIN — ACETAMINOPHEN 650 MG: 325 TABLET ORAL at 16:55

## 2023-08-14 RX ADMIN — TRAMADOL HYDROCHLORIDE 50 MG: 50 TABLET, COATED ORAL at 21:37

## 2023-08-14 RX ADMIN — SOTALOL HYDROCHLORIDE 80 MG: 80 TABLET ORAL at 08:20

## 2023-08-14 RX ADMIN — ACETAMINOPHEN 650 MG: 325 TABLET ORAL at 05:20

## 2023-08-14 RX ADMIN — SERTRALINE 100 MG: 50 TABLET, FILM COATED ORAL at 21:37

## 2023-08-14 RX ADMIN — ROPINIROLE HYDROCHLORIDE 0.25 MG: 0.25 TABLET, FILM COATED ORAL at 08:20

## 2023-08-14 RX ADMIN — GABAPENTIN 100 MG: 100 CAPSULE ORAL at 14:25

## 2023-08-14 RX ADMIN — CARBIDOPA AND LEVODOPA 1 TABLET: 25; 100 TABLET ORAL at 14:25

## 2023-08-14 RX ADMIN — CARBIDOPA AND LEVODOPA 1 TABLET: 25; 100 TABLET ORAL at 05:12

## 2023-08-14 RX ADMIN — GABAPENTIN 100 MG: 100 CAPSULE ORAL at 08:20

## 2023-08-14 RX ADMIN — CARBIDOPA AND LEVODOPA 1 TABLET: 25; 100 TABLET ORAL at 11:36

## 2023-08-14 RX ADMIN — TAMSULOSIN HYDROCHLORIDE 0.4 MG: 0.4 CAPSULE ORAL at 21:38

## 2023-08-14 RX ADMIN — PANTOPRAZOLE SODIUM 40 MG: 40 TABLET, DELAYED RELEASE ORAL at 05:12

## 2023-08-14 ASSESSMENT — PAIN SCALES - GENERAL
PAINLEVEL_OUTOF10: 0
PAINLEVEL_OUTOF10: 7
PAINLEVEL_OUTOF10: 0
PAINLEVEL_OUTOF10: 3
PAINLEVEL_OUTOF10: 0

## 2023-08-14 ASSESSMENT — PAIN DESCRIPTION - ORIENTATION
ORIENTATION: LOWER
ORIENTATION: POSTERIOR;ANTERIOR

## 2023-08-14 ASSESSMENT — PAIN DESCRIPTION - DESCRIPTORS
DESCRIPTORS: ACHING;SPASM
DESCRIPTORS: ACHING

## 2023-08-14 ASSESSMENT — PAIN - FUNCTIONAL ASSESSMENT
PAIN_FUNCTIONAL_ASSESSMENT: ACTIVITIES ARE NOT PREVENTED
PAIN_FUNCTIONAL_ASSESSMENT: ACTIVITIES ARE NOT PREVENTED

## 2023-08-14 ASSESSMENT — PAIN DESCRIPTION - LOCATION
LOCATION: HEAD
LOCATION: BACK

## 2023-08-14 NOTE — PROGRESS NOTES
[address] [facility] Department   Phone: [phone]    Occupational Therapy    [x] Initial Evaluation            [] Daily Treatment Note         [] Discharge Summary      Patient: Audra Carcamo   : 1950   MRN: [id number]   Date of Service:  2023    Admitting Diagnosis:  CVA (cerebrovascular accident due to intracerebral hemorrhage) [facility])  Current Admission Summary: Audra Carcamo is a 68 y.o. male who presented to [facility]  with encephalopathy and found to have significant DEBO. PMH reflects PD, CAD, Afib. 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 Return to [facility] post acute off for irregular HR. MRI brain : 2 punctate acute infarcts in the left middle cerebral artery territory. Past Medical History:  has a past medical history of Atrial fibrillation ([address]), No significant past medical history, and Parkinson disease ([address]). Past Surgical History:  has a past surgical history that includes Coronary artery bypass graft (2018) and Atrial ablation surgery (2018). Discharge Recommendations: HH OT S3; PRN assist from family    DME Required For Discharge: DME to be determined pending patient progress    Precautions/Restrictions: high fall risk  Weight Bearing Restrictions: no restrictions       Required Braces/Orthotics: no braces required     Positional Restrictions:no positional restrictions      Pre-Admission Information   Lives With: spouse                  Type of Home: house  Home Layout: one level; finished basement with full flight of steps with 1 HR  Home Access: 1 step to enter with handrail. Handrails are located on R side.   Bathroom Layout: walk in shower, curtain   Bathroom Equipment: shower chair, hand held shower head, portable/suction cup grab bars, small built in shower shelf (wife reports it's too small to sit on)   Toilet Height: standard height  Home independent   Patient will complete grooming at modified independent   Patient will complete functional transfers at modified independent   Patient will complete functional mobility at modified independent   Patient to gather and transport IADL items at modified independent      Above goals reviewed on 8/14/2023. All goals are ongoing at this time unless indicated above.      Therapy Session Time     Individual Group Co-treatment   Time In  0915      Time Out  1015      Minutes  60           Timed Code Treatment Minutes:   45  Total Treatment Minutes:  60       Electronically Signed By: Lauren Triana, [phone] OTR/L #[id number]

## 2023-08-14 NOTE — PLAN OF CARE
Problem: Discharge Planning  Goal: Discharge to home or other facility with appropriate resources  8/14/2023 1035 by Lolita Galarza RN  Outcome: Progressing     Problem: Safety - Adult  Goal: Free from fall injury  8/14/2023 1035 by Lolita Galarza RN  Outcome: Progressing     Problem: Pain  Goal: Verbalizes/displays adequate comfort level or baseline comfort level  8/14/2023 1035 by Lolita Galarza RN  Outcome: Progressing

## 2023-08-14 NOTE — PROGRESS NOTES
Morning assessment completed, bp elevated in the morning, schedule meds given, denies any pain, The care plan and education has been reviewed and mutually agreed upon with the patient. Pt remains free from falls. Fall precautions in place--bed in lowest position, call light within reach, bed alarm in use. Pt aware to call for assistance before getting up.

## 2023-08-14 NOTE — PATIENT CARE CONFERENCE
[facility]  Inpatient Rehabilitation  Weekly Team Conference Note    Patient Name: Dhara Emmanuel        MRN: [id number]    : 1950  (68 y.o.)  Gender: male           The team conference for this patient was held on 8/15/23 at 80 am and led by:  Zachary Fuentes. DO [facility]     CASE MANAGEMENT:  Assessment:   Patient is a 68year old male who admitted to ARU on 2023 with the admitting diagnosis of CVA (cerebrovascular accident due to intracerebral hemorrhage) ([address] Patient resides at home with his spouse. Patient was independent with ADLs and IADLs prior to admission. Patient would benefit from skilled PT/OT/SLP to promote increased safety and independence in order to return to his prior level of functioning. Patient anticipates discharging to home with home health care services and recommended DME. PHYSICAL THERAPY:    Transfers:  Sit to stand transfer: minimal assistance  Stand to sit transfer: minimal assistance  Comments: from recliner, pt stood using RW; increased time required for motor planning and sequencing; VC and tactile cues given for hand placement and positioning. Pt stood without RW     Occasional posterior lean and needs tactile cues to upright self. Does better with vela, simple commands : \"Stand-up\" or \"Sit down\"  Ambulation:  Surface:level surface  Assistive Device: rolling walker  Assistance: minimal assistance, moderate assistance  Distance: 133' x 1  Gait Mechanics: Step to and step through gait pattern demonstrating shuffling gait with varying step length, decreased step height. Slight forward flexion and flexed hips and knees   Comments: increased time required and slow jasmin demonstrated, Difficulty processing directions,especially complex.   90 and 180 degree turns x 2  Ambulation 2:  Surface:carpet  Assistive Device:rolling walker  Assistance: contact guard assistance  Distance: 20 ft  Gait Mechanics: same mechanics as level surface with decreased Interventions to address Barriers:  ADL training, AE training, strengthening and ROM  Goals still appropriate:  Yes  Modifications to goals: None  Continue Current Plan of Care:  Yes  Modifications to Plan of Care: None    Rehab Team Members in attendance for Team Conference:  Cristhian Clemons MSW, LSW    Vishal Watkins, RD, LD    Colon Candle, OTR/L    Jordan Woodard, PT, DPT    Ash So M.A., CCC-SLP    Akshat Hill, BSN, RN, CRRN (Charge RN)    COLETTE Duarte PT, DPT,     I, the Rehab Physician, led the above team conference and agree with all decisions made, and approve the established interdisciplinary plan of care as documented within the medical record of Julia Frost. Alee Humphrey.  [facility], DO

## 2023-08-14 NOTE — PROGRESS NOTES
[address] [facility] Department   Phone: [phone]    Physical Therapy    [x] Re- Evaluation            [] Daily Treatment Note         [] Discharge Summary      Patient: Thor Rangel   : 1950   MRN: [id number]   Date of Service:  2023  Admitting Diagnosis: CVA (cerebrovascular accident due to intracerebral hemorrhage) [facility])    Current Admission Summary: 68 y.o. male who presented to [facility]  with encephalopathy and found to have significant DEBO. PMH reflects PD, CAD, Afib. 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 He is agreeable to ARU and wants to work in therapy before planned Dc home with his wife. MRI brain : 2 punctate acute infarcts in the left middle cerebral artery territory. ? Rotator cuff tear in (R) UE: per ortho appropriate for WBAT/ROM as tolerated  Pt. Transferred to Acute care on 23 d/t increased heart rate. Interruption to care. Past Medical History:  has a past medical history of Atrial fibrillation ([address]), No significant past medical history, and Parkinson disease ([address]). Past Surgical History:  has a past surgical history that includes Coronary artery bypass graft (2018) and Atrial ablation surgery (2018). Discharge Recommendations: Home with HHPT  DME Required For Discharge: DME to be determined pending patient progress  Precautions/Restrictions: high fall risk  Weight Bearing Restrictions: weight bearing as tolerated; per ortho note \"WBAT/ROM as tolerated\"  [x] Right Upper Extremity  [] Left Upper Extremity [] Right Lower Extremity  [] Left Lower Extremity     Required Braces/Orthotics: no braces required   [] Right  [] Left  Positional Restrictions:no positional restrictions    Pre-Admission Information   Lives With: spouse                  Type of Home: house  Home Layout: one level  Home Access: 1 step to enter with handrail.  Handrails surface with decreased jasmin, smaller step lengths , posterior lean with the turns  Comments:  Stair Mobility:  Number of Steps: 4  Step Height: 4 inch  Hand Rails: (B) handrail  Device: no device  Assistance: moderate assistance  Comments: Reciprocal steps going up and step to going down, needed cuing to get feet fully on the step. Difficulty completing turn at the top of the steps on the platform. Difficulty processing PT command to side step, leaned to the R unsafely in attempt to get R UE to the rail. Wheelchair Mobility:  No w/c mobility completed on this date. Comments:  Balance:  Static Sitting Balance: fair: maintains balance at CGA without use of UE support  Dynamic Sitting Balance: fair: maintains balance at CGA without use of UE support  Static Standing Balance: poor (+): requires min (A) to maintain balance  Dynamic Standing Balance: poor: requires mod (A) to maintain balance  Patient completes object retrieval from floor in standing position at not attempted d/t safety concerns  Other Therapeutic Interventions    Functional Outcomes                 Cognition  Overall Cognitive Status: WFL  Arousal/Alertness: appropriate responses to stimuli  Following Commands: follows all commands without difficulty  Memory: decreased short term memory  Safety Judgement: decreased awareness of need for assistance, decreased awareness of need for safety  Problem Solving: assistance required to implement solutions, decreased awareness of errors  Initiation: requires cues for some  Sequencing: requires cues for some  Comments: Pt requires increased time for all tasks and some reminders/repeated instructions for some. Pt. Appeared to demonstrate motor apraxia. R UE demonstrated pushing from bed appropriately with an automatic scoot, occasionally responded appropriately grabbing walker. Functional movement improved with quick/simple commands.     Orientation:    oriented to person, oriented to place, oriented to ambulate 270 ft with use of LRAD with Supervision  Patient will ascend/descend 12 stairs with (R) ascending handrail at Supervision  Patient will complete car transfer at Supervision       Above goals reviewed on 8/14/2023. All goals are ongoing at this time unless indicated above.     Therapy Session Time     Second Session Therapy Time:   Individual Concurrent Group Co-treatment   Time In  8065         Time Out  1335         Minutes  60           Timed Code Treatment Minutes:  45    Total Treatment Minutes:   60       Electronically Signed By: Mitul Hurley, [address] [place] [address] [id number]

## 2023-08-14 NOTE — PLAN OF CARE
[facility]U PATIENT TREATMENT PLAN  [facility]  [address], [address]  Sabrina    : 1950  Acct #: [de-identified]  MRN: [id number]  PHYSICIAN:  Flor Wall DO  Primary Problem    Patient Active Problem List   Diagnosis    Dupuytren's contracture    Dementia due to Parkinson's disease without behavioral disturbance (HCC)    Carpal tunnel syndrome    Lesion of ulnar nerve    SVT (supraventricular tachycardia) (HCC)    PAF (paroxysmal atrial fibrillation) (HCC)    Typical atrial flutter (HCC)    Arthralgia of right hip    Coronary artery disease involving native heart without angina pectoris    Hypercholesteremia    Allergic rhinitis    BPH with obstruction/lower urinary tract symptoms    Tachycardia-bradycardia syndrome (HCC)    PLMD (periodic limb movement disorder)    Acute renal failure (HCC)    Bilateral hydronephrosis    Parsonage-Ho syndrome    Post herpetic neuralgia    Benign essential HTN    Arterial ischemic stroke, ICA, left, acute (HCC)    Anemia    Parkinson's disease ([address])    CVA (cerebrovascular accident due to intracerebral hemorrhage) ([address])       Rehabilitation Diagnosis:      CVA- small infarcts in the left middle cerebral artery territory: Continue statin and dual antiplatelet therapy with aspirin and Plavix for 21 days after which she will resume home dose of aspirin       - PT/OT     BPH with obstruction  -Maintain Baig On release and follow-up with urology as an outpatient.  -Flomax continued     CAD  Continue ASA, Lipitor and Sotalol         S/P CABG x 4     pAfib  Continue Sotalol  S/P MAZE, LANDON 35 mm clip 2018 by Dr. Charleen Howard      Parkinson's disease  Continue Sinemet  PT/OT    ADMIT DATE:2023    Patient Goals:  Get back home, Get back to walking normal; Increased IND in self-care.   Admitting Impairments: Stroke - 1.2 - Right Body Involvement (Left Brain)  Activities: Impaired Eating, Hygiene, Toileting, Bathing, Physician anticipated functional outcomes:  By discharge, patient will progress to being supervision - independent with all mobility and activities. IPOC brief synthesis: Khurram Taylor is a 68 y.o. male who presented to [facility] 8/4 with encephalopathy and found to have significant DEBO. I have reviewed this initial plan of care and agree with its contents:    Title   Name    Date    Time    Physician:   Malena Palafox D.O. M.P.H  PM&R  8/14/2023  4:10 PM    Case Mgmt: Compa Bautista MSW, LSW, 8/14/2023 @ 10:26. OT: Darrius Rico OTR/L #[id number], [id number], 8/14/2023    PT: Mary Carmen Bell, [place], [id number], 0807, 8/14/2023    RN: Kia Solorzano, 8/14/2023, [place].     ST: Eunice Burnett, [place] CCC-SLP 8/14/2023 8829    :  Lorna Gomes PT, [place] [id number]  8/14/23  2:02 PM

## 2023-08-14 NOTE — PROGRESS NOTES
Admission Period/Goal QM Codes for Thor Dus. QM Admit Code Goal Code   Eating 5 6   Oral Hygiene 2 6   Toileting Hygiene 2 6   Shower/Bathing 2 6   UB Dressing 2 6   LB Dressing 2 6   Putting on/off Footwear 2 6   Rolling Left and Right 3 4   Sit To Lying 2 4   Lying to Sitting on Bedside 2 4   Sit to Stand 2 5   Chair/Bed to Chair Transfer 4 5   Toilet Transfers 2 6   Car Transfers 4 4   Walk 10 Feet 4 5   Walk 50 Feet with Two Turns 4 5   Walk 150 Feet 88 5   Walk 10 Feet on Uneven Surfaces 4 5   1 Step (Curb) 4 5   4 Steps 4 5   12 Steps 88 5   Picking up Object from Floor 4 5   Wheel 50 Feet with 2 Turns 9 -   Type - -   Wheel 150 Feet 9 -   Type - -       The above codes were determined by the treatment team to be the patient's accurate admission assessment codes based on assessment performed soon after the patient's admission and prior to the benefit of services provided by staff, or if appropriate, the patient's usual performance at admission.     OT:  Cassy Avendano, OTR/L #[id number], [phone], 8/15/2023     PT:  Carlos Ivy, PT, [id number], 8/14/2023     RN:  CHERI HardwickN, RN 08/15/2023, 1020     ST:  Yosvany Cortes CCC-SLP 8/14/2023 [id number]     :  Gio Rios PT, DPT [id number]  8/15/23  2:21 PM

## 2023-08-14 NOTE — PROGRESS NOTES
ARU Admission Assessment    Ethnicity  \"Are you of , /a, or Monegasque origin? \"  Check all that apply:  [x] A. No, not of , /a, or [place] Origin  [] B.  Yes, [place] American, Chicano/a  [] C.  Yes, [facility]  [] D.  Yes, [place]  [] E.  Yes, another , , or Monegasque origin  [] X. Patient unable to respond  [] Y. Patient declines to respond    Race  \"What is your race? \"  Check all that apply:  [x] A. White  [] B. Black or   [] C. American Suzanne or [place] Native  [] D.  Suzanne  [] E. [place]  [] F. Colombian  [] G. [place]  [] Lerry Fruit  [] I. [place]  [] J.  Other   [] K.   [] L. Armenian or Hetal  [] M. Maltese  [] N. Other -[address] [place] [address]  [] X. Patient unable to respond  [] Y. Patient declines to respond  [] Z. None of the above    Language  A. \"What is your preferred language? \"   English    B. \"Do you need or want an  to communicate with a doctor or health care staff? \"  Check only one:  [x] 0. No  [] 1. Yes  [] 9. Unable to determine    Transportation  \"Has lack of transportation kept you from medical appointments, meetings, work, or from getting things needed for daily living? \"Check all that apply:  [] A.  Yes, it has kept me from medical appointments or from getting my medications  [] B.  Yes, it has kept me from non-medical meetings, appointments, work, or from getting things that I need  [x] C.  No  [] X. Patient unable to respond  [] Y. Patient declines to respond    Hearing  Ability to hear (with hearing aid or hearing appliances if normally used)  [x]  0. Adequate - no difficulty in normal conversation, social interaction, listening to TV  []  1. Minimal difficulty - difficulty in some environments (e.g. when person speaks softly or setting is noisy)  []  2. Moderate difficulty - speaker has to increase volume and speak distinctly   []  3.   Highly impaired - absence of the above    High Risk Drug Classes:  Use and Indication    Is taking: Check if the pt is taking any medications by pharmacological classification, not how it is used, in the following classes  Indication noted: If column 1 is checked, check if there is an indication noted for all meds in the drug class Is taking  (check all that apply) Indication noted (check all that apply)   Antipsychotic [] []   Anticoagulant [x] [x]   Antibiotic [] []   Opioid [x] [x]   Antiplatelet [x] [x]   Hypoglycemic (including insulin) [] []   None of the above []     Special Treatments, Procedures, and Programs    Check all of the following treatments, procedures, and programs that apply on admission. On admission (check all that apply)   Cancer Treatments   A1. Chemotherapy []           A2. IV []           A3. Oral []           A10. Other []   B1. Radiation []   Respiratory Therapies   C1. Oxygen Therapy []           C2. Continuous (continuously for at least 14 hours per day) []           C3. Intermittent []           C4. High-concentration []   D1. Suctioning (Does not include oral suctioning) []           D2. Scheduled []           D3. As needed []   E1. Tracheostomy Care []   F1. Invasive Mechanical Ventilator (ventilator or respirator) []   G1. Non-invasive Mechanical Ventilator []           G2. BiPAP []           G3. CPAP []   Other   H1. IV Medications (Do not include sub Q pumps, flushes, Dextrose 50% or lactated ringers) []           H2. Vasoactive medications []           H3. Antibiotics []           H4. Anticoagulation []           H10. Other []   I1. Transfusions []   J1. Dialysis []           J2. Hemodialysis []           J3. Peritoneal dialysis []   O1. IV access (including a catheter in a vein) []           O2. Peripheral [x]           O3. Midline []           O4.  Central (PICC, tunneled, port) []      None of the above (select if no Cancer, Respiratory, or Other boxes are checked) []     The above items have been reviewed

## 2023-08-14 NOTE — CONSULTS
Ada Ormond, MD Meryl Plain, MD Roselyn Petite, MD                  Office: [phone]                      Fax: [phone] [address]                   NEPHROLOGY INITIAL CONSULT NOTE:     PATIENT NAME: David Jackson  : 1950  MRN: [id number]  REASON FOR CONSULT: For evaluation and management of hyponatremia. (My recommendations will be communicated by way of shared medical record.)      RECOMMENDATIONS:   - Start normal saline for maintenance IV fluid  - Follow-up labs daily,  - Due to slight overcorrection needs to be monitored closely    -With underlying SIADH is possible also, continue*Pain control  - If able to, minimize SSRIs such as currently Zoloft  - argueta insertion needed. Follow-up with urology,  - Continue Flomax at night  -no need for dialysis,    -at higher risk for decompensation, needing closer monitoring. D/C plan from renal stand point:  - likely ~2-3 days  -Follow-up with rehab plans also. IMPRESSION:       Admitted on:  2023 12:43 PM   For:  CVA (cerebrovascular accident due to intracerebral hemorrhage) ([address]) [I61.9]  No diagnosis found.      Hyponatremia, worsening, chronic,  Likely with underlying hypovolemia currently   No major neurological effect,  With underlying Parkinson, monitor closely      Recently admitted at [facility]  At that time-acute kidney injury, severe, had creatinine 8.9 on admission,  - With baseline creatinine 0.9, as of   - Thought to be due to obstructive uropathy, with BPH, bladder retention, and some component of hypovolemia  - Creatinine improved   -Currently creatinine around 0.9-1.1, without DEBO        Associated problems:   - Volume status: euvolemic  : HTN : no need for tight control   - Azotemia: pre-renal +  - Electrolytes: K: WNL  - Acid-Base: Acidosis, non-anion gap, metabolic, mild,  - Anemia: Mild, likely chronic disease      Other major problems: Management per primary and other consulting unselect if not a suspected or confirmed emergency medical condition->Emergency Medical Condition (MA) Reason for Exam: Abnormal Lab (Called by family doc regarding lab work done this morning with critically high BUN and Cr levels. Recently completed treatment for shingles. Wife states pt has been confused, wetting self at night, diarrhea.); ORDERING SYSTEM PROVIDED HISTORY: altered mental status TECHNOLOGIST PROVIDED HISTORY: Reason for exam:->altered mental status Has a \"code stroke\" or \"stroke alert\" been called? ->No Decision Support Exception - unselect if not a suspected or confirmed emergency medical condition->Emergency Medical Condition (MA) Reason for Exam: Abnormal Lab (Called by family doc regarding lab work done this morning with critically high BUN and Cr levels. Recently completed treatment for shingles. Wife states pt has been confused, wetting self at night, diarrhea.) FINDINGS: CT head: No evidence of intra or extra-axial hemorrhage. No evidence of masses or mass effects or shifts. Ventricular system is normal and symmetrical. The skull, paranasal sinuses and orbits appear unremarkable. CT abdomen and pelvis: Lower Chest: No active disease. Organs: The liver demonstrates multiple hypodense lesions which have the density of cysts. The largest in the right lobe measures 2.7 cm. Gallbladder, pancreas, spleen, adrenals, aorta, and IVC appear stable. Bilateral hydronephrosis and hydroureter. Prostate is enlarged measuring 5.4 x 4.7 cm. The urinary bladder is distended. No focal thickening. Hydronephrosis is not related to ureteric stones or UV junction abnormality. There could be reflux. GI/Bowel: No evidence of bowel obstruction or perforation. Normal appendix. Mild constipation. Pelvis: As described, the urinary bladder is distended extending into the lower abdomen. No focal thickening. UV junctions appear unremarkable. Prostate is enlarged measuring 5.4 x 4.7 cm.   No evidence of pelvic

## 2023-08-14 NOTE — PROGRESS NOTES
Nutrition Note    RECOMMENDATIONS  PO Diet: Regular, no caffeine  ONS: N/A     NUTRITION ASSESSMENT   Pt was d/c'd from ARU d/t rapid response x2 on 8/11. Remains on a regular diet with po intake consistently greater than 75% of meals. No wt loss to report, with  lb per standing scale. Pt is at low risk for nutrition compromise. Nutrition Related Findings: LBM 8/12; trace edema BUE; +1-+2 epitting edema BLE. Na 127  Wounds: None  Nutrition Education:  Education not indicated   Nutrition Goals: PO intake 75% or greater     MALNUTRITION ASSESSMENT      Malnutrition Status: No malnutrition    NUTRITION DIAGNOSIS   No nutrition diagnosis at this time     CURRENT NUTRITION THERAPIES  ADULT DIET; Regular; No Caffeine     PO Intake: %   PO Supplement Intake:None Ordered    ANTHROPOMETRICS  Current Height: 6' (182.9 cm)  Current Weight - Scale: 205 lb 12.8 oz (93.4 kg)    Ideal Body Weight (IBW): 178 lbs  (81 kg)        BMI:  27.91    The patient will be monitored per nutrition standards of care. Consult dietitian if additional nutrition interventions are needed prior to RD reassessment.      Katiuska Moreno RD, LD    Contact: [phone]

## 2023-08-14 NOTE — PLAN OF CARE
Problem: Discharge Planning  Goal: Discharge to home or other facility with appropriate resources  Outcome: Progressing  Flowsheets  Taken 8/13/2023 2130 by Aniyah Ryan RN  Discharge to home or other facility with appropriate resources: Identify barriers to discharge with patient and caregiver  Taken 8/13/2023 1322 by Shanell Hill RN  Discharge to home or other facility with appropriate resources: Refer to discharge planning if patient needs post-hospital services based on physician order or complex needs related to functional status, cognitive ability or social support system     Problem: Safety - Adult  Goal: Free from fall injury  8/14/2023 0011 by Aniyah Ryan RN  Outcome: Progressing  Flowsheets (Taken 8/14/2023 0008)  Free From Fall Injury: Instruct family/caregiver on patient safety  8/13/2023 1456 by Shanell Hill RN  Outcome: Progressing     Problem: Pain  Goal: Verbalizes/displays adequate comfort level or baseline comfort level  8/14/2023 0011 by Aniyah Ryan RN  Outcome: Progressing  Flowsheets (Taken 8/13/2023 2130)  Verbalizes/displays adequate comfort level or baseline comfort level: Encourage patient to monitor pain and request assistance  8/13/2023 1456 by Shanell Hill RN  Outcome: Progressing     Problem: Chronic Conditions and Co-morbidities  Goal: Patient's chronic conditions and co-morbidity symptoms are monitored and maintained or improved  8/14/2023 0011 by Aniyah Ryan RN  Outcome: Progressing  Flowsheets (Taken 8/13/2023 2130)  Care Plan - Patient's Chronic Conditions and Co-Morbidity Symptoms are Monitored and Maintained or Improved: Monitor and assess patient's chronic conditions and comorbid symptoms for stability, deterioration, or improvement  8/13/2023 1456 by Shanell Hill RN  Outcome: Progressing     Problem: ABCDS Injury Assessment  Goal: Absence of physical injury  Outcome: Progressing  Flowsheets (Taken 8/14/2023 0008)  Absence of Physical Injury:

## 2023-08-14 NOTE — PROGRESS NOTES
[address] [facility] Department   Phone: [phone]    Speech Therapy    [x] Initial Evaluation            [] Daily Treatment Note         [] Discharge Summary      Patient: Danielle May   : 1950   MRN: [id number]   Date of Service:  2023  Admitting Diagnosis: CVA (cerebrovascular accident due to intracerebral hemorrhage) [facility])  Current Admission Summary: Danielle May is a 68 y.o. male who presented to [facility]  with encephalopathy and found to have significant DEBO. PMH reflects PD, CAD, Afib. 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 He is agreeable to Aru and wants to work in therapy before planned Dc home with his wife. DC from ARU due to irregular heart beat. He is now improved and wants to come back to ARU. Past Medical History:  has a past medical history of Atrial fibrillation ([address]), No significant past medical history, and Parkinson disease ([address]). Past Surgical History:  has a past surgical history that includes Coronary artery bypass graft (2018) and Atrial ablation surgery (2018). Recent MRI Brain: 23  IMPRESSION:  2 punctate acute infarcts in the left middle cerebral artery territory. Precautions/Restrictions: high fall risk      Pre-Admission Information   Living Status: Pt lives at home with wife. Pt's wife encourages pt to stay physically and mentally active but provides assistance. They travel to [place] during the winter months. They have two adult children who live locally and 6 grandchildren.    Occupation/School: Retired 4 years ago from [employer]  Medication Management: :  []Primary   [x]Secondary (wife sets up pill organizer and sets alarms; pt familiar with medication list) []No   Finance Management: []Primary   [x]Secondary (wife uses electronic bill pay and reviews payments each month to keep pt active in lists)  []No   Active :   []Yes (Assessment of Language-Related Functional Activities) were facilitated   - Telling time: 50% (5/10)   - Counting money: 20% (1/5)   Pt benefited from mod cues (simplification, repetition, written cues) for correction of errors. Pt was insightful to level of difficulty and feels it is more difficult s/p CVA. Education  Barriers To Learning: cognition  Patient Education: Provided education regarding role of SLP, results of assessment, recommendations and general speech pathology plan of care. Learning Assessment: Pt verbalized understanding   Pt requires ongoing learning     Assessment  Impairments Requiring Therapeutic Intervention: Dysarthria, Cognitive-Linguistic Deficits , Speech-Language Deficits  Prognosis: good    Clinical Assessment:   Pt presents with mild-mod cognitive-linguistic deficits characterized by difficulty with immediate/working memory, short-term memory, attention, problem solving, delayed processing and executive function. Pt benefited from mod cues via simplifications and repetition of task questions/directives. Language skills are limited are characterized by reduced initiation and reduced utterance length. Also, pt presents with mild-moderate hypokinetic dysarthria characterized by low vocal intensity, monotone speech, and reduced utterance length. Recommend skilled speech therapy to improve speech-language cognitive skills upon discharge to home setting. Diet Solids Recommendation:   Liquid Consistency Recommendation:   Recommended Form of Meds:   Regular texture diet     Thin liquids     Meds whole with water       Recommended Compensatory Swallowing Strategies: Upright as possible with all PO intake , Small bites/sips , Eat/feed slowly      Plan  Frequency: 60 minutes/day; 5 days per week, as tolerated, until goals met, or discharged from ARU.   Therapeutic Interventions: Diet Tolerance Monitoring , Patient/Family Education  Cognitive-Linguistic intervention , Patient/ Family

## 2023-08-15 LAB
ALBUMIN SERPL-MCNC: 2.9 G/DL (ref 3.4–5)
ANION GAP SERPL CALCULATED.3IONS-SCNC: 10 MMOL/L (ref 3–16)
BUN SERPL-MCNC: 43 MG/DL (ref 7–20)
CALCIUM SERPL-MCNC: 8.4 MG/DL (ref 8.3–10.6)
CHLORIDE SERPL-SCNC: 97 MMOL/L (ref 99–110)
CO2 SERPL-SCNC: 20 MMOL/L (ref 21–32)
CREAT SERPL-MCNC: 0.9 MG/DL (ref 0.8–1.3)
GFR SERPLBLD CREATININE-BSD FMLA CKD-EPI: >60 ML/MIN/{1.73_M2}
GLUCOSE SERPL-MCNC: 93 MG/DL (ref 70–99)
PHOSPHATE SERPL-MCNC: 3.9 MG/DL (ref 2.5–4.9)
POTASSIUM SERPL-SCNC: 4.5 MMOL/L (ref 3.5–5.1)
SODIUM SERPL-SCNC: 127 MMOL/L (ref 136–145)

## 2023-08-15 PROCEDURE — 80069 RENAL FUNCTION PANEL: CPT

## 2023-08-15 PROCEDURE — 6370000000 HC RX 637 (ALT 250 FOR IP): Performed by: PHYSICAL MEDICINE & REHABILITATION

## 2023-08-15 PROCEDURE — 97535 SELF CARE MNGMENT TRAINING: CPT

## 2023-08-15 PROCEDURE — 97116 GAIT TRAINING THERAPY: CPT

## 2023-08-15 PROCEDURE — 97530 THERAPEUTIC ACTIVITIES: CPT

## 2023-08-15 PROCEDURE — 6360000002 HC RX W HCPCS: Performed by: PHYSICAL MEDICINE & REHABILITATION

## 2023-08-15 PROCEDURE — 92507 TX SP LANG VOICE COMM INDIV: CPT

## 2023-08-15 PROCEDURE — 97112 NEUROMUSCULAR REEDUCATION: CPT

## 2023-08-15 PROCEDURE — 2580000003 HC RX 258: Performed by: INTERNAL MEDICINE

## 2023-08-15 PROCEDURE — 97130 THER IVNTJ EA ADDL 15 MIN: CPT

## 2023-08-15 PROCEDURE — 97129 THER IVNTJ 1ST 15 MIN: CPT

## 2023-08-15 PROCEDURE — 1280000000 HC REHAB R&B

## 2023-08-15 RX ORDER — ONDANSETRON 4 MG/1
4 TABLET, ORALLY DISINTEGRATING ORAL EVERY 8 HOURS PRN
Status: DISCONTINUED | OUTPATIENT
Start: 2023-08-15 | End: 2023-08-15

## 2023-08-15 RX ORDER — PROMETHAZINE HYDROCHLORIDE 25 MG/1
25 TABLET ORAL EVERY 6 HOURS PRN
Status: DISCONTINUED | OUTPATIENT
Start: 2023-08-15 | End: 2023-08-30 | Stop reason: HOSPADM

## 2023-08-15 RX ADMIN — SODIUM CHLORIDE: 9 INJECTION, SOLUTION INTRAVENOUS at 05:01

## 2023-08-15 RX ADMIN — ROPINIROLE HYDROCHLORIDE 0.25 MG: 0.25 TABLET, FILM COATED ORAL at 14:32

## 2023-08-15 RX ADMIN — ROPINIROLE HYDROCHLORIDE 0.25 MG: 0.25 TABLET, FILM COATED ORAL at 07:47

## 2023-08-15 RX ADMIN — ENOXAPARIN SODIUM 40 MG: 100 INJECTION SUBCUTANEOUS at 07:47

## 2023-08-15 RX ADMIN — PROMETHAZINE HYDROCHLORIDE 25 MG: 25 TABLET ORAL at 12:24

## 2023-08-15 RX ADMIN — CARBIDOPA AND LEVODOPA 1 TABLET: 25; 100 TABLET ORAL at 10:52

## 2023-08-15 RX ADMIN — CARBIDOPA AND LEVODOPA 1 TABLET: 25; 100 TABLET ORAL at 06:18

## 2023-08-15 RX ADMIN — SERTRALINE 100 MG: 50 TABLET, FILM COATED ORAL at 21:00

## 2023-08-15 RX ADMIN — CARBIDOPA AND LEVODOPA 1 TABLET: 25; 100 TABLET ORAL at 14:32

## 2023-08-15 RX ADMIN — SOTALOL HYDROCHLORIDE 80 MG: 80 TABLET ORAL at 07:48

## 2023-08-15 RX ADMIN — ASPIRIN 81 MG: 81 TABLET, COATED ORAL at 07:47

## 2023-08-15 RX ADMIN — ROPINIROLE HYDROCHLORIDE 0.25 MG: 0.25 TABLET, FILM COATED ORAL at 21:00

## 2023-08-15 RX ADMIN — ATORVASTATIN CALCIUM 80 MG: 80 TABLET, FILM COATED ORAL at 21:00

## 2023-08-15 RX ADMIN — PANTOPRAZOLE SODIUM 40 MG: 40 TABLET, DELAYED RELEASE ORAL at 06:18

## 2023-08-15 RX ADMIN — TAMSULOSIN HYDROCHLORIDE 0.4 MG: 0.4 CAPSULE ORAL at 21:00

## 2023-08-15 RX ADMIN — CARBIDOPA AND LEVODOPA 1 TABLET: 25; 100 TABLET ORAL at 18:29

## 2023-08-15 ASSESSMENT — PAIN SCALES - GENERAL: PAINLEVEL_OUTOF10: 0

## 2023-08-15 NOTE — PROGRESS NOTES
[address] [facility] Department   Phone: [phone]    Physical Therapy    [] Re- Evaluation            [x] Daily Treatment Note         [] Discharge Summary      Patient: Pauline Rubio   : 1950   MRN: [id number]   Date of Service:  8/15/2023  Admitting Diagnosis: CVA (cerebrovascular accident due to intracerebral hemorrhage) [facility])    Current Admission Summary: 68 y.o. male who presented to [facility]  with encephalopathy and found to have significant DEBO. PMH reflects PD, CAD, Afib. 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 He is agreeable to ARU and wants to work in therapy before planned Dc home with his wife. MRI brain : 2 punctate acute infarcts in the left middle cerebral artery territory. ? Rotator cuff tear in (R) UE: per ortho appropriate for WBAT/ROM as tolerated  Pt. Transferred to Acute care on 23 d/t increased heart rate. Interruption to care. Past Medical History:  has a past medical history of Atrial fibrillation ([address]), No significant past medical history, and Parkinson disease ([address]). Past Surgical History:  has a past surgical history that includes Coronary artery bypass graft (2018) and Atrial ablation surgery (2018). Discharge Recommendations: Home with HHPT  DME Required For Discharge: DME to be determined pending patient progress  Precautions/Restrictions: high fall risk  Weight Bearing Restrictions: weight bearing as tolerated; per ortho note \"WBAT/ROM as tolerated\"  [x] Right Upper Extremity  [] Left Upper Extremity [] Right Lower Extremity  [] Left Lower Extremity     Required Braces/Orthotics: no braces required   [] Right  [] Left  Positional Restrictions:no positional restrictions    Pre-Admission Information   Lives With: spouse                  Type of Home: house  Home Layout: one level  Home Access: 1 step to enter with handrail.  Handrails are located on R side. Bathroom Layout: walk in shower  Bathroom Equipment: shower chair, hand held shower head, portable/suction cup grab bars, small built in shower seat (wife reports it's too small to sit on)   Toilet Height: standard height  Home Equipment: rollator - 4 wheeled walker, single point cane, quad cane, electric scooter, reacher, sock aide, transport chair  Transfer Assistance: Independent without use of device  Ambulation Assistance: Independent without use of device  ADL Assistance: independent with all ADL's, wife helped shave (Pt. Doesn't like to )  IADL Assistance: shares household tasks with wife; pt would use riding   Active :        [] Yes                 [x] No  Hand Dominance: [] Left                 [x] Right  Current Employment: retired. Occupation: [employer]   Hobbies: Enjoys watching TV; [employer] and [employer] [address]: Reports no falls within last 6 months    Patient's wife reports 2 weeks up until hospitalization, pt required assistance with all ADLs and IADLs; Pt used SPC to amb and transfer around home/community. The above information is prior to pt's decline up until hospitalization. Examination   Vision:   Vision Gross Assessment: Impaired and Vision Corrective Device: wears glasses at all times     Hearing:   [facility]  Observation:   General Observation:  Resting (R) tremor  Baig catheter  IV in (L) UE      Subjective  General:  Pt sitting in chair upon arrival, agreeable to PT session with wife present. Pt reporting he slept okay, but was up most of the evening due to stomach issues. Pt reporting no pain and doing \"good\". Pain: 0/10  Pain Interventions: not applicable       Functional Mobility  Bed Mobility:  Bed mobility not completed on this date.   Comments: pt in recliner upon arrival and upon completion  Transfers:  Sit to stand transfer: minimal assistance  Stand to sit transfer: minimal assistance  Comments: from recliner, pt stood using RW; SPT  Therapist observed and directed the patient's plan of care.   Co-signed and supervised by: Jordan Woodard PT, DPT - [id number]

## 2023-08-15 NOTE — PLAN OF CARE
Problem: Discharge Planning  Goal: Discharge to home or other facility with appropriate resources  8/14/2023 2159 by Catrachita Crabtree RN  Outcome: Progressing  8/14/2023 1035 by Antoinette Willett RN  Outcome: Progressing     Problem: Safety - Adult  Goal: Free from fall injury  8/14/2023 2159 by Catrachiat Crbatree RN  Outcome: Progressing  Flowsheets (Taken 8/14/2023 2158)  Free From Fall Injury: Instruct family/caregiver on patient safety  8/14/2023 1035 by Antoinette Willett RN  Outcome: Progressing     Problem: Pain  Goal: Verbalizes/displays adequate comfort level or baseline comfort level  8/14/2023 2159 by Catrachita Crabtree RN  Outcome: Progressing  Flowsheets (Taken 8/14/2023 2130)  Verbalizes/displays adequate comfort level or baseline comfort level: Encourage patient to monitor pain and request assistance  8/14/2023 1035 by Antoinette Willett RN  Outcome: Progressing     Problem: Chronic Conditions and Co-morbidities  Goal: Patient's chronic conditions and co-morbidity symptoms are monitored and maintained or improved  Outcome: Progressing     Problem: ABCDS Injury Assessment  Goal: Absence of physical injury  Outcome: Progressing  Flowsheets (Taken 8/14/2023 2158)  Absence of Physical Injury: Implement safety measures based on patient assessment

## 2023-08-15 NOTE — CONSULTS
MD Abdoul Parsons MD Lucille Howells, MD                  Office: [phone]                      Fax: [phone]                   NEPHROLOGY INPATIENT PROGRESS NOTE:     PATIENT NAME: Hadley Esparza  : 1950  MRN: [id number]        RECOMMENDATIONS:   - Started normal saline for maintenance IV fluid  - Follow-up labs daily,  - Due to slight overcorrection needs to be monitored closely    -With underlying SIADH is possible also, continue*Pain control  - If able to, minimize SSRIs such as currently Zoloft    - argueta insertion needed. Follow-up with urology, - plans for cystoscopy outpt  - Continue Flomax at night  -no need for dialysis,    -at higher risk for decompensation, needing closer monitoring. D/C plan from renal stand point:  - likely ~2-3 days  -Follow-up with rehab plans also. IMPRESSION:       Admitted on:  2023 12:43 PM   For:  CVA (cerebrovascular accident due to intracerebral hemorrhage) ([address]) [I61.9]  No diagnosis found. Hyponatremia, worsening, chronic,  Likely with underlying hypovolemia currently   No major neurological effect,  With underlying Parkinson, monitor closely      Recently admitted at [facility]  At that time-acute kidney injury, severe, had creatinine 8.9 on admission,  - With baseline creatinine 0.9, as of   - Thought to be due to obstructive uropathy, with BPH, bladder retention, and some component of hypovolemia  - Creatinine improved   -Currently creatinine around 0.9-1.1, without DEBO        Associated problems:   - Volume status: euvolemic  : HTN : no need for tight control   - Azotemia: pre-renal +  - Electrolytes: K: WNL  - Acid-Base: Acidosis, non-anion gap, metabolic, mild,  - Anemia: Mild, likely chronic disease      Other major problems: Management per primary and other consulting teams.         Hospital Problems             Last Modified POA    * (Principal) CVA (cerebrovascular extra-axial hemorrhage. No evidence of masses or mass effects or shifts. Ventricular system is normal and symmetrical. The skull, paranasal sinuses and orbits appear unremarkable. CT abdomen and pelvis: Lower Chest: No active disease. Organs: The liver demonstrates multiple hypodense lesions which have the density of cysts. The largest in the right lobe measures 2.7 cm. Gallbladder, pancreas, spleen, adrenals, aorta, and IVC appear stable. Bilateral hydronephrosis and hydroureter. Prostate is enlarged measuring 5.4 x 4.7 cm. The urinary bladder is distended. No focal thickening. Hydronephrosis is not related to ureteric stones or UV junction abnormality. There could be reflux. GI/Bowel: No evidence of bowel obstruction or perforation. Normal appendix. Mild constipation. Pelvis: As described, the urinary bladder is distended extending into the lower abdomen. No focal thickening. UV junctions appear unremarkable. Prostate is enlarged measuring 5.4 x 4.7 cm. No evidence of pelvic lymphadenopathy. Peritoneum/Retroperitoneum: No evidence of retroperitoneal lymphadenopathy or acute mesenteric findings. Bones/Soft Tissues: No acute abnormality. 1. No acute intracranial abnormality. 2. Bilateral hydronephrosis and hydroureter; however, no evidence of ureteric stones. Distended urinary bladder. Enlarged prostate. Hydronephrosis may be related to bladder outlet obstruction. 3. Benign-looking hepatic cysts. 4. No acute gastrointestinal abnormality. XR SHOULDER RIGHT (MIN 2 VIEWS)    Result Date: 8/6/2023  EXAMINATION: THREE XRAY VIEWS OF THE RIGHT SHOULDER 8/6/2023 12:03 pm COMPARISON: None. HISTORY: Acute right arm weakness. FINDINGS: Osteopenia. No acute fracture or dislocation. Mild degenerative changes. Sternotomy changes noted along with left atrial appendage clip. No acute osseous abnormality.      CT Head W/O Contrast    Result Date: 8/7/2023  EXAMINATION: CT OF THE ABDOMEN AND PELVIS WITHOUT characteristics are: moderate severity and heterogeneous texture. Velocities are measured in cm/s ; Diameters are measured in mm Carotid Right Measurements +---------------+----+----+-----+----+ ! Location       ! PSV ! EDV ! Angle! RI  ! +---------------+----+----+-----+----+ ! Prox CCA       !101 !14. 8!60   !0.85! +---------------+----+----+-----+----+ ! Mid CCA        !100 !14. 3!60   !0.86! +---------------+----+----+-----+----+ ! Dist CCA       !77.4!12. 6!60   !0.84! +---------------+----+----+-----+----+ ! Prox ICA       !111 !23  !60   !0.79! +---------------+----+----+-----+----+ ! Mid ICA        !101 !15. 6!60   !0.85! +---------------+----+----+-----+----+ ! Dist ICA       !85.8!17. 3! 60   !0.8 ! +---------------+----+----+-----+----+ ! Prox ECA       !139 !    !61   !    ! +---------------+----+----+-----+----+ ! Vertebral      !50.5!    !60   !    ! +---------------+----+----+-----+----+ ! Prox Subclavian!85.1!    !60   !    ! +---------------+----+----+-----+----+   - There is antegrade vertebral flow noted on the right side. - Additional Measurements:ICAPSV/CCAPSV 1.11. ICAEDV/CCAEDV 1.55. Carotid Left Measurements +---------------+----+----+-----+----+ ! Location       ! PSV ! EDV ! Angle! RI  ! +---------------+----+----+-----+----+ ! Prox CCA       !109 !18. 2! 60   !0.83! +---------------+----+----+-----+----+ ! Mid CCA        !105 !18. 9!60   !0.82! +---------------+----+----+-----+----+ ! Dist CCA       !83. 0!78. 1! 60   !0.72! +---------------+----+----+-----+----+ ! Prox ICA       !83. 4!17. 5!60   !0.79! +---------------+----+----+-----+----+ ! Mid ICA        !84.8!22. 4!60   !0.74! +---------------+----+----+-----+----+ ! Dist ICA       !125 !37. 8!60   !0.7 ! +---------------+----+----+-----+----+ ! Prox ECA       !98.1!    !60   !    ! +---------------+----+----+-----+----+ ! Vertebral      !46.6!    !60   !    ! +---------------+----+----+-----+----+ ! Prox Subclavian! 136 !    !61   !    ! +---------------+----+----+-----+----+

## 2023-08-15 NOTE — PROGRESS NOTES
[address] [facility] Department   Phone: [phone]    Speech Therapy    [] Initial Evaluation            [x] Daily Treatment Note         [] Discharge Summary      Patient: Danielle May   : 1950   MRN: [id number]   Date of Service:  8/15/2023  Admitting Diagnosis: CVA (cerebrovascular accident due to intracerebral hemorrhage) [facility])  Current Admission Summary: Danielle May is a 68 y.o. male who presented to [facility]  with encephalopathy and found to have significant DEBO. PMH reflects PD, CAD, Afib. 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 He is agreeable to [facility] and wants to work in therapy before planned Dc home with his wife. DC from ARU due to irregular heart beat. He is now improved and wants to come back to ARU. Past Medical History:  has a past medical history of Atrial fibrillation ([address]), No significant past medical history, and Parkinson disease ([address]). Past Surgical History:  has a past surgical history that includes Coronary artery bypass graft (2018) and Atrial ablation surgery (2018). Recent MRI Brain: 23  IMPRESSION:  2 punctate acute infarcts in the left middle cerebral artery territory. Precautions/Restrictions: high fall risk      Pre-Admission Information   Living Status: Pt lives at home with wife. Pt's wife encourages pt to stay physically and mentally active but provides assistance. They travel to [place] during the winter months. They have two adult children who live locally and 6 grandchildren.    Occupation/School: Retired 4 years ago from [employer]  Medication Management: :  []Primary   [x]Secondary (wife sets up pill organizer and sets alarms; pt familiar with medication list) []No   Finance Management: []Primary   [x]Secondary (wife uses electronic bill pay and reviews payments each month to keep pt active in lists)  []No   Active :   []Yes Session 2   Time In 0800 1005   Time Out 0830 1035   Time Code Minutes 15 30   Individual Minutes 30 30     Timed Code Treatment Minutes: 45  Total Treatment Minutes: 60    Electronically Signed By:   Jose Juan Mcgee M.A.  CCC-SLP GUILLAUME [id number]  Speech-Language Pathologist   8/15/2023 8:38 AM

## 2023-08-15 NOTE — PROGRESS NOTES
[address] [facility] Department   Phone: [phone]    Occupational Therapy    [] Initial Evaluation            [x] Daily Treatment Note         [] Discharge Summary      Patient: Joselyn Powell   : 1950   MRN: [id number]   Date of Service:  8/15/2023    Admitting Diagnosis:  CVA (cerebrovascular accident due to intracerebral hemorrhage) [facility])  Current Admission Summary: Joselyn Powell is a 68 y.o. male who presented to [facility]  with encephalopathy and found to have significant DEBO. PMH reflects PD, CAD, Afib. 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 Return to [facility] post acute off for irregular HR. MRI brain : 2 punctate acute infarcts in the left middle cerebral artery territory. Past Medical History:  has a past medical history of Atrial fibrillation ([address]), No significant past medical history, and Parkinson disease ([address]). Past Surgical History:  has a past surgical history that includes Coronary artery bypass graft (2018) and Atrial ablation surgery (2018). Discharge Recommendations: HH OT S3; PRN assist from family    DME Required For Discharge: DME to be determined pending patient progress    Precautions/Restrictions: high fall risk  Weight Bearing Restrictions: no restrictions       Required Braces/Orthotics: no braces required     Positional Restrictions:no positional restrictions      Pre-Admission Information   Lives With: spouse                  Type of Home: house  Home Layout: one level; finished basement with full flight of steps with 1 HR  Home Access: 1 step to enter with handrail. Handrails are located on R side.   Bathroom Layout: walk in shower, curtain   Bathroom Equipment: shower chair, hand held shower head, portable/suction cup grab bars, small built in shower shelf (wife reports it's too small to sit on)   Toilet Height: standard height  Home Equipment: rollator - 4 wheeled walker, single point cane, electric scooter, reacher, sock aide, transport chair  Transfer Assistance: Independent without use of device  Ambulation Assistance: Independent without use of device  ADL Assistance: independent with all ADL's, wife helped shave  IADL Assistance: shares household tasks with wife; pt would use riding   Active :        [] Yes                 [x] No  Hand Dominance: [] Left                 [x] Right  Current Employment: retired. Occupation: [employer]   Hobbies: Enjoys watching TV; [employer] and Jeopardy; time with grandchildren   Recent Falls: Reports no falls within last 6 months     Patient's wife reports 2 weeks up until hospitalization, pt required assistance with all ADLs and IADLs; Pt used SPC to amb and transfer around community for long distances; use of carts in stores such as [employer] secondary to long distances required . The above information is prior to pt's decline up until hospitalization. Subjective  General: Pt in bed at entry, agreeable to session. Pt's spouse present in room. Reporting pt had episode of emesis but has since received nausea medications. IV running throughout session. Baig catheter present. Pt denied Pain. Pain Interventions: not applicable     Activities of Daily Living  Basic Activities of Daily Living  Grooming: stand by assistance Increased time to complete task  Grooming Comments: oral care w/c level at sink, VC for initiation and location of cup for water. Toileting: moderate assistance. Toileting Comments: Ingrid for clothing over hips; assist for marisol hygiene post BM; increased time required for voiding   General Comments: Pt ambulated to bathroom with RW for toileting needs- pt then transitioned to w/c for grooming  Instrumental Activities of Daily Living  No IADL completed on this date.     Functional Mobility  Bed Mobility:  Supine to Sit: moderate assistance  Comments: assist decreased strength, decreased cognition, decreased endurance, decreased balance, decreased IADL, decreased fine motor control, decreased coordination  Prognosis: fair  Clinical Assessment: Pt is a 68 yr old male who presents with the above deficits impacting his occupational performance and placing him below his baseline level of function. Pt requiring increased transfer and mobility assist in PM OT session. Pt demo'd poor motor planning of R UE during tasks and sequencing of bed mobility/transfers. Ongoing extensive ADL assist required. Skilled OT services indicated in order to maximize his IND an safety with all occupational pursuits. Safety Interventions: patient left in chair, chair alarm in place, call light within reach, patient at risk for falls, and family/caregiver present    Plan  Frequency: 5 x/week, 60 min/day  Current Treatment Recommendations: strengthening, ROM, balance training, functional mobility training, transfer training, endurance training, neuromuscular re-education, patient/caregiver education, ADL/self-care training, IADL training, cognitive reorientation, safety education, and equipment evaluation/education    Goals  Patient Goals: Get back to walking normal; Increased IND in self-care    Short Term Goals:  Time Frame: 14 days   Patient will complete upper body ADL at modified independent   Patient will complete lower body ADL at modified independent   Patient will complete toileting at modified independent   Patient will complete grooming at modified independent   Patient will complete functional transfers at modified independent   Patient will complete functional mobility at modified independent   Patient to gather and transport IADL items at modified independent      Above goals reviewed on 8/15/2023. All goals are ongoing at this time unless indicated above.      Therapy Session Time     Individual Group Co-treatment   Time In 1334      Time Out 1434      Minutes 60           Timed

## 2023-08-15 NOTE — PLAN OF CARE
Problem: Discharge Planning  Goal: Discharge to home or other facility with appropriate resources  8/15/2023 1121 by Andres España RN  Outcome: Progressing     Problem: Safety - Adult  Goal: Free from fall injury  8/15/2023 1121 by Andres España RN  Outcome: Progressing     Problem: Pain  Goal: Verbalizes/displays adequate comfort level or baseline comfort level  8/15/2023 1121 by Andres España RN  Outcome: Progressing

## 2023-08-15 NOTE — PROGRESS NOTES
Morning assessment completed, vss, schedule meds given, argueta to be remain until discharge, and needs cystoscopy done outpatient per urology note, The care plan and education has been reviewed and mutually agreed upon with the patient. Pt remains free from falls. Fall precautions in place--bed in lowest position, call light within reach, bed alarm in use. Pt aware to call for assistance before getting up.

## 2023-08-16 LAB
ALBUMIN SERPL-MCNC: 2.6 G/DL (ref 3.4–5)
ANION GAP SERPL CALCULATED.3IONS-SCNC: 11 MMOL/L (ref 3–16)
BASOPHILS # BLD: 0 K/UL (ref 0–0.2)
BASOPHILS NFR BLD: 0.7 %
BUN SERPL-MCNC: 41 MG/DL (ref 7–20)
CALCIUM SERPL-MCNC: 8.4 MG/DL (ref 8.3–10.6)
CHLORIDE SERPL-SCNC: 99 MMOL/L (ref 99–110)
CO2 SERPL-SCNC: 15 MMOL/L (ref 21–32)
CREAT SERPL-MCNC: 0.8 MG/DL (ref 0.8–1.3)
DEPRECATED RDW RBC AUTO: 14.8 % (ref 12.4–15.4)
EOSINOPHIL # BLD: 0.1 K/UL (ref 0–0.6)
EOSINOPHIL NFR BLD: 1.8 %
GFR SERPLBLD CREATININE-BSD FMLA CKD-EPI: >60 ML/MIN/{1.73_M2}
GLUCOSE SERPL-MCNC: 76 MG/DL (ref 70–99)
HCT VFR BLD AUTO: 34 % (ref 40.5–52.5)
HGB BLD-MCNC: 11.2 G/DL (ref 13.5–17.5)
LYMPHOCYTES # BLD: 0.5 K/UL (ref 1–5.1)
LYMPHOCYTES NFR BLD: 7.8 %
MCH RBC QN AUTO: 31 PG (ref 26–34)
MCHC RBC AUTO-ENTMCNC: 32.8 G/DL (ref 31–36)
MCV RBC AUTO: 94.5 FL (ref 80–100)
MONOCYTES # BLD: 0.7 K/UL (ref 0–1.3)
MONOCYTES NFR BLD: 10.1 %
NEUTROPHILS # BLD: 5.2 K/UL (ref 1.7–7.7)
NEUTROPHILS NFR BLD: 79.6 %
PHOSPHATE SERPL-MCNC: 3.9 MG/DL (ref 2.5–4.9)
PLATELET # BLD AUTO: 143 K/UL (ref 135–450)
PMV BLD AUTO: 8.2 FL (ref 5–10.5)
POTASSIUM SERPL-SCNC: 4.2 MMOL/L (ref 3.5–5.1)
RBC # BLD AUTO: 3.6 M/UL (ref 4.2–5.9)
SODIUM SERPL-SCNC: 125 MMOL/L (ref 136–145)
WBC # BLD AUTO: 6.5 K/UL (ref 4–11)

## 2023-08-16 PROCEDURE — 1280000000 HC REHAB R&B

## 2023-08-16 PROCEDURE — 97530 THERAPEUTIC ACTIVITIES: CPT

## 2023-08-16 PROCEDURE — 85025 COMPLETE CBC W/AUTO DIFF WBC: CPT

## 2023-08-16 PROCEDURE — 2580000003 HC RX 258: Performed by: PHYSICAL MEDICINE & REHABILITATION

## 2023-08-16 PROCEDURE — 97112 NEUROMUSCULAR REEDUCATION: CPT

## 2023-08-16 PROCEDURE — 6360000002 HC RX W HCPCS: Performed by: PHYSICAL MEDICINE & REHABILITATION

## 2023-08-16 PROCEDURE — 97110 THERAPEUTIC EXERCISES: CPT

## 2023-08-16 PROCEDURE — 92507 TX SP LANG VOICE COMM INDIV: CPT

## 2023-08-16 PROCEDURE — 6370000000 HC RX 637 (ALT 250 FOR IP): Performed by: PHYSICAL MEDICINE & REHABILITATION

## 2023-08-16 PROCEDURE — 80069 RENAL FUNCTION PANEL: CPT

## 2023-08-16 PROCEDURE — 97129 THER IVNTJ 1ST 15 MIN: CPT

## 2023-08-16 PROCEDURE — 97116 GAIT TRAINING THERAPY: CPT

## 2023-08-16 PROCEDURE — 97535 SELF CARE MNGMENT TRAINING: CPT

## 2023-08-16 RX ADMIN — SERTRALINE 100 MG: 50 TABLET, FILM COATED ORAL at 20:12

## 2023-08-16 RX ADMIN — SODIUM CHLORIDE, PRESERVATIVE FREE 10 ML: 5 INJECTION INTRAVENOUS at 09:39

## 2023-08-16 RX ADMIN — CARBIDOPA AND LEVODOPA 1 TABLET: 25; 100 TABLET ORAL at 05:51

## 2023-08-16 RX ADMIN — ROPINIROLE HYDROCHLORIDE 0.25 MG: 0.25 TABLET, FILM COATED ORAL at 14:56

## 2023-08-16 RX ADMIN — CARBIDOPA AND LEVODOPA 1 TABLET: 25; 100 TABLET ORAL at 14:56

## 2023-08-16 RX ADMIN — CARBIDOPA AND LEVODOPA 1 TABLET: 25; 100 TABLET ORAL at 20:12

## 2023-08-16 RX ADMIN — ROPINIROLE HYDROCHLORIDE 0.25 MG: 0.25 TABLET, FILM COATED ORAL at 09:39

## 2023-08-16 RX ADMIN — ENOXAPARIN SODIUM 40 MG: 100 INJECTION SUBCUTANEOUS at 09:39

## 2023-08-16 RX ADMIN — ROPINIROLE HYDROCHLORIDE 0.25 MG: 0.25 TABLET, FILM COATED ORAL at 20:11

## 2023-08-16 RX ADMIN — SOTALOL HYDROCHLORIDE 80 MG: 80 TABLET ORAL at 09:39

## 2023-08-16 RX ADMIN — CARBIDOPA AND LEVODOPA 1 TABLET: 25; 100 TABLET ORAL at 11:38

## 2023-08-16 RX ADMIN — ATORVASTATIN CALCIUM 80 MG: 80 TABLET, FILM COATED ORAL at 20:11

## 2023-08-16 RX ADMIN — TAMSULOSIN HYDROCHLORIDE 0.4 MG: 0.4 CAPSULE ORAL at 20:11

## 2023-08-16 RX ADMIN — PANTOPRAZOLE SODIUM 40 MG: 40 TABLET, DELAYED RELEASE ORAL at 05:12

## 2023-08-16 RX ADMIN — ASPIRIN 81 MG: 81 TABLET, COATED ORAL at 09:39

## 2023-08-16 ASSESSMENT — PAIN SCALES - GENERAL
PAINLEVEL_OUTOF10: 0

## 2023-08-16 NOTE — PROGRESS NOTES
[address] [facility] Department   Phone: [phone]    Speech Therapy    [] Initial Evaluation            [x] Daily Treatment Note         [] Discharge Summary      Patient: Brigido Stinson   : 1950   MRN: [id number]   Date of Service:  2023  Admitting Diagnosis: CVA (cerebrovascular accident due to intracerebral hemorrhage) [facility])  Current Admission Summary: Brigido Stinson is a 68 y.o. male who presented to [facility]  with encephalopathy and found to have significant DEBO. PMH reflects PD, CAD, Afib. 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 He is agreeable to [facility] and wants to work in therapy before planned Dc home with his wife. DC from ARU due to irregular heart beat. He is now improved and wants to come back to ARU. Past Medical History:  has a past medical history of Atrial fibrillation ([address]), No significant past medical history, and Parkinson disease ([address]). Past Surgical History:  has a past surgical history that includes Coronary artery bypass graft (2018) and Atrial ablation surgery (2018). Recent MRI Brain: 23  IMPRESSION:  2 punctate acute infarcts in the left middle cerebral artery territory. Precautions/Restrictions: high fall risk      Pre-Admission Information   Living Status: Pt lives at home with wife. Pt's wife encourages pt to stay physically and mentally active but provides assistance. They travel to [place] during the winter months. They have two adult children who live locally and 6 grandchildren.    Occupation/School: Retired 4 years ago from [employer]  Medication Management: :  []Primary   [x]Secondary (wife sets up pill organizer and sets alarms; pt familiar with medication list) []No   Finance Management: []Primary   [x]Secondary (wife uses electronic bill pay and reviews payments each month to keep pt active in lists)  []No   Active :   []Yes [x]No (stopped driving one year ago with declining cognition)   Hearing:    WFL  Vision:    Vision Corrective Device: wears glasses at all times      Subjective  General: Pt alert and agreeable to participate. Improved eye contact and engagement noted throughout session. Wife present. Pain: Denies    Safety Interventions: patient left in chair, chair alarm in place, call light within reach, patient at risk for falls, and family/caregiver present left in presence of wife      Therapeutic Interventions:       Session 1: Chanda Love MA, CCC-SLP   Dysphagia: Severity: Mild    Goal not targeted this session. Breakfast tray arrived at end of session. Comprehension: Severity: Mild   Pt able to vocalize when he required repetition of task questions/directives. Increased insight to comprehension deficits. Expressive Language: Severity: Within functional limits  and Mild   Thought organization: generative naming (concrete categories)  - required extended time for completion and repetition of targeted category   - naming 8 items/category, completed with 83% accuracy (20/24)   - phonemic/category cues provided by SLP and pt wife    Voice: Severity: Mild   Goal not targeted this session.       Cognition: Severity: Moderate   Orientation  - oriented to day of week, month and situation   - one day off from date   - provided set up assistance for daily therapy schedule and oriented to date with cues to digital clock     Functional recall   - able to recall alternating attention (maze task) completed with SLP   - unable to recall SLP name but recalled SLP as \"the blonde\"     Personal history   - pt recalled name's of both daughters, including son in law   - recalled names of all 6 grandchildren and was able to state who/age if the oldest vs youngest of the group     Functional problem solving (home based scenarios)   - completed with 66% accuracy (4/6) increased to 100% accuracy given min-mod verbal cues

## 2023-08-16 NOTE — PROGRESS NOTES
[address] [facility] Department   Phone: [phone]    Physical Therapy    [] Evaluation            [x] Daily Treatment Note         [] Discharge Summary      Patient: Maria C Coronado   : 1950   MRN: [id number]   Date of Service:  2023  Admitting Diagnosis: CVA (cerebrovascular accident due to intracerebral hemorrhage) [facility])  Current Admission Summary: 68 y.o. male who presented to [facility]  with encephalopathy and found to have significant DEBO. PMH reflects PD, CAD, Afib. 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 He is agreeable to ARU and wants to work in therapy before planned Dc home with his wife. MRI brain : 2 punctate acute infarcts in the left middle cerebral artery territory. ? Rotator cuff tear in (R) UE: per ortho appropriate for WBAT/ROM as tolerated  Pt. Transferred to Acute care on 23 d/t increased heart rate. Interruption to care. Past Medical History:  has a past medical history of Atrial fibrillation ([address]), No significant past medical history, and Parkinson disease ([address]). Past Surgical History:  has a past surgical history that includes Coronary artery bypass graft (2018) and Atrial ablation surgery (2018). Discharge Recommendations: Home with HHPT  DME Required For Discharge: DME to be determined pending patient progress  Precautions/Restrictions: high fall risk  Weight Bearing Restrictions: weight bearing as tolerated; per ortho note \"WBAT/ROM as tolerated\"  [x] Right Upper Extremity  [] Left Upper Extremity [] Right Lower Extremity  [] Left Lower Extremity     Required Braces/Orthotics: no braces required   [] Right  [] Left  Positional Restrictions:no positional restrictions    Pre-Admission Information   Lives With: spouse                  Type of Home: house  Home Layout: one level  Home Access: 1 step to enter with handrail.  Handrails are assistance; from bike seat to w/c  Comments: no device used; from recliner and to/from w/c throughout session; significant increased time for motor planning and sequencing; assist given for (R) UE hand placement; Posterior lean improvement but requires tactile and VC to upright self. Ambulation:  Surface:level surface  Assistive Device: no device  Assistance: minimal assistance  Distance: 130 ft + 160 ft + short distances within session  Gait Mechanics: Varying step length and decreased step height with step to shuffling gait progressing to step through with max VC and tactile cues to weight-shift. Slight (R) lateral lean and forward flexed posture; corrected with (R)/anterior tactile cues; standing rest breaks given during first amb  Comments: slow jasmin; Difficulty processing directions and safety awareness around obstacles and proximity to walls  Stair Mobility:  Stair mobility not completed on this date. Comments:   Wheelchair Mobility:  No w/c mobility completed on this date. Comments:  Balance:  Static Sitting Balance: fair: maintains balance at [facility] without use of UE support  Dynamic Sitting Balance: fair: maintains balance at [facility] without use of UE support  Static Standing Balance: fair: maintains balance at [facility] without use of UE support  Dynamic Standing Balance: fair (-): maintains balance at [facility] with use of UE support  Comments: no device used    Other Therapeutic Interventions  See above for functional mobility. In addition, pt performed standing between // bars to focus on stride length and step heights; alternating step to dot and back 2 x 20 with (B) UE support, static stance with 1 LE stepping over yaritza and back 1 x 15 each; at [facility] with (B) UE support. Pt performed standing (B) chest openers, requiring AAROM (R) UE in shoulder horizontal ABD/ADD x 15. Pt completed 5mins on bike at 1.4 resistance to improve cardiovascular endurance and promote reciprocal gait patterns.  Pt required rest breaks awareness. Prior to recent hospitalizations, patient was independent without device in home and community. Safety Interventions: patient left in chair, chair alarm in place, call light within reach, gait belt, patient at risk for falls, and family/caregiver present; wife present and RN present giving meds upon completion    Plan  Frequency: 5 x/week, 60 min/day  Current Treatment Recommendations: strengthening, ROM, balance training, functional mobility training, transfer training, gait training, stair training, endurance training, neuromuscular re-education, wheelchair mobility training, modalities, patient/caregiver education, ADL/self-care training, IADL training, home exercise program, and safety education    Goals  Patient Goals: To go home   Short Term Goals:  Time Frame: 10-14 days  Patient will complete bed mobility at Supervision  Patient will complete stand step transfers Supervision  Patient will ambulate 270 ft with use of LRAD with Supervision  Patient will ascend/descend 12 stairs with (R) ascending handrail at Supervision  Patient will complete car transfer at Supervision    Above goals reviewed on 8/16/2023. All goals are ongoing at this time unless indicated above. Therapy Session Time     Second Session Therapy Time:   Individual Concurrent Group Co-treatment   Time In  0830         Time Out  0930         Minutes  60           Timed Code Treatment Minutes: 60  Total Treatment Minutes:   61       Electronically Signed By:  CARRIE Slade  Therapist observed and directed the patient's plan of care.   Co-signed and supervised by: John Hui PT, DPT - [id number]

## 2023-08-16 NOTE — PROGRESS NOTES
MD Mariluz James MD Maria Dolly, MD                  Office: [phone]                      Fax: [phone] [address] [email or web address]                   NEPHROLOGY INPATIENT PROGRESS NOTE:     PATIENT NAME: Joselyn Powell  : 1950  MRN: [id number]        RECOMMENDATIONS:   - Stop maintenance normal saline, as likely possibility of mild fluid overload, SIADH, with hyponatremia  - If no major improvement in 12- 24 hours, will give Lasix trial    - Follow-up labs daily,  - Due to slight overcorrection needs to be monitored closely    -With underlying SIADH is possible also, continue*Pain control  - If able to, minimize SSRIs such as currently Zoloft    - argueta insertion needed. Follow-up with urology, - plans for cystoscopy outpt  - Continue Flomax at night  -no need for dialysis,    -at higher risk for decompensation, needing closer monitoring. D/C plan from renal stand point:  - likely ~2-3 days  -Follow-up with rehab plans also. IMPRESSION:       Admitted on:  2023 12:43 PM   For:  CVA (cerebrovascular accident due to intracerebral hemorrhage) ([address]) [I61.9]  No diagnosis found.      Hyponatremia, worsening, chronic,  Likely with underlying hypovolemia currently   No major neurological effect,  With underlying Parkinson, monitor closely      Recently admitted at [facility]  At that time-acute kidney injury, severe, had creatinine 8.9 on admission,  - With baseline creatinine 0.9, as of   - Thought to be due to obstructive uropathy, with BPH, bladder retention, and some component of hypovolemia  - Creatinine improved   -Currently creatinine around 0.9-1.1, without DEBO        Associated problems:   - Volume status: euvolemic  : HTN : no need for tight control   - Azotemia: pre-renal +  - Electrolytes: K: WNL  - Acid-Base: Acidosis, non-anion gap, metabolic, mild,  - Anemia: Mild, likely chronic disease      Other major problems: Management per primary and severity and heterogeneous texture. Velocities are measured in cm/s ; Diameters are measured in mm Carotid Right Measurements +---------------+----+----+-----+----+ ! Location       ! PSV ! EDV ! Angle! RI  ! +---------------+----+----+-----+----+ ! Prox CCA       !101 !14. 8!60   !0.85! +---------------+----+----+-----+----+ ! Mid CCA        !100 !14. 3!60   !0.86! +---------------+----+----+-----+----+ ! Dist CCA       !77.4!12. 6!60   !0.84! +---------------+----+----+-----+----+ ! Prox ICA       !111 !23  !60   !0.79! +---------------+----+----+-----+----+ ! Mid ICA        !101 !15. 6!60   !0.85! +---------------+----+----+-----+----+ ! Dist ICA       !85.8!17. 3! 60   !0.8 ! +---------------+----+----+-----+----+ ! Prox ECA       !139 !    !61   !    ! +---------------+----+----+-----+----+ ! Vertebral      !50.5!    !60   !    ! +---------------+----+----+-----+----+ ! Prox Subclavian!85.1!    !60   !    ! +---------------+----+----+-----+----+   - There is antegrade vertebral flow noted on the right side. - Additional Measurements:ICAPSV/CCAPSV 1.11. ICAEDV/CCAEDV 1.55. Carotid Left Measurements +---------------+----+----+-----+----+ ! Location       ! PSV ! EDV ! Angle! RI  ! +---------------+----+----+-----+----+ ! Prox CCA       !109 !18. 2! 60   !0.83! +---------------+----+----+-----+----+ ! Mid CCA        !105 !18. 9!60   !0.82! +---------------+----+----+-----+----+ ! Dist CCA       !83. 0!86. 1! 60   !0.72! +---------------+----+----+-----+----+ ! Prox ICA       !83. 4!17. 5!60   !0.79! +---------------+----+----+-----+----+ ! Mid ICA        !84.8!22. 4!60   !0.74! +---------------+----+----+-----+----+ ! Dist ICA       !125 !37. 8!60   !0.7 ! +---------------+----+----+-----+----+ ! Prox ECA       !98.1!    !60   !    ! +---------------+----+----+-----+----+ ! Vertebral      !46.6!    !60   !    ! +---------------+----+----+-----+----+ ! Prox Subclavian! 136 !    !60   !    ! +---------------+----+----+-----+----+   - There is antegrade

## 2023-08-16 NOTE — PLAN OF CARE
Problem: Discharge Planning  Goal: Discharge to home or other facility with appropriate resources  8/15/2023 2236 by Lanre Mayo RN  Outcome: Progressing  8/15/2023 1121 by Yee Leblanc RN  Outcome: Progressing     Problem: Safety - Adult  Goal: Free from fall injury  8/15/2023 2236 by Lanre Mayo RN  Outcome: Progressing  8/15/2023 1121 by Yee Leblanc RN  Outcome: Progressing     Problem: Pain  Goal: Verbalizes/displays adequate comfort level or baseline comfort level  8/15/2023 2236 by Lanre Mayo RN  Outcome: Progressing  8/15/2023 1121 by Yee Leblanc RN  Outcome: Progressing     Problem: Chronic Conditions and Co-morbidities  Goal: Patient's chronic conditions and co-morbidity symptoms are monitored and maintained or improved  Outcome: Progressing     Problem: ABCDS Injury Assessment  Goal: Absence of physical injury  Outcome: Progressing

## 2023-08-16 NOTE — PROGRESS NOTES
[address] [facility] Department   Phone: [phone]    Occupational Therapy    [] Initial Evaluation            [x] Daily Treatment Note         [] Discharge Summary      Patient: Marko Landaverde   : 1950   MRN: [id number]   Date of Service:  2023    Admitting Diagnosis:  CVA (cerebrovascular accident due to intracerebral hemorrhage) [facility])  Current Admission Summary: Marko Landaverde is a 68 y.o. male who presented to [facility]  with encephalopathy and found to have significant DEBO. PMH reflects PD, CAD, Afib. 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 Return to [facility] post acute off for irregular HR. MRI brain : 2 punctate acute infarcts in the left middle cerebral artery territory. Past Medical History:  has a past medical history of Atrial fibrillation ([address]), No significant past medical history, and Parkinson disease ([address]). Past Surgical History:  has a past surgical history that includes Coronary artery bypass graft (2018) and Atrial ablation surgery (2018). Discharge Recommendations: HH OT S3; PRN assist from family    DME Required For Discharge: DME to be determined pending patient progress    Precautions/Restrictions: high fall risk  Weight Bearing Restrictions: no restrictions       Required Braces/Orthotics: no braces required     Positional Restrictions:no positional restrictions      Pre-Admission Information   Lives With: spouse                  Type of Home: house  Home Layout: one level; finished basement with full flight of steps with 1 HR  Home Access: 1 step to enter with handrail. Handrails are located on R side.   Bathroom Layout: walk in shower, curtain   Bathroom Equipment: shower chair, hand held shower head, portable/suction cup grab bars, small built in shower shelf (wife reports it's too small to sit on)   Toilet Height: standard height  Home UE arm extension completed upon return to upright sitting; resisted push/pulls (b) holding yaritza with resistance applied by therapist, pt demo'd difficulty with sequence and frequently leaning back to wedge posterior to pt instead of bending elbow s when pulling; pt requiring repetition of directives, visual and verbal demo's and HOHA for facilitation intermittently throughout. Attention to task decreasing with fatigue.           Cognition  Overall Cognitive Status: Impaired  Arousal/Alertness: delayed responses to stimuli, inconsistent responses to stimuli  Following Commands: follows one step commands with repetition, follows one step commands with increased time  Attention Span: attends with cues to redirect, difficulty attending to directions  Memory: decreased recall of recent events, decreased short term memory  Safety Judgement: decreased awareness of need for assistance  Problem Solving: assistance required to implement solutions, decreased awareness of errors, assistance required to identify errors made  Insights: decreased awareness of deficits  Initiation: requires cues for some  Sequencing: requires cues for some  Comments: delayed processing at times; flat affect   Orientation:    oriented to person, oriented to place, and disoriented to time   Command Following:   impaired     Education  Barriers To Learning: cognition and physical  Patient Education: patient educated on goals, OT role and benefits, plan of care, weight-bearing education, HEP, orientation, family education, transfer training, discharge recommendations  Learning Assessment:  patient verbalizes understanding, would benefit from continued reinforcement, patient will require reinforcement due to cognitive deficits    Assessment  Activity Tolerance: fair tolerance, rest breaks as needed; SpO2 on RA WFL throughout; HR 57-62 during ADL completion   Impairments Requiring Therapeutic Intervention: decreased functional mobility, decreased ADL Treatment Minutes: 60   Total Treatment Minutes:  60       Electronically Signed By: Lashonda Carlton, [address] [place] [address], OTR/L #[id number]

## 2023-08-17 LAB
ALBUMIN SERPL-MCNC: 2.8 G/DL (ref 3.4–5)
ANION GAP SERPL CALCULATED.3IONS-SCNC: 9 MMOL/L (ref 3–16)
BUN SERPL-MCNC: 39 MG/DL (ref 7–20)
CALCIUM SERPL-MCNC: 8.3 MG/DL (ref 8.3–10.6)
CHLORIDE SERPL-SCNC: 99 MMOL/L (ref 99–110)
CO2 SERPL-SCNC: 17 MMOL/L (ref 21–32)
CREAT SERPL-MCNC: 0.9 MG/DL (ref 0.8–1.3)
GFR SERPLBLD CREATININE-BSD FMLA CKD-EPI: >60 ML/MIN/{1.73_M2}
GLUCOSE SERPL-MCNC: 87 MG/DL (ref 70–99)
PHOSPHATE SERPL-MCNC: 3.7 MG/DL (ref 2.5–4.9)
POTASSIUM SERPL-SCNC: 4.2 MMOL/L (ref 3.5–5.1)
SODIUM SERPL-SCNC: 125 MMOL/L (ref 136–145)

## 2023-08-17 PROCEDURE — 92507 TX SP LANG VOICE COMM INDIV: CPT

## 2023-08-17 PROCEDURE — 97112 NEUROMUSCULAR REEDUCATION: CPT

## 2023-08-17 PROCEDURE — 1280000000 HC REHAB R&B

## 2023-08-17 PROCEDURE — 6360000002 HC RX W HCPCS: Performed by: PHYSICAL MEDICINE & REHABILITATION

## 2023-08-17 PROCEDURE — 97129 THER IVNTJ 1ST 15 MIN: CPT

## 2023-08-17 PROCEDURE — 97535 SELF CARE MNGMENT TRAINING: CPT

## 2023-08-17 PROCEDURE — 97130 THER IVNTJ EA ADDL 15 MIN: CPT

## 2023-08-17 PROCEDURE — 97530 THERAPEUTIC ACTIVITIES: CPT

## 2023-08-17 PROCEDURE — 6370000000 HC RX 637 (ALT 250 FOR IP): Performed by: PHYSICAL MEDICINE & REHABILITATION

## 2023-08-17 PROCEDURE — 36415 COLL VENOUS BLD VENIPUNCTURE: CPT

## 2023-08-17 PROCEDURE — 97110 THERAPEUTIC EXERCISES: CPT

## 2023-08-17 PROCEDURE — 97116 GAIT TRAINING THERAPY: CPT

## 2023-08-17 PROCEDURE — 80069 RENAL FUNCTION PANEL: CPT

## 2023-08-17 RX ORDER — POLYETHYLENE GLYCOL 3350 17 G/17G
17 POWDER, FOR SOLUTION ORAL DAILY PRN
Status: DISCONTINUED | OUTPATIENT
Start: 2023-08-17 | End: 2023-08-30 | Stop reason: HOSPADM

## 2023-08-17 RX ADMIN — CARBIDOPA AND LEVODOPA 1 TABLET: 25; 100 TABLET ORAL at 10:19

## 2023-08-17 RX ADMIN — TAMSULOSIN HYDROCHLORIDE 0.4 MG: 0.4 CAPSULE ORAL at 21:23

## 2023-08-17 RX ADMIN — ROPINIROLE HYDROCHLORIDE 0.25 MG: 0.25 TABLET, FILM COATED ORAL at 08:00

## 2023-08-17 RX ADMIN — ROPINIROLE HYDROCHLORIDE 0.25 MG: 0.25 TABLET, FILM COATED ORAL at 21:22

## 2023-08-17 RX ADMIN — SOTALOL HYDROCHLORIDE 80 MG: 80 TABLET ORAL at 10:18

## 2023-08-17 RX ADMIN — PANTOPRAZOLE SODIUM 40 MG: 40 TABLET, DELAYED RELEASE ORAL at 05:11

## 2023-08-17 RX ADMIN — ASPIRIN 81 MG: 81 TABLET, COATED ORAL at 08:00

## 2023-08-17 RX ADMIN — ENOXAPARIN SODIUM 40 MG: 100 INJECTION SUBCUTANEOUS at 08:00

## 2023-08-17 RX ADMIN — CARBIDOPA AND LEVODOPA 2 TABLET: 25; 100 TABLET ORAL at 19:28

## 2023-08-17 RX ADMIN — ROPINIROLE HYDROCHLORIDE 0.25 MG: 0.25 TABLET, FILM COATED ORAL at 14:11

## 2023-08-17 RX ADMIN — CARBIDOPA AND LEVODOPA 1 TABLET: 25; 100 TABLET ORAL at 05:44

## 2023-08-17 RX ADMIN — ATORVASTATIN CALCIUM 80 MG: 80 TABLET, FILM COATED ORAL at 21:23

## 2023-08-17 RX ADMIN — SERTRALINE 100 MG: 50 TABLET, FILM COATED ORAL at 21:23

## 2023-08-17 RX ADMIN — CARBIDOPA AND LEVODOPA 2 TABLET: 25; 100 TABLET ORAL at 14:12

## 2023-08-17 NOTE — PROGRESS NOTES
Berline Goodell, MD Janel Cleaver, MD Ellie Kinsman, MD                  Office: [phone]                      Fax: [phone] [address] [employer]                   NEPHROLOGY INPATIENT PROGRESS NOTE:     PATIENT NAME: Marybeth Lizama  : 1950  MRN: [id number]        RECOMMENDATIONS:   - Stopped maintenance normal saline, as likely possibility of mild fluid overload, SIADH, with hyponatremia  - If no major improvement in 12- 24 hours, will give Lasix trial    - Follow-up labs daily,  - Due to slight overcorrection needs to be monitored closely    -With underlying SIADH is possible also, continue*Pain control  - If able to, minimize SSRIs such as currently Zoloft    - argueta insertion needed. Follow-up with urology, - plans for cystoscopy outpt  - Continue Flomax at night  -no need for dialysis,    -at higher risk for decompensation, needing closer monitoring. D/C plan from renal stand point:  - likely ~2-3 days  -Follow-up with rehab plans also. IMPRESSION:       Admitted on:  2023 12:43 PM   For:  CVA (cerebrovascular accident due to intracerebral hemorrhage) ([address]) [I61.9]  No diagnosis found.      Hyponatremia, worsening, chronic,  Likely with underlying hypovolemia currently   No major neurological effect,  With underlying Parkinson, monitor closely      Recently admitted at [facility]  At that time-acute kidney injury, severe, had creatinine 8.9 on admission,  - With baseline creatinine 0.9, as of   - Thought to be due to obstructive uropathy, with BPH, bladder retention, and some component of hypovolemia  - Creatinine improved   -Currently creatinine around 0.9-1.1, without DEBO        Associated problems:   - Volume status: euvolemic  : HTN : no need for tight control   - Azotemia: pre-renal +  - Electrolytes: K: WNL  - Acid-Base: Acidosis, non-anion gap, metabolic, mild,  - Anemia: Mild, likely chronic disease      Other major problems: Management per primary +---------------+----+----+-----+----+ ! Prox CCA       !101 !14. 8!60   !0.85! +---------------+----+----+-----+----+ ! Mid CCA        !100 !14. 3!60   !0.86! +---------------+----+----+-----+----+ ! Dist CCA       !77.4!12. 6!60   !0.84! +---------------+----+----+-----+----+ ! Prox ICA       !111 !23  !60   !0.79! +---------------+----+----+-----+----+ ! Mid ICA        !101 !15. 6!60   !0.85! +---------------+----+----+-----+----+ ! Dist ICA       !85.8!17. 3! 60   !0.8 ! +---------------+----+----+-----+----+ ! Prox ECA       !139 !    !61   !    ! +---------------+----+----+-----+----+ ! Vertebral      !50.5!    !60   !    ! +---------------+----+----+-----+----+ ! Prox Subclavian!85.1!    !60   !    ! +---------------+----+----+-----+----+   - There is antegrade vertebral flow noted on the right side. - Additional Measurements:ICAPSV/CCAPSV 1.11. ICAEDV/CCAEDV 1.55. Carotid Left Measurements +---------------+----+----+-----+----+ ! Location       ! PSV ! EDV ! Angle! RI  ! +---------------+----+----+-----+----+ ! Prox CCA       !109 !18. 2! 60   !0.83! +---------------+----+----+-----+----+ ! Mid CCA        !105 !18. 9!60   !0.82! +---------------+----+----+-----+----+ ! Dist CCA       !83. 1!86. 1! 60   !0.72! +---------------+----+----+-----+----+ ! Prox ICA       !83. 4!17. 5!60   !0.79! +---------------+----+----+-----+----+ ! Mid ICA        !84.8!22. 4!60   !0.74! +---------------+----+----+-----+----+ ! Dist ICA       !125 !37. 8!60   !0.7 ! +---------------+----+----+-----+----+ ! Prox ECA       !98.1!    !60   !    ! +---------------+----+----+-----+----+ ! Vertebral      !46.6!    !60   !    ! +---------------+----+----+-----+----+ ! Prox Subclavian! 136 !    !60   !    ! +---------------+----+----+-----+----+   - There is antegrade vertebral flow noted on the left side. - Additional Measurements:ICAPSV/CCAPSV 1.19. ICAEDV/CCAEDV 2.08.     MRI BRAIN WO CONTRAST    Result Date: 8/11/2023  EXAMINATION: MRI OF THE BRAIN WITHOUT CONTRAST 8/7/2023 5:41 pm TECHNIQUE: Multiplanar multisequence MRI of the brain was performed without the administration of intravenous contrast. COMPARISON: None. HISTORY: ORDERING SYSTEM PROVIDED HISTORY: R arm weakness TECHNOLOGIST PROVIDED HISTORY: Reason for exam:->R arm weakness Reason for Exam: R arm weakness FINDINGS: INTRACRANIAL STRUCTURES/VENTRICLES: There is a punctate acute infarct in the left middle frontal gyrus. There is an additional acute punctate infarct in the left superior parietal lobule. There is no acute hemorrhage, herniation, or hydrocephalus. Scattered white matter abnormalities are nonspecific but commonly attributed to chronic microvascular ischemia. ORBITS: The visualized portion of the orbits demonstrate no acute abnormality. SINUSES: The visualized paranasal sinuses and mastoid air cells demonstrate no acute abnormality. BONES/SOFT TISSUES: The bone marrow signal intensity appears normal. The soft tissues demonstrate no acute abnormality. 2 punctate acute infarcts in the left middle cerebral artery territory. Imaging: [unfilled]         ======================================================================  Please note that this chart entry has been generated using voice recognition software, mainly. So please excuse brevity and/or typos. While every effort and attempts have been made to ensure the accuracy of this automated transcription, some errors may have occurred; and certain words and phrases in transcription may not be entered as intended. However, inadvertent computerized transcription errors may be present. So please contact us if any clarification needed.

## 2023-08-17 NOTE — PROGRESS NOTES
[address] [facility] Department   Phone: [phone]    Speech Therapy    [] Initial Evaluation            [x] Daily Treatment Note         [] Discharge Summary      Patient: Trean Broussard   : 1950   MRN: [id number]   Date of Service:  2023  Admitting Diagnosis: CVA (cerebrovascular accident due to intracerebral hemorrhage) [facility])  Current Admission Summary: Trena Broussard is a 68 y.o. male who presented to [facility]  with encephalopathy and found to have significant DEBO. PMH reflects PD, CAD, Afib. 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 He is agreeable to Aru and wants to work in therapy before planned Dc home with his wife. DC from ARU due to irregular heart beat. He is now improved and wants to come back to ARU. Past Medical History:  has a past medical history of Atrial fibrillation ([address]), No significant past medical history, and Parkinson disease ([address]). Past Surgical History:  has a past surgical history that includes Coronary artery bypass graft (2018) and Atrial ablation surgery (2018). Recent MRI Brain: 23  IMPRESSION:  2 punctate acute infarcts in the left middle cerebral artery territory. Precautions/Restrictions: high fall risk      Pre-Admission Information   Living Status: Pt lives at home with wife. Pt's wife encourages pt to stay physically and mentally active but provides assistance. They travel to [place] during the winter months. They have two adult children who live locally and 6 grandchildren.    Occupation/School: Retired 4 years ago from [employer]  Medication Management: :  []Primary   [x]Secondary (wife sets up pill organizer and sets alarms; pt familiar with medication list) []No   Finance Management: []Primary   [x]Secondary (wife uses electronic bill pay and reviews payments each month to keep pt active in lists)  []No   Active :   []Yes Comprehension: Severity: Mild   Goal not targeted this session. Expressive Language: Severity: Mild   Indirectly targeted this session. Voice: Severity: Mild   Goal not targeted this session. Cognition: Severity: Moderate   Fx Recall  - Pt recalled two items he ate for lunch    Thought Organization: Fx Divergent Naming  - Pt independently named two members of a functional category (e.g., meals, tv shows, etc) in 56% (9/16) opportunities, increased to 87% with min cues  - Pt with many perseverative errors, however easy to redirect    Executive Function: Reading Menus ([employer])  - Pt independently answered 44% (4/9) questions provided a visual prompt (ice cream shop menu), increased to 100% with moderate visual and verbal cues  - Pt with difficulty visually tracking items to price  - Pt answering with memory from a real life ice cream shop rather than referring to visual prompt  - Pt perseverating on one area of menu, required reduced field to redirect  - Pt required many repetitions of verbal questions    Additional Interventions:        Education  Barriers To Learning: cognition  Patient Education: Provided education regarding role of SLP, results of assessment, recommendations and general speech pathology plan of care. Provided stroke education. Provided education re LSVT LOUD (outpatient). Learning Assessment: Pt verbalized understanding   Pt requires ongoing learning     Assessment  Impairments Requiring Therapeutic Intervention: Dysarthria, Cognitive-Linguistic Deficits , Speech-Language Deficits  Prognosis: good    Clinical Assessment:   Pt presents with mild-mod cognitive-linguistic deficits characterized by difficulty with immediate/working memory, short-term memory, attention, problem solving, delayed processing and executive function. Pt benefited from mod cues via simplifications and repetition of task questions/directives.  Language skills are limited are characterized by reduced initiation, utterance length and thought organization. Also, pt presents with mild-moderate hypokinetic dysarthria characterized by low vocal intensity, monotone speech, and reduced utterance length. Dysphagia is continuing to be monitoring due to reduced endurance with eating/drinking but seemingly tolerating regular consistencies. Recommend skilled speech therapy to improve speech-language cognitive skills upon discharge to home setting. Diet Solids Recommendation:   Liquid Consistency Recommendation:   Recommended Form of Meds:   Regular texture diet     Thin liquids     Meds whole with water       Recommended Compensatory Swallowing Strategies: Upright as possible with all PO intake , Small bites/sips , Eat/feed slowly      Plan  Frequency: 60 minutes/day; 5 days per week, as tolerated, until goals met, or discharged from ARU. Therapeutic Interventions: Diet Tolerance Monitoring , Patient/Family Education  Cognitive-Linguistic intervention , Patient/ Family education , and Shirley Romp Voice Therapy (LSVT)   Discharge Recommendations: Home with assistance and 24 hour supervision  Continued SLP at Discharge: Yes  and LSVT     Goals  Patient Goals: \"improve word finding and memory\"  Time Frame: 10-14 days    Pt will tolerate recommended diet with no overt s/s of aspiration. Ongoing   Patient will complete verbal description and word retrieval tasks with 80% accuracy or given min verbal cues  Ongoing   Pt will recall functional information (daily tasks, personal hx, etc.) with 80% accuracy. Patient will complete functional problem-solving tasks for daily situations with 80% accuracy or given min cues. Ongoing   Pt will complete word associative tasks to improve mental flexibility, complex thinking, and working memory skills with 80% accuracy. Ongoing   Pt will participate in vocal exercises to increase vocal loudness with 80% accuracy with min cues.   Ongoing       Therapy Session Time      Session 1

## 2023-08-17 NOTE — PROGRESS NOTES
[address] [facility] Department   Phone: [phone]    Physical Therapy    [] Evaluation            [x] Daily Treatment Note         [] Discharge Summary      Patient: Thor Rangel   : 1950   MRN: [id number]   Date of Service:  2023  Admitting Diagnosis: CVA (cerebrovascular accident due to intracerebral hemorrhage) [facility])  Current Admission Summary: 68 y.o. male who presented to [facility]  with encephalopathy and found to have significant DEBO. PMH reflects PD, CAD, Afib. 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 He is agreeable to ARU and wants to work in therapy before planned Dc home with his wife. MRI brain : 2 punctate acute infarcts in the left middle cerebral artery territory. Rotator cuff tear in (R) UE: per ortho appropriate for WBAT/ROM as tolerated  Pt. Transferred to Acute care on 23 d/t increased heart rate. Interruption to care. Past Medical History:  has a past medical history of Atrial fibrillation ([address]), No significant past medical history, and Parkinson disease ([address]). Past Surgical History:  has a past surgical history that includes Coronary artery bypass graft (2018) and Atrial ablation surgery (2018). Discharge Recommendations: Home with HHPT  DME Required For Discharge: DME to be determined pending patient progress  Precautions/Restrictions: high fall risk  Weight Bearing Restrictions: weight bearing as tolerated; per ortho note \"WBAT/ROM as tolerated\"  [x] Right Upper Extremity  [] Left Upper Extremity [] Right Lower Extremity  [] Left Lower Extremity     Required Braces/Orthotics: no braces required   [] Right  [] Left  Positional Restrictions:no positional restrictions    Pre-Admission Information   Lives With: spouse                  Type of Home: house  Home Layout: one level  Home Access: 1 step to enter with handrail.  Handrails are located on R side. Bathroom Layout: walk in shower  Bathroom Equipment: shower chair, hand held shower head, portable/suction cup grab bars, small built in shower seat (wife reports it's too small to sit on)   Toilet Height: standard height  Home Equipment: rollator - 4 wheeled walker, single point cane, quad cane, electric scooter, reacher, sock aide, transport chair  Transfer Assistance: Independent without use of device  Ambulation Assistance: Independent without use of device  ADL Assistance: independent with all ADL's, wife helped shave (Pt. Doesn't like to )  IADL Assistance: shares household tasks with wife; pt would use riding   Active :        [] Yes                 [x] No  Hand Dominance: [] Left                 [x] Right  Current Employment: retired. Occupation: [employer]   Hobbies: Enjoys watching TV; [employer] and [address]: Reports no falls within last 6 months    Patient's wife reports 2 weeks up until hospitalization, pt required assistance with all ADLs and IADLs; Pt used SPC to amb and transfer around home/community. The above information is prior to pt's decline up until hospitalization. Examination   Vision:   Vision Gross Assessment: Impaired and Vision Corrective Device: wears glasses at all times     Hearing:   [facility]  Observation:   General Observation:  Resting (R) tremor  Baig catheter      Subjective  General: Pt sitting in recliner upon arrival, agreeable to session with wife present. Pt reports he is feeling good today. Pain: 0/10  Pain Interventions: not applicable       Functional Mobility  Bed Mobility:  Bed mobility not completed on this date.   Comments: pt in recliner upon arrival and upon completion  Transfers:  Sit to stand transfer: contact guard assistance  Stand to sit transfer: contact guard assistance  Comments: no device used; significant increased time for motor planning and sequencing  Ambulation:  Surface:level surface  Assistive Device: for assistance, decreased awareness of need for safety  Problem Solving: assistance required to implement solutions, decreased awareness of errors  Initiation: requires cues for some  Sequencing: requires cues for some  Comments: Pt requires increased time for all tasks and some reminders/repeated instructions for some. Command Following:   accurately follows one step commands    Education  Barriers To Learning: cognition  Patient Education: patient educated on goals, PT role and benefits, plan of care, general safety, functional mobility training, orientation, disease specific education  Learning Assessment:  patient verbalizes understanding, would benefit from continued reinforcement    Assessment  Activity Tolerance: Good; increased time for tasks; requires rests breaks between sets  Impairments Requiring Therapeutic Intervention: decreased functional mobility, decreased ADL status, decreased ROM, decreased strength, decreased safety awareness, decreased cognition, decreased endurance, decreased balance, decreased IADL, decreased posture  Prognosis: good  Clinical Assessment: Pt continues to be CGA with functional mobility this date, with less VC required for transfers. Pt has progressed his ambulation distance to 170 ft without AD; still requiring a significant increase amount of time, with frequent verbal and tactile cues for posture, and a step through gait pattern to decrease shuffling mechanics. Pt still requires increased time for all tasks, rest breaks secondary to fatigue and cues for safety awareness. Prior to recent hospitalizations, patient was independent without device in home and community.   Safety Interventions: patient left in chair, chair alarm in place, call light within reach, gait belt, patient at risk for falls, and family/caregiver present; wife present    Plan  Frequency: 5 x/week, 60 min/day  Current Treatment Recommendations: strengthening, ROM, balance training, functional mobility

## 2023-08-17 NOTE — PLAN OF CARE

## 2023-08-17 NOTE — PROGRESS NOTES
[address] [facility] Department   Phone: [phone]    Occupational Therapy    [] Initial Evaluation            [x] Daily Treatment Note         [] Discharge Summary      Patient: Dorian Denise   : 1950   MRN: [id number]   Date of Service:  2023    Admitting Diagnosis:  CVA (cerebrovascular accident due to intracerebral hemorrhage) [facility])  Current Admission Summary: Dorian Denise is a 68 y.o. male who presented to [facility]  with encephalopathy and found to have significant DEBO. PMH reflects PD, CAD, Afib. 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 Return to Aru post acute off for irregular HR. MRI brain : 2 punctate acute infarcts in the left middle cerebral artery territory. Past Medical History:  has a past medical history of Atrial fibrillation ([address]), No significant past medical history, and Parkinson disease ([address]). Past Surgical History:  has a past surgical history that includes Coronary artery bypass graft (2018) and Atrial ablation surgery (2018). Discharge Recommendations: HH OT S3; PRN assist from family    DME Required For Discharge: DME to be determined pending patient progress    Precautions/Restrictions: high fall risk  Weight Bearing Restrictions: no restrictions       Required Braces/Orthotics: no braces required     Positional Restrictions:no positional restrictions      Pre-Admission Information   Lives With: spouse                  Type of Home: house  Home Layout: one level; finished basement with full flight of steps with 1 HR  Home Access: 1 step to enter with handrail. Handrails are located on R side.   Bathroom Layout: walk in shower, curtain   Bathroom Equipment: shower chair, hand held shower head, portable/suction cup grab bars, small built in shower shelf (wife reports it's too small to sit on)   Toilet Height: standard height  Home Cognition  Overall Cognitive Status: Impaired  Arousal/Alertness: delayed responses to stimuli, inconsistent responses to stimuli  Following Commands: follows one step commands with repetition, follows one step commands with increased time  Attention Span: attends with cues to redirect, difficulty attending to directions  Memory: decreased recall of recent events, decreased short term memory  Safety Judgement: decreased awareness of need for assistance  Problem Solving: assistance required to implement solutions, decreased awareness of errors, assistance required to identify errors made  Insights: decreased awareness of deficits  Initiation: requires cues for some  Sequencing: requires cues for some  Comments: delayed processing at times; flat affect; fluctuations in attention throughout session   Orientation:    oriented to person, oriented to place, and disoriented to time   Command Following:   impaired     Education  Barriers To Learning: cognition and physical  Patient Education: patient educated on goals, OT role and benefits, plan of care, weight-bearing education, HEP, orientation, family education, transfer training, discharge recommendations  Learning Assessment:  patient verbalizes understanding, would benefit from continued reinforcement, patient will require reinforcement due to cognitive deficits    Assessment  Activity Tolerance: fair tolerance, rest breaks as needed  Impairments Requiring Therapeutic Intervention: decreased functional mobility, decreased ADL status, decreased ROM, decreased strength, decreased cognition, decreased endurance, decreased balance, decreased IADL, decreased fine motor control, decreased coordination  Prognosis: fair  Clinical Assessment: Pt tolerated shower level ADL fairly well. Pt introduced to AE for LB dressing- will require ongoing education/training for carryover and problem solving for functional use. Hmei-UB dressing introduced.  Overall transfers and mobility w/o AD remain at CGA/Ingrid. Ongoing activities to address ADL/AE training, balance, and R UE function recommended. Skilled OT services indicated in order to maximize his IND an safety with all occupational pursuits. Safety Interventions: patient left in chair, chair alarm in place, call light within reach, patient at risk for falls, and family/caregiver present    Plan  Frequency: 5 x/week, 60 min/day  Current Treatment Recommendations: strengthening, ROM, balance training, functional mobility training, transfer training, endurance training, neuromuscular re-education, patient/caregiver education, ADL/self-care training, IADL training, cognitive reorientation, safety education, and equipment evaluation/education    Goals  Patient Goals: Get back to walking normal; Increased IND in self-care    Short Term Goals:  Time Frame: 14 days   Patient will complete upper body ADL at [facility]   Patient will complete lower body ADL at [facility]   Patient will complete toileting at modified independent   Patient will complete grooming at [facility]   Patient will complete functional transfers at [facility]   Patient will complete functional mobility at modified independent   Patient to gather and transport IADL items at modified independent      Above goals reviewed on 8/17/2023. All goals are ongoing at this time unless indicated above.      Therapy Session Time     Individual Group Co-treatment   Time In 1320      Time Out 1420      Minutes 60           Timed Code Treatment Minutes: 60   Total Treatment Minutes:  60       Electronically Signed By: Naeem Sanches21 Eusebio Jones [phone] CHLOÉ Cortez/MIGUELINA #[id number]

## 2023-08-18 ENCOUNTER — APPOINTMENT (OUTPATIENT)
Dept: GENERAL RADIOLOGY | Age: 73
DRG: 057 | End: 2023-08-18
Attending: PHYSICAL MEDICINE & REHABILITATION
Payer: MEDICARE

## 2023-08-18 LAB
ALBUMIN SERPL-MCNC: 2.9 G/DL (ref 3.4–5)
ALBUMIN SERPL-MCNC: 3.3 G/DL (ref 3.4–5)
ANION GAP SERPL CALCULATED.3IONS-SCNC: 10 MMOL/L (ref 3–16)
ANION GAP SERPL CALCULATED.3IONS-SCNC: 11 MMOL/L (ref 3–16)
BACTERIA URNS QL MICRO: ABNORMAL /HPF
BASOPHILS # BLD: 0.1 K/UL (ref 0–0.2)
BASOPHILS NFR BLD: 0.8 %
BILIRUB UR QL STRIP.AUTO: NEGATIVE
BUN SERPL-MCNC: 37 MG/DL (ref 7–20)
BUN SERPL-MCNC: 37 MG/DL (ref 7–20)
CALCIUM SERPL-MCNC: 8.5 MG/DL (ref 8.3–10.6)
CALCIUM SERPL-MCNC: 8.7 MG/DL (ref 8.3–10.6)
CHLORIDE SERPL-SCNC: 96 MMOL/L (ref 99–110)
CHLORIDE SERPL-SCNC: 99 MMOL/L (ref 99–110)
CHLORIDE UR-SCNC: 70 MMOL/L
CLARITY UR: ABNORMAL
CO2 SERPL-SCNC: 17 MMOL/L (ref 21–32)
CO2 SERPL-SCNC: 20 MMOL/L (ref 21–32)
COARSE GRAN CASTS #/AREA URNS LPF: ABNORMAL /LPF (ref 0–2)
COLOR UR: YELLOW
CREAT SERPL-MCNC: 0.9 MG/DL (ref 0.8–1.3)
CREAT SERPL-MCNC: 1.1 MG/DL (ref 0.8–1.3)
CREAT UR-MCNC: 52.8 MG/DL (ref 39–259)
DEPRECATED RDW RBC AUTO: 14.6 % (ref 12.4–15.4)
EOSINOPHIL # BLD: 0.1 K/UL (ref 0–0.6)
EOSINOPHIL NFR BLD: 1.7 %
EPI CELLS #/AREA URNS HPF: ABNORMAL /HPF (ref 0–5)
GFR SERPLBLD CREATININE-BSD FMLA CKD-EPI: >60 ML/MIN/{1.73_M2}
GFR SERPLBLD CREATININE-BSD FMLA CKD-EPI: >60 ML/MIN/{1.73_M2}
GLUCOSE SERPL-MCNC: 88 MG/DL (ref 70–99)
GLUCOSE SERPL-MCNC: 88 MG/DL (ref 70–99)
GLUCOSE UR STRIP.AUTO-MCNC: NEGATIVE MG/DL
HCT VFR BLD AUTO: 30.8 % (ref 40.5–52.5)
HGB BLD-MCNC: 10.4 G/DL (ref 13.5–17.5)
HGB UR QL STRIP.AUTO: ABNORMAL
HYALINE CASTS #/AREA URNS LPF: ABNORMAL /LPF (ref 0–2)
KETONES UR STRIP.AUTO-MCNC: NEGATIVE MG/DL
LEUKOCYTE ESTERASE UR QL STRIP.AUTO: ABNORMAL
LYMPHOCYTES # BLD: 0.4 K/UL (ref 1–5.1)
LYMPHOCYTES NFR BLD: 6.9 %
MCH RBC QN AUTO: 30.7 PG (ref 26–34)
MCHC RBC AUTO-ENTMCNC: 33.7 G/DL (ref 31–36)
MCV RBC AUTO: 91 FL (ref 80–100)
MONOCYTES # BLD: 0.7 K/UL (ref 0–1.3)
MONOCYTES NFR BLD: 10.9 %
NEUTROPHILS # BLD: 5.1 K/UL (ref 1.7–7.7)
NEUTROPHILS NFR BLD: 79.7 %
NITRITE UR QL STRIP.AUTO: NEGATIVE
PH UR STRIP.AUTO: 5 [PH] (ref 5–8)
PHOSPHATE SERPL-MCNC: 3.7 MG/DL (ref 2.5–4.9)
PHOSPHATE SERPL-MCNC: 3.9 MG/DL (ref 2.5–4.9)
PLATELET # BLD AUTO: 178 K/UL (ref 135–450)
PMV BLD AUTO: 8.3 FL (ref 5–10.5)
POTASSIUM SERPL-SCNC: 4.1 MMOL/L (ref 3.5–5.1)
POTASSIUM SERPL-SCNC: 4.2 MMOL/L (ref 3.5–5.1)
POTASSIUM UR-SCNC: 21.7 MMOL/L
PROT UR STRIP.AUTO-MCNC: 100 MG/DL
RBC # BLD AUTO: 3.39 M/UL (ref 4.2–5.9)
RBC #/AREA URNS HPF: ABNORMAL /HPF (ref 0–4)
SODIUM SERPL-SCNC: 124 MMOL/L (ref 136–145)
SODIUM SERPL-SCNC: 129 MMOL/L (ref 136–145)
SODIUM UR-SCNC: 39 MMOL/L
SP GR UR STRIP.AUTO: 1.01 (ref 1–1.03)
UA COMPLETE W REFLEX CULTURE PNL UR: ABNORMAL
UA DIPSTICK W REFLEX MICRO PNL UR: YES
URATE SERPL-MCNC: 6.1 MG/DL (ref 3.5–7.2)
URN SPEC COLLECT METH UR: ABNORMAL
UROBILINOGEN UR STRIP-ACNC: 0.2 E.U./DL
UUN UR-MCNC: 311.2 MG/DL (ref 800–1666)
WBC # BLD AUTO: 6.4 K/UL (ref 4–11)
WBC #/AREA URNS HPF: ABNORMAL /HPF (ref 0–5)

## 2023-08-18 PROCEDURE — 97110 THERAPEUTIC EXERCISES: CPT

## 2023-08-18 PROCEDURE — 85025 COMPLETE CBC W/AUTO DIFF WBC: CPT

## 2023-08-18 PROCEDURE — 6360000002 HC RX W HCPCS: Performed by: PHYSICAL MEDICINE & REHABILITATION

## 2023-08-18 PROCEDURE — 71046 X-RAY EXAM CHEST 2 VIEWS: CPT

## 2023-08-18 PROCEDURE — 84133 ASSAY OF URINE POTASSIUM: CPT

## 2023-08-18 PROCEDURE — 2580000003 HC RX 258: Performed by: PHYSICAL MEDICINE & REHABILITATION

## 2023-08-18 PROCEDURE — 97535 SELF CARE MNGMENT TRAINING: CPT

## 2023-08-18 PROCEDURE — 84300 ASSAY OF URINE SODIUM: CPT

## 2023-08-18 PROCEDURE — 82570 ASSAY OF URINE CREATININE: CPT

## 2023-08-18 PROCEDURE — 6370000000 HC RX 637 (ALT 250 FOR IP): Performed by: PHYSICAL MEDICINE & REHABILITATION

## 2023-08-18 PROCEDURE — 81001 URINALYSIS AUTO W/SCOPE: CPT

## 2023-08-18 PROCEDURE — 97530 THERAPEUTIC ACTIVITIES: CPT

## 2023-08-18 PROCEDURE — 84540 ASSAY OF URINE/UREA-N: CPT

## 2023-08-18 PROCEDURE — 97130 THER IVNTJ EA ADDL 15 MIN: CPT

## 2023-08-18 PROCEDURE — 36415 COLL VENOUS BLD VENIPUNCTURE: CPT

## 2023-08-18 PROCEDURE — 82436 ASSAY OF URINE CHLORIDE: CPT

## 2023-08-18 PROCEDURE — 6370000000 HC RX 637 (ALT 250 FOR IP): Performed by: INTERNAL MEDICINE

## 2023-08-18 PROCEDURE — 84550 ASSAY OF BLOOD/URIC ACID: CPT

## 2023-08-18 PROCEDURE — 97129 THER IVNTJ 1ST 15 MIN: CPT

## 2023-08-18 PROCEDURE — 1280000000 HC REHAB R&B

## 2023-08-18 PROCEDURE — 80069 RENAL FUNCTION PANEL: CPT

## 2023-08-18 PROCEDURE — 6360000002 HC RX W HCPCS: Performed by: INTERNAL MEDICINE

## 2023-08-18 PROCEDURE — 97116 GAIT TRAINING THERAPY: CPT

## 2023-08-18 RX ORDER — IPRATROPIUM BROMIDE AND ALBUTEROL SULFATE 2.5; .5 MG/3ML; MG/3ML
1 SOLUTION RESPIRATORY (INHALATION) EVERY 4 HOURS PRN
Status: DISCONTINUED | OUTPATIENT
Start: 2023-08-18 | End: 2023-08-21

## 2023-08-18 RX ORDER — SODIUM CHLORIDE 1 G/1
1 TABLET ORAL
Status: DISCONTINUED | OUTPATIENT
Start: 2023-08-18 | End: 2023-08-19

## 2023-08-18 RX ORDER — FUROSEMIDE 10 MG/ML
20 INJECTION INTRAMUSCULAR; INTRAVENOUS ONCE
Status: COMPLETED | OUTPATIENT
Start: 2023-08-18 | End: 2023-08-18

## 2023-08-18 RX ORDER — CIPROFLOXACIN 500 MG/1
500 TABLET, FILM COATED ORAL EVERY 12 HOURS SCHEDULED
Status: COMPLETED | OUTPATIENT
Start: 2023-08-18 | End: 2023-08-24

## 2023-08-18 RX ORDER — IPRATROPIUM BROMIDE AND ALBUTEROL SULFATE 2.5; .5 MG/3ML; MG/3ML
1 SOLUTION RESPIRATORY (INHALATION)
Status: DISCONTINUED | OUTPATIENT
Start: 2023-08-18 | End: 2023-08-18

## 2023-08-18 RX ORDER — ALBUTEROL SULFATE 2.5 MG/3ML
2.5 SOLUTION RESPIRATORY (INHALATION) EVERY 6 HOURS PRN
Status: DISCONTINUED | OUTPATIENT
Start: 2023-08-18 | End: 2023-08-21

## 2023-08-18 RX ADMIN — CARBIDOPA AND LEVODOPA 2 TABLET: 25; 100 TABLET ORAL at 10:56

## 2023-08-18 RX ADMIN — ROPINIROLE HYDROCHLORIDE 0.25 MG: 0.25 TABLET, FILM COATED ORAL at 08:26

## 2023-08-18 RX ADMIN — ASPIRIN 81 MG: 81 TABLET, COATED ORAL at 08:26

## 2023-08-18 RX ADMIN — TAMSULOSIN HYDROCHLORIDE 0.4 MG: 0.4 CAPSULE ORAL at 21:56

## 2023-08-18 RX ADMIN — SODIUM CHLORIDE 1 G: 1 TABLET ORAL at 10:56

## 2023-08-18 RX ADMIN — CARBIDOPA AND LEVODOPA 2 TABLET: 25; 100 TABLET ORAL at 15:00

## 2023-08-18 RX ADMIN — SOTALOL HYDROCHLORIDE 80 MG: 80 TABLET ORAL at 08:26

## 2023-08-18 RX ADMIN — CARBIDOPA AND LEVODOPA 2 TABLET: 25; 100 TABLET ORAL at 19:19

## 2023-08-18 RX ADMIN — CARBIDOPA AND LEVODOPA 2 TABLET: 25; 100 TABLET ORAL at 06:00

## 2023-08-18 RX ADMIN — ATORVASTATIN CALCIUM 80 MG: 80 TABLET, FILM COATED ORAL at 21:56

## 2023-08-18 RX ADMIN — CIPROFLOXACIN 500 MG: 500 TABLET, FILM COATED ORAL at 21:56

## 2023-08-18 RX ADMIN — SODIUM CHLORIDE 1 G: 1 TABLET ORAL at 17:25

## 2023-08-18 RX ADMIN — ROPINIROLE HYDROCHLORIDE 0.25 MG: 0.25 TABLET, FILM COATED ORAL at 15:00

## 2023-08-18 RX ADMIN — FUROSEMIDE 20 MG: 10 INJECTION, SOLUTION INTRAMUSCULAR; INTRAVENOUS at 10:57

## 2023-08-18 RX ADMIN — ROPINIROLE HYDROCHLORIDE 0.25 MG: 0.25 TABLET, FILM COATED ORAL at 21:56

## 2023-08-18 RX ADMIN — SERTRALINE 100 MG: 50 TABLET, FILM COATED ORAL at 21:56

## 2023-08-18 RX ADMIN — ENOXAPARIN SODIUM 40 MG: 100 INJECTION SUBCUTANEOUS at 08:26

## 2023-08-18 RX ADMIN — SODIUM CHLORIDE, PRESERVATIVE FREE 10 ML: 5 INJECTION INTRAVENOUS at 08:26

## 2023-08-18 RX ADMIN — PANTOPRAZOLE SODIUM 40 MG: 40 TABLET, DELAYED RELEASE ORAL at 06:00

## 2023-08-18 RX ADMIN — CIPROFLOXACIN 500 MG: 500 TABLET, FILM COATED ORAL at 10:56

## 2023-08-18 ASSESSMENT — PAIN SCALES - GENERAL: PAINLEVEL_OUTOF10: 0

## 2023-08-18 NOTE — PROGRESS NOTES
MD Breana Lau MD Tolbert Coad, MD                  Office: [phone]                      Fax: [phone] [address] [employer]                   NEPHROLOGY INPATIENT PROGRESS NOTE:     PATIENT NAME: Tarsha Bojorquez  : 1950  MRN: [id number]        RECOMMENDATIONS:   -Check urine electrolytes, serum uric acid    - Stopped maintenance normal saline, as likely possibility of mild fluid overload, SIADH, with hyponatremia  - Start salt tablets, BP will tolerate, currently BP within normal range  - With Lasix trial 20 mg IV x1    - Follow-up labs again today. ,  - Goal less than 6-8 point change in 24 hours, treatment overcorrection needs to be monitored closely    -With underlying SIADH is possible also, continue*Pain control  - If able to, minimize SSRIs such as currently Zoloft    - argueta insertion needed. Follow-up with urology, - plans for cystoscopy outpt  - Continue Flomax at night    -no need for dialysis,    -at higher risk for decompensation, needing closer monitoring. D/C plan from renal stand point:  - likely ~2-3 days  + Follow-up with rehab plans also. IMPRESSION:       Admitted on:  2023 12:43 PM   For:  CVA (cerebrovascular accident due to intracerebral hemorrhage) ([address]) [I61.9]  No diagnosis found.      Hyponatremia, worsening, chronic,  Likely with underlying hypovolemia currently   No major neurological effect,  With underlying Parkinson, monitor closely      Recently admitted at [facility]  At that time-acute kidney injury, severe, had creatinine 8.9 on admission,  - With baseline creatinine 0.9, as of   - Thought to be due to obstructive uropathy, with BPH, bladder retention, and some component of hypovolemia  - Creatinine improved   -Currently creatinine around 0.9-1.1, without DEBO        Associated problems:   - Volume status: euvolemic  : HTN : no need for tight control   - Azotemia: pre-renal +  - Electrolytes: K: WNL  - Support Exception - unselect if not a suspected or confirmed emergency medical condition->Emergency Medical Condition (MA) Reason for Exam: Abnormal Lab (Called by family doc regarding lab work done this morning with critically high BUN and Cr levels. Recently completed treatment for shingles. Wife states pt has been confused, wetting self at night, diarrhea.) FINDINGS: CT head: No evidence of intra or extra-axial hemorrhage. No evidence of masses or mass effects or shifts. Ventricular system is normal and symmetrical. The skull, paranasal sinuses and orbits appear unremarkable. CT abdomen and pelvis: Lower Chest: No active disease. Organs: The liver demonstrates multiple hypodense lesions which have the density of cysts. The largest in the right lobe measures 2.7 cm. Gallbladder, pancreas, spleen, adrenals, aorta, and IVC appear stable. Bilateral hydronephrosis and hydroureter. Prostate is enlarged measuring 5.4 x 4.7 cm. The urinary bladder is distended. No focal thickening. Hydronephrosis is not related to ureteric stones or UV junction abnormality. There could be reflux. GI/Bowel: No evidence of bowel obstruction or perforation. Normal appendix. Mild constipation. Pelvis: As described, the urinary bladder is distended extending into the lower abdomen. No focal thickening. UV junctions appear unremarkable. Prostate is enlarged measuring 5.4 x 4.7 cm. No evidence of pelvic lymphadenopathy. Peritoneum/Retroperitoneum: No evidence of retroperitoneal lymphadenopathy or acute mesenteric findings. Bones/Soft Tissues: No acute abnormality. 1. No acute intracranial abnormality. 2. Bilateral hydronephrosis and hydroureter; however, no evidence of ureteric stones. Distended urinary bladder. Enlarged prostate. Hydronephrosis may be related to bladder outlet obstruction. 3. Benign-looking hepatic cysts. 4. No acute gastrointestinal abnormality.      XR SHOULDER RIGHT (MIN 2 VIEWS)    Result Date: on 08/08/2023 at 05:28 PM  ------------------------------------------------------------------  Patient Status:Routine. [facility] - Vascular Lab. Technical Quality:Limited visualization due to movement. Plaque   - A plaque was found in the Right ICA. irregular. The plaque characteristics are: moderate severity and heterogeneous texture. - A plaque was found in the Left ICA. smooth. The plaque characteristics are: moderate severity and heterogeneous texture. Velocities are measured in cm/s ; Diameters are measured in mm Carotid Right Measurements +---------------+----+----+-----+----+ ! Location       ! PSV ! EDV ! Angle! RI  ! +---------------+----+----+-----+----+ ! Prox CCA       !101 !14. 8!60   !0.85! +---------------+----+----+-----+----+ ! Mid CCA        !100 !14. 3!60   !0.86! +---------------+----+----+-----+----+ ! Dist CCA       !77.4!12. 6!60   !0.84! +---------------+----+----+-----+----+ ! Prox ICA       !111 !23  !60   !0.79! +---------------+----+----+-----+----+ ! Mid ICA        !101 !15. 6!60   !0.85! +---------------+----+----+-----+----+ ! Dist ICA       !85.8!17. 3! 60   !0.8 ! +---------------+----+----+-----+----+ ! Prox ECA       !139 !    !61   !    ! +---------------+----+----+-----+----+ ! Vertebral      !50.5!    !60   !    ! +---------------+----+----+-----+----+ ! Prox Subclavian!85.1!    !60   !    ! +---------------+----+----+-----+----+   - There is antegrade vertebral flow noted on the right side. - Additional Measurements:ICAPSV/CCAPSV 1.11. ICAEDV/CCAEDV 1.55. Carotid Left Measurements +---------------+----+----+-----+----+ ! Location       ! PSV ! EDV ! Angle! RI  ! +---------------+----+----+-----+----+ ! Prox CCA       !109 !18. 2! 60   !0.83! +---------------+----+----+-----+----+ ! Mid CCA        !105 !18. 9!60   !0.82! +---------------+----+----+-----+----+ ! Dist CCA       !83. 5!56. 1! 60   !0.72! +---------------+----+----+-----+----+ ! Prox ICA       !83. 4!17. 5!60   !0.79! and/or typos. While every effort and attempts have been made to ensure the accuracy of this automated transcription, some errors may have occurred; and certain words and phrases in transcription may not be entered as intended. However, inadvertent computerized transcription errors may be present. So please contact us if any clarification needed.

## 2023-08-18 NOTE — PROGRESS NOTES
[facility] Department   Phone: [phone]    Physical Therapy    [] Evaluation            [x] Daily Treatment Note         [] Discharge Summary      Patient: Brigido Stinson   : 1950   MRN: [id number]   Date of Service:  2023  Admitting Diagnosis: CVA (cerebrovascular accident due to intracerebral hemorrhage) [facility])  Current Admission Summary: 68 y.o. male who presented to [facility]  with encephalopathy and found to have significant DEBO. PMH reflects PD, CAD, Afib. 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 He is agreeable to ARU and wants to work in therapy before planned Dc home with his wife. MRI brain : 2 punctate acute infarcts in the left middle cerebral artery territory. Rotator cuff tear in (R) UE: per ortho appropriate for WBAT/ROM as tolerated  Pt. Transferred to Acute care on 23 d/t increased heart rate. Interruption to care. Past Medical History:  has a past medical history of Atrial fibrillation ([address]), No significant past medical history, and Parkinson disease ([address]). Past Surgical History:  has a past surgical history that includes Coronary artery bypass graft (2018) and Atrial ablation surgery (2018). Discharge Recommendations: Home with HHPT  DME Required For Discharge: DME to be determined pending patient progress  Precautions/Restrictions: high fall risk  Weight Bearing Restrictions: weight bearing as tolerated; per ortho note \"WBAT/ROM as tolerated\"  [x] Right Upper Extremity  [] Left Upper Extremity [] Right Lower Extremity  [] Left Lower Extremity     Required Braces/Orthotics: no braces required   [] Right  [] Left  Positional Restrictions:no positional restrictions    Pre-Admission Information   Lives With: spouse                  Type of Home: house  Home Layout: one level  Home Access: 1 step to enter with handrail.  Handrails are located time given for pt to communicate and complete word finding. STS completed from recliner at [facility]; pt amb 92 ft with same mechanics as above despite slower jasmin and 3 standing rest breaks approximately 3-5 secs. Pt reported significant fatigue post-amb and minimal SOB noted; pt reporting feeling \"gassed\"; significant time given for pt to recover. Pt completed STS from w/c to stand - stand step to bike at [facility]. Pt performed 6 mins on bike at 1.5 resistance (seat 7); increased time given upon completion for recovery. Pt completed amb around corners to improve turns; VC used for step through pattern and big steps around corner; pt amb ~ 40 ft with (R) turns and ~ 40 ft with (L) turns. Upon completion, pt stand stepped from wc to bed with 1 LOB requiring mod (A) for recovery from posterior lean. Pt completed stand to sit to bed at mod (A), sit to supine at mod (A) for (B) LE and trunk support and mod (A) for scooting in bed secondary to fatigue. Upon completion, pt left in bed, bed alarm in place, call light and all needs within reach and wife present. Pt reported his tongue feels \"big and abnormal\"; SLP and RN notified. To note, pt seemed frequently confused throughout session thinking it was Saturday, requiring frequent reminders for tasks and had some difficulty word finding/completing sentences. Functional Outcomes                 Cognition  Overall Cognitive Status: WFL  Arousal/Alertness: appropriate responses to stimuli  Following Commands: follows all commands without difficulty  Memory: decreased short term memory  Safety Judgement: decreased awareness of need for assistance, decreased awareness of need for safety  Problem Solving: assistance required to implement solutions, decreased awareness of errors  Initiation: requires cues for some  Sequencing: requires cues for some  Comments: Pt requires increased time for all tasks and some reminders/repeated instructions for some.   Command Following:   accurately follows one step commands    Education  Barriers To Learning: cognition  Patient Education: patient educated on goals, PT role and benefits, plan of care, general safety, functional mobility training, orientation, disease specific education  Learning Assessment:  patient verbalizes understanding, would benefit from continued reinforcement    Assessment  Activity Tolerance: Good; increased time for all tasks; requires frequent rests breaks between sets  Impairments Requiring Therapeutic Intervention: decreased functional mobility, decreased ADL status, decreased ROM, decreased strength, decreased safety awareness, decreased cognition, decreased endurance, decreased balance, decreased IADL, decreased posture  Prognosis: good  Clinical Assessment: Pt has progressed his max ambulation distance on this date without AD; Pt demonstrated improvement with gait overall this date with slightly faster jasmin and improvement with stepping throughout gait pattern. Pt still demonstrates a narrow CHELLY and some cross-over step pattern when amb but improves with VC. Pt still requires increased time for all tasks, rest breaks secondary to fatigue and cues for safety awareness. During second session, pt's fatigue level increased requiring prolonged rest breaks and one posterior LOB requiring mod (A). Prior to recent hospitalizations, patient was independent without device in home and community.   Safety Interventions: patient left in chair, chair alarm in place, call light within reach, gait belt, patient at risk for falls, and family/caregiver present; wife present and SLP present for next session    Plan  Frequency: 5 x/week, 60 min/day  Current Treatment Recommendations: strengthening, ROM, balance training, functional mobility training, transfer training, gait training, stair training, endurance training, neuromuscular re-education, wheelchair mobility training, modalities, patient/caregiver education, ADL/self-care training, IADL training, home exercise program, and safety education    Goals  Patient Goals: To go home   Short Term Goals:  Time Frame: 10-14 days  Patient will complete bed mobility at Supervision  Patient will complete stand step transfers Supervision  Patient will ambulate 270 ft with use of LRAD with Supervision  Patient will ascend/descend 12 stairs with (R) ascending handrail at Supervision  Patient will complete car transfer at Supervision    Above goals reviewed on 8/18/2023. All goals are ongoing at this time unless indicated above. Therapy Session Time     First Session Therapy Time:   Individual Concurrent Group Co-treatment   Time In  0830         Time Out  0902         Minutes  32           Timed Code Treatment Minutes: 28    Second Session Therapy Time:   Individual Concurrent Group Co-treatment   Time In  1250         Time Out  1341         Minutes  51           Timed Code Treatment Minutes:  51  Total Treatment Minutes:    32 + 06 = 83    Electronically Signed By:  CARRIE Lemus  Therapist observed and directed the patient's plan of care.   Co-signed and supervised by: Jeffrey Lee PT, DPT - [id number]

## 2023-08-18 NOTE — PROGRESS NOTES
Urine sample to be colleted from patient argueta catheter using clean cath method per urology. 1215: pt left the room to x-ray. 1240: pt back to the floor.

## 2023-08-18 NOTE — PROGRESS NOTES
MD increased pt sinemet for his parkinson's, wife still at bedside and assists with pt care. F/C still in place and patent, urine is tea or cecelia color, not much output on this shift, wife noted that pt hasn't been drinking much today, Call light within reach and safety is being maintained.

## 2023-08-18 NOTE — PROGRESS NOTES
[facility] Department   Phone: [phone]    Speech Therapy    [] Initial Evaluation            [x] Daily Treatment Note         [] Discharge Summary      Patient: Maria C Coronado   : 1950   MRN: [id number]   Date of Service:  2023  Admitting Diagnosis: CVA (cerebrovascular accident due to intracerebral hemorrhage) [facility])  Current Admission Summary: Maria C Coronado is a 68 y.o. male who presented to [facility]  with encephalopathy and found to have significant DEBO. PMH reflects PD, CAD, Afib. 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 He is agreeable to Aru and wants to work in therapy before planned Dc home with his wife. DC from ARU due to irregular heart beat. He is now improved and wants to come back to ARU. Past Medical History:  has a past medical history of Atrial fibrillation ([address]), No significant past medical history, and Parkinson disease ([address]). Past Surgical History:  has a past surgical history that includes Coronary artery bypass graft (2018) and Atrial ablation surgery (2018). Recent MRI Brain: 23  IMPRESSION:  2 punctate acute infarcts in the left middle cerebral artery territory. Precautions/Restrictions: high fall risk      Pre-Admission Information   Living Status: Pt lives at home with wife. Pt's wife encourages pt to stay physically and mentally active but provides assistance. They travel to [place] during the winter months. They have two adult children who live locally and 6 grandchildren.    Occupation/School: Retired 4 years ago from [employer]  Medication Management: :  []Primary   [x]Secondary (wife sets up pill organizer and sets alarms; pt familiar with medication list) []No   Finance Management: []Primary   [x]Secondary (wife uses electronic bill pay and reviews payments each month to keep pt active in lists)  []No   Active :   []Yes

## 2023-08-18 NOTE — PROGRESS NOTES
[address] [facility] Department   Phone: [phone]    Occupational Therapy    [] Initial Evaluation            [x] Daily Treatment Note         [] Discharge Summary      Patient: Joanna Castellon   : 1950   MRN: [id number]   Date of Service:  2023    Admitting Diagnosis:  CVA (cerebrovascular accident due to intracerebral hemorrhage) [facility])  Current Admission Summary: Joanna Castellon is a 68 y.o. male who presented to [facility]  with encephalopathy and found to have significant DEBO. PMH reflects PD, CAD, Afib. 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 Return to Aru post acute off for irregular HR. MRI brain : 2 punctate acute infarcts in the left middle cerebral artery territory. Past Medical History:  has a past medical history of Atrial fibrillation ([address]), No significant past medical history, and Parkinson disease ([address]). Past Surgical History:  has a past surgical history that includes Coronary artery bypass graft (2018) and Atrial ablation surgery (2018). Discharge Recommendations: HH OT S3; PRN assist from family    DME Required For Discharge: DME to be determined pending patient progress    Precautions/Restrictions: high fall risk  Weight Bearing Restrictions: no restrictions       Required Braces/Orthotics: no braces required     Positional Restrictions:no positional restrictions      Pre-Admission Information   Lives With: spouse                  Type of Home: house  Home Layout: one level; finished basement with full flight of steps with 1 HR  Home Access: 1 step to enter with handrail. Handrails are located on R side.   Bathroom Layout: walk in shower, curtain   Bathroom Equipment: shower chair, hand held shower head, portable/suction cup grab bars, small built in shower shelf (wife reports it's too small to sit on)   Toilet Height: standard height  Home maintains balance at [facility] without use of UE support  Dynamic Sitting Balance: fair: maintains balance at [facility] without use of UE support  Static Standing Balance: fair (-): maintains balance at [facility] with use of UE support  Dynamic Standing Balance: fair (-): maintains balance at [facility] with use of UE support  Comments:    Other Therapeutic Interventions         Cognition  Overall Cognitive Status: Impaired  Arousal/Alertness: delayed responses to stimuli, inconsistent responses to stimuli  Following Commands: follows one step commands with repetition, follows one step commands with increased time  Attention Span: attends with cues to redirect, difficulty attending to directions  Memory: decreased recall of recent events, decreased short term memory  Safety Judgement: decreased awareness of need for assistance  Problem Solving: assistance required to implement solutions, decreased awareness of errors, assistance required to identify errors made  Insights: decreased awareness of deficits  Initiation: requires cues for some  Sequencing: requires cues for some  Comments: delayed processing at times; flat affect; fluctuations in attention throughout session   Orientation:    oriented to person, oriented to place, and disoriented to time   Command Following:   impaired     Education  Barriers To Learning: cognition and physical  Patient Education: patient educated on goals, OT role and benefits, plan of care, ADL adaptive strategies, orientation, family education, transfer training, discharge recommendations  Learning Assessment:  patient verbalizes understanding, would benefit from continued reinforcement, patient will require reinforcement due to cognitive deficits    Assessment  Activity Tolerance: Fair with rest breaks  Impairments Requiring Therapeutic Intervention: decreased functional mobility, decreased ADL status, decreased ROM, decreased strength, decreased cognition, decreased endurance, decreased balance, decreased have reviewed and agree with the above treatment note.     Cynthia Woody, OTR/L, [id number]

## 2023-08-18 NOTE — PROGRESS NOTES
08/18/23 0923   RT Protocol   History Pulmonary Disease 0   Respiratory pattern 0   Breath sounds 0   Cough 0   Bronchodilator Assessment Score 0

## 2023-08-18 NOTE — PROGRESS NOTES
Morning assessment completed. Pt comfortably resting in chair. VSS. Morning meds given per MAR. Patient remains free from falls or accidental injury. Room free from clutter. All fall precautions in place. call light and belongings within reach, and door open. Sodium 124 this morning, pt has increased confusion this morning, MD notified, waiting for response. The care plan and education has been reviewed and mutually agreed upon with the patient.

## 2023-08-19 LAB
ALBUMIN SERPL-MCNC: 3 G/DL (ref 3.4–5)
ANION GAP SERPL CALCULATED.3IONS-SCNC: 13 MMOL/L (ref 3–16)
BASE EXCESS BLDV CALC-SCNC: -6.2 MMOL/L (ref -3–3)
BUN SERPL-MCNC: 34 MG/DL (ref 7–20)
CALCIUM SERPL-MCNC: 8.6 MG/DL (ref 8.3–10.6)
CHLORIDE SERPL-SCNC: 97 MMOL/L (ref 99–110)
CO2 BLDV-SCNC: 44 MMOL/L
CO2 SERPL-SCNC: 16 MMOL/L (ref 21–32)
COHGB MFR BLDV: 4.6 % (ref 0–1.5)
CREAT SERPL-MCNC: 1.1 MG/DL (ref 0.8–1.3)
DO-HGB MFR BLDV: 16 %
GFR SERPLBLD CREATININE-BSD FMLA CKD-EPI: >60 ML/MIN/{1.73_M2}
GLUCOSE SERPL-MCNC: 89 MG/DL (ref 70–99)
HCO3 BLDV-SCNC: 18.7 MMOL/L (ref 23–29)
MAGNESIUM SERPL-MCNC: 2.1 MG/DL (ref 1.8–2.4)
METHGB MFR BLDV: 0.7 %
O2 CT VFR BLDV CALC: 12 VOL %
O2 THERAPY: ABNORMAL
PCO2 BLDV: 33.7 MMHG (ref 40–50)
PH BLDV: 7.35 [PH] (ref 7.35–7.45)
PHOSPHATE SERPL-MCNC: 3.6 MG/DL (ref 2.5–4.9)
PO2 BLDV: 46 MMHG (ref 25–40)
POTASSIUM SERPL-SCNC: 4.2 MMOL/L (ref 3.5–5.1)
SAO2 % BLDV: 83 %
SODIUM SERPL-SCNC: 126 MMOL/L (ref 136–145)

## 2023-08-19 PROCEDURE — 80069 RENAL FUNCTION PANEL: CPT

## 2023-08-19 PROCEDURE — 6360000002 HC RX W HCPCS: Performed by: PHYSICAL MEDICINE & REHABILITATION

## 2023-08-19 PROCEDURE — 6370000000 HC RX 637 (ALT 250 FOR IP): Performed by: PHYSICAL MEDICINE & REHABILITATION

## 2023-08-19 PROCEDURE — 83735 ASSAY OF MAGNESIUM: CPT

## 2023-08-19 PROCEDURE — 6370000000 HC RX 637 (ALT 250 FOR IP): Performed by: INTERNAL MEDICINE

## 2023-08-19 PROCEDURE — 82803 BLOOD GASES ANY COMBINATION: CPT

## 2023-08-19 PROCEDURE — 84300 ASSAY OF URINE SODIUM: CPT

## 2023-08-19 PROCEDURE — 1280000000 HC REHAB R&B

## 2023-08-19 PROCEDURE — 36415 COLL VENOUS BLD VENIPUNCTURE: CPT

## 2023-08-19 PROCEDURE — 83935 ASSAY OF URINE OSMOLALITY: CPT

## 2023-08-19 PROCEDURE — 84133 ASSAY OF URINE POTASSIUM: CPT

## 2023-08-19 RX ORDER — FUROSEMIDE 20 MG/1
20 TABLET ORAL 2 TIMES DAILY
Status: DISCONTINUED | OUTPATIENT
Start: 2023-08-19 | End: 2023-08-20

## 2023-08-19 RX ORDER — SODIUM BICARBONATE 650 MG/1
650 TABLET ORAL 3 TIMES DAILY
Status: COMPLETED | OUTPATIENT
Start: 2023-08-19 | End: 2023-08-22

## 2023-08-19 RX ADMIN — ROPINIROLE HYDROCHLORIDE 0.25 MG: 0.25 TABLET, FILM COATED ORAL at 21:50

## 2023-08-19 RX ADMIN — SOTALOL HYDROCHLORIDE 80 MG: 80 TABLET ORAL at 10:55

## 2023-08-19 RX ADMIN — ROPINIROLE HYDROCHLORIDE 0.25 MG: 0.25 TABLET, FILM COATED ORAL at 14:47

## 2023-08-19 RX ADMIN — CARBIDOPA AND LEVODOPA 2 TABLET: 25; 100 TABLET ORAL at 12:26

## 2023-08-19 RX ADMIN — SODIUM BICARBONATE 650 MG: 650 TABLET ORAL at 14:47

## 2023-08-19 RX ADMIN — FUROSEMIDE 20 MG: 20 TABLET ORAL at 11:05

## 2023-08-19 RX ADMIN — SERTRALINE 100 MG: 50 TABLET, FILM COATED ORAL at 21:51

## 2023-08-19 RX ADMIN — CARBIDOPA AND LEVODOPA 2 TABLET: 25; 100 TABLET ORAL at 21:50

## 2023-08-19 RX ADMIN — ENOXAPARIN SODIUM 40 MG: 100 INJECTION SUBCUTANEOUS at 10:55

## 2023-08-19 RX ADMIN — TAMSULOSIN HYDROCHLORIDE 0.4 MG: 0.4 CAPSULE ORAL at 21:50

## 2023-08-19 RX ADMIN — CIPROFLOXACIN 500 MG: 500 TABLET, FILM COATED ORAL at 10:55

## 2023-08-19 RX ADMIN — ATORVASTATIN CALCIUM 80 MG: 80 TABLET, FILM COATED ORAL at 21:50

## 2023-08-19 RX ADMIN — ROPINIROLE HYDROCHLORIDE 0.25 MG: 0.25 TABLET, FILM COATED ORAL at 10:55

## 2023-08-19 RX ADMIN — CARBIDOPA AND LEVODOPA 2 TABLET: 25; 100 TABLET ORAL at 16:28

## 2023-08-19 RX ADMIN — ASPIRIN 81 MG: 81 TABLET, COATED ORAL at 10:55

## 2023-08-19 RX ADMIN — SODIUM BICARBONATE 650 MG: 650 TABLET ORAL at 21:50

## 2023-08-19 RX ADMIN — ACETAMINOPHEN 650 MG: 325 TABLET ORAL at 14:48

## 2023-08-19 RX ADMIN — CARBIDOPA AND LEVODOPA 2 TABLET: 25; 100 TABLET ORAL at 08:20

## 2023-08-19 RX ADMIN — CIPROFLOXACIN 500 MG: 500 TABLET, FILM COATED ORAL at 21:50

## 2023-08-19 RX ADMIN — FLUTICASONE PROPIONATE 2 SPRAY: 50 SPRAY, METERED NASAL at 21:53

## 2023-08-19 RX ADMIN — PANTOPRAZOLE SODIUM 40 MG: 40 TABLET, DELAYED RELEASE ORAL at 05:17

## 2023-08-19 RX ADMIN — FUROSEMIDE 20 MG: 20 TABLET ORAL at 19:00

## 2023-08-19 ASSESSMENT — PAIN DESCRIPTION - FREQUENCY: FREQUENCY: CONTINUOUS

## 2023-08-19 ASSESSMENT — PAIN SCALES - GENERAL
PAINLEVEL_OUTOF10: 0
PAINLEVEL_OUTOF10: 0
PAINLEVEL_OUTOF10: 3
PAINLEVEL_OUTOF10: 0
PAINLEVEL_OUTOF10: 0

## 2023-08-19 ASSESSMENT — PAIN DESCRIPTION - PAIN TYPE: TYPE: ACUTE PAIN

## 2023-08-19 ASSESSMENT — PAIN DESCRIPTION - DESCRIPTORS: DESCRIPTORS: ACHING

## 2023-08-19 ASSESSMENT — PAIN DESCRIPTION - ORIENTATION: ORIENTATION: ANTERIOR

## 2023-08-19 ASSESSMENT — PAIN DESCRIPTION - ONSET: ONSET: ON-GOING

## 2023-08-19 ASSESSMENT — PAIN DESCRIPTION - LOCATION: LOCATION: HEAD

## 2023-08-19 ASSESSMENT — PAIN - FUNCTIONAL ASSESSMENT: PAIN_FUNCTIONAL_ASSESSMENT: ACTIVITIES ARE NOT PREVENTED

## 2023-08-19 NOTE — PROGRESS NOTES
MD Abdoul Parsons MD Lucille Howells, MD                  Office: [phone]                      Fax: [phone] [address]                   NEPHROLOGY INPATIENT PROGRESS NOTE:     PATIENT NAME: Hadley Esparza  : 1950  MRN: [id number]        RECOMMENDATIONS:   -has been having diarrhea  - check VBG. - check urine studies for hyponatremia  - start po HCO3 TID  - Lasix 20mg po bid.  1.5L/24h fluid restriction.    - Diarrhea control. Consider stool for C. Diff since on Cipro. Discussed plan with nurse. D/C plan from renal stand point:  - likely ~2-3 days  + Follow-up with rehab plans also. IMPRESSION:       Admitted on:  2023 12:43 PM   For:  CVA (cerebrovascular accident due to intracerebral hemorrhage) ([address]) [I61.9]  No diagnosis found. Hyponatremia, worsening, chronic,  Likely with underlying hypovolemia currently   No major neurological effect,  With underlying Parkinson, monitor closely    Recently admitted at [facility]  At that time-acute kidney injury, severe, had creatinine 8.9 on admission,  - With baseline creatinine 0.9, as of   - Thought to be due to obstructive uropathy, with BPH, bladder retention, and some component of hypovolemia  - Creatinine improved   -Currently creatinine around 0.9-1.1, without DEBO        Associated problems:   - Volume status: euvolemic  : HTN : no need for tight control   - Azotemia: pre-renal +  - Electrolytes: K: WNL  - Acid-Base: Acidosis, non-anion gap, metabolic  - Anemia: Mild, likely chronic disease      Other major problems: Management per primary and other consulting teams. Hospital Problems             Last Modified POA    * (Principal) CVA (cerebrovascular accident due to intracerebral hemorrhage) ([address]) 2023 Yes       Thank you for allowing me to participate in this patient's care. Please do not hesitate to contact me anytime. We will follow along with you.        Delon for: HIV  GLORY:  No results found for: ANATITER, GLORY  RF:  No results found for: RF  DSDNA:  No components found for: DNA  AMYLASE:  No results found for: AMYLASE  LIPASE:    Lab Results   Component Value Date/Time    LIPASE 65.0 08/04/2023 05:35 PM     Fibrinogen Level:  No components found for: FIB       BELOW MENTIONED RADIOLOGY STUDY RESULTS BY ME (AS NEEDED FOR MY EVALUATION AND MANAGEMENT). CT ABDOMEN PELVIS WO CONTRAST Additional Contrast? None    Result Date: 8/7/2023  EXAMINATION: CT OF THE ABDOMEN AND PELVIS WITHOUT CONTRAST; CT OF THE HEAD WITHOUT CONTRAST 8/4/2023 5:45 pm; 8/4/2023 5:46 pm TECHNIQUE: CT of the abdomen and pelvis was performed without the administration of intravenous contrast. Multiplanar reformatted images are provided for review. Automated exposure control, iterative reconstruction, and/or weight based adjustment of the mA/kV was utilized to reduce the radiation dose to as low as reasonably achievable.; CT of the head was performed without the administration of intravenous contrast. Automated exposure control, iterative reconstruction, and/or weight based adjustment of the mA/kV was utilized to reduce the radiation dose to as low as reasonably achievable. COMPARISON: None. HISTORY: ORDERING SYSTEM PROVIDED HISTORY: lower abdominal pain, renal failure TECHNOLOGIST PROVIDED HISTORY: Reason for exam:->lower abdominal pain, renal failure Additional Contrast?->None Decision Support Exception - unselect if not a suspected or confirmed emergency medical condition->Emergency Medical Condition (MA) Reason for Exam: Abnormal Lab (Called by family doc regarding lab work done this morning with critically high BUN and Cr levels. Recently completed treatment for shingles.  Wife states pt has been confused, wetting self at night, diarrhea.); ORDERING SYSTEM PROVIDED HISTORY: altered mental status TECHNOLOGIST PROVIDED HISTORY: Reason for exam:->altered mental status Has a \"code stroke\" or \"stroke please excuse brevity and/or typos. While every effort and attempts have been made to ensure the accuracy of this automated transcription, some errors may have occurred; and certain words and phrases in transcription may not be entered as intended. However, inadvertent computerized transcription errors may be present. So please contact us if any clarification needed.

## 2023-08-19 NOTE — PLAN OF CARE
Problem: Safety - Adult  Goal: Free from fall injury  8/18/2023 2253 by Virgilio RN  Outcome: Progressing     Problem: ABCDS Injury Assessment  Goal: Absence of physical injury  Outcome: Progressing

## 2023-08-19 NOTE — PROGRESS NOTES
@ this time , this Rn notes pt has SOB on exertion after using the bathroom. RN assists pt back to bed, assess pt , sat. 94 % and notes to have inspirational wheezes. 2L of O2 set up. Respiratory informed  to give PRN breathing treatment. No needs expressed at this time. RN will continue to monitor.

## 2023-08-20 VITALS
BODY MASS INDEX: 28.63 KG/M2 | SYSTOLIC BLOOD PRESSURE: 155 MMHG | OXYGEN SATURATION: 99 % | RESPIRATION RATE: 18 BRPM | HEART RATE: 56 BPM | HEIGHT: 72 IN | WEIGHT: 211.4 LBS | DIASTOLIC BLOOD PRESSURE: 76 MMHG | TEMPERATURE: 98.2 F

## 2023-08-20 LAB
ALBUMIN SERPL-MCNC: 3 G/DL (ref 3.4–5)
ANION GAP SERPL CALCULATED.3IONS-SCNC: 13 MMOL/L (ref 3–16)
BUN SERPL-MCNC: 33 MG/DL (ref 7–20)
CALCIUM SERPL-MCNC: 8.5 MG/DL (ref 8.3–10.6)
CHLORIDE SERPL-SCNC: 96 MMOL/L (ref 99–110)
CO2 SERPL-SCNC: 16 MMOL/L (ref 21–32)
CREAT SERPL-MCNC: 1.1 MG/DL (ref 0.8–1.3)
GFR SERPLBLD CREATININE-BSD FMLA CKD-EPI: >60 ML/MIN/{1.73_M2}
GLUCOSE SERPL-MCNC: 76 MG/DL (ref 70–99)
MAGNESIUM SERPL-MCNC: 2.1 MG/DL (ref 1.8–2.4)
OSMOLALITY UR: 400 MOSM/KG (ref 390–1070)
PHOSPHATE SERPL-MCNC: 4.1 MG/DL (ref 2.5–4.9)
POTASSIUM SERPL-SCNC: 3.8 MMOL/L (ref 3.5–5.1)
POTASSIUM UR-SCNC: 19.4 MMOL/L
SODIUM SERPL-SCNC: 125 MMOL/L (ref 136–145)
SODIUM UR-SCNC: <20 MMOL/L
T4 FREE SERPL-MCNC: 1.3 NG/DL (ref 0.9–1.8)
TSH SERPL DL<=0.005 MIU/L-ACNC: 4.91 UIU/ML (ref 0.27–4.2)

## 2023-08-20 PROCEDURE — 84443 ASSAY THYROID STIM HORMONE: CPT

## 2023-08-20 PROCEDURE — 83735 ASSAY OF MAGNESIUM: CPT

## 2023-08-20 PROCEDURE — 6370000000 HC RX 637 (ALT 250 FOR IP): Performed by: INTERNAL MEDICINE

## 2023-08-20 PROCEDURE — 84439 ASSAY OF FREE THYROXINE: CPT

## 2023-08-20 PROCEDURE — 6360000002 HC RX W HCPCS: Performed by: PHYSICAL MEDICINE & REHABILITATION

## 2023-08-20 PROCEDURE — 6370000000 HC RX 637 (ALT 250 FOR IP): Performed by: PHYSICAL MEDICINE & REHABILITATION

## 2023-08-20 PROCEDURE — 80069 RENAL FUNCTION PANEL: CPT

## 2023-08-20 PROCEDURE — 2580000003 HC RX 258: Performed by: PHYSICAL MEDICINE & REHABILITATION

## 2023-08-20 PROCEDURE — 1280000000 HC REHAB R&B

## 2023-08-20 PROCEDURE — 51702 INSERT TEMP BLADDER CATH: CPT

## 2023-08-20 RX ORDER — FUROSEMIDE 40 MG/1
40 TABLET ORAL 2 TIMES DAILY
Status: DISCONTINUED | OUTPATIENT
Start: 2023-08-20 | End: 2023-08-30 | Stop reason: HOSPADM

## 2023-08-20 RX ADMIN — PANTOPRAZOLE SODIUM 40 MG: 40 TABLET, DELAYED RELEASE ORAL at 05:54

## 2023-08-20 RX ADMIN — ROPINIROLE HYDROCHLORIDE 0.25 MG: 0.25 TABLET, FILM COATED ORAL at 15:24

## 2023-08-20 RX ADMIN — ASPIRIN 81 MG: 81 TABLET, COATED ORAL at 09:29

## 2023-08-20 RX ADMIN — SODIUM BICARBONATE 650 MG: 650 TABLET ORAL at 15:24

## 2023-08-20 RX ADMIN — CARBIDOPA AND LEVODOPA 2 TABLET: 25; 100 TABLET ORAL at 11:15

## 2023-08-20 RX ADMIN — CARBIDOPA AND LEVODOPA 2 TABLET: 25; 100 TABLET ORAL at 05:54

## 2023-08-20 RX ADMIN — CIPROFLOXACIN 500 MG: 500 TABLET, FILM COATED ORAL at 09:29

## 2023-08-20 RX ADMIN — CIPROFLOXACIN 500 MG: 500 TABLET, FILM COATED ORAL at 20:39

## 2023-08-20 RX ADMIN — SODIUM BICARBONATE 650 MG: 650 TABLET ORAL at 20:40

## 2023-08-20 RX ADMIN — ENOXAPARIN SODIUM 40 MG: 100 INJECTION SUBCUTANEOUS at 09:28

## 2023-08-20 RX ADMIN — ATORVASTATIN CALCIUM 80 MG: 80 TABLET, FILM COATED ORAL at 20:40

## 2023-08-20 RX ADMIN — ROPINIROLE HYDROCHLORIDE 0.25 MG: 0.25 TABLET, FILM COATED ORAL at 20:39

## 2023-08-20 RX ADMIN — SODIUM CHLORIDE, PRESERVATIVE FREE 10 ML: 5 INJECTION INTRAVENOUS at 20:39

## 2023-08-20 RX ADMIN — SERTRALINE 100 MG: 50 TABLET, FILM COATED ORAL at 20:40

## 2023-08-20 RX ADMIN — TAMSULOSIN HYDROCHLORIDE 0.4 MG: 0.4 CAPSULE ORAL at 20:40

## 2023-08-20 RX ADMIN — SOTALOL HYDROCHLORIDE 80 MG: 80 TABLET ORAL at 09:32

## 2023-08-20 RX ADMIN — CARBIDOPA AND LEVODOPA 2 TABLET: 25; 100 TABLET ORAL at 20:39

## 2023-08-20 RX ADMIN — CARBIDOPA AND LEVODOPA 2 TABLET: 25; 100 TABLET ORAL at 15:24

## 2023-08-20 RX ADMIN — FLUTICASONE PROPIONATE 2 SPRAY: 50 SPRAY, METERED NASAL at 20:40

## 2023-08-20 RX ADMIN — SODIUM CHLORIDE, PRESERVATIVE FREE 10 ML: 5 INJECTION INTRAVENOUS at 09:30

## 2023-08-20 RX ADMIN — ROPINIROLE HYDROCHLORIDE 0.25 MG: 0.25 TABLET, FILM COATED ORAL at 09:29

## 2023-08-20 RX ADMIN — FUROSEMIDE 20 MG: 20 TABLET ORAL at 09:29

## 2023-08-20 RX ADMIN — SODIUM BICARBONATE 650 MG: 650 TABLET ORAL at 09:29

## 2023-08-20 RX ADMIN — FUROSEMIDE 40 MG: 40 TABLET ORAL at 16:30

## 2023-08-20 ASSESSMENT — PAIN SCALES - GENERAL: PAINLEVEL_OUTOF10: 0

## 2023-08-20 NOTE — PLAN OF CARE
Problem: Discharge Planning  Goal: Discharge to home or other facility with appropriate resources  Outcome: Progressing  Flowsheets (Taken 8/20/2023 9329)  Discharge to home or other facility with appropriate resources: Identify barriers to discharge with patient and caregiver     Problem: Safety - Adult  Goal: Free from fall injury  8/20/2023 1220 by Karen Tristan RN  Outcome: Progressing  8/19/2023 2242 by Nahum Morrison RN  Outcome: Progressing     Problem: ABCDS Injury Assessment  Goal: Absence of physical injury  8/19/2023 2242 by Nahum Morrison, RN  Outcome: Progressing

## 2023-08-20 NOTE — PROGRESS NOTES
MD Jared Monk MD Miller Gill, MD                  Office: [phone]                      Fax: [phone] [address] [facility]                   NEPHROLOGY INPATIENT PROGRESS NOTE:     PATIENT NAME: Maria C Coronado  : 1950  MRN: [id number]        RECOMMENDATIONS:   - VBG showing respiratory alkalosis  - Uosm 400, Chilo less than 20, albumin 3. Higher ADH state. - continue po HCO3 TID  - increase Lasix to 40mg po bid to decrease medullary concentrating gradient. 1.5L/24h fluid restriction. If not better tomorrow, will add urea. - Diarrhea control. Discussed plan with wife at bedside. D/C plan from renal stand point:  - likely ~2-3 days  + Follow-up with rehab plans also. IMPRESSION:       Admitted on:  2023 12:43 PM   For:  CVA (cerebrovascular accident due to intracerebral hemorrhage) ([address]) [I61.9]  No diagnosis found. Hyponatremia, worsening, chronic, higher ADH state. May have 3rd spacing also. Likely with underlying hypovolemia currently   No major neurological effect,  With underlying Parkinson, monitor closely    Recently admitted at [facility]  At that time-acute kidney injury, severe, had creatinine 8.9 on admission,  - With baseline creatinine 0.9, as of   - Thought to be due to obstructive uropathy, with BPH, bladder retention, and some component of hypovolemia  - Creatinine improved   -Currently creatinine around 0.9-1.1, without DEBO        Associated problems:   - Volume status: euvolemic  : HTN : no need for tight control. At goal.   - Azotemia: pre-renal +  - Electrolytes: K: WNL  - Acid-Base: Acidosis, non-anion gap, metabolic  - Anemia: Mild, likely chronic disease      Other major problems: Management per primary and other consulting teams.         Hospital Problems             Last Modified POA    * (Principal) CVA (cerebrovascular accident due to intracerebral hemorrhage) ([address]) 2023 Yes       Thank you for allowing me to participate in this patient's care. Please do not hesitate to contact me anytime. We will follow along with you. Elena Maguire MD,  [facility] [address] [place] [address]  Nw: [phone] or [address]  Fax: [phone]        =======================================================================================   Subjective   Episodes of diarrhea  Wife at bedside     Chart reviewed, patient's pertinent electronic medical records , Medical history and medication reviewed as needed for my evaulation. Past medical, Surgical, Social, Family medical history reviewed by me. MEDICATIONS: reviewed by me. Medications Prior to Admission:  No current facility-administered medications on file prior to encounter. Current Outpatient Medications on File Prior to Encounter   Medication Sig Dispense Refill    sotalol (BETAPACE) 80 MG tablet Take 1 tablet by mouth daily 60 tablet 1    pantoprazole (PROTONIX) 40 MG tablet Take 1 tablet by mouth every morning (before breakfast) (Patient not taking: Reported on 8/13/2023) 30 tablet 3    gabapentin (NEURONTIN) 100 MG capsule Take 1 capsule by mouth 3 times daily for 30 days.  (Patient not taking: Reported on 8/13/2023) 90 capsule 0    carbidopa-levodopa (SINEMET)  MG per tablet TAKE 1 TABLET AT 7:30 AM, 11:30 AM, 3:30 PM AND 7:30 PM (Patient taking differently: Take 1 tablet by mouth 4 times daily (before meals and nightly) TAKE 1 TABLET AT 7:30 AM, 11:30 AM, 3:30 PM AND 7:30 PM) 360 tablet 1    rOPINIRole (REQUIP) 0.25 MG tablet TAKE 1 TABLET BY MOUTH THREE TIMES A  tablet 1    tamsulosin (FLOMAX) 0.4 MG capsule Take 2 capsules by mouth nightly 180 capsule 1    sertraline (ZOLOFT) 100 MG tablet Take 1 tablet by mouth daily (Patient taking differently: Take 1 tablet by mouth nightly) 90 tablet 1    atorvastatin (LIPITOR) 80 MG tablet Take 1 tablet by mouth nightly      aspirin 81 MG tablet Take 1 tablet by mouth daily artery demonstrates moderate focal plaque formation. Conclusions   Summary   -The right internal carotid artery appears to have a <50% diameter reducing  stenosis based on velocity criteria. -The left internal carotid artery appears to have a <50% diameter reducing  stenosis based on velocity criteria. Recommendations   Recommend follow up study in 12 months. Signature   ------------------------------------------------------------------  Electronically signed by Daryl Zambrano MD, [facility] (Interpreting  physician) on 08/08/2023 at 05:28 PM  ------------------------------------------------------------------  Patient Status:Routine. [facility] - Vascular Lab. Technical Quality:Limited visualization due to movement. Plaque   - A plaque was found in the Right ICA. irregular. The plaque characteristics are: moderate severity and heterogeneous texture. - A plaque was found in the Left ICA. smooth. The plaque characteristics are: moderate severity and heterogeneous texture. Velocities are measured in cm/s ; Diameters are measured in mm Carotid Right Measurements +---------------+----+----+-----+----+ ! Location       ! PSV ! EDV ! Angle! RI  ! +---------------+----+----+-----+----+ ! Prox CCA       !101 !14. 8!60   !0.85! +---------------+----+----+-----+----+ ! Mid CCA        !100 !14. 3!60   !0.86! +---------------+----+----+-----+----+ ! Dist CCA       !77.4!12. 6!60   !0.84! +---------------+----+----+-----+----+ ! Prox ICA       !111 !23  !60   !0.79! +---------------+----+----+-----+----+ ! Mid ICA        !101 !15. 6!60   !0.85! +---------------+----+----+-----+----+ ! Dist ICA       !85.8!17. 3! 60   !0.8 ! +---------------+----+----+-----+----+ ! Prox ECA       !139 !    !61   !    ! +---------------+----+----+-----+----+ ! Vertebral      !50.5!    !60   !    ! +---------------+----+----+-----+----+ ! Prox Subclavian!85.1!    !60   !    ! +---------------+----+----+-----+----+   - There is antegrade The visualized paranasal sinuses and mastoid air cells demonstrate no acute abnormality. BONES/SOFT TISSUES: The bone marrow signal intensity appears normal. The soft tissues demonstrate no acute abnormality. 2 punctate acute infarcts in the left middle cerebral artery territory. Imaging: [unfilled]         ======================================================================  Please note that this chart entry has been generated using voice recognition software, mainly. So please excuse brevity and/or typos. While every effort and attempts have been made to ensure the accuracy of this automated transcription, some errors may have occurred; and certain words and phrases in transcription may not be entered as intended. However, inadvertent computerized transcription errors may be present. So please contact us if any clarification needed.

## 2023-08-21 LAB
ALBUMIN SERPL-MCNC: 2.9 G/DL (ref 3.4–5)
ANION GAP SERPL CALCULATED.3IONS-SCNC: 11 MMOL/L (ref 3–16)
BASOPHILS # BLD: 0 K/UL (ref 0–0.2)
BASOPHILS NFR BLD: 0.7 %
BUN SERPL-MCNC: 29 MG/DL (ref 7–20)
CALCIUM SERPL-MCNC: 8.2 MG/DL (ref 8.3–10.6)
CHLORIDE SERPL-SCNC: 99 MMOL/L (ref 99–110)
CO2 SERPL-SCNC: 18 MMOL/L (ref 21–32)
CREAT SERPL-MCNC: 1 MG/DL (ref 0.8–1.3)
DEPRECATED RDW RBC AUTO: 14.6 % (ref 12.4–15.4)
EOSINOPHIL # BLD: 0.1 K/UL (ref 0–0.6)
EOSINOPHIL NFR BLD: 1.4 %
EST. AVERAGE GLUCOSE BLD GHB EST-MCNC: 99.7 MG/DL
GFR SERPLBLD CREATININE-BSD FMLA CKD-EPI: >60 ML/MIN/{1.73_M2}
GLUCOSE SERPL-MCNC: 89 MG/DL (ref 70–99)
HBA1C MFR BLD: 5.1 %
HCT VFR BLD AUTO: 29.1 % (ref 40.5–52.5)
HGB BLD-MCNC: 9.8 G/DL (ref 13.5–17.5)
LYMPHOCYTES # BLD: 0.5 K/UL (ref 1–5.1)
LYMPHOCYTES NFR BLD: 10.1 %
MAGNESIUM SERPL-MCNC: 2 MG/DL (ref 1.8–2.4)
MCH RBC QN AUTO: 30.9 PG (ref 26–34)
MCHC RBC AUTO-ENTMCNC: 33.8 G/DL (ref 31–36)
MCV RBC AUTO: 91.3 FL (ref 80–100)
MONOCYTES # BLD: 0.7 K/UL (ref 0–1.3)
MONOCYTES NFR BLD: 12.7 %
NEUTROPHILS # BLD: 3.9 K/UL (ref 1.7–7.7)
NEUTROPHILS NFR BLD: 75.1 %
PHOSPHATE SERPL-MCNC: 3.4 MG/DL (ref 2.5–4.9)
PLATELET # BLD AUTO: 195 K/UL (ref 135–450)
PMV BLD AUTO: 8.4 FL (ref 5–10.5)
POTASSIUM SERPL-SCNC: 3.5 MMOL/L (ref 3.5–5.1)
RBC # BLD AUTO: 3.19 M/UL (ref 4.2–5.9)
SODIUM SERPL-SCNC: 128 MMOL/L (ref 136–145)
WBC # BLD AUTO: 5.3 K/UL (ref 4–11)

## 2023-08-21 PROCEDURE — 6360000002 HC RX W HCPCS: Performed by: PHYSICAL MEDICINE & REHABILITATION

## 2023-08-21 PROCEDURE — 80069 RENAL FUNCTION PANEL: CPT

## 2023-08-21 PROCEDURE — 1280000000 HC REHAB R&B

## 2023-08-21 PROCEDURE — 51702 INSERT TEMP BLADDER CATH: CPT

## 2023-08-21 PROCEDURE — 97110 THERAPEUTIC EXERCISES: CPT

## 2023-08-21 PROCEDURE — 2580000003 HC RX 258: Performed by: PHYSICAL MEDICINE & REHABILITATION

## 2023-08-21 PROCEDURE — 97530 THERAPEUTIC ACTIVITIES: CPT

## 2023-08-21 PROCEDURE — 92507 TX SP LANG VOICE COMM INDIV: CPT

## 2023-08-21 PROCEDURE — 6370000000 HC RX 637 (ALT 250 FOR IP): Performed by: INTERNAL MEDICINE

## 2023-08-21 PROCEDURE — 97130 THER IVNTJ EA ADDL 15 MIN: CPT

## 2023-08-21 PROCEDURE — 97116 GAIT TRAINING THERAPY: CPT

## 2023-08-21 PROCEDURE — 85025 COMPLETE CBC W/AUTO DIFF WBC: CPT

## 2023-08-21 PROCEDURE — 97112 NEUROMUSCULAR REEDUCATION: CPT

## 2023-08-21 PROCEDURE — 97535 SELF CARE MNGMENT TRAINING: CPT

## 2023-08-21 PROCEDURE — 83735 ASSAY OF MAGNESIUM: CPT

## 2023-08-21 PROCEDURE — 97129 THER IVNTJ 1ST 15 MIN: CPT

## 2023-08-21 PROCEDURE — 83036 HEMOGLOBIN GLYCOSYLATED A1C: CPT

## 2023-08-21 PROCEDURE — 6370000000 HC RX 637 (ALT 250 FOR IP): Performed by: PHYSICAL MEDICINE & REHABILITATION

## 2023-08-21 RX ADMIN — FUROSEMIDE 40 MG: 40 TABLET ORAL at 17:18

## 2023-08-21 RX ADMIN — SODIUM CHLORIDE, PRESERVATIVE FREE 10 ML: 5 INJECTION INTRAVENOUS at 22:11

## 2023-08-21 RX ADMIN — CIPROFLOXACIN 500 MG: 500 TABLET, FILM COATED ORAL at 09:24

## 2023-08-21 RX ADMIN — CARBIDOPA AND LEVODOPA 2 TABLET: 25; 100 TABLET ORAL at 22:10

## 2023-08-21 RX ADMIN — SODIUM BICARBONATE 650 MG: 650 TABLET ORAL at 14:15

## 2023-08-21 RX ADMIN — ENOXAPARIN SODIUM 40 MG: 100 INJECTION SUBCUTANEOUS at 09:24

## 2023-08-21 RX ADMIN — CIPROFLOXACIN 500 MG: 500 TABLET, FILM COATED ORAL at 22:10

## 2023-08-21 RX ADMIN — SERTRALINE 100 MG: 50 TABLET, FILM COATED ORAL at 22:10

## 2023-08-21 RX ADMIN — ATORVASTATIN CALCIUM 80 MG: 80 TABLET, FILM COATED ORAL at 22:11

## 2023-08-21 RX ADMIN — FLUTICASONE PROPIONATE 2 SPRAY: 50 SPRAY, METERED NASAL at 22:12

## 2023-08-21 RX ADMIN — SODIUM BICARBONATE 650 MG: 650 TABLET ORAL at 22:10

## 2023-08-21 RX ADMIN — ROPINIROLE HYDROCHLORIDE 0.25 MG: 0.25 TABLET, FILM COATED ORAL at 22:11

## 2023-08-21 RX ADMIN — SOTALOL HYDROCHLORIDE 80 MG: 80 TABLET ORAL at 09:24

## 2023-08-21 RX ADMIN — FUROSEMIDE 40 MG: 40 TABLET ORAL at 06:58

## 2023-08-21 RX ADMIN — ASPIRIN 81 MG: 81 TABLET, COATED ORAL at 09:24

## 2023-08-21 RX ADMIN — CARBIDOPA AND LEVODOPA 2 TABLET: 25; 100 TABLET ORAL at 06:53

## 2023-08-21 RX ADMIN — CARBIDOPA AND LEVODOPA 2 TABLET: 25; 100 TABLET ORAL at 12:02

## 2023-08-21 RX ADMIN — ROPINIROLE HYDROCHLORIDE 0.25 MG: 0.25 TABLET, FILM COATED ORAL at 14:15

## 2023-08-21 RX ADMIN — PANTOPRAZOLE SODIUM 40 MG: 40 TABLET, DELAYED RELEASE ORAL at 06:57

## 2023-08-21 RX ADMIN — SODIUM CHLORIDE, PRESERVATIVE FREE 10 ML: 5 INJECTION INTRAVENOUS at 09:24

## 2023-08-21 RX ADMIN — ROPINIROLE HYDROCHLORIDE 0.25 MG: 0.25 TABLET, FILM COATED ORAL at 09:24

## 2023-08-21 RX ADMIN — SODIUM BICARBONATE 650 MG: 650 TABLET ORAL at 09:24

## 2023-08-21 RX ADMIN — TAMSULOSIN HYDROCHLORIDE 0.4 MG: 0.4 CAPSULE ORAL at 22:10

## 2023-08-21 RX ADMIN — CARBIDOPA AND LEVODOPA 2 TABLET: 25; 100 TABLET ORAL at 15:49

## 2023-08-21 ASSESSMENT — PAIN SCALES - GENERAL
PAINLEVEL_OUTOF10: 0

## 2023-08-21 NOTE — PATIENT CARE CONFERENCE
[facility]  Inpatient Rehabilitation  Weekly Team Conference Note    Patient Name: Thor aRngel        MRN: [id number]    : 1950  (68 y.o.)  Gender: male           The team conference for this patient was held on 2023 at 80 am and led by:  Sri Teague. DO [facility]     CASE MANAGEMENT:  Assessment:   Patient is a 68year old male who admitted to ARU on 2023 with the admitting diagnosis of CVA (cerebrovascular accident due to intracerebral hemorrhage) ([address]) Patient resides at home with his spouse. Patient also has a daughter and grandchildren who are supportive. Patient continues to  benefit from skilled PT/OT/SLP to promote increased safety and independence in order to return to his prior level of functioning. Patient anticipates discharging to home with home health care services and recommended DME. PHYSICAL THERAPY:    Bed Mobility:   Supine to Sit: moderate assistance  Sit to Supine: minimal assistance  Transfers:  Sit to stand transfer: contact guard assistance  Stand to sit transfer: contact guard assistance  Comments: no device used; increased postural sway during initial stance but self corrects with increased time and use of LE into chair surface. Ambulation:  Surface:level surface  Assistive Device: no device  Assistance: minimal assistance  Distance:  200 ft + 115 ft + short distances within session  Gait Mechanics: Step to shuffling gait progressing to reciprocal pattern. Reduced jasmin with decreased step height/length (B) with (R) deficts > (L).  (B) Arm swing absent in high guard position. Comments: Reciprocal pattern improves in response to VC  Stair Mobility:  Number of Steps: 9  Step Height: 6 inch  Hand Rails: (R) ascending handrail  Assistance: minimal assistance, (Trisha progressing to CGA with repetition)  Comments: Step to pattern with VC for sequence. Utilizes cross body reaching of (L) UE to HR surface while ascending.   Initially attempts (R) UE

## 2023-08-21 NOTE — PROGRESS NOTES
Contrast?->None Decision Support Exception - unselect if not a suspected or confirmed emergency medical condition->Emergency Medical Condition (MA) Reason for Exam: Abnormal Lab (Called by family doc regarding lab work done this morning with critically high BUN and Cr levels. Recently completed treatment for shingles. Wife states pt has been confused, wetting self at night, diarrhea.); ORDERING SYSTEM PROVIDED HISTORY: altered mental status TECHNOLOGIST PROVIDED HISTORY: Reason for exam:->altered mental status Has a \"code stroke\" or \"stroke alert\" been called? ->No Decision Support Exception - unselect if not a suspected or confirmed emergency medical condition->Emergency Medical Condition (MA) Reason for Exam: Abnormal Lab (Called by family doc regarding lab work done this morning with critically high BUN and Cr levels. Recently completed treatment for shingles. Wife states pt has been confused, wetting self at night, diarrhea.) FINDINGS: CT head: No evidence of intra or extra-axial hemorrhage. No evidence of masses or mass effects or shifts. Ventricular system is normal and symmetrical. The skull, paranasal sinuses and orbits appear unremarkable. CT abdomen and pelvis: Lower Chest: No active disease. Organs: The liver demonstrates multiple hypodense lesions which have the density of cysts. The largest in the right lobe measures 2.7 cm. Gallbladder, pancreas, spleen, adrenals, aorta, and IVC appear stable. Bilateral hydronephrosis and hydroureter. Prostate is enlarged measuring 5.4 x 4.7 cm. The urinary bladder is distended. No focal thickening. Hydronephrosis is not related to ureteric stones or UV junction abnormality. There could be reflux. GI/Bowel: No evidence of bowel obstruction or perforation. Normal appendix. Mild constipation. Pelvis: As described, the urinary bladder is distended extending into the lower abdomen. No focal thickening. UV junctions appear unremarkable.  Prostate is enlarged +---------------+----+----+-----+----+   - There is antegrade vertebral flow noted on the right side. - Additional Measurements:ICAPSV/CCAPSV 1.11. ICAEDV/CCAEDV 1.55. Carotid Left Measurements +---------------+----+----+-----+----+ ! Location       ! PSV ! EDV ! Angle! RI  ! +---------------+----+----+-----+----+ ! Prox CCA       !109 !18. 2! 60   !0.83! +---------------+----+----+-----+----+ ! Mid CCA        !105 !18. 9!60   !0.82! +---------------+----+----+-----+----+ ! Dist CCA       !83. 6!73. 1! 60   !0.72! +---------------+----+----+-----+----+ ! Prox ICA       !83. 4!17. 5!60   !0.79! +---------------+----+----+-----+----+ ! Mid ICA        !84.8!22. 4!60   !0.74! +---------------+----+----+-----+----+ ! Dist ICA       !125 !37. 8!60   !0.7 ! +---------------+----+----+-----+----+ ! Prox ECA       !98.1!    !60   !    ! +---------------+----+----+-----+----+ ! Vertebral      !46.6!    !60   !    ! +---------------+----+----+-----+----+ ! Prox Subclavian! 136 !    !60   !    ! +---------------+----+----+-----+----+   - There is antegrade vertebral flow noted on the left side. - Additional Measurements:ICAPSV/CCAPSV 1.19. ICAEDV/CCAEDV 2.08. MRI BRAIN WO CONTRAST    Result Date: 8/11/2023  EXAMINATION: MRI OF THE BRAIN WITHOUT CONTRAST  8/7/2023 5:41 pm TECHNIQUE: Multiplanar multisequence MRI of the brain was performed without the administration of intravenous contrast. COMPARISON: None. HISTORY: ORDERING SYSTEM PROVIDED HISTORY: R arm weakness TECHNOLOGIST PROVIDED HISTORY: Reason for exam:->R arm weakness Reason for Exam: R arm weakness FINDINGS: INTRACRANIAL STRUCTURES/VENTRICLES: There is a punctate acute infarct in the left middle frontal gyrus. There is an additional acute punctate infarct in the left superior parietal lobule. There is no acute hemorrhage, herniation, or hydrocephalus. Scattered white matter abnormalities are nonspecific but commonly attributed to chronic microvascular ischemia.  ORBITS: The visualized portion of the orbits demonstrate no acute abnormality. SINUSES: The visualized paranasal sinuses and mastoid air cells demonstrate no acute abnormality. BONES/SOFT TISSUES: The bone marrow signal intensity appears normal. The soft tissues demonstrate no acute abnormality. 2 punctate acute infarcts in the left middle cerebral artery territory. Imaging: [unfilled]         ======================================================================  Please note that this chart entry has been generated using voice recognition software, mainly. So please excuse brevity and/or typos. While every effort and attempts have been made to ensure the accuracy of this automated transcription, some errors may have occurred; and certain words and phrases in transcription may not be entered as intended. However, inadvertent computerized transcription errors may be present. So please contact us if any clarification needed.

## 2023-08-21 NOTE — PLAN OF CARE
Problem: Discharge Planning  Goal: Discharge to home or other facility with appropriate resources  8/21/2023 0017 by Linda Arevalo RN  Outcome: Progressing     Problem: Safety - Adult  Goal: Free from fall injury  8/21/2023 0017 by Linda Arevalo RN  Outcome: Progressing     Problem: Chronic Conditions and Co-morbidities  Goal: Patient's chronic conditions and co-morbidity symptoms are monitored and maintained or improved  Outcome: Progressing     Problem: ABCDS Injury Assessment  Goal: Absence of physical injury  Outcome: Progressing     Problem: Skin/Tissue Integrity  Goal: Absence of new skin breakdown  Description: 1. Monitor for areas of redness and/or skin breakdown  2. Assess vascular access sites hourly  3. Every 4-6 hours minimum:  Change oxygen saturation probe site  4. Every 4-6 hours:  If on nasal continuous positive airway pressure, respiratory therapy assess nares and determine need for appliance change or resting period.   Outcome: Progressing

## 2023-08-21 NOTE — PROGRESS NOTES
[address] [facility] Department   Phone: [phone]    Speech Therapy    [] Initial Evaluation            [x] Daily Treatment Note         [] Discharge Summary      Patient: Thor Rangel   : 1950   MRN: [id number]   Date of Service:  2023  Admitting Diagnosis: CVA (cerebrovascular accident due to intracerebral hemorrhage) [facility])  Current Admission Summary: Thor Rangel is a 68 y.o. male who presented to [facility]  with encephalopathy and found to have significant DEBO. PMH reflects PD, CAD, Afib. 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 He is agreeable to [facility] and wants to work in therapy before planned Dc home with his wife. DC from ARU due to irregular heart beat. He is now improved and wants to come back to ARU. Past Medical History:  has a past medical history of Atrial fibrillation ([address]), No significant past medical history, and Parkinson disease ([address]). Past Surgical History:  has a past surgical history that includes Coronary artery bypass graft (2018) and Atrial ablation surgery (2018). Recent MRI Brain: 23  IMPRESSION:  2 punctate acute infarcts in the left middle cerebral artery territory. Precautions/Restrictions: high fall risk      Pre-Admission Information   Living Status: Pt lives at home with wife. Pt's wife encourages pt to stay physically and mentally active but provides assistance. They travel to [place] during the winter months. They have two adult children who live locally and 6 grandchildren.    Occupation/School: Retired 4 years ago from [employer]  Medication Management: :  []Primary   [x]Secondary (wife sets up pill organizer and sets alarms; pt familiar with medication list) []No   Finance Management: []Primary   [x]Secondary (wife uses electronic bill pay and reviews payments each month to keep pt active in lists)  []No   Active :   []Yes

## 2023-08-21 NOTE — PROGRESS NOTES
235 [facility] Department   Phone: [phone]    Occupational Therapy    [] Initial Evaluation            [x] Daily Treatment Note         [] Discharge Summary      Patient: Mari Hdz   : 1950   MRN: [id number]   Date of Service:  2023    Admitting Diagnosis:  CVA (cerebrovascular accident due to intracerebral hemorrhage) [facility])  Current Admission Summary: Mari Hdz is a 68 y.o. male who presented to [facility]  with encephalopathy and found to have significant DEBO. PMH reflects PD, CAD, Afib. 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 Return to [facility] post acute off for irregular HR. MRI brain : 2 punctate acute infarcts in the left middle cerebral artery territory. Past Medical History:  has a past medical history of Atrial fibrillation ([address]), No significant past medical history, and Parkinson disease ([address]). Past Surgical History:  has a past surgical history that includes Coronary artery bypass graft (2018) and Atrial ablation surgery (2018). Discharge Recommendations: HH OT S3; PRN assist from family    DME Required For Discharge: DME to be determined pending patient progress    Precautions/Restrictions: high fall risk  Weight Bearing Restrictions: no restrictions       Required Braces/Orthotics: no braces required     Positional Restrictions:no positional restrictions      Pre-Admission Information   Lives With: spouse                  Type of Home: house  Home Layout: one level; finished basement with full flight of steps with 1 HR  Home Access: 1 step to enter with handrail. Handrails are located on R side.   Bathroom Layout: walk in shower, curtain   Bathroom Equipment: shower chair, hand held shower head, portable/suction cup grab bars, small built in shower shelf (wife reports it's too small to sit on)   Toilet Height: standard height  Home Equipment: rollator - 4 wheeled walker, single point cane, electric scooter, reacher, sock aide, transport chair  Transfer Assistance: Independent without use of device  Ambulation Assistance: Independent without use of device  ADL Assistance: independent with all ADL's, wife helped shave  IADL Assistance: shares household tasks with wife; pt would use riding   Active :        [] Yes                 [x] No  Hand Dominance: [] Left                 [x] Right  Current Employment: retired. Occupation: [employer]   Hobbies: Enjoys watching TV; [employer] and Jeopardy; time with grandchildren   Recent Falls: Reports no falls within last 6 months     Patient's wife reports 2 weeks up until hospitalization, pt required assistance with all ADLs and IADLs; Pt used SPC to amb and transfer around community for long distances; use of carts in stores such as [employer] secondary to long distances required . The above information is prior to pt's decline up until hospitalization. Subjective  General: Pt in recliner at entry, agreeable to session and shower. Pt's spouse present in room.  Pt emotional at start of session- support provided throughout  Pain: 0/10  Pain Interventions: not applicable      Activities of Daily Living  Basic Activities of Daily Living  Grooming: minimal assistance  Grooming Comments: oral care in stance at sink- assist to apply toothpaste to brush secondary to pt applying toothpaste to toothpaste cap  Upper Extremity Bathing: moderate assistance  Lower Extremity Bathing: moderate assistance   Bathing Equipment: long handled sponge  Bathing Comments: bathing seated on TTB in shower- pt introduced to [facility] for B LE and for assist in washing R UE; pt requiring assist for thoroughness of washing/drying UB/LB, assist for posterior marisol hygiene thoroughness; intermittent assist to R UE to increase ROM during tasks   Upper Extremity Dressing: moderate assistance  Lower Extremity Dressing: maximum assistance  Dressing Equipment: reacher  Dressing Comments: VC for doffing shirt at SBA with increased time, pt requiring assist for olman-dressing techniques to don; pt able to doff shorts with Trisha and doffed socks with assist to hold figure-four. Pt provided with reacher to don LB clothing however with max A VC/TC throughout pt unable to problem solve tool use- assist to thread LE into brief/pants and Trisha for clothing over hips in stance. Pt re-introduced to sock aide- able to don B socks with set-up and modA for pulling movement  Toileting: moderate assistance. Toileting Comments: Trisha for clothing management + assist for marisol hygiene thoroughness after BM after pt attempt   General Comments: Pt ambulated to/from and within bathroom for ADL completion. Pt completed UB/LB bathing/dressing seated on TTB in shower. Increased time for all tasks. Fair initiation and sequencing throughout. Instrumental Activities of Daily Living  No IADL completed on this date. Functional Mobility  Bed Mobility:  Bed mobility not completed on this date.     Transfers:  Sit to stand transfer:contact guard assistance  Stand to sit transfer: contact guard assistance  Toilet transfer: contact guard assistance  Toilet transfer equipment: standard toilet, grab bars  Shower transfer: contact guard assistance  Shower transfer equipment: tub transfer bench, grab bars  Shower transfer comments: VC for use of grab bar to assist in controled sit<>stands   Comments:     Functional Mobility:  Functional Mobility Activity: to/from bathroom  Device Use: no device  Required Assistance: contact guard assistance  Comment: slow pace but no LOB noted    Balance:  Static Sitting Balance: fair (+): maintains balance at SBA/supervision without use of UE support  Dynamic Sitting Balance: fair: maintains balance at CGA without use of UE support  Static Standing Balance: fair (-): maintains balance at CGA with use of UE support  Dynamic completed with continued education required secondary to decreased motor planning and problem solving with tools. Tolerance for ADL improved compared to past session as well as CGA for transfers/mobility throughout. Pt Ongoing activities to address ADL/AE training, balance, and R UE function recommended. Skilled OT services indicated in order to maximize his IND an safety with all occupational pursuits. Safety Interventions: patient left in chair, chair alarm in place, call light within reach, patient at risk for falls, nurse notified, and family/caregiver present    Plan  Frequency: 5 x/week, 60 min/day  Current Treatment Recommendations: strengthening, ROM, balance training, functional mobility training, transfer training, endurance training, neuromuscular re-education, patient/caregiver education, ADL/self-care training, IADL training, cognitive reorientation, safety education, and equipment evaluation/education    Goals  Patient Goals: Get back to walking normal; Increased IND in self-care    Short Term Goals:  Time Frame: 14 days   Patient will complete upper body ADL at modified independent   Patient will complete lower body ADL at [facility] independent   Patient will complete toileting at modified independent   Patient will complete grooming at [facility] independent   Patient will complete functional transfers at [facility] independent   Patient will complete functional mobility at modified independent   Patient to gather and transport IADL items at modified independent      Above goals reviewed on 8/21/2023. All goals are ongoing at this time unless indicated above.      Therapy Session Time     Individual Group Co-treatment   Time In 4779      Time Out 1050      Minutes 60           Timed Code Treatment Minutes: 60  Total Treatment Minutes:  60       Electronically Signed By:   Brian Howe OTR/MIGUELINA #[id number]

## 2023-08-21 NOTE — PROGRESS NOTES
08/21/23 1706   RT Protocol   History Pulmonary Disease 0   Respiratory pattern 0   Breath sounds 0   Cough 0   Bronchodilator Assessment Score 0

## 2023-08-21 NOTE — PLAN OF CARE
Problem: Discharge Planning  Goal: Discharge to home or other facility with appropriate resources  8/21/2023 1355 by Jacque Araujo RN  Outcome: Progressing  Flowsheets (Taken 8/21/2023 1355)  Discharge to home or other facility with appropriate resources:   Identify barriers to discharge with patient and caregiver   Identify discharge learning needs (meds, wound care, etc)   Refer to discharge planning if patient needs post-hospital services based on physician order or complex needs related to functional status, cognitive ability or social support system   Arrange for needed discharge resources and transportation as appropriate  8/21/2023 0017 by Estefany Escalera RN  Outcome: Progressing     Problem: Safety - Adult  Goal: Free from fall injury  8/21/2023 1355 by Jacque Araujo RN  Outcome: Progressing  Flowsheets (Taken 8/21/2023 1355)  Free From Fall Injury: Instruct family/caregiver on patient safety  8/21/2023 0017 by Estefany Escalera RN  Outcome: Progressing     Problem: Pain  Goal: Verbalizes/displays adequate comfort level or baseline comfort level  8/21/2023 1355 by Jacque Araujo RN  Outcome: Progressing  Flowsheets (Taken 8/21/2023 1355)  Verbalizes/displays adequate comfort level or baseline comfort level:   Encourage patient to monitor pain and request assistance   Assess pain using appropriate pain scale   Administer analgesics based on type and severity of pain and evaluate response   Implement non-pharmacological measures as appropriate and evaluate response  8/21/2023 0017 by Estefany Escalera RN  Outcome: Adequate for Discharge     Problem: Chronic Conditions and Co-morbidities  Goal: Patient's chronic conditions and co-morbidity symptoms are monitored and maintained or improved  8/21/2023 1355 by Jacque Araujo RN  Outcome: Progressing  Flowsheets (Taken 8/21/2023 1355)  Care Plan - Patient's Chronic Conditions and Co-Morbidity Symptoms are Monitored and Maintained or Improved:   Monitor and assess patient's chronic conditions and comorbid symptoms for stability, deterioration, or improvement   Collaborate with multidisciplinary team to address chronic and comorbid conditions and prevent exacerbation or deterioration  8/21/2023 0017 by Seda Castellanos RN  Outcome: Progressing     Problem: ABCDS Injury Assessment  Goal: Absence of physical injury  8/21/2023 1355 by Laureano Gonzales RN  Outcome: Progressing  Flowsheets (Taken 8/21/2023 1355)  Absence of Physical Injury: Implement safety measures based on patient assessment  8/21/2023 0017 by Seda Castellanos RN  Outcome: Progressing     Problem: Skin/Tissue Integrity  Goal: Absence of new skin breakdown  Description: 1. Monitor for areas of redness and/or skin breakdown  2. Assess vascular access sites hourly  3. Every 4-6 hours minimum:  Change oxygen saturation probe site  4. Every 4-6 hours:  If on nasal continuous positive airway pressure, respiratory therapy assess nares and determine need for appliance change or resting period.   8/21/2023 1355 by Laureano Gonzales RN  Outcome: Progressing  8/21/2023 0017 by Seda Castellanos RN  Outcome: Progressing

## 2023-08-21 NOTE — PROGRESS NOTES
235 [facility] Department   Phone: [phone]    Physical Therapy    [] Evaluation            [x] Daily Treatment Note         [] Discharge Summary      Patient: Mari Hdz   : 1950   MRN: [id number]   Date of Service:  2023  Admitting Diagnosis: CVA (cerebrovascular accident due to intracerebral hemorrhage) [facility])  Current Admission Summary: 68 y.o. male who presented to [facility]  with encephalopathy and found to have significant DEBO. PMH reflects PD, CAD, Afib. 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 He is agreeable to ARU and wants to work in therapy before planned Dc home with his wife. MRI brain : 2 punctate acute infarcts in the left middle cerebral artery territory. Rotator cuff tear in (R) UE: per ortho appropriate for WBAT/ROM as tolerated  Pt. Transferred to Acute care on 23 d/t increased heart rate. Interruption to care. Past Medical History:  has a past medical history of Atrial fibrillation ([address]), No significant past medical history, and Parkinson disease ([address]). Past Surgical History:  has a past surgical history that includes Coronary artery bypass graft (2018) and Atrial ablation surgery (2018). Discharge Recommendations: Home with HHPT  DME Required For Discharge: DME to be determined pending patient progress  Precautions/Restrictions: high fall risk  Weight Bearing Restrictions: weight bearing as tolerated; per ortho note \"WBAT/ROM as tolerated\"  [x] Right Upper Extremity  [] Left Upper Extremity [] Right Lower Extremity  [] Left Lower Extremity     Required Braces/Orthotics: no braces required   [] Right  [] Left  Positional Restrictions:no positional restrictions    Pre-Admission Information   Lives With: spouse                  Type of Home: house  Home Layout: one level  Home Access: 1 step to enter with handrail.  Handrails are located

## 2023-08-22 PROCEDURE — 97110 THERAPEUTIC EXERCISES: CPT

## 2023-08-22 PROCEDURE — 6370000000 HC RX 637 (ALT 250 FOR IP): Performed by: INTERNAL MEDICINE

## 2023-08-22 PROCEDURE — 6370000000 HC RX 637 (ALT 250 FOR IP): Performed by: PHYSICAL MEDICINE & REHABILITATION

## 2023-08-22 PROCEDURE — 97130 THER IVNTJ EA ADDL 15 MIN: CPT

## 2023-08-22 PROCEDURE — 51702 INSERT TEMP BLADDER CATH: CPT

## 2023-08-22 PROCEDURE — 97530 THERAPEUTIC ACTIVITIES: CPT

## 2023-08-22 PROCEDURE — 6360000002 HC RX W HCPCS: Performed by: PHYSICAL MEDICINE & REHABILITATION

## 2023-08-22 PROCEDURE — 1280000000 HC REHAB R&B

## 2023-08-22 PROCEDURE — 97116 GAIT TRAINING THERAPY: CPT

## 2023-08-22 PROCEDURE — 2580000003 HC RX 258: Performed by: PHYSICAL MEDICINE & REHABILITATION

## 2023-08-22 PROCEDURE — 97129 THER IVNTJ 1ST 15 MIN: CPT

## 2023-08-22 RX ADMIN — CIPROFLOXACIN 500 MG: 500 TABLET, FILM COATED ORAL at 20:35

## 2023-08-22 RX ADMIN — ENOXAPARIN SODIUM 40 MG: 100 INJECTION SUBCUTANEOUS at 08:27

## 2023-08-22 RX ADMIN — ROPINIROLE HYDROCHLORIDE 0.25 MG: 0.25 TABLET, FILM COATED ORAL at 20:34

## 2023-08-22 RX ADMIN — ATORVASTATIN CALCIUM 80 MG: 80 TABLET, FILM COATED ORAL at 20:35

## 2023-08-22 RX ADMIN — FUROSEMIDE 40 MG: 40 TABLET ORAL at 08:27

## 2023-08-22 RX ADMIN — TAMSULOSIN HYDROCHLORIDE 0.4 MG: 0.4 CAPSULE ORAL at 20:34

## 2023-08-22 RX ADMIN — SERTRALINE 100 MG: 50 TABLET, FILM COATED ORAL at 20:34

## 2023-08-22 RX ADMIN — CARBIDOPA AND LEVODOPA 2 TABLET: 25; 100 TABLET ORAL at 11:27

## 2023-08-22 RX ADMIN — CARBIDOPA AND LEVODOPA 2 TABLET: 25; 100 TABLET ORAL at 20:34

## 2023-08-22 RX ADMIN — PANTOPRAZOLE SODIUM 40 MG: 40 TABLET, DELAYED RELEASE ORAL at 06:25

## 2023-08-22 RX ADMIN — SOTALOL HYDROCHLORIDE 80 MG: 80 TABLET ORAL at 08:32

## 2023-08-22 RX ADMIN — FLUTICASONE PROPIONATE 2 SPRAY: 50 SPRAY, METERED NASAL at 20:35

## 2023-08-22 RX ADMIN — SODIUM CHLORIDE, PRESERVATIVE FREE 10 ML: 5 INJECTION INTRAVENOUS at 08:26

## 2023-08-22 RX ADMIN — ASPIRIN 81 MG: 81 TABLET, COATED ORAL at 08:27

## 2023-08-22 RX ADMIN — CARBIDOPA AND LEVODOPA 2 TABLET: 25; 100 TABLET ORAL at 06:25

## 2023-08-22 RX ADMIN — SODIUM CHLORIDE, PRESERVATIVE FREE 10 ML: 5 INJECTION INTRAVENOUS at 20:34

## 2023-08-22 RX ADMIN — CIPROFLOXACIN 500 MG: 500 TABLET, FILM COATED ORAL at 08:27

## 2023-08-22 RX ADMIN — FUROSEMIDE 40 MG: 40 TABLET ORAL at 16:55

## 2023-08-22 RX ADMIN — ROPINIROLE HYDROCHLORIDE 0.25 MG: 0.25 TABLET, FILM COATED ORAL at 15:12

## 2023-08-22 RX ADMIN — SODIUM BICARBONATE 650 MG: 650 TABLET ORAL at 08:27

## 2023-08-22 RX ADMIN — ROPINIROLE HYDROCHLORIDE 0.25 MG: 0.25 TABLET, FILM COATED ORAL at 08:27

## 2023-08-22 RX ADMIN — CARBIDOPA AND LEVODOPA 2 TABLET: 25; 100 TABLET ORAL at 15:12

## 2023-08-22 ASSESSMENT — PAIN SCALES - GENERAL
PAINLEVEL_OUTOF10: 0
PAINLEVEL_OUTOF10: 0

## 2023-08-22 NOTE — PROGRESS NOTES
MD Toby Cr MD Genevie Pollock, MD                  Office: [phone]                      Fax: [phone] [address]                   NEPHROLOGY INPATIENT PROGRESS NOTE:     PATIENT NAME: Dorian Denise  : 1950  MRN: [id number]        RECOMMENDATIONS:   - increase Lasix to 40mg po bid to decrease medullary concentrating gradient.    - 1.5L/24h fluid restriction.    - If not better tomorrow, will add urea. - VBG showing respiratory alkalosis  - Uosm 400, Chilo less than 20, albumin 3. Higher ADH state. - continue po HCO3 TID  - Diarrhea control. Pending labs  Have discussed plan with pt, wife at bedside too. D/C plan from renal stand point: improving  + Follow-up with rehab plans also. +: f/u w/ me at [address] in ~ 2 weeks after d/c. (I will arrange.)   + I have updated our information and discharge follow-up provider list.      IMPRESSION:       Admitted on:  2023 12:43 PM   For:  CVA (cerebrovascular accident due to intracerebral hemorrhage) ([address]) [I61.9]  No diagnosis found. Hyponatremia, worsening, chronic, higher ADH state. May have 3rd spacing also. Likely with underlying hypovolemia currently   No major neurological effect,  With underlying Parkinson, monitor closely    Recently admitted at [facility]  At that time-acute kidney injury, severe, had creatinine 8.9 on admission,  - With baseline creatinine 0.9, as of   - Thought to be due to obstructive uropathy, with BPH, bladder retention, and some component of hypovolemia  - Creatinine improved   -Currently creatinine around 0.9-1.1, without DEBO        Associated problems:   - Volume status: euvolemic  : HTN : no need for tight control.   At goal.   - Azotemia: pre-renal +  - Electrolytes: K: WNL  - Acid-Base: Acidosis, non-anion gap, metabolic  - Anemia: Mild, likely chronic disease      Other major problems: Management per primary and other consulting teams.        Hospital Problems             Last Modified POA    * (Principal) CVA (cerebrovascular accident due to intracerebral hemorrhage) ([address]) 8/13/2023 Yes   Thank you for allowing me to participate in this patient's care. Please do not hesitate to contact me anytime. We will follow along with you. Ainl Bynum MD,  [facility] [address] [place] [address]  Nw: [phone] or Via [email or web address]  Fax: [phone]        =======================================================================================   Subjective   Episodes of diarrhea  Wife at bedside     Chart reviewed, patient's pertinent electronic medical records , Medical history and medication reviewed as needed for my evaulation. Past medical, Surgical, Social, Family medical history reviewed by me. MEDICATIONS: reviewed by me. Medications Prior to Admission:  No current facility-administered medications on file prior to encounter. Current Outpatient Medications on File Prior to Encounter   Medication Sig Dispense Refill    sotalol (BETAPACE) 80 MG tablet Take 1 tablet by mouth daily 60 tablet 1    pantoprazole (PROTONIX) 40 MG tablet Take 1 tablet by mouth every morning (before breakfast) (Patient not taking: Reported on 8/13/2023) 30 tablet 3    gabapentin (NEURONTIN) 100 MG capsule Take 1 capsule by mouth 3 times daily for 30 days.  (Patient not taking: Reported on 8/13/2023) 90 capsule 0    carbidopa-levodopa (SINEMET)  MG per tablet TAKE 1 TABLET AT 7:30 AM, 11:30 AM, 3:30 PM AND 7:30 PM (Patient taking differently: Take 1 tablet by mouth 4 times daily (before meals and nightly) TAKE 1 TABLET AT 7:30 AM, 11:30 AM, 3:30 PM AND 7:30 PM) 360 tablet 1    rOPINIRole (REQUIP) 0.25 MG tablet TAKE 1 TABLET BY MOUTH THREE TIMES A  tablet 1    tamsulosin (FLOMAX) 0.4 MG capsule Take 2 capsules by mouth nightly 180 capsule 1    sertraline (ZOLOFT) 100 MG tablet Take 1 tablet by mouth daily (Patient taking a <50% diameter reducing  stenosis based on velocity criteria. Recommendations   Recommend follow up study in 12 months. Signature   ------------------------------------------------------------------  Electronically signed by Tona Schirmer MD, [facility] (Interpreting  physician) on 08/08/2023 at 05:28 PM  ------------------------------------------------------------------  Patient Status:Routine. [facility] V[facility]. Technical Quality:Limited visualization due to movement. Plaque   - A plaque was found in the Right ICA. irregular. The plaque characteristics are: moderate severity and heterogeneous texture. - A plaque was found in the Left ICA. smooth. The plaque characteristics are: moderate severity and heterogeneous texture. Velocities are measured in cm/s ; Diameters are measured in mm Carotid Right Measurements +---------------+----+----+-----+----+ ! Location       ! PSV ! EDV ! Angle! RI  ! +---------------+----+----+-----+----+ ! Prox CCA       !101 !14. 8!60   !0.85! +---------------+----+----+-----+----+ ! Mid CCA        !100 !14. 3!60   !0.86! +---------------+----+----+-----+----+ ! Dist CCA       !77.4!12. 6!60   !0.84! +---------------+----+----+-----+----+ ! Prox ICA       !111 !23  !60   !0.79! +---------------+----+----+-----+----+ ! Mid ICA        !101 !15. 6!60   !0.85! +---------------+----+----+-----+----+ ! Dist ICA       !85.8!17. 3! 60   !0.8 ! +---------------+----+----+-----+----+ ! Prox ECA       !139 !    !61   !    ! +---------------+----+----+-----+----+ ! Vertebral      !50.5!    !60   !    ! +---------------+----+----+-----+----+ ! Prox Subclavian!85.1!    !60   !    ! +---------------+----+----+-----+----+   - There is antegrade vertebral flow noted on the right side. - Additional Measurements:ICAPSV/CCAPSV 1.11. ICAEDV/CCAEDV 1.55. Carotid Left Measurements +---------------+----+----+-----+----+ ! Location       ! PSV ! EDV ! Angle! RI  !

## 2023-08-22 NOTE — PROGRESS NOTES
235 [facility] Department   Phone: [phone]    Speech Therapy    [] Initial Evaluation            [x] Daily Treatment Note         [] Discharge Summary      Patient: Gui Vera   : 1950   MRN: [id number]   Date of Service:  2023  Admitting Diagnosis: CVA (cerebrovascular accident due to intracerebral hemorrhage) [facility])  Current Admission Summary: Gui Vera is a 68 y.o. male who presented to [facility]  with encephalopathy and found to have significant DEBO. PMH reflects PD, CAD, Afib. 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 He is agreeable to Aru and wants to work in therapy before planned Dc home with his wife. DC from ARU due to irregular heart beat. He is now improved and wants to come back to ARU. Past Medical History:  has a past medical history of Atrial fibrillation ([address]), No significant past medical history, and Parkinson disease ([address]). Past Surgical History:  has a past surgical history that includes Coronary artery bypass graft (2018) and Atrial ablation surgery (2018). Recent MRI Brain: 23  IMPRESSION:  2 punctate acute infarcts in the left middle cerebral artery territory. Precautions/Restrictions: high fall risk      Pre-Admission Information   Living Status: Pt lives at home with wife. Pt's wife encourages pt to stay physically and mentally active but provides assistance. They travel to [place] during the winter months. They have two adult children who live locally and 6 grandchildren.    Occupation/School: Retired 4 years ago from [employer]  Medication Management: :  []Primary   [x]Secondary (wife sets up pill organizer and sets alarms; pt familiar with medication list) []No   Finance Management: []Primary   [x]Secondary (wife uses electronic bill pay and reviews payments each month to keep pt active in lists)  []No   Active :   []Yes ISIDORO GALAVIZ [id number]  Speech-Language Pathologist   8/22/2023 12:26 PM

## 2023-08-22 NOTE — CARE COORDINATION
Team conference held today. Spoke with patient and his spouse  to discuss progress with therapy, barriers to discharge, and plans to return home. Estimated discharge date set for 8/30/2023. Patient anticipates discharging to home with home health care and needed DME. Patient requesting to be referred to [facility]. Will continue to follow for support and discharge planning.     Electronically signed by ALISHA Ibanez on 8/22/2023 at 11:55 AM

## 2023-08-22 NOTE — PLAN OF CARE
Problem: Discharge Planning  Goal: Discharge to home or other facility with appropriate resources  8/22/2023 1116 by Freeman Nascimento RN  Outcome: Progressing  Flowsheets (Taken 8/22/2023 9841)  Discharge to home or other facility with appropriate resources:   Identify barriers to discharge with patient and caregiver   Identify discharge learning needs (meds, wound care, etc)  8/21/2023 2341 by Heidy Baird RN  Outcome: Progressing     Problem: Safety - Adult  Goal: Free from fall injury  8/22/2023 1116 by Freeman Nascimento RN  Outcome: Progressing  8/21/2023 2341 by Heidy Baird RN  Outcome: Progressing     Problem: Pain  Goal: Verbalizes/displays adequate comfort level or baseline comfort level  Outcome: Progressing  Flowsheets (Taken 8/22/2023 0833)  Verbalizes/displays adequate comfort level or baseline comfort level: Encourage patient to monitor pain and request assistance     Problem: Chronic Conditions and Co-morbidities  Goal: Patient's chronic conditions and co-morbidity symptoms are monitored and maintained or improved  8/22/2023 1116 by Freeman Nascimento RN  Outcome: Progressing  Flowsheets (Taken 8/22/2023 5625)  Care Plan - Patient's Chronic Conditions and Co-Morbidity Symptoms are Monitored and Maintained or Improved: Monitor and assess patient's chronic conditions and comorbid symptoms for stability, deterioration, or improvement  8/21/2023 2341 by Heidy Baird RN  Outcome: Progressing     Problem: ABCDS Injury Assessment  Goal: Absence of physical injury  8/22/2023 1116 by Freeman Nascimento RN  Outcome: Progressing  8/21/2023 2341 by Heidy Baird RN  Outcome: Progressing     Problem: Skin/Tissue Integrity  Goal: Absence of new skin breakdown  Description: 1. Monitor for areas of redness and/or skin breakdown  2. Assess vascular access sites hourly  3. Every 4-6 hours minimum:  Change oxygen saturation probe site  4.   Every 4-6 hours:  If on nasal continuous positive airway pressure,

## 2023-08-22 NOTE — PROGRESS NOTES
[facility] Department   Phone: [phone]    Occupational Therapy    [] Initial Evaluation            [x] Daily Treatment Note         [] Discharge Summary      Patient: Thor Rangel   : 1950   MRN: [id number]   Date of Service:  2023    Admitting Diagnosis:  CVA (cerebrovascular accident due to intracerebral hemorrhage) [facility])  Current Admission Summary: Thor Rangel is a 68 y.o. male who presented to [facility]  with encephalopathy and found to have significant DEBO. PMH reflects PD, CAD, Afib. 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 Return to [facility] post acute off for irregular HR. MRI brain : 2 punctate acute infarcts in the left middle cerebral artery territory. Past Medical History:  has a past medical history of Atrial fibrillation ([address]), No significant past medical history, and Parkinson disease ([address]). Past Surgical History:  has a past surgical history that includes Coronary artery bypass graft (2018) and Atrial ablation surgery (2018). Discharge Recommendations: HH OT S3; PRN assist from family    DME Required For Discharge: DME to be determined pending patient progress    Precautions/Restrictions: high fall risk  Weight Bearing Restrictions: no restrictions       Required Braces/Orthotics: no braces required     Positional Restrictions:no positional restrictions      Pre-Admission Information   Lives With: spouse                  Type of Home: house  Home Layout: one level; finished basement with full flight of steps with 1 HR  Home Access: 1 step to enter with handrail. Handrails are located on R side.   Bathroom Layout: walk in shower, curtain   Bathroom Equipment: shower chair, hand held shower head, portable/suction cup grab bars, small built in shower shelf (wife reports it's too small to sit on)   Toilet Height: standard height  Home into looped band after several attempts of task- pt demo'd variable fluidity in coordination and command following- intermittently attempting to thread therapist foot into rings, variable coordination with reacher- overall successful with 50% of each task. Pt assist back to room at EOS- left in recliner, chair alarm on, call light and needs in reach.             Cognition  Overall Cognitive Status: Impaired  Arousal/Alertness: delayed responses to stimuli, inconsistent responses to stimuli  Following Commands: follows one step commands with repetition, follows one step commands with increased time  Attention Span: attends with cues to redirect, difficulty attending to directions  Memory: decreased recall of recent events, decreased short term memory  Safety Judgement: decreased awareness of need for assistance  Problem Solving: assistance required to implement solutions, decreased awareness of errors, assistance required to identify errors made  Insights: decreased awareness of deficits  Initiation: requires cues for some  Sequencing: requires cues for some  Comments: delayed processing at times; flat affect; fluctuations in attention and command following throughout session   Orientation:    oriented to person, oriented to place, and disoriented to time   Command Following:   impaired     Education  Barriers To Learning: cognition and physical  Patient Education: patient educated on goals, OT role and benefits, plan of care, ADL adaptive strategies, proper use of assistive device/equipment, adaptive device training, orientation, family education, transfer training, discharge recommendations  Learning Assessment:  patient verbalizes understanding, would benefit from continued reinforcement, patient will require reinforcement due to cognitive deficits    Assessment  Activity Tolerance: Fair with rest breaks  Impairments Requiring Therapeutic Intervention: decreased functional mobility, decreased ADL status, decreased ROM, decreased strength, decreased cognition, decreased endurance, decreased balance, decreased IADL, decreased fine motor control, decreased coordination  Prognosis: fair  Clinical Assessment: Pt slowly progressing towards goals. Ongoing AE training completed with continued education required secondary to decreased motor planning and problem solving with tools. Overall tolerance and balance during mobility improved from day prior. Ongoing activities to address ADL/AE training, balance, and R UE function recommended. Skilled OT services indicated in order to maximize his IND an safety with all occupational pursuits. Safety Interventions: patient left in chair, chair alarm in place, call light within reach, patient at risk for falls, and nurse notified    Plan  Frequency: 5 x/week, 60 min/day  Current Treatment Recommendations: strengthening, ROM, balance training, functional mobility training, transfer training, endurance training, neuromuscular re-education, patient/caregiver education, ADL/self-care training, IADL training, cognitive reorientation, safety education, and equipment evaluation/education    Goals  Patient Goals: Get back to walking normal; Increased IND in self-care    Short Term Goals:  Time Frame: 14 days   Patient will complete upper body ADL at [facility] independent   Patient will complete lower body ADL at [facility]   Patient will complete toileting at modified independent   Patient will complete grooming at [facility] independent   Patient will complete functional transfers at [facility] independent   Patient will complete functional mobility at modified independent   Patient to gather and transport IADL items at modified independent      Above goals reviewed on 8/22/2023. All goals are ongoing at this time unless indicated above.      Therapy Session Time     Individual Group Co-treatment   Time In 1000, 8780      Time Out 1030, 1345      Minutes 30, 30           Timed Code

## 2023-08-22 NOTE — PLAN OF CARE
Problem: Discharge Planning  Goal: Discharge to home or other facility with appropriate resources  8/21/2023 2341 by Samir Beckman RN  Outcome: Progressing     Problem: Safety - Adult  Goal: Free from fall injury  8/21/2023 2341 by Samir Beckman RN  Outcome: Progressing     Problem: Chronic Conditions and Co-morbidities  Goal: Patient's chronic conditions and co-morbidity symptoms are monitored and maintained or improved  8/21/2023 2341 by Samir Beckman RN  Outcome: Progressing     Problem: ABCDS Injury Assessment  Goal: Absence of physical injury  8/21/2023 2341 by Samir Beckman RN  Outcome: Progressing     Problem: Skin/Tissue Integrity  Goal: Absence of new skin breakdown  Description: 1. Monitor for areas of redness and/or skin breakdown  2. Assess vascular access sites hourly  3. Every 4-6 hours minimum:  Change oxygen saturation probe site  4. Every 4-6 hours:  If on nasal continuous positive airway pressure, respiratory therapy assess nares and determine need for appliance change or resting period.   8/21/2023 2341 by Samir Beckman RN  Outcome: Progressing

## 2023-08-22 NOTE — PROGRESS NOTES
[address] [facility] Department   Phone: [phone]    Physical Therapy    [] Evaluation            [x] Daily Treatment Note         [] Discharge Summary      Patient: Dorian Denise   : 1950   MRN: [id number]   Date of Service:  2023  Admitting Diagnosis: CVA (cerebrovascular accident due to intracerebral hemorrhage) [facility])  Current Admission Summary: 68 y.o. male who presented to [facility]  with encephalopathy and found to have significant DEBO. PMH reflects PD, CAD, Afib. 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 He is agreeable to ARU and wants to work in therapy before planned Dc home with his wife. MRI brain : 2 punctate acute infarcts in the left middle cerebral artery territory. Rotator cuff tear in (R) UE: per ortho appropriate for WBAT/ROM as tolerated  Pt. Transferred to Acute care on 23 d/t increased heart rate. Interruption to care. Past Medical History:  has a past medical history of Atrial fibrillation ([address]), No significant past medical history, and Parkinson disease ([address]). Past Surgical History:  has a past surgical history that includes Coronary artery bypass graft (2018) and Atrial ablation surgery (2018). Discharge Recommendations: Home with HHPT  DME Required For Discharge: DME to be determined pending patient progress  Precautions/Restrictions: high fall risk  Weight Bearing Restrictions: weight bearing as tolerated; per ortho note \"WBAT/ROM as tolerated\"  [x] Right Upper Extremity  [] Left Upper Extremity [] Right Lower Extremity  [] Left Lower Extremity     Required Braces/Orthotics: no braces required   [] Right  [] Left  Positional Restrictions:no positional restrictions    Pre-Admission Information   Lives With: spouse                  Type of Home: house  Home Layout: one level  Home Access: 1 step to enter with handrail.  Handrails are located mechanics with reciprocal gait pattern, minimal (B) arm swing, improved jasmin and less VC required; second amb same mechanics as above as pt reported fatigue. Pt performed 8 mins on stepper at 2.0 resistance to improve reciprocal pattern and cardiovascular endurance. Pt completed figure-8 amb around chairs x4 laps to improve stepping in turns; at [facility], with max VC to maintain step height/length. Pt required rest breaks between sets secondary to reporting fatigue. Upon completion, pt in recliner, positioned for comfort, with chair alarm on, call light and all needs within reach. BP taken throughout session (L) upper arm: Pt reported no BP symptoms throughout session    Start of session: 151/72 & 52 HR    Post-stepper with rest breaks; seated: 174/78 & 60 HR    Post-amb, immediately seated: 149/75 & 66 HR     Functional Outcomes                 Cognition  Overall Cognitive Status: WFL  Arousal/Alertness: appropriate responses to stimuli  Following Commands: follows all commands without difficulty  Memory: decreased short term memory  Safety Judgement: decreased awareness of need for assistance, decreased awareness of need for safety  Problem Solving: assistance required to implement solutions, decreased awareness of errors  Initiation: requires cues for some  Sequencing: requires cues for some  Comments: Pt requires increased time for all tasks and some reminders/repeated instructions for some.   Command Following:   accurately follows one step commands    Education  Barriers To Learning: cognition  Patient Education: patient educated on goals, PT role and benefits, plan of care, general safety, functional mobility training, disease specific education  Learning Assessment:  patient verbalizes understanding, would benefit from continued reinforcement    Assessment  Activity Tolerance: Good; increased time for all tasks; requires frequent rests breaks between sets secondary to fatigue  Impairments Requiring

## 2023-08-23 LAB
ALBUMIN SERPL-MCNC: 2.8 G/DL (ref 3.4–5)
ANION GAP SERPL CALCULATED.3IONS-SCNC: 9 MMOL/L (ref 3–16)
BASOPHILS # BLD: 0 K/UL (ref 0–0.2)
BASOPHILS NFR BLD: 0.7 %
BUN SERPL-MCNC: 16 MG/DL (ref 7–20)
CALCIUM SERPL-MCNC: 8.5 MG/DL (ref 8.3–10.6)
CHLORIDE SERPL-SCNC: 103 MMOL/L (ref 99–110)
CO2 SERPL-SCNC: 23 MMOL/L (ref 21–32)
CREAT SERPL-MCNC: 0.7 MG/DL (ref 0.8–1.3)
DEPRECATED RDW RBC AUTO: 14.6 % (ref 12.4–15.4)
EOSINOPHIL # BLD: 0.1 K/UL (ref 0–0.6)
EOSINOPHIL NFR BLD: 2.9 %
GFR SERPLBLD CREATININE-BSD FMLA CKD-EPI: >60 ML/MIN/{1.73_M2}
GLUCOSE SERPL-MCNC: 93 MG/DL (ref 70–99)
HCT VFR BLD AUTO: 29.3 % (ref 40.5–52.5)
HGB BLD-MCNC: 10.1 G/DL (ref 13.5–17.5)
LYMPHOCYTES # BLD: 0.5 K/UL (ref 1–5.1)
LYMPHOCYTES NFR BLD: 10.3 %
MCH RBC QN AUTO: 30.9 PG (ref 26–34)
MCHC RBC AUTO-ENTMCNC: 34.3 G/DL (ref 31–36)
MCV RBC AUTO: 90 FL (ref 80–100)
MONOCYTES # BLD: 0.7 K/UL (ref 0–1.3)
MONOCYTES NFR BLD: 15.3 %
NEUTROPHILS # BLD: 3.2 K/UL (ref 1.7–7.7)
NEUTROPHILS NFR BLD: 70.8 %
PHOSPHATE SERPL-MCNC: 3 MG/DL (ref 2.5–4.9)
PLATELET # BLD AUTO: 252 K/UL (ref 135–450)
PMV BLD AUTO: 8 FL (ref 5–10.5)
POTASSIUM SERPL-SCNC: 3.2 MMOL/L (ref 3.5–5.1)
RBC # BLD AUTO: 3.25 M/UL (ref 4.2–5.9)
SODIUM SERPL-SCNC: 135 MMOL/L (ref 136–145)
WBC # BLD AUTO: 4.5 K/UL (ref 4–11)

## 2023-08-23 PROCEDURE — 6370000000 HC RX 637 (ALT 250 FOR IP): Performed by: PHYSICAL MEDICINE & REHABILITATION

## 2023-08-23 PROCEDURE — 97110 THERAPEUTIC EXERCISES: CPT

## 2023-08-23 PROCEDURE — 6360000002 HC RX W HCPCS: Performed by: PHYSICAL MEDICINE & REHABILITATION

## 2023-08-23 PROCEDURE — 6370000000 HC RX 637 (ALT 250 FOR IP): Performed by: INTERNAL MEDICINE

## 2023-08-23 PROCEDURE — 97112 NEUROMUSCULAR REEDUCATION: CPT

## 2023-08-23 PROCEDURE — 80069 RENAL FUNCTION PANEL: CPT

## 2023-08-23 PROCEDURE — 97129 THER IVNTJ 1ST 15 MIN: CPT

## 2023-08-23 PROCEDURE — 36415 COLL VENOUS BLD VENIPUNCTURE: CPT

## 2023-08-23 PROCEDURE — 97130 THER IVNTJ EA ADDL 15 MIN: CPT

## 2023-08-23 PROCEDURE — 1280000000 HC REHAB R&B

## 2023-08-23 PROCEDURE — 97116 GAIT TRAINING THERAPY: CPT

## 2023-08-23 PROCEDURE — 97535 SELF CARE MNGMENT TRAINING: CPT

## 2023-08-23 PROCEDURE — 97530 THERAPEUTIC ACTIVITIES: CPT

## 2023-08-23 PROCEDURE — 85025 COMPLETE CBC W/AUTO DIFF WBC: CPT

## 2023-08-23 PROCEDURE — 51702 INSERT TEMP BLADDER CATH: CPT

## 2023-08-23 RX ORDER — POTASSIUM CHLORIDE 20 MEQ/1
20 TABLET, EXTENDED RELEASE ORAL 2 TIMES DAILY WITH MEALS
Status: DISCONTINUED | OUTPATIENT
Start: 2023-08-23 | End: 2023-08-28

## 2023-08-23 RX ADMIN — CARBIDOPA AND LEVODOPA 2 TABLET: 25; 100 TABLET ORAL at 07:10

## 2023-08-23 RX ADMIN — SERTRALINE 100 MG: 50 TABLET, FILM COATED ORAL at 21:26

## 2023-08-23 RX ADMIN — POTASSIUM CHLORIDE 20 MEQ: 1500 TABLET, EXTENDED RELEASE ORAL at 09:17

## 2023-08-23 RX ADMIN — CARBIDOPA AND LEVODOPA 2 TABLET: 25; 100 TABLET ORAL at 19:34

## 2023-08-23 RX ADMIN — TAMSULOSIN HYDROCHLORIDE 0.4 MG: 0.4 CAPSULE ORAL at 21:26

## 2023-08-23 RX ADMIN — ASPIRIN 81 MG: 81 TABLET, COATED ORAL at 09:13

## 2023-08-23 RX ADMIN — ROPINIROLE HYDROCHLORIDE 0.25 MG: 0.25 TABLET, FILM COATED ORAL at 14:08

## 2023-08-23 RX ADMIN — CARBIDOPA AND LEVODOPA 2 TABLET: 25; 100 TABLET ORAL at 11:44

## 2023-08-23 RX ADMIN — SOTALOL HYDROCHLORIDE 80 MG: 80 TABLET ORAL at 09:17

## 2023-08-23 RX ADMIN — FUROSEMIDE 40 MG: 40 TABLET ORAL at 17:45

## 2023-08-23 RX ADMIN — CIPROFLOXACIN 500 MG: 500 TABLET, FILM COATED ORAL at 21:26

## 2023-08-23 RX ADMIN — ROPINIROLE HYDROCHLORIDE 0.25 MG: 0.25 TABLET, FILM COATED ORAL at 21:25

## 2023-08-23 RX ADMIN — FUROSEMIDE 40 MG: 40 TABLET ORAL at 09:14

## 2023-08-23 RX ADMIN — ENOXAPARIN SODIUM 40 MG: 100 INJECTION SUBCUTANEOUS at 09:14

## 2023-08-23 RX ADMIN — CIPROFLOXACIN 500 MG: 500 TABLET, FILM COATED ORAL at 09:14

## 2023-08-23 RX ADMIN — ATORVASTATIN CALCIUM 80 MG: 80 TABLET, FILM COATED ORAL at 21:26

## 2023-08-23 RX ADMIN — CARBIDOPA AND LEVODOPA 2 TABLET: 25; 100 TABLET ORAL at 15:57

## 2023-08-23 RX ADMIN — ROPINIROLE HYDROCHLORIDE 0.25 MG: 0.25 TABLET, FILM COATED ORAL at 09:13

## 2023-08-23 RX ADMIN — POTASSIUM CHLORIDE 20 MEQ: 1500 TABLET, EXTENDED RELEASE ORAL at 17:45

## 2023-08-23 RX ADMIN — PANTOPRAZOLE SODIUM 40 MG: 40 TABLET, DELAYED RELEASE ORAL at 07:10

## 2023-08-23 ASSESSMENT — PAIN SCALES - GENERAL
PAINLEVEL_OUTOF10: 0

## 2023-08-23 NOTE — PROGRESS NOTES
Berline Goodell, MD Janel Cleaver, MD Ellie Kinsman, MD                  Office: [phone]                      Fax: [phone] [address] [employer]                   NEPHROLOGY INPATIENT PROGRESS NOTE:     PATIENT NAME: Marybeth Lizama  : 1950  MRN: [id number]        RECOMMENDATIONS:   - increased Lasix to 40mg po bid to decrease medullary concentrating gradient. - add K repletion   - 1.5L/24h fluid restriction. - VBG showing respiratory alkalosis  - Uosm 400, Chilo less than 20, albumin 3. Higher ADH state. - continue po HCO3 TID  - Diarrhea control. Have discussed plan with pt, wife at bedside too. D/C plan from renal stand point: improving  + Follow-up with rehab plans also. +: f/u w/ me at [address] in ~ 2 weeks after d/c. (I will arrange.)   + I have updated our information and discharge follow-up provider list.      IMPRESSION:       Admitted on:  2023 12:43 PM   For:  CVA (cerebrovascular accident due to intracerebral hemorrhage) ([address]) [I61.9]  No diagnosis found. Hyponatremia, worsening, chronic, higher ADH state. May have 3rd spacing also. Likely with underlying hypovolemia currently   No major neurological effect,  With underlying Parkinson, monitor closely    Recently admitted at [facility]  At that time-acute kidney injury, severe, had creatinine 8.9 on admission,  - With baseline creatinine 0.9, as of   - Thought to be due to obstructive uropathy, with BPH, bladder retention, and some component of hypovolemia  - Creatinine improved   -Currently creatinine around 0.9-1.1, without DEBO        Associated problems:   - Volume status: euvolemic  : HTN : no need for tight control. At goal.   - Azotemia: pre-renal +  - Electrolytes: K: WNL  - Acid-Base: Acidosis, non-anion gap, metabolic  - Anemia: Mild, likely chronic disease      Other major problems: Management per primary and other consulting teams.         Hospital Problems a <50% diameter reducing  stenosis based on velocity criteria. -The left internal carotid artery appears to have a <50% diameter reducing  stenosis based on velocity criteria. Recommendations   Recommend follow up study in 12 months. Signature   ------------------------------------------------------------------  Electronically signed by Rosanna Elena MD, [facility] (Interpreting  physician) on 08/08/2023 at 05:28 PM  ------------------------------------------------------------------  Patient Status:Routine. [facility] - Vascular Lab. Technical Quality:Limited visualization due to movement. Plaque   - A plaque was found in the Right ICA. irregular. The plaque characteristics are: moderate severity and heterogeneous texture. - A plaque was found in the Left ICA. smooth. The plaque characteristics are: moderate severity and heterogeneous texture. Velocities are measured in cm/s ; Diameters are measured in mm Carotid Right Measurements +---------------+----+----+-----+----+ ! Location       ! PSV ! EDV ! Angle! RI  ! +---------------+----+----+-----+----+ ! Prox CCA       !101 !14. 8!60   !0.85! +---------------+----+----+-----+----+ ! Mid CCA        !100 !14. 3!60   !0.86! +---------------+----+----+-----+----+ ! Dist CCA       !77.4!12. 6!60   !0.84! +---------------+----+----+-----+----+ ! Prox ICA       !111 !23  !60   !0.79! +---------------+----+----+-----+----+ ! Mid ICA        !101 !15. 6!60   !0.85! +---------------+----+----+-----+----+ ! Dist ICA       !85.8!17. 3! 60   !0.8 ! +---------------+----+----+-----+----+ ! Prox ECA       !139 !    !61   !    ! +---------------+----+----+-----+----+ ! Vertebral      !50.5!    !60   !    ! +---------------+----+----+-----+----+ ! Prox Subclavian!85.1!    !60   !    ! +---------------+----+----+-----+----+   - There is antegrade vertebral flow noted on the right side. - Additional Measurements:ICAPSV/CCAPSV 1.11. ICAEDV/CCAEDV 1.55.  Carotid Left Measurements

## 2023-08-23 NOTE — PROGRESS NOTES
Physician Progress Note      PATIENTMarcial Ing  [facility] #:                  [id number]  :                       1950  ADMIT DATE:       2023 4:45 PM  [facility] DATE:        2023 4:16 PM  RESPONDING  PROVIDER #:        Marco Loza MD          QUERY TEXT:    Pt admitted with DEBO  and has encephalopathy documented. If possible, please   document in progress notes and discharge summary further specificity regarding   the type of encephalopathy:    The medical record reflects the following:  Risk Factors: DEBO, dementia, CVA ,parkinsons  Clinical Indicators:  Per ED-Drowsy, opens eyes to voice and light touch. Oriented to person and place, not oriented to time. He is drowsy however   arousable and protecting his airway. Acute encephalopathy . Per h&p-Patient is   a poor historian at this time due to confusion, Pleasantly confused, Alert   and oriented x 1. severely confused on arrival. Per consult note- W/ some   uremic encephalopathy , mild . CT head- no acute . MRI Brain- 2 punctate   acute infarcts in the left middle cerebral artery territory  bun -110,   creat-8.8 on admission  Treatment: monitor mental status, reorient to environment, monitor labs   ,Neurology consult    Thank you, Letitia Rosas RN [email or web address]  Options provided:  -- Metabolic encephalopathy  -- Toxic encephalopathy  -- Toxic metabolic encephalopathy  -- Other - I will add my own diagnosis  -- Disagree - Not applicable / Not valid  -- Disagree - Clinically unable to determine / Unknown  -- Refer to Clinical Documentation Reviewer    PROVIDER RESPONSE TEXT:    Patient note to have acute stroke on MRI. This along with some toxic metabolic   encephalopathy from being uremic is the cause for the confusion. Query created by:  Filiberto Rosas on 2023 8:12 AM      Electronically signed by:  Marco Loza MD 2023 3:45 PM

## 2023-08-23 NOTE — PROGRESS NOTES
[address] [facility] Department   Phone: [phone]    Speech Therapy    [] Initial Evaluation            [x] Daily Treatment Note         [] Discharge Summary      Patient: David Jackson   : 1950   MRN: [id number]   Date of Service:  2023  Admitting Diagnosis: CVA (cerebrovascular accident due to intracerebral hemorrhage) [facility])  Current Admission Summary: David Jackson is a [place] y.o. male who presented to [facility]  with encephalopathy and found to have significant DEBO. PMH reflects PD, CAD, Afib. 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 He is agreeable to Aru and wants to work in therapy before planned Dc home with his wife. DC from ARU due to irregular heart beat. He is now improved and wants to come back to ARU. Past Medical History:  has a past medical history of Atrial fibrillation ([address]), No significant past medical history, and Parkinson disease ([address]). Past Surgical History:  has a past surgical history that includes Coronary artery bypass graft (2018) and Atrial ablation surgery (2018). Recent MRI Brain: 23  IMPRESSION:  2 punctate acute infarcts in the left middle cerebral artery territory. Precautions/Restrictions: high fall risk      Pre-Admission Information   Living Status: Pt lives at home with wife. Pt's wife encourages pt to stay physically and mentally active but provides assistance. They travel to [place] during the winter months. They have two adult children who live locally and 6 grandchildren.    Occupation/School: Retired 4 years ago from [employer]  Medication Management: :  []Primary   [x]Secondary (wife sets up pill organizer and sets alarms; pt familiar with medication list) []No   Finance Management: []Primary   [x]Secondary (wife uses electronic bill pay and reviews payments each month to keep pt active in lists)  []No   Active :   []Yes Moderate   Orientation   - oriented to self, month, year, place and situation   - improved fluency of orientation responses      Recall of personal information: family member names   - 50% accuracy (3/6)   - required mod cues and use of visual aids  (photo and written list); limited response to semantic and paraphasic cues    - pt demonstrates increased emotions and frustration with errors in family naming       Thought organization: menu organization    - 85% accuracy    - required min cues for reasoning / evaluation of items   - pt demonstrated appropriate sustained attention and good response times with use of the visual task      Thought organization: basic word deduction for crossword    - 89% accuracy (8/9)   - required mod cues throughout task completion to attend to details and alternate between description and visual which resulted in delayed responses and extended time     Additional Interventions:        Education  Barriers To Learning: cognition  Patient Education: Provided education regarding role of SLP, results of assessment, recommendations and general speech pathology plan of care. Provided stroke education. Provided education regarding Stroke Support Group. Provided education re [facility] (outpatient). Learning Assessment: Pt verbalized understanding   Pt requires ongoing learning     Assessment  Impairments Requiring Therapeutic Intervention: Dysarthria, Cognitive-Linguistic Deficits , Speech-Language Deficits  Prognosis: good    Clinical Assessment:   Pt presents with moderate cognitive-linguistic deficits characterized by difficulty with immediate/working memory, short-term memory, attention, problem solving, delayed processing and executive function. Pt benefited from mod-max cues via simplifications, extended time and repetition of task questions/directives. Language skills are limited are characterized by reduced initiation, utterance length and thought organization.  Pt continues to [address] [facility]   8/23/2023 9:04 AM

## 2023-08-23 NOTE — PLAN OF CARE
Problem: Discharge Planning  Goal: Discharge to home or other facility with appropriate resources  8/22/2023 2050 by Ericka Monet RN  Outcome: Progressing     Problem: Safety - Adult  Goal: Free from fall injury  8/22/2023 2050 by Ericka Monet RN  Outcome: Progressing     Problem: Chronic Conditions and Co-morbidities  Goal: Patient's chronic conditions and co-morbidity symptoms are monitored and maintained or improved  8/22/2023 2050 by Ericka Monet RN  Outcome: Progressing     Problem: ABCDS Injury Assessment  Goal: Absence of physical injury  8/22/2023 2050 by Ericka Monet RN  Outcome: Progressing     Problem: Skin/Tissue Integrity  Goal: Absence of new skin breakdown  Description: 1. Monitor for areas of redness and/or skin breakdown  2. Assess vascular access sites hourly  3. Every 4-6 hours minimum:  Change oxygen saturation probe site  4. Every 4-6 hours:  If on nasal continuous positive airway pressure, respiratory therapy assess nares and determine need for appliance change or resting period.   8/22/2023 2050 by Ericka Monet RN  Outcome: Progressing

## 2023-08-23 NOTE — PROGRESS NOTES
Nutrition Note    RECOMMENDATIONS  PO Diet: regular, 1500 mL fluid restriction, no caffeine  ONS: Ensure Plus High Protein once daily (pt prefers vanilla)  \\    NUTRITION ASSESSMENT   Pt continues on a regular diet with 1500 mL fluid restriction. PO intake overall good, most meals pt consumes greater than 50% and is consuming the Ensure. No nutrition questions from family. Will continue to follow nutrition status throughout admission. Nutrition Related Findings: Na+ 135, K+ 3.2; edema: BUE nonpitting, BLE +1; lasix  Wounds: None  Nutrition Education:  Education not indicated   Nutrition Goals: PO intake 75% or greater     MALNUTRITION ASSESSMENT      Malnutrition Status: No malnutrition      NUTRITION DIAGNOSIS   No nutrition diagnosis at this time       CURRENT NUTRITION THERAPIES  ADULT DIET; Regular; 1500 ml; No Caffeine  ADULT ORAL NUTRITION SUPPLEMENT; Breakfast; Standard High Calorie/High Protein Oral Supplement     PO Intake: 51-75%, %   PO Supplement Intake:51-75%      ANTHROPOMETRICS  Current Height: 6' (182.9 cm)  Current Weight - Scale: 206 lb 6.4 oz (93.6 kg)    Ideal Body Weight (IBW): 178 lbs  (81 kg)        BMI: 28      The patient will be monitored per nutrition standards of care. Consult dietitian if additional nutrition interventions are needed prior to RD reassessment.      David Hickman, CONSTANTINO, LD    Contact: [phone]

## 2023-08-23 NOTE — PROGRESS NOTES
[address] [facility] Department   Phone: [phone]    Occupational Therapy    [] Initial Evaluation            [x] Daily Treatment Note         [] Discharge Summary      Patient: Emma Daly   : 1950   MRN: [id number]   Date of Service:  2023    Admitting Diagnosis:  CVA (cerebrovascular accident due to intracerebral hemorrhage) [facility])  Current Admission Summary: Emma Daly is a 68 y.o. male who presented to [facility]  with encephalopathy and found to have significant DEBO. PMH reflects PD, CAD, Afib. 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 Return to Aru post acute off for irregular HR. MRI brain : 2 punctate acute infarcts in the left middle cerebral artery territory. Past Medical History:  has a past medical history of Atrial fibrillation ([address]), No significant past medical history, and Parkinson disease ([address]). Past Surgical History:  has a past surgical history that includes Coronary artery bypass graft (2018) and Atrial ablation surgery (2018). Discharge Recommendations: HH OT S3; PRN assist from family    DME Required For Discharge: DME to be determined pending patient progress    Precautions/Restrictions: high fall risk  Weight Bearing Restrictions: no restrictions       Required Braces/Orthotics: no braces required     Positional Restrictions:no positional restrictions      Pre-Admission Information   Lives With: spouse                  Type of Home: house  Home Layout: one level; finished basement with full flight of steps with 1 HR  Home Access: 1 step to enter with handrail. Handrails are located on R side.   Bathroom Layout: walk in shower, curtain   Bathroom Equipment: shower chair, hand held shower head, portable/suction cup grab bars, small built in shower shelf (wife reports it's too small to sit on)   Toilet Height: standard height  Home 60   Total Treatment Minutes:  60       Electronically Signed By:   CHLOÉ Vidales/MIGUELINA #[id number]

## 2023-08-23 NOTE — PROGRESS NOTES
235 [facility] Department   Phone: [phone]    Physical Therapy    [] Evaluation            [x] Daily Treatment Note         [] Discharge Summary      Patient: Trena Broussard   : 1950   MRN: [id number]   Date of Service:  2023  Admitting Diagnosis: CVA (cerebrovascular accident due to intracerebral hemorrhage) [facility])  Current Admission Summary: 68 y.o. male who presented to [facility]  with encephalopathy and found to have significant DEBO. PMH reflects PD, CAD, Afib. 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 He is agreeable to ARU and wants to work in therapy before planned Dc home with his wife. MRI brain : 2 punctate acute infarcts in the left middle cerebral artery territory. Rotator cuff tear in (R) UE: per ortho appropriate for WBAT/ROM as tolerated  Pt. Transferred to Acute care on 23 d/t increased heart rate. Interruption to care. Past Medical History:  has a past medical history of Atrial fibrillation ([address]), No significant past medical history, and Parkinson disease ([address]). Past Surgical History:  has a past surgical history that includes Coronary artery bypass graft (2018) and Atrial ablation surgery (2018). Discharge Recommendations: Home with HHPT  DME Required For Discharge: DME to be determined pending patient progress  Precautions/Restrictions: high fall risk  Weight Bearing Restrictions: weight bearing as tolerated; per ortho note \"WBAT/ROM as tolerated\"  [x] Right Upper Extremity  [] Left Upper Extremity [] Right Lower Extremity  [] Left Lower Extremity     Required Braces/Orthotics: no braces required   [] Right  [] Left  Positional Restrictions:no positional restrictions    Pre-Admission Information   Lives With: spouse                  Type of Home: house  Home Layout: one level  Home Access: 1 step to enter with handrail.  Handrails are located session with wife present. Pt reporting no new complaints upon arrival. Pt completed STS from recliner <=> chair at A, with increased time, throughout session. Pt amb 104 ft x2 at [facility], with same mechanics as above during first amb; with good functional carryover of treatment interventions during 2nd gait trial. Pt demonstrated reciprocal gait pattern with moderate stride length/height, minimal arm swing, progressing with VC used, and good jasmin overall. Pt performed stair mobility of 4\"/6\" stairs with use of (L) HR 6 x 2, reciprocal gait pattern, slight forward posture, decreased jasmin and slight hesitation with descending, at [facility]. Pt performed (B) stair lunge to 3rd 4\" step with (L) UE support, x10 each and then x6 alternating; pt demonstrated good big stride length/heights with minimal VC used at [facility]. Pt reported (L) calf tightness post-stairs; seated manual (L) calf stretch applied 3 x 20\" holds. Lastly, pt completed // bar (B) taps standing to simulate arm swing with gait; x30 at [facility] and AAROM with (R) UE progressing to AROM. Pt continues to require increased time for functional mobility and sequencing; pt requires frequent rest breaks between sets secondary to fatigue. Upon completion, pt left in recliner, chair alarm in place, call light and all needs within reach with wife present; with no new complaints.     Functional Outcomes                 Cognition  Overall Cognitive Status: Impaired  Arousal/Alertness: appropriate responses to stimuli  Following Commands: follows all commands without difficulty  Memory: decreased short term memory  Safety Judgement: decreased awareness of need for assistance, decreased awareness of need for safety  Problem Solving: assistance required to implement solutions, decreased awareness of errors  Initiation: requires cues for some  Sequencing: requires cues for some  Comments: Pt requires increased time for all tasks and some reminders/repeated instructions for some  Command Following:   accurately follows one step commands    Education  Barriers To Learning: cognition  Patient Education: patient educated on goals, PT role and benefits, plan of care, general safety, functional mobility training, disease specific education  Learning Assessment:  patient verbalizes understanding, would benefit from continued reinforcement    Assessment  Activity Tolerance: Good; increased time for all tasks; requires frequent rests breaks between sets secondary to fatigue  Impairments Requiring Therapeutic Intervention: decreased functional mobility, decreased ADL status, decreased ROM, decreased strength, decreased safety awareness, decreased cognition, decreased endurance, decreased balance, decreased IADL, decreased posture  Prognosis: good  Clinical Assessment: Patient continues to make consistent progress in functional mobility, including continued carryover in reciprocal gait mechanics with decreased occurrence of shuffling pattern and arm swing motions. Pt continues to require occasional min (A) for posterior balance stabilization and intermittent cueing during ambulation to promote improved mechanics. Prior to recent hospitalizations, patient was independent without device in home and community. Pt would benefit from continued skilled PT services to promote his independence, decrease his falls risk and return to PLOF.   Safety Interventions: patient left in chair, chair alarm in place, call light within reach, gait belt, patient at risk for falls, and family/caregiver present; wife present    Plan  Frequency: 5 x/week, 60 min/day  Current Treatment Recommendations: strengthening, ROM, balance training, functional mobility training, transfer training, gait training, stair training, endurance training, neuromuscular re-education, wheelchair mobility training, modalities, patient/caregiver education, ADL/self-care training, IADL training, home exercise program, and safety education    Goals  Patient Goals: To go home   Short Term Goals:  Time Frame: 10-14 days  Patient will complete bed mobility at Supervision  Patient will complete stand step transfers Supervision  Patient will ambulate 270 ft with use of LRAD with Supervision  Patient will ascend/descend 12 stairs with (R) ascending handrail at Supervision  Patient will complete car transfer at Supervision    **Above goals reviewed on 8/23/2023. All goals are ongoing at this time unless indicated above. **    Therapy Session Time     First Session Therapy Time:   Individual Concurrent Group Co-treatment   Time In  0730         Time Out  0815         Minutes  39           Second Session Therapy Time:   Individual Concurrent Group Co-treatment   Time In  7295         Time Out  1326         Minutes  39           Timed Code Treatment Minutes:  39 + 39    Total Treatment Minutes:  84    Electronically signed By: CARRIE Bar  Therapist observed and directed the patient's plan of care.   Co-signed and supervised by: Nyla Montgomery PT, DPT - [id number]

## 2023-08-24 PROCEDURE — 97129 THER IVNTJ 1ST 15 MIN: CPT

## 2023-08-24 PROCEDURE — 97116 GAIT TRAINING THERAPY: CPT

## 2023-08-24 PROCEDURE — 6370000000 HC RX 637 (ALT 250 FOR IP): Performed by: INTERNAL MEDICINE

## 2023-08-24 PROCEDURE — 1280000000 HC REHAB R&B

## 2023-08-24 PROCEDURE — 97110 THERAPEUTIC EXERCISES: CPT

## 2023-08-24 PROCEDURE — 97130 THER IVNTJ EA ADDL 15 MIN: CPT

## 2023-08-24 PROCEDURE — 6370000000 HC RX 637 (ALT 250 FOR IP): Performed by: PHYSICAL MEDICINE & REHABILITATION

## 2023-08-24 PROCEDURE — 97535 SELF CARE MNGMENT TRAINING: CPT

## 2023-08-24 PROCEDURE — 97530 THERAPEUTIC ACTIVITIES: CPT

## 2023-08-24 PROCEDURE — 51702 INSERT TEMP BLADDER CATH: CPT

## 2023-08-24 PROCEDURE — 6360000002 HC RX W HCPCS: Performed by: PHYSICAL MEDICINE & REHABILITATION

## 2023-08-24 PROCEDURE — 97112 NEUROMUSCULAR REEDUCATION: CPT

## 2023-08-24 RX ADMIN — POTASSIUM CHLORIDE 20 MEQ: 1500 TABLET, EXTENDED RELEASE ORAL at 09:03

## 2023-08-24 RX ADMIN — ROPINIROLE HYDROCHLORIDE 0.25 MG: 0.25 TABLET, FILM COATED ORAL at 13:34

## 2023-08-24 RX ADMIN — FUROSEMIDE 40 MG: 40 TABLET ORAL at 09:03

## 2023-08-24 RX ADMIN — ASPIRIN 81 MG: 81 TABLET, COATED ORAL at 09:03

## 2023-08-24 RX ADMIN — ROPINIROLE HYDROCHLORIDE 0.25 MG: 0.25 TABLET, FILM COATED ORAL at 09:03

## 2023-08-24 RX ADMIN — ATORVASTATIN CALCIUM 80 MG: 80 TABLET, FILM COATED ORAL at 22:13

## 2023-08-24 RX ADMIN — CARBIDOPA AND LEVODOPA 2 TABLET: 25; 100 TABLET ORAL at 18:29

## 2023-08-24 RX ADMIN — CARBIDOPA AND LEVODOPA 2 TABLET: 25; 100 TABLET ORAL at 06:16

## 2023-08-24 RX ADMIN — SOTALOL HYDROCHLORIDE 80 MG: 80 TABLET ORAL at 09:03

## 2023-08-24 RX ADMIN — CARBIDOPA AND LEVODOPA 2 TABLET: 25; 100 TABLET ORAL at 10:59

## 2023-08-24 RX ADMIN — PANTOPRAZOLE SODIUM 40 MG: 40 TABLET, DELAYED RELEASE ORAL at 06:16

## 2023-08-24 RX ADMIN — FUROSEMIDE 40 MG: 40 TABLET ORAL at 16:05

## 2023-08-24 RX ADMIN — CIPROFLOXACIN 500 MG: 500 TABLET, FILM COATED ORAL at 09:03

## 2023-08-24 RX ADMIN — TAMSULOSIN HYDROCHLORIDE 0.4 MG: 0.4 CAPSULE ORAL at 22:13

## 2023-08-24 RX ADMIN — CIPROFLOXACIN 500 MG: 500 TABLET, FILM COATED ORAL at 22:16

## 2023-08-24 RX ADMIN — SERTRALINE 100 MG: 50 TABLET, FILM COATED ORAL at 22:16

## 2023-08-24 RX ADMIN — ACETAMINOPHEN 650 MG: 325 TABLET ORAL at 10:59

## 2023-08-24 RX ADMIN — ENOXAPARIN SODIUM 40 MG: 100 INJECTION SUBCUTANEOUS at 09:03

## 2023-08-24 RX ADMIN — ROPINIROLE HYDROCHLORIDE 0.25 MG: 0.25 TABLET, FILM COATED ORAL at 22:15

## 2023-08-24 RX ADMIN — CARBIDOPA AND LEVODOPA 2 TABLET: 25; 100 TABLET ORAL at 16:05

## 2023-08-24 RX ADMIN — POTASSIUM CHLORIDE 20 MEQ: 1500 TABLET, EXTENDED RELEASE ORAL at 16:06

## 2023-08-24 ASSESSMENT — PAIN SCALES - GENERAL
PAINLEVEL_OUTOF10: 0
PAINLEVEL_OUTOF10: 2

## 2023-08-24 ASSESSMENT — PAIN DESCRIPTION - DESCRIPTORS: DESCRIPTORS: ACHING;SORE

## 2023-08-24 ASSESSMENT — PAIN DESCRIPTION - ORIENTATION: ORIENTATION: LEFT

## 2023-08-24 ASSESSMENT — PAIN DESCRIPTION - LOCATION: LOCATION: LEG

## 2023-08-24 NOTE — PROGRESS NOTES
[address] [facility] Department   Phone: [phone]    Speech Therapy    [] Initial Evaluation            [x] Daily Treatment Note         [] Discharge Summary      Patient: Demar Crespo   : 1950   MRN: [id number]   Date of Service:  2023  Admitting Diagnosis: CVA (cerebrovascular accident due to intracerebral hemorrhage) [facility])  Current Admission Summary: Demar Crespo is a 68 y.o. male who presented to [facility]  with encephalopathy and found to have significant DEBO. PMH reflects PD, CAD, Afib. 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 He is agreeable to Aru and wants to work in therapy before planned Dc home with his wife. DC from ARU due to irregular heart beat. He is now improved and wants to come back to ARU. Past Medical History:  has a past medical history of Atrial fibrillation ([address]), No significant past medical history, and Parkinson disease ([address]). Past Surgical History:  has a past surgical history that includes Coronary artery bypass graft (2018) and Atrial ablation surgery (2018). Recent MRI Brain: 23  IMPRESSION:  2 punctate acute infarcts in the left middle cerebral artery territory. Precautions/Restrictions: high fall risk      Pre-Admission Information   Living Status: Pt lives at home with wife. Pt's wife encourages pt to stay physically and mentally active but provides assistance. They travel to [place] during the winter months. They have two adult children who live locally and 6 grandchildren.    Occupation/School: Retired 4 years ago from [employer]  Medication Management: :  []Primary   [x]Secondary (wife sets up pill organizer and sets alarms; pt familiar with medication list) []No   Finance Management: []Primary   [x]Secondary (wife uses electronic bill pay and reviews payments each month to keep pt active in lists)  []No   Active :   []Yes Support Group. Provided education re LSVT LOUD (outpatient). Learning Assessment: Pt verbalized understanding   Pt requires ongoing learning     Assessment  Impairments Requiring Therapeutic Intervention: Dysarthria, Cognitive-Linguistic Deficits , Speech-Language Deficits  Prognosis: good    Clinical Assessment:   Pt presents with moderate cognitive-linguistic deficits characterized by difficulty with immediate/working memory, short-term memory, attention, problem solving, delayed processing and executive function. Pt benefited from mod-max cues via simplifications, extended time and repetition of task questions/directives. Language skills are limited are characterized by reduced initiation, utterance length and thought organization. Pt continues to demonstrate tolerance of regular consistencies, will distantly monitor as appropriate. Recommend skilled speech therapy to improve speech-language cognitive skills upon discharge to home setting. Diet Solids Recommendation:   Liquid Consistency Recommendation:   Recommended Form of Meds:   Regular texture diet     Thin liquids     Meds whole with water       Recommended Compensatory Swallowing Strategies: Upright as possible with all PO intake , Small bites/sips , Eat/feed slowly      Plan  Frequency: 60 minutes/day; 5 days per week, as tolerated, until goals met, or discharged from ARU. Therapeutic Interventions: Diet Tolerance Monitoring , Patient/Family Education  Cognitive-Linguistic intervention , Patient/ Family education , and Retia Likens Voice Therapy (LSVT)   Discharge Recommendations: Home with assistance and 24 hour supervision  Continued SLP at Discharge: Yes  and LSVT     Goals  Patient Goals: \"improve word finding and memory\"  Time Frame: 10-14 days    Pt will tolerate recommended diet with no overt s/s of aspiration.  GOAL MET   Patient will complete verbal description and word retrieval tasks with 80% accuracy or given min verbal cues  Ongoing

## 2023-08-24 NOTE — PROGRESS NOTES
235 [facility] Department   Phone: [phone]    Occupational Therapy    [] Initial Evaluation            [x] Daily Treatment Note         [] Discharge Summary      Patient: Thor Rangel   : 1950   MRN: [id number]   Date of Service:  2023    Admitting Diagnosis:  CVA (cerebrovascular accident due to intracerebral hemorrhage) [facility])  Current Admission Summary: Thor Rangel is a 68 y.o. male who presented to [facility]  with encephalopathy and found to have significant DEBO. PMH reflects PD, CAD, Afib. 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 Return to Aru post acute off for irregular HR. MRI brain : 2 punctate acute infarcts in the left middle cerebral artery territory. Past Medical History:  has a past medical history of Atrial fibrillation ([address]), No significant past medical history, and Parkinson disease ([address]). Past Surgical History:  has a past surgical history that includes Coronary artery bypass graft (2018) and Atrial ablation surgery (2018). Discharge Recommendations: HH OT S3; PRN assist from family    DME Required For Discharge: DME to be determined pending patient progress    Precautions/Restrictions: high fall risk  Weight Bearing Restrictions: no restrictions       Required Braces/Orthotics: no braces required     Positional Restrictions:no positional restrictions      Pre-Admission Information   Lives With: spouse                  Type of Home: house  Home Layout: one level; finished basement with full flight of steps with 1 HR  Home Access: 1 step to enter with handrail. Handrails are located on R side.   Bathroom Layout: walk in shower, curtain   Bathroom Equipment: shower chair, hand held shower head, portable/suction cup grab bars, small built in shower shelf (wife reports it's too small to sit on)   Toilet Height: standard height  Home [place], [address] [place] [address]

## 2023-08-24 NOTE — PROGRESS NOTES
Alee Doran, MD Corrie Ferrer MD                  Office: [phone]                      Fax: [phone] [address]                   NEPHROLOGY INPATIENT PROGRESS NOTE:     PATIENT NAME: Dennie Spalding  : 1950  MRN: [id number]        RECOMMENDATIONS:   - increased Lasix to 40mg po bid to decrease medullary concentrating gradient. - add K repletion   - 1.5L/24h fluid restriction.       - fup labs     - VBG showing respiratory alkalosis  - Uosm 400, Chilo less than 20, albumin 3. Higher ADH state. - continue po HCO3 TID  - Diarrhea control. Have discussed plan with pt, wife at bedside too. D/C plan from renal stand point: improving  + Follow-up with rehab plans also. +: f/u w/ me at [id number] in ~ 2 weeks after d/c. (I will arrange.)   + I have updated our information and discharge follow-up provider list.      IMPRESSION:       Admitted on:  2023 12:43 PM   For:  CVA (cerebrovascular accident due to intracerebral hemorrhage) ([address]) [I61.9]  No diagnosis found. Hyponatremia, worsening, chronic, higher ADH state. May have 3rd spacing also. Likely with underlying hypovolemia currently   No major neurological effect,  With underlying Parkinson, monitor closely    Recently admitted at [facility]  At that time-acute kidney injury, severe, had creatinine 8.9 on admission,  - With baseline creatinine 0.9, as of   - Thought to be due to obstructive uropathy, with BPH, bladder retention, and some component of hypovolemia  - Creatinine improved   -Currently creatinine around 0.9-1.1, without DEBO        Associated problems:   - Volume status: euvolemic  : HTN : no need for tight control. At goal.   - Azotemia: pre-renal +  - Electrolytes: K: WNL  - Acid-Base: Acidosis, non-anion gap, metabolic  - Anemia: Mild, likely chronic disease      Other major problems: Management per primary and other consulting teams. a <50% diameter reducing  stenosis based on velocity criteria. Recommendations   Recommend follow up study in 12 months. Signature   ------------------------------------------------------------------  Electronically signed by Siri Rae MD, [facility] (Interpreting  physician) on 08/08/2023 at 05:28 PM  ------------------------------------------------------------------  Patient Status:Routine. [facility] V[facility]. Technical Quality:Limited visualization due to movement. Plaque   - A plaque was found in the Right ICA. irregular. The plaque characteristics are: moderate severity and heterogeneous texture. - A plaque was found in the Left ICA. smooth. The plaque characteristics are: moderate severity and heterogeneous texture. Velocities are measured in cm/s ; Diameters are measured in mm Carotid Right Measurements +---------------+----+----+-----+----+ ! Location       ! PSV ! EDV ! Angle! RI  ! +---------------+----+----+-----+----+ ! Prox CCA       !101 !14. 8!60   !0.85! +---------------+----+----+-----+----+ ! Mid CCA        !100 !14. 3!60   !0.86! +---------------+----+----+-----+----+ ! Dist CCA       !77.4!12. 6!60   !0.84! +---------------+----+----+-----+----+ ! Prox ICA       !111 !23  !60   !0.79! +---------------+----+----+-----+----+ ! Mid ICA        !101 !15. 6!60   !0.85! +---------------+----+----+-----+----+ ! Dist ICA       !85.8!17. 3! 60   !0.8 ! +---------------+----+----+-----+----+ ! Prox ECA       !139 !    !61   !    ! +---------------+----+----+-----+----+ ! Vertebral      !50.5!    !60   !    ! +---------------+----+----+-----+----+ ! Prox Subclavian!85.1!    !60   !    ! +---------------+----+----+-----+----+   - There is antegrade vertebral flow noted on the right side. - Additional Measurements:ICAPSV/CCAPSV 1.11. ICAEDV/CCAEDV 1.55. Carotid Left Measurements +---------------+----+----+-----+----+ ! Location       ! PSV ! EDV ! Angle! RI  !

## 2023-08-24 NOTE — PROGRESS NOTES
1428: Shift assessment completed, morning medications given per MAR. VSS, alert and oriented. Call light within reach. The care plan and education has been reviewed and mutually agreed upon with the patient.

## 2023-08-24 NOTE — PLAN OF CARE
Problem: Discharge Planning  Goal: Discharge to home or other facility with appropriate resources  Outcome: Progressing  Flowsheets (Taken 8/23/2023 2043)  Discharge to home or other facility with appropriate resources: Identify barriers to discharge with patient and caregiver     Problem: Safety - Adult  Goal: Free from fall injury  Outcome: Progressing  Flowsheets (Taken 8/23/2023 2259)  Free From Fall Injury: Instruct family/caregiver on patient safety     Problem: Pain  Goal: Verbalizes/displays adequate comfort level or baseline comfort level  Outcome: Progressing  Flowsheets (Taken 8/23/2023 2043)  Verbalizes/displays adequate comfort level or baseline comfort level: Encourage patient to monitor pain and request assistance     Problem: Chronic Conditions and Co-morbidities  Goal: Patient's chronic conditions and co-morbidity symptoms are monitored and maintained or improved  Outcome: Progressing  Flowsheets (Taken 8/23/2023 2043)  Care Plan - Patient's Chronic Conditions and Co-Morbidity Symptoms are Monitored and Maintained or Improved: Monitor and assess patient's chronic conditions and comorbid symptoms for stability, deterioration, or improvement     Problem: ABCDS Injury Assessment  Goal: Absence of physical injury  Outcome: Progressing  Flowsheets (Taken 8/23/2023 2259)  Absence of Physical Injury: Implement safety measures based on patient assessment     Problem: Skin/Tissue Integrity  Goal: Absence of new skin breakdown  Description: 1. Monitor for areas of redness and/or skin breakdown  2. Assess vascular access sites hourly  3. Every 4-6 hours minimum:  Change oxygen saturation probe site  4. Every 4-6 hours:  If on nasal continuous positive airway pressure, respiratory therapy assess nares and determine need for appliance change or resting period.   Outcome: Progressing

## 2023-08-24 NOTE — PLAN OF CARE
Problem: Discharge Planning  Goal: Discharge to home or other facility with appropriate resources  8/24/2023 1054 by Beck Cantu RN  Outcome: Progressing  8/23/2023 2300 by Dhaval Sanders RN  Outcome: Progressing  Flowsheets (Taken 8/23/2023 2043)  Discharge to home or other facility with appropriate resources: Identify barriers to discharge with patient and caregiver     Problem: Safety - Adult  Goal: Free from fall injury  8/24/2023 1054 by Beck Cantu RN  Outcome: Progressing  8/23/2023 2300 by Dhaval Sanders RN  Outcome: Progressing  Flowsheets (Taken 8/23/2023 2259)  Free From Fall Injury: Instruct family/caregiver on patient safety     Problem: Pain  Goal: Verbalizes/displays adequate comfort level or baseline comfort level  8/24/2023 1054 by Beck Cantu RN  Outcome: Progressing  8/23/2023 2300 by Dhaval Sanders RN  Outcome: Progressing  Flowsheets (Taken 8/23/2023 2043)  Verbalizes/displays adequate comfort level or baseline comfort level: Encourage patient to monitor pain and request assistance     Problem: Chronic Conditions and Co-morbidities  Goal: Patient's chronic conditions and co-morbidity symptoms are monitored and maintained or improved  8/24/2023 1054 by Beck Cantu RN  Outcome: Progressing  8/23/2023 2300 by Dhaval Sanders RN  Outcome: Progressing  Flowsheets (Taken 8/23/2023 2043)  Care Plan - Patient's Chronic Conditions and Co-Morbidity Symptoms are Monitored and Maintained or Improved: Monitor and assess patient's chronic conditions and comorbid symptoms for stability, deterioration, or improvement     Problem: ABCDS Injury Assessment  Goal: Absence of physical injury  8/24/2023 1054 by Beck Cantu RN  Outcome: Progressing  8/23/2023 2300 by Dhaval Sanders RN  Outcome: Progressing  Flowsheets (Taken 8/23/2023 2259)  Absence of Physical Injury: Implement safety measures based on patient assessment     Problem: Skin/Tissue

## 2023-08-24 NOTE — PROGRESS NOTES
[address] [facility] Department   Phone: [phone]    Physical Therapy    [] Evaluation            [x] Daily Treatment Note         [] Discharge Summary      Patient: Pauline Rubio   : 1950   MRN: [id number]   Date of Service:  2023  Admitting Diagnosis: CVA (cerebrovascular accident due to intracerebral hemorrhage) [facility])  Current Admission Summary: 68 y.o. male who presented to [facility]  with encephalopathy and found to have significant DEBO. PMH reflects PD, CAD, Afib. 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 He is agreeable to ARU and wants to work in therapy before planned Dc home with his wife. MRI brain : 2 punctate acute infarcts in the left middle cerebral artery territory. Rotator cuff tear in (R) UE: per ortho appropriate for WBAT/ROM as tolerated  Pt. Transferred to Acute care on 23 d/t increased heart rate. Interruption to care. Past Medical History:  has a past medical history of Atrial fibrillation ([address]), No significant past medical history, and Parkinson disease ([address]). Past Surgical History:  has a past surgical history that includes Coronary artery bypass graft (2018) and Atrial ablation surgery (2018). Discharge Recommendations: Home with HHPT  DME Required For Discharge: DME to be determined pending patient progress  Precautions/Restrictions: high fall risk  Weight Bearing Restrictions: weight bearing as tolerated; per ortho note \"WBAT/ROM as tolerated\"  [x] Right Upper Extremity  [] Left Upper Extremity [] Right Lower Extremity  [] Left Lower Extremity     Required Braces/Orthotics: no braces required   [] Right  [] Left  Positional Restrictions:no positional restrictions    Pre-Admission Information   Lives With: spouse                  Type of Home: house  Home Layout: one level  Home Access: 1 step to enter with handrail.  Handrails are located reporting no pain and improvement with (L) calf soreness. Pt completed multiple STS transfers throughout session to/from recliner, stepper and therapy chair at [address] [place] [address]. Pt amb 170 ft + 105 ft at [facility] during session with same mechanics as above, improving overall arm swing and decreased jasmin with second amb post-intervention secondary to fatigue. Pt completed 7.5 mins on stepper at 2.0 resistance for improvement of cardiovascular endurance and reciprocal patterns. Pt performed amb with x4 (R) turns and x4 (L) turns to progress stride length and height throughout turns while maintaining arm swing. Pt reported fatigue upon completion and required increased time throughout session for sequencing and rest breaks. Upon completion, pt left in chair, chair alarm in place, call light and all needs within reach; with no new complaints.      Functional Outcomes                 Cognition  Overall Cognitive Status: Impaired  Arousal/Alertness: appropriate responses to stimuli  Following Commands: follows all commands without difficulty  Memory: decreased short term memory  Safety Judgement: decreased awareness of need for assistance, decreased awareness of need for safety  Problem Solving: assistance required to implement solutions, decreased awareness of errors  Initiation: requires cues for some  Sequencing: requires cues for some  Comments: Pt requires increased time for all tasks and some reminders/repeated instructions for some  Command Following:   accurately follows one step commands    Education  Barriers To Learning: cognition  Patient Education: patient educated on goals, PT role and benefits, plan of care, general safety, functional mobility training, disease specific education  Learning Assessment:  patient verbalizes understanding, would benefit from continued reinforcement    Assessment  Activity Tolerance: Good; increased time for all tasks; requires frequent rests breaks between sets secondary to

## 2023-08-25 LAB
ALBUMIN SERPL-MCNC: 2.5 G/DL (ref 3.4–5)
ANION GAP SERPL CALCULATED.3IONS-SCNC: 10 MMOL/L (ref 3–16)
BASOPHILS # BLD: 0 K/UL (ref 0–0.2)
BASOPHILS NFR BLD: 1 %
BUN SERPL-MCNC: 10 MG/DL (ref 7–20)
CALCIUM SERPL-MCNC: 8.3 MG/DL (ref 8.3–10.6)
CHLORIDE SERPL-SCNC: 102 MMOL/L (ref 99–110)
CO2 SERPL-SCNC: 25 MMOL/L (ref 21–32)
CREAT SERPL-MCNC: 0.8 MG/DL (ref 0.8–1.3)
DEPRECATED RDW RBC AUTO: 14.2 % (ref 12.4–15.4)
EOSINOPHIL # BLD: 0.2 K/UL (ref 0–0.6)
EOSINOPHIL NFR BLD: 4.6 %
GFR SERPLBLD CREATININE-BSD FMLA CKD-EPI: >60 ML/MIN/{1.73_M2}
GLUCOSE SERPL-MCNC: 98 MG/DL (ref 70–99)
HCT VFR BLD AUTO: 29.3 % (ref 40.5–52.5)
HGB BLD-MCNC: 10.3 G/DL (ref 13.5–17.5)
LYMPHOCYTES # BLD: 0.6 K/UL (ref 1–5.1)
LYMPHOCYTES NFR BLD: 15.8 %
MCH RBC QN AUTO: 31.5 PG (ref 26–34)
MCHC RBC AUTO-ENTMCNC: 35 G/DL (ref 31–36)
MCV RBC AUTO: 89.9 FL (ref 80–100)
MONOCYTES # BLD: 0.6 K/UL (ref 0–1.3)
MONOCYTES NFR BLD: 16.4 %
NEUTROPHILS # BLD: 2.4 K/UL (ref 1.7–7.7)
NEUTROPHILS NFR BLD: 62.2 %
PHOSPHATE SERPL-MCNC: 2.7 MG/DL (ref 2.5–4.9)
PLATELET # BLD AUTO: 295 K/UL (ref 135–450)
PMV BLD AUTO: 7.1 FL (ref 5–10.5)
POTASSIUM SERPL-SCNC: 3.3 MMOL/L (ref 3.5–5.1)
RBC # BLD AUTO: 3.26 M/UL (ref 4.2–5.9)
SODIUM SERPL-SCNC: 137 MMOL/L (ref 136–145)
WBC # BLD AUTO: 3.8 K/UL (ref 4–11)

## 2023-08-25 PROCEDURE — 97110 THERAPEUTIC EXERCISES: CPT

## 2023-08-25 PROCEDURE — 85025 COMPLETE CBC W/AUTO DIFF WBC: CPT

## 2023-08-25 PROCEDURE — 6370000000 HC RX 637 (ALT 250 FOR IP): Performed by: INTERNAL MEDICINE

## 2023-08-25 PROCEDURE — 51702 INSERT TEMP BLADDER CATH: CPT

## 2023-08-25 PROCEDURE — 97530 THERAPEUTIC ACTIVITIES: CPT

## 2023-08-25 PROCEDURE — 97130 THER IVNTJ EA ADDL 15 MIN: CPT

## 2023-08-25 PROCEDURE — 36415 COLL VENOUS BLD VENIPUNCTURE: CPT

## 2023-08-25 PROCEDURE — 51798 US URINE CAPACITY MEASURE: CPT

## 2023-08-25 PROCEDURE — 97129 THER IVNTJ 1ST 15 MIN: CPT

## 2023-08-25 PROCEDURE — 94760 N-INVAS EAR/PLS OXIMETRY 1: CPT

## 2023-08-25 PROCEDURE — 97116 GAIT TRAINING THERAPY: CPT

## 2023-08-25 PROCEDURE — 97535 SELF CARE MNGMENT TRAINING: CPT

## 2023-08-25 PROCEDURE — 80069 RENAL FUNCTION PANEL: CPT

## 2023-08-25 PROCEDURE — 51701 INSERT BLADDER CATHETER: CPT

## 2023-08-25 PROCEDURE — 97112 NEUROMUSCULAR REEDUCATION: CPT

## 2023-08-25 PROCEDURE — 6360000002 HC RX W HCPCS: Performed by: PHYSICAL MEDICINE & REHABILITATION

## 2023-08-25 PROCEDURE — 1280000000 HC REHAB R&B

## 2023-08-25 PROCEDURE — 6370000000 HC RX 637 (ALT 250 FOR IP): Performed by: PHYSICAL MEDICINE & REHABILITATION

## 2023-08-25 RX ADMIN — POTASSIUM CHLORIDE 20 MEQ: 1500 TABLET, EXTENDED RELEASE ORAL at 19:44

## 2023-08-25 RX ADMIN — ROPINIROLE HYDROCHLORIDE 0.25 MG: 0.25 TABLET, FILM COATED ORAL at 14:19

## 2023-08-25 RX ADMIN — ATORVASTATIN CALCIUM 80 MG: 80 TABLET, FILM COATED ORAL at 21:48

## 2023-08-25 RX ADMIN — CARBIDOPA AND LEVODOPA 2 TABLET: 25; 100 TABLET ORAL at 19:43

## 2023-08-25 RX ADMIN — ENOXAPARIN SODIUM 40 MG: 100 INJECTION SUBCUTANEOUS at 14:19

## 2023-08-25 RX ADMIN — ASPIRIN 81 MG: 81 TABLET, COATED ORAL at 14:19

## 2023-08-25 RX ADMIN — ACETAMINOPHEN 650 MG: 325 TABLET ORAL at 21:49

## 2023-08-25 RX ADMIN — SERTRALINE 100 MG: 50 TABLET, FILM COATED ORAL at 21:48

## 2023-08-25 RX ADMIN — FUROSEMIDE 40 MG: 40 TABLET ORAL at 14:35

## 2023-08-25 RX ADMIN — POTASSIUM CHLORIDE 20 MEQ: 1500 TABLET, EXTENDED RELEASE ORAL at 14:19

## 2023-08-25 RX ADMIN — CARBIDOPA AND LEVODOPA 2 TABLET: 25; 100 TABLET ORAL at 14:18

## 2023-08-25 RX ADMIN — CARBIDOPA AND LEVODOPA 2 TABLET: 25; 100 TABLET ORAL at 06:45

## 2023-08-25 RX ADMIN — ROPINIROLE HYDROCHLORIDE 0.25 MG: 0.25 TABLET, FILM COATED ORAL at 21:48

## 2023-08-25 RX ADMIN — PANTOPRAZOLE SODIUM 40 MG: 40 TABLET, DELAYED RELEASE ORAL at 06:45

## 2023-08-25 RX ADMIN — TAMSULOSIN HYDROCHLORIDE 0.4 MG: 0.4 CAPSULE ORAL at 21:48

## 2023-08-25 RX ADMIN — SOTALOL HYDROCHLORIDE 80 MG: 80 TABLET ORAL at 14:21

## 2023-08-25 RX ADMIN — FUROSEMIDE 40 MG: 40 TABLET ORAL at 19:43

## 2023-08-25 ASSESSMENT — PAIN SCALES - GENERAL
PAINLEVEL_OUTOF10: 0
PAINLEVEL_OUTOF10: 2
PAINLEVEL_OUTOF10: 0

## 2023-08-25 ASSESSMENT — PAIN DESCRIPTION - LOCATION: LOCATION: GENERALIZED

## 2023-08-25 ASSESSMENT — PAIN - FUNCTIONAL ASSESSMENT: PAIN_FUNCTIONAL_ASSESSMENT: ACTIVITIES ARE NOT PREVENTED

## 2023-08-25 ASSESSMENT — PAIN DESCRIPTION - DESCRIPTORS: DESCRIPTORS: ACHING

## 2023-08-25 NOTE — PROGRESS NOTES
Removed argueta cath at 0730 this am with balloon intact. The pt had no urge to urinate. Noted 314 ml urine per bladder scanner. Encouraged to drink 2 cups of water. Will monitor later.

## 2023-08-25 NOTE — PROGRESS NOTES
235 [facility] Department   Phone: [phone]    Speech Therapy    [] Initial Evaluation            [x] Daily Treatment Note         [] Discharge Summary      Patient: Dennie Spalding   : 1950   MRN: [id number]   Date of Service:  2023  Admitting Diagnosis: CVA (cerebrovascular accident due to intracerebral hemorrhage) [facility])  Current Admission Summary: Dennie Spalding is a 68 y.o. male who presented to [facility]  with encephalopathy and found to have significant EDBO. PMH reflects PD, CAD, Afib. 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 He is agreeable to Aru and wants to work in therapy before planned Dc home with his wife. DC from ARU due to irregular heart beat. He is now improved and wants to come back to ARU. Past Medical History:  has a past medical history of Atrial fibrillation ([address]), No significant past medical history, and Parkinson disease ([address]). Past Surgical History:  has a past surgical history that includes Coronary artery bypass graft (2018) and Atrial ablation surgery (2018). Recent MRI Brain: 23  IMPRESSION:  2 punctate acute infarcts in the left middle cerebral artery territory. Precautions/Restrictions: high fall risk      Pre-Admission Information   Living Status: Pt lives at home with wife. Pt's wife encourages pt to stay physically and mentally active but provides assistance. They travel to [place] during the winter months. They have two adult children who live locally and 6 grandchildren.    Occupation/School: Retired 4 years ago from [employer]  Medication Management: :  []Primary   [x]Secondary (wife sets up pill organizer and sets alarms; pt familiar with medication list) []No   Finance Management: []Primary   [x]Secondary (wife uses electronic bill pay and reviews payments each month to keep pt active in lists)  []No   Active :   []Yes task however pt appeared flustered by task and then reported brain fuzziness which wife reported pt typically gets behind his eyes from time to time secondary to Parkinson's disease and will go away when redirected to another task. Will re-attempt at second session. Additional Interventions:       Session 2:   Dysphagia: Severity: Within functional limits   Goal not targeted this session. GOAL MET, Will continue to monitor     Comprehension: Severity: Mild   Reduced need for repetition or clarification of directives. Expressive Language: Severity: Mild   Goal not targeted this session. Voice: Severity: Mild   Goal not targeted this session. Cognition: Severity: Moderate   Alternating attention/sequencing: trail making task (number/letter)  - pt required mod cues for completion of letter/number alternating and recalling which number or letter was previously stated  - able to independently locate and create line from each number/letter    Problem solving/reasoning: completing sentence analogies   - 93% accuracy (13/14) independently     Working memory: word list retention task (category inclusion)   - pt able to repeat list of 3 stimulus words immediately following SLP with 50% accuracy (5/10) increased to 100% accuracy given SLP repetition of words 2-3 times   - pt then able to answer the question about the list with 70% accuracy (7/10) independently increased to 100% accuracy given SLP repetition of words x 1     Additional Interventions:        Education  Barriers To Learning: cognition  Patient Education: Provided education regarding role of SLP, results of assessment, recommendations and general speech pathology plan of care. Provided stroke education. Provided education regarding Stroke Support Group. Provided education re [facility]SV[facility] L[facility] (outpatient).    Learning Assessment: Pt verbalized understanding   Pt requires ongoing learning     Assessment  Impairments Requiring Therapeutic Intervention:

## 2023-08-25 NOTE — PROGRESS NOTES
[address] [facility] Department   Phone: [phone]    Occupational Therapy    [] Initial Evaluation            [x] Daily Treatment Note         [] Discharge Summary      Patient: Muriel Calderon   : 1950   MRN: [id number]   Date of Service:  2023    Admitting Diagnosis:  CVA (cerebrovascular accident due to intracerebral hemorrhage) [facility])  Current Admission Summary: Muriel Calderon is a 68 y.o. male who presented to [facility]  with encephalopathy and found to have significant DEBO. PMH reflects PD, CAD, Afib. 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 Return to Aru post acute off for irregular HR. MRI brain : 2 punctate acute infarcts in the left middle cerebral artery territory. Past Medical History:  has a past medical history of Atrial fibrillation ([address]), No significant past medical history, and Parkinson disease ([address]). Past Surgical History:  has a past surgical history that includes Coronary artery bypass graft (2018) and Atrial ablation surgery (2018). Discharge Recommendations: HH OT S3; PRN assist from family    DME Required For Discharge: DME to be determined pending patient progress    Precautions/Restrictions: high fall risk  Weight Bearing Restrictions: no restrictions       Required Braces/Orthotics: no braces required     Positional Restrictions:no positional restrictions      Pre-Admission Information   Lives With: spouse                  Type of Home: house  Home Layout: one level; finished basement with full flight of steps with 1 HR  Home Access: 1 step to enter with handrail. Handrails are located on R side.   Bathroom Layout: walk in shower, curtain   Bathroom Equipment: shower chair, hand held shower head, portable/suction cup grab bars, small built in shower shelf (wife reports it's too small to sit on)   Toilet Height: standard height  Home VC required for 1/2 error corrections- increased time to complete      Cognition  Overall Cognitive Status: Impaired  Arousal/Alertness: delayed responses to stimuli, inconsistent responses to stimuli  Following Commands: follows one step commands with repetition, follows one step commands with increased time  Attention Span: attends with cues to redirect, difficulty attending to directions  Memory: decreased recall of recent events, decreased short term memory  Safety Judgement: decreased awareness of need for assistance  Problem Solving: assistance required to implement solutions, decreased awareness of errors, assistance required to identify errors made  Insights: decreased awareness of deficits  Initiation: requires cues for some  Sequencing: requires cues for some  Comments: delayed processing at times; flat affect; fluctuations in attention    Orientation:    oriented to person and oriented to place  Command Following:   impaired     Education  Barriers To Learning: cognition and physical  Patient Education: patient educated on goals, OT role and benefits, plan of care, ADL adaptive strategies, weight-bearing education, proper use of assistive device/equipment, adaptive device training, orientation, family education, transfer training, discharge recommendations, AAROM techniques for R UE  Learning Assessment:  patient verbalizes understanding, would benefit from continued reinforcement, patient will require reinforcement due to cognitive deficits    Assessment  Activity Tolerance: Fair with rest breaks as needed  Impairments Requiring Therapeutic Intervention: decreased functional mobility, decreased ADL status, decreased ROM, decreased strength, decreased cognition, decreased endurance, decreased balance, decreased IADL, decreased fine motor control, decreased coordination  Prognosis: fair  Clinical Assessment: Pt slowly progressing towards goals.  Pt able to complete mobility and transfers with SBA during

## 2023-08-25 NOTE — PROGRESS NOTES
235 [facility] Department   Phone: [phone]    Physical Therapy    [] Evaluation            [x] Daily Treatment Note         [] Discharge Summary      Patient: Ludy Sloan   : 1950   MRN: [id number]   Date of Service:  2023  Admitting Diagnosis: CVA (cerebrovascular accident due to intracerebral hemorrhage) [facility])  Current Admission Summary: 68 y.o. male who presented to [facility]  with encephalopathy and found to have significant DEBO. PMH reflects PD, CAD, Afib. 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 He is agreeable to ARU and wants to work in therapy before planned Dc home with his wife. MRI brain : 2 punctate acute infarcts in the left middle cerebral artery territory. Rotator cuff tear in (R) UE: per ortho appropriate for WBAT/ROM as tolerated  Pt. Transferred to Acute care on 23 d/t increased heart rate. Interruption to care. Past Medical History:  has a past medical history of Atrial fibrillation ([address]), No significant past medical history, and Parkinson disease ([address]). Past Surgical History:  has a past surgical history that includes Coronary artery bypass graft (2018) and Atrial ablation surgery (2018). Discharge Recommendations: Home with HHPT  DME Required For Discharge: DME to be determined pending patient progress  Precautions/Restrictions: high fall risk  Weight Bearing Restrictions: weight bearing as tolerated; per ortho note \"WBAT/ROM as tolerated\"  [x] Right Upper Extremity  [] Left Upper Extremity [] Right Lower Extremity  [] Left Lower Extremity     Required Braces/Orthotics: no braces required   [] Right  [] Left  Positional Restrictions:no positional restrictions    Pre-Admission Information   Lives With: spouse                  Type of Home: house  Home Layout: one level  Home Access: 1 step to enter with handrail.  Handrails are located utilize restroom at beginning of session. CGA for ambulation to restroom with quality of gait as is listed above, toilet transfer completed with CGA and use of grab bar. Patient unable to void at this time. Ambulates x 260'+170' during session with SBA and quality of gait as is listed above. Verbal cues provided to increase step length--minimal arm swing noted during activity despite cues. In // bars patient engages in the following activities: rocker board rocking forward and backward x 6 reps--initially mod (A) progressing to min (A) with tactile cuing for knee extension and assistance for proper positioning on (R) UE. Standing lunges onto BOSU ball x 7 reps each LE with CGA and cues to maintain upper body upright positioning during activity. While standing at 4'' steps patient lifts foot to 2nd step and returns to normal CHELLY x 10 reps alternating LE with CGA. Patient intermittently attempting to hold onto hand rail with (L) UE due to fear of instability, though patient presents with appropriate balance and tolerance to single leg stance. While standing at tall back chair patient completes repeated STS with emphasis on trunk extension x 5 reps x 2 sets with assistance for terminal extension. Standing targeted stepping to encourage increasing step height and length x 8 reps alternating LE with emphasis on terminal trunk extension, completed with lateral stepping x 8 reps also with CGA. One episode of min (A) due to lateral LOB with delayed stepping reaction. Dual tasking activity encouraging stepping with punch completed with 2# weight in (L) UE x 10 reps with CGA-- minimal activation in (R) punch noted. Upon return to room, pt remains up in recliner with chair alarm and call light within reach. No new complaints following session.       Functional Outcomes                 Cognition  Overall Cognitive Status: Impaired  Arousal/Alertness: appropriate responses to stimuli  Following Commands: follows all commands without difficulty  Memory: decreased short term memory  Safety Judgement: decreased awareness of need for assistance, decreased awareness of need for safety  Problem Solving: assistance required to implement solutions, decreased awareness of errors  Initiation: requires cues for some  Sequencing: requires cues for some  Comments: Pt requires increased time for all tasks and some reminders/repeated instructions for some  Command Following:   accurately follows one step commands    Education  Barriers To Learning: cognition  Patient Education: patient educated on goals, PT role and benefits, plan of care, general safety, functional mobility training, family education, disease specific education, transfer training  Learning Assessment:  patient verbalizes understanding, would benefit from continued reinforcement    Assessment  Activity Tolerance: Good; pt continues to require increased time for all task and frequent rest breaks  Impairments Requiring Therapeutic Intervention: decreased functional mobility, decreased ADL status, decreased ROM, decreased strength, decreased safety awareness, decreased cognition, decreased endurance, decreased balance, decreased IADL, decreased posture  Prognosis: good  Clinical Assessment: Patient has progressed in functional mobility by supervision with transfers and SBA with ambulation with consistently carrying over reciprocal gait mechanics, mild (B) arm swing and moderate jasmin when not fatigued. Pt continues to require intermittent VC during ambulation to promote improved mechanics and balance, as well as reminders of tasks and awareness of surroundings. Prior to recent hospitalizations, patient was independent without device in home and community. Pt would benefit from continued skilled PT services to promote his independence, decrease his falls risk and return to PLOF.   Safety Interventions: patient left in chair, chair alarm in place, call light within reach, gait belt,

## 2023-08-25 NOTE — PROGRESS NOTES
The pt felt pressure and was uncomfortable laying down but was unable to void. Noted over 900ml per bladder scanner. Used regular 14 fr straight catheter but was unable to pass prostate area. Noted 1000ml clear yellow urine out put using 16 fr coude without resistance. The pt tolerated it well.

## 2023-08-25 NOTE — PLAN OF CARE
Problem: Discharge Planning  Goal: Discharge to home or other facility with appropriate resources  Outcome: Progressing     Problem: Safety - Adult  Goal: Free from fall injury  Outcome: Progressing     Problem: Pain  Goal: Verbalizes/displays adequate comfort level or baseline comfort level  Outcome: Progressing     Problem: Chronic Conditions and Co-morbidities  Goal: Patient's chronic conditions and co-morbidity symptoms are monitored and maintained or improved  Outcome: Progressing     Problem: ABCDS Injury Assessment  Goal: Absence of physical injury  Outcome: Progressing     Problem: Skin/Tissue Integrity  Goal: Absence of new skin breakdown  Description: 1. Monitor for areas of redness and/or skin breakdown  2. Assess vascular access sites hourly  3. Every 4-6 hours minimum:  Change oxygen saturation probe site  4. Every 4-6 hours:  If on nasal continuous positive airway pressure, respiratory therapy assess nares and determine need for appliance change or resting period.   Outcome: Progressing

## 2023-08-25 NOTE — CARE COORDINATION
CM spoke with [facility]([phone]) intake. They are able to accept the pt for [facility] needs upon d/c. Upon d/c, [facility] will need d/c order, H & P, and summary of care faxed to them as their Epic is currently not working. Their main fax number is [phone] and their back up fax number is [phone].     Electronically signed by ALISHA Pitts on 8/25/2023 at 3:27 PM

## 2023-08-26 PROCEDURE — 6370000000 HC RX 637 (ALT 250 FOR IP): Performed by: INTERNAL MEDICINE

## 2023-08-26 PROCEDURE — 51702 INSERT TEMP BLADDER CATH: CPT

## 2023-08-26 PROCEDURE — 97110 THERAPEUTIC EXERCISES: CPT

## 2023-08-26 PROCEDURE — 6360000002 HC RX W HCPCS: Performed by: PHYSICAL MEDICINE & REHABILITATION

## 2023-08-26 PROCEDURE — 97116 GAIT TRAINING THERAPY: CPT

## 2023-08-26 PROCEDURE — 97112 NEUROMUSCULAR REEDUCATION: CPT

## 2023-08-26 PROCEDURE — 1280000000 HC REHAB R&B

## 2023-08-26 PROCEDURE — 6370000000 HC RX 637 (ALT 250 FOR IP): Performed by: PHYSICAL MEDICINE & REHABILITATION

## 2023-08-26 RX ORDER — FLUTICASONE PROPIONATE 50 MCG
2 SPRAY, SUSPENSION (ML) NASAL DAILY
Status: DISCONTINUED | OUTPATIENT
Start: 2023-08-27 | End: 2023-08-30 | Stop reason: HOSPADM

## 2023-08-26 RX ADMIN — ROPINIROLE HYDROCHLORIDE 0.25 MG: 0.25 TABLET, FILM COATED ORAL at 08:33

## 2023-08-26 RX ADMIN — ROPINIROLE HYDROCHLORIDE 0.25 MG: 0.25 TABLET, FILM COATED ORAL at 21:21

## 2023-08-26 RX ADMIN — FUROSEMIDE 40 MG: 40 TABLET ORAL at 08:33

## 2023-08-26 RX ADMIN — SERTRALINE 100 MG: 50 TABLET, FILM COATED ORAL at 21:21

## 2023-08-26 RX ADMIN — ROPINIROLE HYDROCHLORIDE 0.25 MG: 0.25 TABLET, FILM COATED ORAL at 15:29

## 2023-08-26 RX ADMIN — FUROSEMIDE 40 MG: 40 TABLET ORAL at 16:56

## 2023-08-26 RX ADMIN — POTASSIUM CHLORIDE 20 MEQ: 1500 TABLET, EXTENDED RELEASE ORAL at 08:32

## 2023-08-26 RX ADMIN — CARBIDOPA AND LEVODOPA 2 TABLET: 25; 100 TABLET ORAL at 12:04

## 2023-08-26 RX ADMIN — ATORVASTATIN CALCIUM 80 MG: 80 TABLET, FILM COATED ORAL at 21:21

## 2023-08-26 RX ADMIN — ASPIRIN 81 MG: 81 TABLET, COATED ORAL at 08:32

## 2023-08-26 RX ADMIN — CARBIDOPA AND LEVODOPA 2 TABLET: 25; 100 TABLET ORAL at 06:39

## 2023-08-26 RX ADMIN — TAMSULOSIN HYDROCHLORIDE 0.4 MG: 0.4 CAPSULE ORAL at 21:21

## 2023-08-26 RX ADMIN — CARBIDOPA AND LEVODOPA 2 TABLET: 25; 100 TABLET ORAL at 15:29

## 2023-08-26 RX ADMIN — POTASSIUM CHLORIDE 20 MEQ: 1500 TABLET, EXTENDED RELEASE ORAL at 16:56

## 2023-08-26 RX ADMIN — ENOXAPARIN SODIUM 40 MG: 100 INJECTION SUBCUTANEOUS at 08:32

## 2023-08-26 RX ADMIN — PANTOPRAZOLE SODIUM 40 MG: 40 TABLET, DELAYED RELEASE ORAL at 06:39

## 2023-08-26 RX ADMIN — SOTALOL HYDROCHLORIDE 80 MG: 80 TABLET ORAL at 08:32

## 2023-08-26 RX ADMIN — CARBIDOPA AND LEVODOPA 2 TABLET: 25; 100 TABLET ORAL at 18:52

## 2023-08-26 ASSESSMENT — PAIN SCALES - GENERAL: PAINLEVEL_OUTOF10: 0

## 2023-08-26 NOTE — PROGRESS NOTES
Creatinine Clearance:  No components found for: CREAT4, UHRS10, UTV10  Urine Toxicology:  No components found for: Elder Jakes, IBENZO, ICOCAINE, IMARTHC, IOPIATES, IPHENCYC    HgBA1c:    Lab Results   Component Value Date/Time    LABA1C 5.1 08/21/2023 05:57 AM     RPR:  No results found for: RPR  HIV:  No results found for: HIV  GLORY:  No results found for: ANATITER, GLORY  RF:  No results found for: RF  DSDNA:  No components found for: DNA  AMYLASE:  No results found for: AMYLASE  LIPASE:    Lab Results   Component Value Date/Time    LIPASE 65.0 08/04/2023 05:35 PM     Fibrinogen Level:  No components found for: FIB       BELOW MENTIONED RADIOLOGY STUDY RESULTS BY ME (AS NEEDED FOR MY EVALUATION AND MANAGEMENT). CT ABDOMEN PELVIS WO CONTRAST Additional Contrast? None    Result Date: 8/7/2023  EXAMINATION: CT OF THE ABDOMEN AND PELVIS WITHOUT CONTRAST; CT OF THE HEAD WITHOUT CONTRAST 8/4/2023 5:45 pm; 8/4/2023 5:46 pm TECHNIQUE: CT of the abdomen and pelvis was performed without the administration of intravenous contrast. Multiplanar reformatted images are provided for review. Automated exposure control, iterative reconstruction, and/or weight based adjustment of the mA/kV was utilized to reduce the radiation dose to as low as reasonably achievable.; CT of the head was performed without the administration of intravenous contrast. Automated exposure control, iterative reconstruction, and/or weight based adjustment of the mA/kV was utilized to reduce the radiation dose to as low as reasonably achievable. COMPARISON: None.  HISTORY: ORDERING SYSTEM PROVIDED HISTORY: lower abdominal pain, renal failure TECHNOLOGIST PROVIDED HISTORY: Reason for exam:->lower abdominal pain, renal failure Additional Contrast?->None Decision Support Exception - unselect if not a suspected or confirmed emergency medical condition->Emergency Medical Condition (MA) Reason for Exam: Abnormal Lab (Called by family doc regarding lab work done this morning with critically high BUN and Cr levels. Recently completed treatment for shingles. Wife states pt has been confused, wetting self at night, diarrhea.); ORDERING SYSTEM PROVIDED HISTORY: altered mental status TECHNOLOGIST PROVIDED HISTORY: Reason for exam:->altered mental status Has a \"code stroke\" or \"stroke alert\" been called? ->No Decision Support Exception - unselect if not a suspected or confirmed emergency medical condition->Emergency Medical Condition (MA) Reason for Exam: Abnormal Lab (Called by family doc regarding lab work done this morning with critically high BUN and Cr levels. Recently completed treatment for shingles. Wife states pt has been confused, wetting self at night, diarrhea.) FINDINGS: CT head: No evidence of intra or extra-axial hemorrhage. No evidence of masses or mass effects or shifts. Ventricular system is normal and symmetrical. The skull, paranasal sinuses and orbits appear unremarkable. CT abdomen and pelvis: Lower Chest: No active disease. Organs: The liver demonstrates multiple hypodense lesions which have the density of cysts. The largest in the right lobe measures 2.7 cm. Gallbladder, pancreas, spleen, adrenals, aorta, and IVC appear stable. Bilateral hydronephrosis and hydroureter. Prostate is enlarged measuring 5.4 x 4.7 cm. The urinary bladder is distended. No focal thickening. Hydronephrosis is not related to ureteric stones or UV junction abnormality. There could be reflux. GI/Bowel: No evidence of bowel obstruction or perforation. Normal appendix. Mild constipation. Pelvis: As described, the urinary bladder is distended extending into the lower abdomen. No focal thickening. UV junctions appear unremarkable. Prostate is enlarged measuring 5.4 x 4.7 cm. No evidence of pelvic lymphadenopathy. Peritoneum/Retroperitoneum: No evidence of retroperitoneal lymphadenopathy or acute mesenteric findings. Bones/Soft Tissues: No acute abnormality.      1. No acute criteria. -The left internal carotid artery appears to have a <50% diameter reducing  stenosis based on velocity criteria. Recommendations   Recommend follow up study in 12 months. Signature   ------------------------------------------------------------------  Electronically signed by Sabra Walker MD, [facility] (Interpreting  physician) on 08/08/2023 at 05:28 PM  ------------------------------------------------------------------  Patient Status:Routine. [facility]. Technical Quality:Limited visualization due to movement. Plaque   - A plaque was found in the Right ICA. irregular. The plaque characteristics are: moderate severity and heterogeneous texture. - A plaque was found in the Left ICA. smooth. The plaque characteristics are: moderate severity and heterogeneous texture. Velocities are measured in cm/s ; Diameters are measured in mm Carotid Right Measurements +---------------+----+----+-----+----+ ! Location       ! PSV ! EDV ! Angle! RI  ! +---------------+----+----+-----+----+ ! Prox CCA       !101 !14. 8!60   !0.85! +---------------+----+----+-----+----+ ! Mid CCA        !100 !14. 3!60   !0.86! +---------------+----+----+-----+----+ ! Dist CCA       !77.4!12. 6!60   !0.84! +---------------+----+----+-----+----+ ! Prox ICA       !111 !23  !60   !0.79! +---------------+----+----+-----+----+ ! Mid ICA        !101 !15. 6!60   !0.85! +---------------+----+----+-----+----+ ! Dist ICA       !85.8!17. 3! 60   !0.8 ! +---------------+----+----+-----+----+ ! Prox ECA       !139 !    !61   !    ! +---------------+----+----+-----+----+ ! Vertebral      !50.5!    !60   !    ! +---------------+----+----+-----+----+ ! Prox Subclavian!85.1!    !60   !    ! +---------------+----+----+-----+----+   - There is antegrade vertebral flow noted on the right side. - Additional Measurements:ICAPSV/CCAPSV 1.11. ICAEDV/CCAEDV 1.55. Carotid Left Measurements +---------------+----+----+-----+----+ ! Location

## 2023-08-26 NOTE — PLAN OF CARE
Problem: Discharge Planning  Goal: Discharge to home or other facility with appropriate resources  8/26/2023 1139 by Anshu Gonzales RN  Outcome: Progressing     Problem: Safety - Adult  Goal: Free from fall injury  8/26/2023 1139 by Anshu Gonzales RN  Outcome: Progressing

## 2023-08-26 NOTE — FLOWSHEET NOTE
08/25/23 2147 08/25/23 2157   Urinary Catheter 08/25/23 Coude   Placement Date/Time: 08/25/23 2156   Present on Admission/Arrival: No  Inserted by: Primitivo Wiseman RN  Insertion attempts: 1  Catheter Type: Coude  Catheter Size: 16 FR  Catheter Balloon Size: 5 mL  Urine Returned: Yes   $ Urethral catheter insertion  --  $ Not inserted for procedure   Catheter Indications  --  Urinary retention (acute or chronic), continuous bladder irrigation or bladder outlet obstruction   Site Assessment  --  Bleeding   Urine Color  --  Yellow   Urine Appearance  --  Red flecks   Urine Odor  --    (no odor)   Collection Container  --  Standard   Securement Method  --  Securing device (Describe)   Catheter Care   --  Perineal wipes   Catheter Best Practices   --  Drainage tube clipped to bed;Catheter secured to thigh; Tamper seal intact; Bag below bladder;Bag not on floor; Lack of dependent loop in tubing;Drainage bag less than half full   Status  --  Draining   Output (mL)  --  750 mL   Urine Assessment   Urinary Status Retention  --    Urinary Incontinence Absent  --    Bladder Scan Volume (mL) 775 mL  --    $ Bladder scan $ Yes  --      Void trial attempted. Patient unable to urinate after several attempts. Straight cathed several times on day shift. Noted to have scant bleeding from meatus. Argueta cath replaced without difficulty using 16fr coude catheter. 5ml balloon. Urine noted to be clear yellow with red flecks. No odor noted when emptied. Educated patient and wife on argueta care/management. Both verbalize understanding will need reinforced teaching.

## 2023-08-26 NOTE — PROGRESS NOTES
[facility] Department   Phone: [phone]    Physical Therapy    [] Evaluation            [x] Daily Treatment Note         [] Discharge Summary      Patient: Chaim Aranda   : 1950   MRN: [id number]   Date of Service:  2023  Admitting Diagnosis: CVA (cerebrovascular accident due to intracerebral hemorrhage) [facility])  Current Admission Summary: 68 y.o. male who presented to [facility]  with encephalopathy and found to have significant DEBO. PMH reflects PD, CAD, Afib. 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 He is agreeable to ARU and wants to work in therapy before planned Dc home with his wife. MRI brain : 2 punctate acute infarcts in the left middle cerebral artery territory. Rotator cuff tear in (R) UE: per ortho appropriate for WBAT/ROM as tolerated  Pt. Transferred to Acute care on 23 d/t increased heart rate. Interruption to care. Past Medical History:  has a past medical history of Atrial fibrillation ([address]), No significant past medical history, and Parkinson disease ([address]). Past Surgical History:  has a past surgical history that includes Coronary artery bypass graft (2018) and Atrial ablation surgery (2018). Discharge Recommendations: Home with HHPT  DME Required For Discharge: DME to be determined pending patient progress  Precautions/Restrictions: high fall risk  Weight Bearing Restrictions: weight bearing as tolerated; per ortho note \"WBAT/ROM as tolerated\"  [x] Right Upper Extremity  [] Left Upper Extremity [] Right Lower Extremity  [] Left Lower Extremity     Required Braces/Orthotics: no braces required   [] Right  [] Left  Positional Restrictions:no positional restrictions    Pre-Admission Information   Lives With: spouse                  Type of Home: house  Home Layout: one level  Home Access: 1 step to enter with handrail.  Handrails are located Mechanics: Reciprocal gait pattern - maintained good step length throughout session; decreased jasmin  Comments:   Stair Mobility:  Number of Steps: 7  Step Height: 4 inch and 6 inch  Hand Rails: (L) ascending handrail  Device: no device  Assistance: contact guard assistance  Comments: reciprocal pattern on stairs; decreased speed with slight hesitation each step but no LOB     Wheelchair Mobility:  No w/c mobility completed on this date. Comments:  Balance:  Static Sitting Balance: fair (+): maintains balance at SBA/supervision without use of UE support  Dynamic Sitting Balance: fair (+): maintains balance at SBA/supervision without use of UE support  Static Standing Balance: fair (+): maintains balance at SBA/supervision without use of UE support  Dynamic Standing Balance: fair (+): maintains balance at SBA/supervision without use of UE support  Comments: All interventions completed without device use    Other Therapeutic Interventions  1st session:  See above for functional mobility. In addition, pt completed seated stepper for 8 mins at 2.0 resistance to improve reciprocal pattern and cardiovascular endurance. Parallel bars - visual dots for dual task balance/stepping activities in all directions x 5 minutes; airex pad - DLS with LUE raises OH x 10 (no RUE use - held in to body); airex pad - DLS with head nods x 10, head turns x 10 with NO UE support; Blaze pods at mirror - 1 color, 1 distractor x 3 minutes - 47 hits; set up with breakfast in recliner at end of session.       Functional Outcomes                 Cognition  Overall Cognitive Status: Impaired  Arousal/Alertness: appropriate responses to stimuli  Following Commands: follows all commands without difficulty  Memory: decreased short term memory  Safety Judgement: decreased awareness of need for assistance, decreased awareness of need for safety  Problem Solving: assistance required to implement solutions, decreased awareness of errors  Initiation: requires cues for some  Sequencing: requires cues for some  Comments: Pt requires increased time for all tasks and some reminders/repeated instructions for some  Command Following:   accurately follows one step commands    Education  Barriers To Learning: cognition  Patient Education: patient educated on goals, PT role and benefits, plan of care, general safety, functional mobility training, family education, disease specific education, transfer training  Learning Assessment:  patient verbalizes understanding, would benefit from continued reinforcement    Assessment  Activity Tolerance: Good; pt continues to require increased time for all task and frequent rest breaks  Impairments Requiring Therapeutic Intervention: decreased functional mobility, decreased ADL status, decreased ROM, decreased strength, decreased safety awareness, decreased cognition, decreased endurance, decreased balance, decreased IADL, decreased posture  Prognosis: good  Clinical Assessment: Patient has progressed in functional mobility by supervision with transfers and SBA with ambulation with consistently carrying over reciprocal gait mechanics, mild (B) arm swing and moderate jasmin. Pt continues to require reminders of tasks and awareness of surroundings. Prior to recent hospitalizations, patient was independent without device in home and community. Pt would benefit from continued skilled PT services to promote his independence, decrease his falls risk and return to PLOF.   Safety Interventions: patient left in chair, chair alarm in place, call light within reach, gait belt, patient at risk for falls, and family/caregiver present; wife present    Plan  Frequency: 5 x/week, 60 min/day  Current Treatment Recommendations: strengthening, ROM, balance training, functional mobility training, transfer training, gait training, stair training, endurance training, neuromuscular re-education, wheelchair mobility training, modalities, patient/caregiver

## 2023-08-26 NOTE — PLAN OF CARE
Problem: Discharge Planning  Goal: Discharge to home or other facility with appropriate resources  8/25/2023 2353 by Demetrice Borja RN  Outcome: Progressing  8/25/2023 1521 by Munir Hill RN  Outcome: Progressing     Problem: Safety - Adult  Goal: Free from fall injury  8/25/2023 2353 by Demetrice Borja RN  Outcome: Progressing  Flowsheets (Taken 8/25/2023 2352)  Free From Fall Injury: Instruct family/caregiver on patient safety  8/25/2023 1521 by Munir Hill RN  Outcome: Progressing     Problem: Pain  Goal: Verbalizes/displays adequate comfort level or baseline comfort level  8/25/2023 2353 by Demetrice Borja RN  Outcome: Progressing  Flowsheets (Taken 8/25/2023 1948)  Verbalizes/displays adequate comfort level or baseline comfort level: Encourage patient to monitor pain and request assistance  8/25/2023 1521 by Munir Hill RN  Outcome: Progressing     Problem: Chronic Conditions and Co-morbidities  Goal: Patient's chronic conditions and co-morbidity symptoms are monitored and maintained or improved  8/25/2023 2353 by Demetrice Borja RN  Outcome: Progressing  8/25/2023 1521 by Munir Hill RN  Outcome: Progressing     Problem: ABCDS Injury Assessment  Goal: Absence of physical injury  8/25/2023 2353 by Demetrice Borja RN  Outcome: Progressing  Flowsheets (Taken 8/25/2023 2352)  Absence of Physical Injury: Implement safety measures based on patient assessment  8/25/2023 1521 by Munir Hill RN  Outcome: Progressing     Problem: Skin/Tissue Integrity  Goal: Absence of new skin breakdown  Description: 1. Monitor for areas of redness and/or skin breakdown  2. Assess vascular access sites hourly  3. Every 4-6 hours minimum:  Change oxygen saturation probe site  4. Every 4-6 hours:  If on nasal continuous positive airway pressure, respiratory therapy assess nares and determine need for appliance change or resting period.   8/25/2023 2353 by Demetrice Borja RN  Outcome:

## 2023-08-27 VITALS
TEMPERATURE: 98.1 F | WEIGHT: 189.5 LBS | HEIGHT: 72 IN | RESPIRATION RATE: 17 BRPM | DIASTOLIC BLOOD PRESSURE: 89 MMHG | HEART RATE: 64 BPM | SYSTOLIC BLOOD PRESSURE: 157 MMHG | BODY MASS INDEX: 25.67 KG/M2 | OXYGEN SATURATION: 95 %

## 2023-08-27 PROCEDURE — 51702 INSERT TEMP BLADDER CATH: CPT

## 2023-08-27 PROCEDURE — 94760 N-INVAS EAR/PLS OXIMETRY 1: CPT

## 2023-08-27 PROCEDURE — 6370000000 HC RX 637 (ALT 250 FOR IP): Performed by: INTERNAL MEDICINE

## 2023-08-27 PROCEDURE — 1280000000 HC REHAB R&B

## 2023-08-27 PROCEDURE — 6360000002 HC RX W HCPCS: Performed by: PHYSICAL MEDICINE & REHABILITATION

## 2023-08-27 PROCEDURE — 6370000000 HC RX 637 (ALT 250 FOR IP): Performed by: PHYSICAL MEDICINE & REHABILITATION

## 2023-08-27 RX ADMIN — CARBIDOPA AND LEVODOPA 2 TABLET: 25; 100 TABLET ORAL at 14:38

## 2023-08-27 RX ADMIN — ROPINIROLE HYDROCHLORIDE 0.25 MG: 0.25 TABLET, FILM COATED ORAL at 20:40

## 2023-08-27 RX ADMIN — FUROSEMIDE 40 MG: 40 TABLET ORAL at 16:56

## 2023-08-27 RX ADMIN — FUROSEMIDE 40 MG: 40 TABLET ORAL at 09:08

## 2023-08-27 RX ADMIN — PANTOPRAZOLE SODIUM 40 MG: 40 TABLET, DELAYED RELEASE ORAL at 06:15

## 2023-08-27 RX ADMIN — CARBIDOPA AND LEVODOPA 2 TABLET: 25; 100 TABLET ORAL at 20:38

## 2023-08-27 RX ADMIN — ATORVASTATIN CALCIUM 80 MG: 80 TABLET, FILM COATED ORAL at 20:40

## 2023-08-27 RX ADMIN — SERTRALINE 100 MG: 50 TABLET, FILM COATED ORAL at 20:40

## 2023-08-27 RX ADMIN — CARBIDOPA AND LEVODOPA 2 TABLET: 25; 100 TABLET ORAL at 11:52

## 2023-08-27 RX ADMIN — ROPINIROLE HYDROCHLORIDE 0.25 MG: 0.25 TABLET, FILM COATED ORAL at 09:08

## 2023-08-27 RX ADMIN — TAMSULOSIN HYDROCHLORIDE 0.4 MG: 0.4 CAPSULE ORAL at 20:40

## 2023-08-27 RX ADMIN — ROPINIROLE HYDROCHLORIDE 0.25 MG: 0.25 TABLET, FILM COATED ORAL at 14:38

## 2023-08-27 RX ADMIN — ASPIRIN 81 MG: 81 TABLET, COATED ORAL at 09:08

## 2023-08-27 RX ADMIN — SOTALOL HYDROCHLORIDE 80 MG: 80 TABLET ORAL at 09:08

## 2023-08-27 RX ADMIN — Medication 2 SPRAY: at 09:08

## 2023-08-27 RX ADMIN — POTASSIUM CHLORIDE 20 MEQ: 1500 TABLET, EXTENDED RELEASE ORAL at 16:56

## 2023-08-27 RX ADMIN — CARBIDOPA AND LEVODOPA 2 TABLET: 25; 100 TABLET ORAL at 06:15

## 2023-08-27 RX ADMIN — POTASSIUM CHLORIDE 20 MEQ: 1500 TABLET, EXTENDED RELEASE ORAL at 09:08

## 2023-08-27 RX ADMIN — ENOXAPARIN SODIUM 40 MG: 100 INJECTION SUBCUTANEOUS at 09:08

## 2023-08-27 NOTE — PROGRESS NOTES
MD Arnav Ayon MD Alben Emory, MD                  Office: [phone]                      Fax: [phone] [address] [employer]                   NEPHROLOGY INPATIENT PROGRESS NOTE:     PATIENT NAME: Marce Price  : 1950  MRN: [id number]        RECOMMENDATIONS:   - increased Lasix to 40mg po bid to decrease medullary concentrating gradient. - added K repletion  - increase dose   - 1.5L/24h fluid restriction.       - fup labs QMWF     - VBG showing respiratory alkalosis  - Uosm 400, Chilo less than 20, albumin 3. Higher ADH state. - continue po HCO3 TID  - Diarrhea control. Have discussed plan with pt, wife at bedside too. D/C plan from renal stand point: improving  + Follow-up with rehab plans also. +: f/u w/ me at [id number] in ~ 2 weeks after d/c. (I will arrange.)   + I have updated our information and discharge follow-up provider list.      IMPRESSION:       Admitted on:  2023 12:43 PM   For:  CVA (cerebrovascular accident due to intracerebral hemorrhage) ([address]) [I61.9]  No diagnosis found. Hyponatremia, worsening, chronic, higher ADH state. May have 3rd spacing also. Likely with underlying hypovolemia currently   No major neurological effect,  With underlying Parkinson, monitor closely    Recently admitted at [facility]  At that time-acute kidney injury, severe, had creatinine 8.9 on admission,  - With baseline creatinine 0.9, as of   - Thought to be due to obstructive uropathy, with BPH, bladder retention, and some component of hypovolemia  - Creatinine improved   -Currently creatinine around 0.9-1.1, without DEBO        Associated problems:   - Volume status: euvolemic  : HTN : no need for tight control.   At goal.   - Azotemia: pre-renal +  - Electrolytes: K: WNL  - Acid-Base: Acidosis, non-anion gap, metabolic  - Anemia: Mild, likely chronic disease      Other major problems: Management per primary and other !Prox ECA       !139 !    !61   !    ! +---------------+----+----+-----+----+ ! Vertebral      !50.5!    !60   !    ! +---------------+----+----+-----+----+ ! Prox Subclavian!85.1!    !60   !    ! +---------------+----+----+-----+----+   - There is antegrade vertebral flow noted on the right side. - Additional Measurements:ICAPSV/CCAPSV 1.11. ICAEDV/CCAEDV 1.55. Carotid Left Measurements +---------------+----+----+-----+----+ ! Location       ! PSV ! EDV ! Angle! RI  ! +---------------+----+----+-----+----+ ! Prox CCA       !109 !18. 2! 60   !0.83! +---------------+----+----+-----+----+ ! Mid CCA        !105 !18. 9!60   !0.82! +---------------+----+----+-----+----+ ! Dist CCA       !83. 5!43. 1! 60   !0.72! +---------------+----+----+-----+----+ ! Prox ICA       !83. 4!17. 5!60   !0.79! +---------------+----+----+-----+----+ ! Mid ICA        !84.8!22. 4!60   !0.74! +---------------+----+----+-----+----+ ! Dist ICA       !125 !37. 8!60   !0.7 ! +---------------+----+----+-----+----+ ! Prox ECA       !98.1!    !60   !    ! +---------------+----+----+-----+----+ ! Vertebral      !46.6!    !60   !    ! +---------------+----+----+-----+----+ ! Prox Subclavian! 136 !    !60   !    ! +---------------+----+----+-----+----+   - There is antegrade vertebral flow noted on the left side. - Additional Measurements:ICAPSV/CCAPSV 1.19. ICAEDV/CCAEDV 2.08. MRI BRAIN WO CONTRAST    Result Date: 8/11/2023  EXAMINATION: MRI OF THE BRAIN WITHOUT CONTRAST  8/7/2023 5:41 pm TECHNIQUE: Multiplanar multisequence MRI of the brain was performed without the administration of intravenous contrast. COMPARISON: None. HISTORY: ORDERING SYSTEM PROVIDED HISTORY: R arm weakness TECHNOLOGIST PROVIDED HISTORY: Reason for exam:->R arm weakness Reason for Exam: R arm weakness FINDINGS: INTRACRANIAL STRUCTURES/VENTRICLES: There is a punctate acute infarct in the left middle frontal gyrus. There is an additional acute punctate infarct in the left superior parietal lobule. There is no acute hemorrhage, herniation, or hydrocephalus. Scattered white matter abnormalities are nonspecific but commonly attributed to chronic microvascular ischemia. ORBITS: The visualized portion of the orbits demonstrate no acute abnormality. SINUSES: The visualized paranasal sinuses and mastoid air cells demonstrate no acute abnormality. BONES/SOFT TISSUES: The bone marrow signal intensity appears normal. The soft tissues demonstrate no acute abnormality. 2 punctate acute infarcts in the left middle cerebral artery territory. Imaging: [unfilled]         ======================================================================  Please note that this chart entry has been generated using voice recognition software, mainly. So please excuse brevity and/or typos. While every effort and attempts have been made to ensure the accuracy of this automated transcription, some errors may have occurred; and certain words and phrases in transcription may not be entered as intended. However, inadvertent computerized transcription errors may be present. So please contact us if any clarification needed.

## 2023-08-27 NOTE — PLAN OF CARE
Problem: Discharge Planning  Goal: Discharge to home or other facility with appropriate resources  8/26/2023 2145 by Gumaro Mckeon RN  Outcome: Progressing  8/26/2023 1139 by Uche Cardoso RN  Outcome: Progressing     Problem: Safety - Adult  Goal: Free from fall injury  8/26/2023 2145 by Gumaro Mckeon RN  Outcome: Progressing  Flowsheets (Taken 8/26/2023 2143)  Free From Fall Injury: Instruct family/caregiver on patient safety  8/26/2023 1139 by Uche Cardoso RN  Outcome: Progressing     Problem: Pain  Goal: Verbalizes/displays adequate comfort level or baseline comfort level  8/26/2023 2145 by Gumaro Mckeon RN  Outcome: Progressing  Flowsheets (Taken 8/26/2023 2100)  Verbalizes/displays adequate comfort level or baseline comfort level: Encourage patient to monitor pain and request assistance  8/26/2023 1139 by Uche Cardoso RN  Outcome: Progressing     Problem: Chronic Conditions and Co-morbidities  Goal: Patient's chronic conditions and co-morbidity symptoms are monitored and maintained or improved  Outcome: Progressing     Problem: ABCDS Injury Assessment  Goal: Absence of physical injury  Outcome: Progressing  Flowsheets (Taken 8/26/2023 2143)  Absence of Physical Injury: Implement safety measures based on patient assessment     Problem: Skin/Tissue Integrity  Goal: Absence of new skin breakdown  Description: 1. Monitor for areas of redness and/or skin breakdown  2. Assess vascular access sites hourly  3. Every 4-6 hours minimum:  Change oxygen saturation probe site  4. Every 4-6 hours:  If on nasal continuous positive airway pressure, respiratory therapy assess nares and determine need for appliance change or resting period.   Outcome: Progressing

## 2023-08-28 LAB
ALBUMIN SERPL-MCNC: 2.6 G/DL (ref 3.4–5)
ANION GAP SERPL CALCULATED.3IONS-SCNC: 10 MMOL/L (ref 3–16)
BASOPHILS # BLD: 0.1 K/UL (ref 0–0.2)
BASOPHILS NFR BLD: 1.2 %
BUN SERPL-MCNC: 8 MG/DL (ref 7–20)
CALCIUM SERPL-MCNC: 8.3 MG/DL (ref 8.3–10.6)
CHLORIDE SERPL-SCNC: 99 MMOL/L (ref 99–110)
CO2 SERPL-SCNC: 32 MMOL/L (ref 21–32)
CREAT SERPL-MCNC: 0.9 MG/DL (ref 0.8–1.3)
CREAT UR-MCNC: 177.6 MG/DL (ref 39–259)
DEPRECATED RDW RBC AUTO: 14.4 % (ref 12.4–15.4)
EOSINOPHIL # BLD: 0.2 K/UL (ref 0–0.6)
EOSINOPHIL NFR BLD: 3.5 %
GFR SERPLBLD CREATININE-BSD FMLA CKD-EPI: >60 ML/MIN/{1.73_M2}
GLUCOSE SERPL-MCNC: 93 MG/DL (ref 70–99)
HCT VFR BLD AUTO: 30.7 % (ref 40.5–52.5)
HGB BLD-MCNC: 10.5 G/DL (ref 13.5–17.5)
LYMPHOCYTES # BLD: 0.7 K/UL (ref 1–5.1)
LYMPHOCYTES NFR BLD: 14.6 %
MAGNESIUM SERPL-MCNC: 1.6 MG/DL (ref 1.8–2.4)
MAGNESIUM UR-MCNC: 5.3 MG/DL (ref 4.1–13.8)
MCH RBC QN AUTO: 30.8 PG (ref 26–34)
MCHC RBC AUTO-ENTMCNC: 34.2 G/DL (ref 31–36)
MCV RBC AUTO: 89.8 FL (ref 80–100)
MONOCYTES # BLD: 0.7 K/UL (ref 0–1.3)
MONOCYTES NFR BLD: 13.3 %
NEUTROPHILS # BLD: 3.4 K/UL (ref 1.7–7.7)
NEUTROPHILS NFR BLD: 67.4 %
PHOSPHATE SERPL-MCNC: 3.2 MG/DL (ref 2.5–4.9)
PLATELET # BLD AUTO: 294 K/UL (ref 135–450)
PMV BLD AUTO: 6.8 FL (ref 5–10.5)
POTASSIUM SERPL-SCNC: 3.4 MMOL/L (ref 3.5–5.1)
POTASSIUM UR-SCNC: 33.3 MMOL/L
RBC # BLD AUTO: 3.42 M/UL (ref 4.2–5.9)
SODIUM SERPL-SCNC: 141 MMOL/L (ref 136–145)
WBC # BLD AUTO: 5 K/UL (ref 4–11)

## 2023-08-28 PROCEDURE — 6370000000 HC RX 637 (ALT 250 FOR IP): Performed by: INTERNAL MEDICINE

## 2023-08-28 PROCEDURE — 97535 SELF CARE MNGMENT TRAINING: CPT

## 2023-08-28 PROCEDURE — 97530 THERAPEUTIC ACTIVITIES: CPT

## 2023-08-28 PROCEDURE — 83735 ASSAY OF MAGNESIUM: CPT

## 2023-08-28 PROCEDURE — 97129 THER IVNTJ 1ST 15 MIN: CPT

## 2023-08-28 PROCEDURE — 84133 ASSAY OF URINE POTASSIUM: CPT

## 2023-08-28 PROCEDURE — 97110 THERAPEUTIC EXERCISES: CPT

## 2023-08-28 PROCEDURE — 6370000000 HC RX 637 (ALT 250 FOR IP): Performed by: PHYSICAL MEDICINE & REHABILITATION

## 2023-08-28 PROCEDURE — 97130 THER IVNTJ EA ADDL 15 MIN: CPT

## 2023-08-28 PROCEDURE — 82570 ASSAY OF URINE CREATININE: CPT

## 2023-08-28 PROCEDURE — 6360000002 HC RX W HCPCS: Performed by: PHYSICAL MEDICINE & REHABILITATION

## 2023-08-28 PROCEDURE — 1280000000 HC REHAB R&B

## 2023-08-28 PROCEDURE — 85025 COMPLETE CBC W/AUTO DIFF WBC: CPT

## 2023-08-28 PROCEDURE — 97116 GAIT TRAINING THERAPY: CPT

## 2023-08-28 PROCEDURE — 97112 NEUROMUSCULAR REEDUCATION: CPT

## 2023-08-28 PROCEDURE — 36415 COLL VENOUS BLD VENIPUNCTURE: CPT

## 2023-08-28 PROCEDURE — 80069 RENAL FUNCTION PANEL: CPT

## 2023-08-28 RX ORDER — POTASSIUM CHLORIDE 20 MEQ/1
20 TABLET, EXTENDED RELEASE ORAL
Status: DISCONTINUED | OUTPATIENT
Start: 2023-08-28 | End: 2023-08-30

## 2023-08-28 RX ORDER — LANOLIN ALCOHOL/MO/W.PET/CERES
400 CREAM (GRAM) TOPICAL 3 TIMES DAILY
Status: DISCONTINUED | OUTPATIENT
Start: 2023-08-28 | End: 2023-08-30

## 2023-08-28 RX ADMIN — ROPINIROLE HYDROCHLORIDE 0.25 MG: 0.25 TABLET, FILM COATED ORAL at 08:24

## 2023-08-28 RX ADMIN — POTASSIUM CHLORIDE 20 MEQ: 1500 TABLET, EXTENDED RELEASE ORAL at 16:09

## 2023-08-28 RX ADMIN — CARBIDOPA AND LEVODOPA 2 TABLET: 25; 100 TABLET ORAL at 15:04

## 2023-08-28 RX ADMIN — ATORVASTATIN CALCIUM 80 MG: 80 TABLET, FILM COATED ORAL at 21:32

## 2023-08-28 RX ADMIN — SOTALOL HYDROCHLORIDE 80 MG: 80 TABLET ORAL at 08:26

## 2023-08-28 RX ADMIN — CARBIDOPA AND LEVODOPA 2 TABLET: 25; 100 TABLET ORAL at 06:40

## 2023-08-28 RX ADMIN — CARBIDOPA AND LEVODOPA 2 TABLET: 25; 100 TABLET ORAL at 18:53

## 2023-08-28 RX ADMIN — Medication 400 MG: at 21:32

## 2023-08-28 RX ADMIN — SERTRALINE 100 MG: 50 TABLET, FILM COATED ORAL at 21:32

## 2023-08-28 RX ADMIN — PANTOPRAZOLE SODIUM 40 MG: 40 TABLET, DELAYED RELEASE ORAL at 06:40

## 2023-08-28 RX ADMIN — ENOXAPARIN SODIUM 40 MG: 100 INJECTION SUBCUTANEOUS at 08:27

## 2023-08-28 RX ADMIN — CARBIDOPA AND LEVODOPA 2 TABLET: 25; 100 TABLET ORAL at 11:22

## 2023-08-28 RX ADMIN — ROPINIROLE HYDROCHLORIDE 0.25 MG: 0.25 TABLET, FILM COATED ORAL at 21:32

## 2023-08-28 RX ADMIN — Medication 2 SPRAY: at 08:27

## 2023-08-28 RX ADMIN — Medication 400 MG: at 08:29

## 2023-08-28 RX ADMIN — FUROSEMIDE 40 MG: 40 TABLET ORAL at 16:09

## 2023-08-28 RX ADMIN — TAMSULOSIN HYDROCHLORIDE 0.4 MG: 0.4 CAPSULE ORAL at 21:32

## 2023-08-28 RX ADMIN — POTASSIUM CHLORIDE 20 MEQ: 1500 TABLET, EXTENDED RELEASE ORAL at 08:24

## 2023-08-28 RX ADMIN — ACETAMINOPHEN 650 MG: 325 TABLET ORAL at 16:08

## 2023-08-28 RX ADMIN — POTASSIUM CHLORIDE 20 MEQ: 1500 TABLET, EXTENDED RELEASE ORAL at 11:22

## 2023-08-28 RX ADMIN — ROPINIROLE HYDROCHLORIDE 0.25 MG: 0.25 TABLET, FILM COATED ORAL at 15:04

## 2023-08-28 RX ADMIN — ASPIRIN 81 MG: 81 TABLET, COATED ORAL at 08:26

## 2023-08-28 RX ADMIN — Medication 400 MG: at 15:04

## 2023-08-28 ASSESSMENT — PAIN SCALES - WONG BAKER: WONGBAKER_NUMERICALRESPONSE: 0

## 2023-08-28 ASSESSMENT — PAIN DESCRIPTION - LOCATION: LOCATION: HEAD

## 2023-08-28 ASSESSMENT — PAIN SCALES - GENERAL
PAINLEVEL_OUTOF10: 0
PAINLEVEL_OUTOF10: 3
PAINLEVEL_OUTOF10: 0

## 2023-08-28 ASSESSMENT — PAIN DESCRIPTION - DESCRIPTORS: DESCRIPTORS: ACHING

## 2023-08-28 ASSESSMENT — PAIN - FUNCTIONAL ASSESSMENT: PAIN_FUNCTIONAL_ASSESSMENT: ACTIVITIES ARE NOT PREVENTED

## 2023-08-28 ASSESSMENT — PAIN DESCRIPTION - ORIENTATION: ORIENTATION: RIGHT;LEFT;MID

## 2023-08-28 NOTE — PROGRESS NOTES
[address] [facility] Department   Phone: [phone]    Speech Therapy    [] Initial Evaluation            [x] Daily Treatment Note         [] Discharge Summary      Patient: Audra Carcamo   : 1950   MRN: [id number]   Date of Service:  2023  Admitting Diagnosis: CVA (cerebrovascular accident due to intracerebral hemorrhage) [facility])  Current Admission Summary: Audra Carcamo is a 68 y.o. male who presented to [facility]  with encephalopathy and found to have significant DEBO. PMH reflects PD, CAD, Afib. 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 He is agreeable to Aru and wants to work in therapy before planned Dc home with his wife. DC from ARU due to irregular heart beat. He is now improved and wants to come back to ARU. Past Medical History:  has a past medical history of Atrial fibrillation ([address]), No significant past medical history, and Parkinson disease ([address]). Past Surgical History:  has a past surgical history that includes Coronary artery bypass graft (2018) and Atrial ablation surgery (2018). Recent MRI Brain: 23  IMPRESSION:  2 punctate acute infarcts in the left middle cerebral artery territory. Precautions/Restrictions: high fall risk      Pre-Admission Information   Living Status: Pt lives at home with wife. Pt's wife encourages pt to stay physically and mentally active but provides assistance. They travel to [place] during the winter months. They have two adult children who live locally and 6 grandchildren.    Occupation/School: Retired 4 years ago from [employer]  Medication Management: :  []Primary   [x]Secondary (wife sets up pill organizer and sets alarms; pt familiar with medication list) []No   Finance Management: []Primary   [x]Secondary (wife uses electronic bill pay and reviews payments each month to keep pt active in lists)  []No   Active :   []Yes [x]No (stopped driving one year ago with declining cognition)   Hearing:    WFL  Vision:    Vision Corrective Device: wears glasses at all times      Subjective  General: Pt alert and participative during treatment session with wife present. Pain: Denies    Safety Interventions: patient left in chair, chair alarm in place, call light within reach, patient at risk for falls, and family/caregiver present left in presence of wife      Therapeutic Interventions:     Session 1:   Dysphagia: Severity: Within functional limits    Goal not targeted this session. GOAL MET, Will continue to monitor     Comprehension: Severity: Mild   Reduced need for repetition or clarification of directives. Expressive Language: Severity: Within functional limits  and Mild   Goal not targeted this session. Voice: Severity: Mild   Goal not targeted this session.       Cognition: Severity: Moderate   Orientation   - oriented to month, year, JORGE, place and situation independently  - utilized digital clock on wall to orient to date       Functional recall:  - pt able to recall upcoming discharge and plans for discharge  - recalled OT session from earlier this date     Personal history:    - independently verbalized years  to wife, Mary Beth Burnett     - reported daughter's, CATRINA and all grandchildren names (9/9) independently      Problem solving: deduction by exclusion task   - completed with min-mod verbal and visual cues    - pt able to decipher the clue but demonstrated difficulty with locating and crossing out the dates      Discharge planning:    - discussed recommendations for SLP home health with progression to OP  pending progress with language/cognitive tasks vs LSVT   - pt/pt wife in agreement with continuing services  - provided hand out and provided information at length re: LSVT and resources at discharge     Additional Interventions:       Session 2:   Dysphagia: Severity: Within functional limits   Goal not directives/questions however is requiring less need for  repetition and clarification. Improved eye contact, engagement and verbal initiation is noted although thought organization remains reduced. Pt continues to demonstrate tolerance of regular consistencies, will distantly monitor as appropriate. Recommend skilled speech therapy to improve speech-language cognitive skills upon discharge to home setting. Diet Solids Recommendation:   Liquid Consistency Recommendation:   Recommended Form of Meds:   Regular texture diet     Thin liquids     Meds whole with water       Recommended Compensatory Swallowing Strategies: Upright as possible with all PO intake , Small bites/sips , Eat/feed slowly      Plan  Frequency: 60 minutes/day; 5 days per week, as tolerated, until goals met, or discharged from ARU. Therapeutic Interventions: Diet Tolerance Monitoring , Patient/Family Education  Cognitive-Linguistic intervention , Patient/ Family education , and Sophia [facility] Voice Therapy (LSVT)   Discharge Recommendations: Home with assistance and 24 hour supervision  Continued SLP at Discharge: Yes  and LSVT     Goals  Patient Goals: \"improve word finding and memory\"  Time Frame: 10-14 days    Pt will tolerate recommended diet with no overt s/s of aspiration. GOAL MET   Patient will complete verbal description and word retrieval tasks with 80% accuracy or given min verbal cues  Ongoing   Pt will recall functional information (daily tasks, personal hx, etc.) with 80% accuracy. Patient will complete functional problem-solving tasks for daily situations with 80% accuracy or given min cues. Ongoing   Pt will complete word associative tasks to improve mental flexibility, complex thinking, and working memory skills with 80% accuracy. Ongoing   Pt will participate in vocal exercises to increase vocal loudness with 80% accuracy with min cues.   Ongoing       Therapy Session Time      Session 1 Session 2   Time In 0900 1115   Time

## 2023-08-28 NOTE — PROGRESS NOTES
on R side. Bathroom Layout: walk in shower  Bathroom Equipment: shower chair, hand held shower head, portable/suction cup grab bars, small built in shower seat (wife reports it's too small to sit on)   Toilet Height: standard height  Home Equipment: rollator - 4 wheeled walker, single point cane, quad cane, electric scooter, reacher, sock aide, transport chair  Transfer Assistance: Independent without use of device  Ambulation Assistance: Independent without use of device  ADL Assistance: independent with all ADL's, wife helped shave (Pt. Doesn't like to )  IADL Assistance: shares household tasks with wife; pt would use riding   Active :        [] Yes                 [x] No  Hand Dominance: [] Left                 [x] Right  Current Employment: retired. Occupation: [employer]   Hobbies: Enjoys watching TV; [employer] and [address]: Reports no falls within last 6 months    Patient's wife reports 2 weeks up until hospitalization, pt required assistance with all ADLs and IADLs; Pt used SPC to amb and transfer around home/community. The above information is prior to pt's decline up until hospitalization. Examination   Vision:   Vision Gross Assessment: Impaired and Vision Corrective Device: wears glasses at all times  Hearing:   [facility]      Subjective  General: Pt sitting in recliner with wife present; agreeable to PT session. Pt reporting he had a boring weekend with little movement, but pleasant with family visiting on/off and attempting chair exercises with wife. Pain: 0/10   Pain Interventions: not applicable       Functional Mobility  Bed Mobility:  Bed mobility not completed on this date.   Comments: pt sitting in recliner upon arrival and completion  Transfers:  Sit to stand transfer: Independent  Stand to sit transfer: Independent  Comments: no device used; intermittent use of (B) UE support to stand; increased time required  Ambulation:  Surface:level surface  Assistive understanding, would benefit from continued reinforcement    Assessment  Activity Tolerance: Good; pt continues to require rest breaks but improving on time requires for task completion  Impairments Requiring Therapeutic Intervention: decreased functional mobility, decreased ADL status, decreased ROM, decreased strength, decreased safety awareness, decreased cognition, decreased endurance, decreased balance, decreased IADL, decreased posture  Prognosis: good  Clinical Assessment: Patient has significantly progressed in functional mobility by completing transfers at independent this date and supervision with ambulation over 500 + ft on level and unlevel surfaces. Pt has consistently demonstrated carry over with reciprocal gait mechanics, minimal (B) arm swing with VC, and moderate gait speed. Pt has required less cueing for safety awareness and mechanics but continues to require intermittent reminders of tasks and awareness of surroundings. Prior to recent hospitalizations, patient was independent without device in home and community. Pt would benefit from continued skilled PT services to promote his independence, decrease his falls risk and return to PLOF. Safety Interventions: patient left in chair, chair alarm in place, call light within reach, gait belt, patient at risk for falls, and family/caregiver present; wife present    Plan  Frequency: 5 x/week, 60 min/day  Current Treatment Recommendations: strengthening, ROM, balance training, functional mobility training, transfer training, gait training, stair training, endurance training, neuromuscular re-education, wheelchair mobility training, modalities, patient/caregiver education, ADL/self-care training, IADL training, home exercise program, and safety education    Goals  Patient Goals:  To go home   Short Term Goals:  Time Frame: 10-14 days  Patient will complete bed mobility at Supervision  Patient will complete stand step transfers Supervision  Patient will ambulate 270 ft with use of LRAD with Supervision - goal met 08/28/23  Patient will ascend/descend 12 stairs with (R) ascending handrail at Supervision  Patient will complete car transfer at Supervision - goal met 08/25/23    **Above goals reviewed on 8/28/2023. All goals are ongoing at this time unless indicated above. **    Therapy Session Time     First Session Therapy Time:   Individual Concurrent Group Co-treatment   Time In  0730         Time Out  0816         Minutes  55             Second Session Therapy Time:   Individual Concurrent Group Co-treatment   Time In  1320         Time Out  1350         Minutes  30             Timed Code Treatment Minutes:  55 + 30    Total Treatment Minutes:  76        CARRIE Philip  Therapist observed and directed the patient's plan of care.   Co-signed and supervised by: Jaquan Ortiz PT, DPT - [id number]

## 2023-08-28 NOTE — PATIENT CARE CONFERENCE
[facility]  Inpatient Rehabilitation  Weekly Team Conference Note    Patient Name: Thor Rangel        MRN: [id number]    : 1950  (68 y.o.)  Gender: male           The team conference for this patient was held on 23 at 1000 am and led by:  Tunde Randle. MD [facility]     CASE MANAGEMENT:  Assessment:   Patient is a 68year old male who admitted to ARU on 2023 with the admitting diagnosis of CVA (cerebrovascular accident due to intracerebral hemorrhage) ([address] Patient resides at home with his spouse. Spouse will assist patient with ADLs and IADLs as needed. Patient continues to benefit from skilled PT/OT/SLP to promote increased safety and independence in order to return to his prior level of functioning. Patient anticipates discharging to home with home health care services and recommended DME.       PHYSICAL THERAPY:    Bed Mobility:   Supine to Sit: contact guard assistance  Transfers:  Sit to stand transfer: Independent  Stand to sit transfer: Independent  Comments: no device used; intermittent use of (B) UE support to stand; increased time required  Ambulation:  Surface:level surface  Assistive Device: no device  Assistance: supervision  Distance:  170 ft + 105 ft + short distances within session  Gait Mechanics: Reciprocal gait pattern; moderate jasmin regressing to decreased step height and slower jasmin with fatigue; (B) minimal arm swing improving with VC  Comments: No LOB; (B) shoes worn for amb  Stair Mobility:  Number of Steps: 12  Step Height: 6 inch  Hand Rails: (R) ascending handrail  Assistance: Stand by assistance    QM:  Roll Left and Right  Assistance Needed: Partial/moderate assistance  CARE Score: 3  Discharge Goal: Supervision or touching assistance  Sit to Lying  Assistance Needed: Partial/moderate assistance  CARE Score: 3  Discharge Goal: Supervision or touching assistance  Lying to Sitting on Side of Bed  Assistance Needed: Supervision or touching to progression towards less cues with language based tasks. Barriers towards progress:  Cognition, LE weakness, balance impairments, R UE deficits   Interventions to address Barriers:  LE strengthening program, dynamic balance training, gait training, Adl training, AE training, compensatory cognitive therapy  Goals still appropriate:  Yes  Modifications to goals: None  Continue Current Plan of Care:  Yes  Modifications to Plan of Care: None    Rehab Team Members in attendance for Team Conference:  Carley Webb, MSW, LSW    Dada Sloan, RD, LD    Darwyn Cooks, OTR/MIGUELINA Chester, PT, DPT    Cheryle Gondola, M.A., CCC-SLP    COLETTE Pratt, RN (Charge RN)    Alisa Rosario PT, DPT,     I, the Rehab Physician, led the above team conference and agree with all decisions made, and approve the established interdisciplinary plan of care as documented within the medical record of Demar Crespo. Dieter Hickman.  MD [facility]

## 2023-08-28 NOTE — PROGRESS NOTES
MD Sarah Crane MD Harley Molly, MD                  Office: [phone]                      Fax: [phone] [address]                   NEPHROLOGY ARU PROGRESS NOTE:     PATIENT NAME: Muriel Calderon  : 1950  MRN: [id number]        RECOMMENDATIONS:   - continue K and Mg replacement. Diarrhea has resolved  - check [place] and UMg    - increased Lasix to 40mg po bid to decrease medullary concentrating gradient.    - 1.5L/24h fluid restriction.       - fup labs QMWF     - VBG showing respiratory alkalosis  - Uosm 400, Chilo less than 20, albumin 3. Higher ADH state. - continue po HCO3 TID  - Diarrhea control. Have discussed plan with pt, wife at bedside too. D/C plan from renal stand point: improving  + Follow-up with rehab plans also. +: f/u w/ Dr. Markus Guallpa at [address] in ~ 2 weeks after d/c. (I will arrange.)   + I have updated our information and discharge follow-up provider list.      IMPRESSION:       Admitted on:  2023 12:43 PM   For:  CVA (cerebrovascular accident due to intracerebral hemorrhage) ([address]) [I61.9]  No diagnosis found. Hyponatremia, worsening, chronic, higher ADH state. May have 3rd spacing also. Better. Likely with underlying hypovolemia currently   No major neurological effect,  With underlying Parkinson, monitor closely    Recently admitted at [facility]  At that time-acute kidney injury, severe, had creatinine 8.9 on admission,  - With baseline creatinine 0.9, as of   - Thought to be due to obstructive uropathy, with BPH, bladder retention, and some component of hypovolemia  - Creatinine improved   -Currently creatinine around 0.9-1.1, without DEBO        Associated problems:   - Volume status: euvolemic  : HTN : no need for tight control.   At goal.   - Azotemia: pre-renal +  - Electrolytes: K: WNL  - Acid-Base: Acidosis, non-anion gap, metabolic  - Anemia: Mild, likely chronic disease      Other Date/Time    LABA1C 5.1 08/21/2023 05:57 AM     RPR:  No results found for: RPR  HIV:  No results found for: HIV  GLORY:  No results found for: ANATITER, GLORY  RF:  No results found for: RF  DSDNA:  No components found for: DNA  AMYLASE:  No results found for: AMYLASE  LIPASE:    Lab Results   Component Value Date/Time    LIPASE 65.0 08/04/2023 05:35 PM     Fibrinogen Level:  No components found for: FIB       BELOW MENTIONED RADIOLOGY STUDY RESULTS BY ME (AS NEEDED FOR MY EVALUATION AND MANAGEMENT). CT ABDOMEN PELVIS WO CONTRAST Additional Contrast? None    Result Date: 8/7/2023  EXAMINATION: CT OF THE ABDOMEN AND PELVIS WITHOUT CONTRAST; CT OF THE HEAD WITHOUT CONTRAST 8/4/2023 5:45 pm; 8/4/2023 5:46 pm TECHNIQUE: CT of the abdomen and pelvis was performed without the administration of intravenous contrast. Multiplanar reformatted images are provided for review. Automated exposure control, iterative reconstruction, and/or weight based adjustment of the mA/kV was utilized to reduce the radiation dose to as low as reasonably achievable.; CT of the head was performed without the administration of intravenous contrast. Automated exposure control, iterative reconstruction, and/or weight based adjustment of the mA/kV was utilized to reduce the radiation dose to as low as reasonably achievable. COMPARISON: None. HISTORY: ORDERING SYSTEM PROVIDED HISTORY: lower abdominal pain, renal failure TECHNOLOGIST PROVIDED HISTORY: Reason for exam:->lower abdominal pain, renal failure Additional Contrast?->None Decision Support Exception - unselect if not a suspected or confirmed emergency medical condition->Emergency Medical Condition (MA) Reason for Exam: Abnormal Lab (Called by family doc regarding lab work done this morning with critically high BUN and Cr levels. Recently completed treatment for shingles.  Wife states pt has been confused, wetting self at night, diarrhea.); ORDERING SYSTEM PROVIDED HISTORY: altered mental status ------------------------------------------------------------------  Electronically signed by Azeem Coreas MD, [facility] (Interpreting  physician) on 08/08/2023 at 05:28 PM  ------------------------------------------------------------------  Patient Status:Routine. [facility] - Vascular Lab. Technical Quality:Limited visualization due to movement. Plaque   - A plaque was found in the Right ICA. irregular. The plaque characteristics are: moderate severity and heterogeneous texture. - A plaque was found in the Left ICA. smooth. The plaque characteristics are: moderate severity and heterogeneous texture. Velocities are measured in cm/s ; Diameters are measured in mm Carotid Right Measurements +---------------+----+----+-----+----+ ! Location       ! PSV ! EDV ! Angle! RI  ! +---------------+----+----+-----+----+ ! Prox CCA       !101 !14. 8!60   !0.85! +---------------+----+----+-----+----+ ! Mid CCA        !100 !14. 3!60   !0.86! +---------------+----+----+-----+----+ ! Dist CCA       !77.4!12. 6!60   !0.84! +---------------+----+----+-----+----+ ! Prox ICA       !111 !23  !60   !0.79! +---------------+----+----+-----+----+ ! Mid ICA        !101 !15. 6!60   !0.85! +---------------+----+----+-----+----+ ! Dist ICA       !85.8!17. 3! 60   !0.8 ! +---------------+----+----+-----+----+ ! Prox ECA       !139 !    !61   !    ! +---------------+----+----+-----+----+ ! Vertebral      !50.5!    !60   !    ! +---------------+----+----+-----+----+ ! Prox Subclavian!85.1!    !60   !    ! +---------------+----+----+-----+----+   - There is antegrade vertebral flow noted on the right side. - Additional Measurements:ICAPSV/CCAPSV 1.11. ICAEDV/CCAEDV 1.55. Carotid Left Measurements +---------------+----+----+-----+----+ ! Location       ! PSV ! EDV ! Angle! RI  ! +---------------+----+----+-----+----+ ! Prox CCA       !109 !18. 2! 60   !0.83! +---------------+----+----+-----+----+ ! Mid CCA        !105 !18. 9!60   !0.82!

## 2023-08-28 NOTE — PROGRESS NOTES
Morning assessment completed. Pt comfortably resting in chair. VSS. Morning meds given per MAR. Patient remains free from falls or accidental injury. Room free from clutter. All fall precautions in place. call light and belongings within reach, and door open. The care plan and education has been reviewed and mutually agreed upon with the patient.

## 2023-08-29 LAB
ALBUMIN SERPL-MCNC: 3.3 G/DL (ref 3.4–5)
ANION GAP SERPL CALCULATED.3IONS-SCNC: 10 MMOL/L (ref 3–16)
BUN SERPL-MCNC: 9 MG/DL (ref 7–20)
CALCIUM SERPL-MCNC: 9 MG/DL (ref 8.3–10.6)
CHLORIDE SERPL-SCNC: 99 MMOL/L (ref 99–110)
CO2 SERPL-SCNC: 30 MMOL/L (ref 21–32)
CREAT SERPL-MCNC: 0.9 MG/DL (ref 0.8–1.3)
GFR SERPLBLD CREATININE-BSD FMLA CKD-EPI: >60 ML/MIN/{1.73_M2}
GLUCOSE SERPL-MCNC: 135 MG/DL (ref 70–99)
MAGNESIUM SERPL-MCNC: 1.9 MG/DL (ref 1.8–2.4)
PHOSPHATE SERPL-MCNC: 2.9 MG/DL (ref 2.5–4.9)
POTASSIUM SERPL-SCNC: 3.7 MMOL/L (ref 3.5–5.1)
SODIUM SERPL-SCNC: 139 MMOL/L (ref 136–145)

## 2023-08-29 PROCEDURE — 97129 THER IVNTJ 1ST 15 MIN: CPT

## 2023-08-29 PROCEDURE — 83735 ASSAY OF MAGNESIUM: CPT

## 2023-08-29 PROCEDURE — 97130 THER IVNTJ EA ADDL 15 MIN: CPT

## 2023-08-29 PROCEDURE — 6370000000 HC RX 637 (ALT 250 FOR IP): Performed by: PHYSICAL MEDICINE & REHABILITATION

## 2023-08-29 PROCEDURE — 80069 RENAL FUNCTION PANEL: CPT

## 2023-08-29 PROCEDURE — 1280000000 HC REHAB R&B

## 2023-08-29 PROCEDURE — 36415 COLL VENOUS BLD VENIPUNCTURE: CPT

## 2023-08-29 PROCEDURE — 6370000000 HC RX 637 (ALT 250 FOR IP): Performed by: INTERNAL MEDICINE

## 2023-08-29 PROCEDURE — 6360000002 HC RX W HCPCS: Performed by: PHYSICAL MEDICINE & REHABILITATION

## 2023-08-29 PROCEDURE — 51702 INSERT TEMP BLADDER CATH: CPT

## 2023-08-29 PROCEDURE — 97530 THERAPEUTIC ACTIVITIES: CPT

## 2023-08-29 PROCEDURE — 97535 SELF CARE MNGMENT TRAINING: CPT

## 2023-08-29 PROCEDURE — 97116 GAIT TRAINING THERAPY: CPT

## 2023-08-29 RX ORDER — POTASSIUM CHLORIDE 20 MEQ/1
20 TABLET, EXTENDED RELEASE ORAL 2 TIMES DAILY
Qty: 60 TABLET | Refills: 3 | Status: SHIPPED | OUTPATIENT
Start: 2023-08-29

## 2023-08-29 RX ORDER — FUROSEMIDE 40 MG/1
40 TABLET ORAL 2 TIMES DAILY
Qty: 60 TABLET | Refills: 3 | Status: SHIPPED | OUTPATIENT
Start: 2023-08-29 | End: 2023-09-14

## 2023-08-29 RX ORDER — FLUTICASONE PROPIONATE 50 MCG
2 SPRAY, SUSPENSION (ML) NASAL DAILY
Qty: 16 G | Refills: 3 | Status: SHIPPED | OUTPATIENT
Start: 2023-08-30

## 2023-08-29 RX ORDER — LANOLIN ALCOHOL/MO/W.PET/CERES
400 CREAM (GRAM) TOPICAL 3 TIMES DAILY
Qty: 60 TABLET | Refills: 4 | Status: SHIPPED | OUTPATIENT
Start: 2023-08-29

## 2023-08-29 RX ADMIN — CARBIDOPA AND LEVODOPA 2 TABLET: 25; 100 TABLET ORAL at 11:18

## 2023-08-29 RX ADMIN — ENOXAPARIN SODIUM 40 MG: 100 INJECTION SUBCUTANEOUS at 08:35

## 2023-08-29 RX ADMIN — ATORVASTATIN CALCIUM 80 MG: 80 TABLET, FILM COATED ORAL at 21:36

## 2023-08-29 RX ADMIN — SERTRALINE 100 MG: 50 TABLET, FILM COATED ORAL at 21:36

## 2023-08-29 RX ADMIN — FUROSEMIDE 40 MG: 40 TABLET ORAL at 08:36

## 2023-08-29 RX ADMIN — ASPIRIN 81 MG: 81 TABLET, COATED ORAL at 08:35

## 2023-08-29 RX ADMIN — ROPINIROLE HYDROCHLORIDE 0.25 MG: 0.25 TABLET, FILM COATED ORAL at 08:36

## 2023-08-29 RX ADMIN — POTASSIUM CHLORIDE 20 MEQ: 1500 TABLET, EXTENDED RELEASE ORAL at 16:09

## 2023-08-29 RX ADMIN — TAMSULOSIN HYDROCHLORIDE 0.4 MG: 0.4 CAPSULE ORAL at 21:36

## 2023-08-29 RX ADMIN — POTASSIUM CHLORIDE 20 MEQ: 1500 TABLET, EXTENDED RELEASE ORAL at 08:36

## 2023-08-29 RX ADMIN — SOTALOL HYDROCHLORIDE 80 MG: 80 TABLET ORAL at 11:18

## 2023-08-29 RX ADMIN — CARBIDOPA AND LEVODOPA 2 TABLET: 25; 100 TABLET ORAL at 18:53

## 2023-08-29 RX ADMIN — ROPINIROLE HYDROCHLORIDE 0.25 MG: 0.25 TABLET, FILM COATED ORAL at 16:12

## 2023-08-29 RX ADMIN — Medication 400 MG: at 16:09

## 2023-08-29 RX ADMIN — PANTOPRAZOLE SODIUM 40 MG: 40 TABLET, DELAYED RELEASE ORAL at 06:31

## 2023-08-29 RX ADMIN — Medication 400 MG: at 08:36

## 2023-08-29 RX ADMIN — ROPINIROLE HYDROCHLORIDE 0.25 MG: 0.25 TABLET, FILM COATED ORAL at 21:36

## 2023-08-29 RX ADMIN — Medication 2 SPRAY: at 08:35

## 2023-08-29 RX ADMIN — CARBIDOPA AND LEVODOPA 2 TABLET: 25; 100 TABLET ORAL at 06:31

## 2023-08-29 RX ADMIN — Medication 400 MG: at 21:36

## 2023-08-29 RX ADMIN — POTASSIUM CHLORIDE 20 MEQ: 1500 TABLET, EXTENDED RELEASE ORAL at 11:18

## 2023-08-29 RX ADMIN — CARBIDOPA AND LEVODOPA 2 TABLET: 25; 100 TABLET ORAL at 16:09

## 2023-08-29 RX ADMIN — FUROSEMIDE 40 MG: 40 TABLET ORAL at 16:09

## 2023-08-29 NOTE — PROGRESS NOTES
Iveth Hicks MD, 400 mg at 08/29/23 0836    fluticasone (FLONASE) 50 MCG/ACT nasal spray 2 spray, 2 spray, Each Nostril, Daily, Esteban Wall DO, 2 spray at 08/29/23 0835    furosemide (LASIX) tablet 40 mg, 40 mg, Oral, BID, Delon Enciso MD, 40 mg at 08/29/23 0836    polyethylene glycol (GLYCOLAX) packet 17 g, 17 g, Oral, Daily PRN, Esteban Maguireis DO    carbidopa-levodopa (SINEMET)  MG per tablet 2 tablet, 2 tablet, Oral, 4x Daily AC & HS, Esteban Wall DO, 2 tablet at 08/29/23 0631    promethazine (PHENERGAN) tablet 25 mg, 25 mg, Oral, Q6H PRN, Esteban Wall DO, 25 mg at 08/15/23 1224    aspirin EC tablet 81 mg, 81 mg, Oral, Daily, Esteban Wall, DO, 81 mg at 08/29/23 0835    atorvastatin (LIPITOR) tablet 80 mg, 80 mg, Oral, Nightly, Leontine Massa Andrezis, DO, 80 mg at 08/28/23 2132    [Held by provider] magnesium hydroxide (MILK OF MAGNESIA) 400 MG/5ML suspension 30 mL, 30 mL, Oral, Daily PRN, Esetban Maguireis, DO    pantoprazole (PROTONIX) tablet 40 mg, 40 mg, Oral, QAM AC, Esteban Wall, DO, 40 mg at 08/29/23 0631    rOPINIRole (REQUIP) tablet 0.25 mg, 0.25 mg, Oral, TID, Esteban Wall, DO, 0.25 mg at 08/29/23 0836    sertraline (ZOLOFT) tablet 100 mg, 100 mg, Oral, Nightly, Esteban Wall, DO, 100 mg at 08/28/23 2132    sodium chloride flush 0.9 % injection 5-40 mL, 5-40 mL, IntraVENous, PRN, Edwardtine Massa Andrezis, DO, 10 mL at 08/22/23 2034    sotalol (BETAPACE) tablet 80 mg, 80 mg, Oral, Daily, Esteban Maguireis DO, 80 mg at 08/28/23 5640    tamsulosin (FLOMAX) capsule 0.4 mg, 0.4 mg, Oral, Nightly, Esteban Wall DO, 0.4 mg at 08/28/23 2132    traMADol (ULTRAM) tablet 50 mg, 50 mg, Oral, Q6H PRN, Kelli Wall DO, 50 mg at 08/14/23 2137    acetaminophen (TYLENOL) tablet 650 mg, 650 mg, Oral, Q4H PRN, Esteban Wall DO, 650 mg at 08/28/23 1608    enoxaparin (LOVENOX) injection 40 mg, 40 mg, SubCUTAneous, Daily, Esteban Wall DO, 40 mg at 08/29/23 0832    [Held by provider] bisacodyl (DULCOLAX) ! 83. 4!17. 5!60   !0.79! +---------------+----+----+-----+----+ ! Mid ICA        !84.8!22. 4!60   !0.74! +---------------+----+----+-----+----+ ! Dist ICA       !125 !37. 8!60   !0.7 ! +---------------+----+----+-----+----+ ! Prox ECA       !98.1!    !60   !    ! +---------------+----+----+-----+----+ ! Vertebral      !46.6!    !60   !    ! +---------------+----+----+-----+----+ ! Prox Subclavian! 136 !    !60   !    ! +---------------+----+----+-----+----+   - There is antegrade vertebral flow noted on the left side. - Additional Measurements:ICAPSV/CCAPSV 1.19. ICAEDV/CCAEDV 2.08. MRI BRAIN WO CONTRAST    Result Date: 8/11/2023  EXAMINATION: MRI OF THE BRAIN WITHOUT CONTRAST  8/7/2023 5:41 pm TECHNIQUE: Multiplanar multisequence MRI of the brain was performed without the administration of intravenous contrast. COMPARISON: None. HISTORY: ORDERING SYSTEM PROVIDED HISTORY: R arm weakness TECHNOLOGIST PROVIDED HISTORY: Reason for exam:->R arm weakness Reason for Exam: R arm weakness FINDINGS: INTRACRANIAL STRUCTURES/VENTRICLES: There is a punctate acute infarct in the left middle frontal gyrus. There is an additional acute punctate infarct in the left superior parietal lobule. There is no acute hemorrhage, herniation, or hydrocephalus. Scattered white matter abnormalities are nonspecific but commonly attributed to chronic microvascular ischemia. ORBITS: The visualized portion of the orbits demonstrate no acute abnormality. SINUSES: The visualized paranasal sinuses and mastoid air cells demonstrate no acute abnormality. BONES/SOFT TISSUES: The bone marrow signal intensity appears normal. The soft tissues demonstrate no acute abnormality. 2 punctate acute infarcts in the left middle cerebral artery territory. Imaging: [unfilled]         ======================================================================  Please note that this chart entry has been generated using voice recognition software, mainly.   So please excuse brevity and/or typos. While every effort and attempts have been made to ensure the accuracy of this automated transcription, some errors may have occurred; and certain words and phrases in transcription may not be entered as intended. However, inadvertent computerized transcription errors may be present. So please contact us if any clarification needed.

## 2023-08-29 NOTE — PROGRESS NOTES
[address] [facility] Department   Phone: [phone]    Physical Therapy    [] Evaluation            [x] Daily Treatment Note         [x] Discharge Summary      Patient: Jr Faust   : 1950   MRN: [id number]   Date of Service:  2023  Admitting Diagnosis: CVA (cerebrovascular accident due to intracerebral hemorrhage) [facility])  Current Admission Summary: 68 y.o. male who presented to [facility]  with encephalopathy and found to have significant DEBO. PMH reflects PD, CAD, Afib. 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 He is agreeable to ARU and wants to work in therapy before planned Dc home with his wife. MRI brain : 2 punctate acute infarcts in the left middle cerebral artery territory. Rotator cuff tear in (R) UE: per ortho appropriate for WBAT/ROM as tolerated  Pt. Transferred to Acute care on 23 d/t increased heart rate. Interruption to care. Past Medical History:  has a past medical history of Atrial fibrillation ([address]), No significant past medical history, and Parkinson disease ([address]). Past Surgical History:  has a past surgical history that includes Coronary artery bypass graft (2018) and Atrial ablation surgery (2018).   Discharge Recommendations: Home with HHPT with recommendation for outpatient therapy services following home health PT  DME Required For Discharge: no DME required at discharge  Precautions/Restrictions: high fall risk  Weight Bearing Restrictions: weight bearing as tolerated; per ortho note \"WBAT/ROM as tolerated\"  [x] Right Upper Extremity  [] Left Upper Extremity [] Right Lower Extremity  [] Left Lower Extremity     Required Braces/Orthotics: no braces required   [] Right  [] Left  Positional Restrictions:no positional restrictions    Pre-Admission Information   Lives With: spouse                  Type of Home: house  Home Layout: one level  Home Independent  Stand to sit transfer: Independent  Stand step transfer: modified independent, significant increased time; completed during 2nd session  Car transfer: modified independent, hand rails used with significant increased time required  Comments: no device used; without (B) UE support for sit <=> stand transfers but safety completes with good control and proximity to chair surfaces  Ambulation:  Surface:level surface  Assistive Device: no device  Assistance: Independent  Distance: 270 ft + 120 ft + short distances within session  Gait Mechanics: Reciprocal gait pattern; moderate jasmin; rounded shoulders; (B) minimal arm swing and trunk movements  Comments: No LOB; (B) shoes worn for amb  Ambulation Trial 2:  Surface:carpet  Assistive Device: no device  Assistance: Independent  Distance: 20 ft  Gait Mechanics: Same mechanics as level surface with decrease step height and decreased jasmin  Comments: Notable decreased (R) foot clearance but adequately clears floor surface  Stair Mobility:  Number of Steps: 12  Step Height: 4 inch and 6 inch  Hand Rails: (L) ascending & (L) descending  Device: no device  Assistance: modified independent  Comments: Step to gait pattern progressing to reciprocal; Forward posture, decreased step height, increased time with each step  Stair Mobility Trial 2:  Number of Steps: 1 + 1  Step Height: 4 inch  Hand Rails: None  Device: no device  Assistance: stand by assistance  Comments: Step to pattern; Forward posture; increased time due to hesitation with each step up/down  Stair Mobility Trial 3: During 2nd session:  Number of Steps: 3  Step Height: 6 inch  Hand Rails: (R) ascending HR (utilized with (B) UE)  Device: no device  Assistance: stand by assistance  Comments: Step to pattern with increased time; (B) UE utilized on HR; narrow CHELLY; high guard (B) UE positioning  Wheelchair Mobility:  No w/c mobility completed on this date.   Comments:  Balance:  Static Sitting Balance: good: Interventions: patient left in chair, chair alarm in place, call light within reach, gait belt, patient at risk for falls, and family/caregiver present; wife present    Plan  Frequency: 5 x/week, 60 min/day  Current Treatment Recommendations: strengthening, ROM, balance training, functional mobility training, transfer training, gait training, stair training, endurance training, neuromuscular re-education, wheelchair mobility training, modalities, patient/caregiver education, ADL/self-care training, IADL training, home exercise program, and safety education    Goals  Patient Goals: To go home   Short Term Goals:  Time Frame: 10-14 days  Patient will complete bed mobility at Supervision - goal met 08/29/23  Patient will complete stand step transfers Supervision  - goal met 08/29/23  Patient will ambulate 270 ft with use of LRAD with Supervision - goal met 08/28/23  Patient will ascend/descend 12 stairs with (R) ascending handrail at Supervision - goal not met  Patient will complete car transfer at Supervision - goal met 08/25/23    **Above goals reviewed on 8/29/2023. All goals are ongoing at this time unless indicated above. **    Therapy Session Time     First Session Therapy Time:   Individual Concurrent Group Co-treatment   Time In  0730         Time Out  4500         Minutes  47           Second Session Therapy Time:   Individual Concurrent Group Co-treatment   Time In  1245         Time Out  1318         Minutes  33           Timed Code Treatment Minutes:  47 + 33    Total Treatment Minutes:  87    Electronically Signed By: CARRIE Sharif  Therapist observed and directed the patient's plan of care.   Co-signed and supervised by: Nils Bashir PT, DPT - [id number]

## 2023-08-29 NOTE — CARE COORDINATION
Team conference held today. Spoke with patient and his spouse to discuss progress with therapy, barriers to discharge, and plans to return home. Estimated discharge date set for 8/30/2023. Patient anticipates discharging to home with home health care services. Also discussed with patient the need to schedule follow-up appointments. Will continue to follow for support and discharge planning.     Electronically signed by ALISHA Cisneros on 8/29/2023 at 4:55 PM

## 2023-08-29 NOTE — DISCHARGE INSTR - COC
Continuity of Care Form    Patient Name: Chaim Aranda   :  1950  MRN:  [id number]    Admit date:  2023  Discharge date:  23    Code Status Order: Full Code   Advance Directives:     Admitting Physician:  Chantell Camacho DO  PCP: Raúl Bernal MD    Discharging Nurse:  CHERI WebsterN, [address] Unit/Room#: AOG-1281/8950-91  Discharging Unit Phone Number: [phone]    Emergency Contact:   Extended Emergency Contact Information  Primary Emergency Contact: Mai Zamora  Address: [address] [place] [address], [facility] [place] [address] [address] Phone: [phone]  Mobile Phone: [phone]  Relation: Spouse  Secondary Emergency Contact: Idalmis Fulton  Address: [place]           [phone]  Home Phone: [phone]  Relation: Child    Past Surgical History:  Past Surgical History:   Procedure Laterality Date    ATRIAL ABLATION SURGERY  2018    MAZE    CORONARY ARTERY BYPASS GRAFT  2018       Immunization History:   Immunization History   Administered Date(s) Administered    COVID-19, MODERNA BLUE border, Primary or Immunocompromised, (age 12y+), IM, 100 mcg/0.5mL 2021, 2021, 2021    Influenza, High Dose (Fluzone 65 yrs and older) 2018    Pneumococcal, PCV-13, PREVNAR 15, (age 6w+), IM, 0.5mL 2016    Pneumococcal, PPSV23, PNEUMOVAX 21, (age 2y+), SC/IM, 0.5mL 2017    TDaP, ADACEL (age 6y-58y), BOOSTRIX (age 10y+), IM, 0.5mL 2013       Active Problems:  Patient Active Problem List   Diagnosis Code    Dupuytren's contracture M72.0    Dementia due to Parkinson's disease without behavioral disturbance ([address]) G20, F02.80    Carpal tunnel syndrome G56.00    Lesion of ulnar nerve G56.20    SVT (supraventricular tachycardia) ([facility]) I47.1    PAF (paroxysmal atrial fibrillation) ([facility]) I48.0    Typical atrial flutter ([facility]) I48.3    Arthralgia of right hip M25.551    Coronary artery disease involving native heart Xi Barboza  is necessary for the continuing treatment of the diagnosis listed and that he requires [address] [place] [address] for less 30 days.      Update Admission H&P: No change in H&P    PHYSICIAN SIGNATURE:  Electronically signed by Andres Watson MD on 8/29/23 at 10:34 AM EDT

## 2023-08-29 NOTE — DISCHARGE INSTRUCTIONS
We hope your stay on rehab has exceeded your expectations. Once again the entire Acute Rehab Staff at [facility] wish to thank you for allowing us the privilege to care for you. A few days after you are discharged from Rehab, you will receive a survey [employer]) in the mail or through email. This is a nationally distributed survey sent to thousands of rehab patients throughout the nation. It is very important to everyone on the rehab unit that we receive feedback based on your experience. Thank you, we wish you good health always,         Acute Rehab Team      Hospital Preference:     [facility] [address] Diagnosis/Conditions    _______________________ (free text)    Emergency Contact:    ________________________________________Phone#________________________      Advanced Directives:    Code Status: ?  [x]  Full Code  ? []  DNR  ? []  [facility]  ? []  [facility] - Arrest    (as of date of discharge:  _________)      Medical POA: ?  []   Yes ______________________________ ?   []   No                                       (Name and phone number)                     Living Will:   ?   []   Yes    ?  []   No        Insurance Information:    _______________________ (free text)      Individual Responsible  for the coordination of the discharge/follow up:    ______________________________________________________    Functional Status:    VISUAL DEFICITS:    Yes [x]  No  []       If yes, assisted device:   Wears Glasses Yes [x]  No  []  Wears Contacts  Yes []  No  []  Legally Blind Yes []  No  []    HEARING DEFICITS:    Yes []  No  [x]       If yes, assisted device:   Wears Hearing Aids Yes []  No  []  Pocket Talker  Yes []  No  []       Physical Therapist & Contact #:  Hawa Borjas PT, DPT - [phone]  OccupationalTherapist & Contact : Linda Layne OTR/MIGUELINA  [phone]  Speech Therapist & Contact #:       Activities of Daily Living:     ADL's - Adaptive Equipment

## 2023-08-29 NOTE — PROGRESS NOTES
[address] [facility] Department   Phone: [phone]    Occupational Therapy    [] Initial Evaluation            [x] Daily Treatment Note         [x] Discharge Summary      Patient: Dhara Emmanuel   : 1950   MRN: [id number]   Date of Service:  2023    Admitting Diagnosis:  CVA (cerebrovascular accident due to intracerebral hemorrhage) [facility])  Current Admission Summary: Dhara Emmanuel is a 68 y.o. male who presented to [facility]  with encephalopathy and found to have significant DEBO. PMH reflects PD, CAD, Afib. 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 Return to Aru post acute off for irregular HR. MRI brain : 2 punctate acute infarcts in the left middle cerebral artery territory. Past Medical History:  has a past medical history of Atrial fibrillation ([address]), No significant past medical history, and Parkinson disease ([address]). Past Surgical History:  has a past surgical history that includes Coronary artery bypass graft (2018) and Atrial ablation surgery (2018). Discharge Recommendations: HH OT S3; close SUP >> PRN from spouse/family    DME Required For Discharge: shower chair with back, reacher    Precautions/Restrictions: high fall risk  Weight Bearing Restrictions: no restrictions       Required Braces/Orthotics: no braces required     Positional Restrictions:no positional restrictions      Pre-Admission Information   Lives With: spouse                  Type of Home: house  Home Layout: one level; finished basement with full flight of steps with 1 HR  Home Access: 1 step to enter with handrail. Handrails are located on R side.   Bathroom Layout: walk in shower, curtain   Bathroom Equipment: shower chair, hand held shower head, portable/suction cup grab bars, small built in shower shelf (wife reports it's too small to sit on)   Toilet Height: standard height  Home family/caregiver present    Plan  Frequency: 5 x/week, 60 min/day  Current Treatment Recommendations: strengthening, ROM, balance training, functional mobility training, transfer training, endurance training, neuromuscular re-education, patient/caregiver education, ADL/self-care training, IADL training, cognitive reorientation, safety education, and equipment evaluation/education    Goals  Patient Goals: Get back to walking normal; Increased IND in self-care    Short Term Goals:  Time Frame: 14 days   Patient will complete upper body ADL at modified independent   Patient will complete lower body ADL at modified independent   Patient will complete toileting at modified independent   Patient will complete grooming at modified independent   Patient will complete functional transfers at modified independent - goal met  Patient will complete functional mobility at modified independent - goal met   Patient to gather and transport IADL items at modified independent      Above goals reviewed on 8/29/2023. All goals are ongoing at this time unless indicated above.      Therapy Session Time     Individual Group Co-treatment   Time In 1400      Time Out 1500      Minutes 60           Timed Code Treatment Minutes: 60   Total Treatment Minutes:  60       Electronically Signed By:   CHLOÉ Wilkerson/MIGUELINA #[id number]

## 2023-08-30 VITALS
HEIGHT: 72 IN | HEART RATE: 63 BPM | WEIGHT: 187.4 LBS | OXYGEN SATURATION: 95 % | SYSTOLIC BLOOD PRESSURE: 94 MMHG | DIASTOLIC BLOOD PRESSURE: 62 MMHG | BODY MASS INDEX: 25.38 KG/M2 | TEMPERATURE: 98 F | RESPIRATION RATE: 18 BRPM

## 2023-08-30 LAB
ANION GAP SERPL CALCULATED.3IONS-SCNC: 8 MMOL/L (ref 3–16)
BASOPHILS # BLD: 0.1 K/UL (ref 0–0.2)
BASOPHILS NFR BLD: 1.2 %
BUN SERPL-MCNC: 9 MG/DL (ref 7–20)
CALCIUM SERPL-MCNC: 9.1 MG/DL (ref 8.3–10.6)
CHLORIDE SERPL-SCNC: 101 MMOL/L (ref 99–110)
CO2 SERPL-SCNC: 32 MMOL/L (ref 21–32)
CREAT SERPL-MCNC: 0.9 MG/DL (ref 0.8–1.3)
DEPRECATED RDW RBC AUTO: 14.4 % (ref 12.4–15.4)
EOSINOPHIL # BLD: 0.1 K/UL (ref 0–0.6)
EOSINOPHIL NFR BLD: 2.5 %
GFR SERPLBLD CREATININE-BSD FMLA CKD-EPI: >60 ML/MIN/{1.73_M2}
GLUCOSE SERPL-MCNC: 101 MG/DL (ref 70–99)
HCT VFR BLD AUTO: 31.9 % (ref 40.5–52.5)
HGB BLD-MCNC: 10.8 G/DL (ref 13.5–17.5)
LYMPHOCYTES # BLD: 0.8 K/UL (ref 1–5.1)
LYMPHOCYTES NFR BLD: 16.1 %
MAGNESIUM SERPL-MCNC: 2 MG/DL (ref 1.8–2.4)
MCH RBC QN AUTO: 30.8 PG (ref 26–34)
MCHC RBC AUTO-ENTMCNC: 34 G/DL (ref 31–36)
MCV RBC AUTO: 90.5 FL (ref 80–100)
MONOCYTES # BLD: 0.7 K/UL (ref 0–1.3)
MONOCYTES NFR BLD: 13 %
NEUTROPHILS # BLD: 3.4 K/UL (ref 1.7–7.7)
NEUTROPHILS NFR BLD: 67.2 %
PLATELET # BLD AUTO: 241 K/UL (ref 135–450)
PMV BLD AUTO: 7.1 FL (ref 5–10.5)
POTASSIUM SERPL-SCNC: 4.4 MMOL/L (ref 3.5–5.1)
RBC # BLD AUTO: 3.52 M/UL (ref 4.2–5.9)
SODIUM SERPL-SCNC: 141 MMOL/L (ref 136–145)
WBC # BLD AUTO: 5.1 K/UL (ref 4–11)

## 2023-08-30 PROCEDURE — 80048 BASIC METABOLIC PNL TOTAL CA: CPT

## 2023-08-30 PROCEDURE — 6370000000 HC RX 637 (ALT 250 FOR IP): Performed by: PHYSICAL MEDICINE & REHABILITATION

## 2023-08-30 PROCEDURE — 83735 ASSAY OF MAGNESIUM: CPT

## 2023-08-30 PROCEDURE — 51702 INSERT TEMP BLADDER CATH: CPT

## 2023-08-30 PROCEDURE — 85025 COMPLETE CBC W/AUTO DIFF WBC: CPT

## 2023-08-30 PROCEDURE — 36415 COLL VENOUS BLD VENIPUNCTURE: CPT

## 2023-08-30 PROCEDURE — 6370000000 HC RX 637 (ALT 250 FOR IP): Performed by: INTERNAL MEDICINE

## 2023-08-30 RX ORDER — POTASSIUM CHLORIDE 20 MEQ/1
20 TABLET, EXTENDED RELEASE ORAL 2 TIMES DAILY WITH MEALS
Status: DISCONTINUED | OUTPATIENT
Start: 2023-08-30 | End: 2023-08-30 | Stop reason: HOSPADM

## 2023-08-30 RX ORDER — LANOLIN ALCOHOL/MO/W.PET/CERES
400 CREAM (GRAM) TOPICAL 2 TIMES DAILY
Status: DISCONTINUED | OUTPATIENT
Start: 2023-08-30 | End: 2023-08-30 | Stop reason: HOSPADM

## 2023-08-30 RX ADMIN — SOTALOL HYDROCHLORIDE 80 MG: 80 TABLET ORAL at 09:01

## 2023-08-30 RX ADMIN — CARBIDOPA AND LEVODOPA 2 TABLET: 25; 100 TABLET ORAL at 06:57

## 2023-08-30 RX ADMIN — FUROSEMIDE 40 MG: 40 TABLET ORAL at 09:02

## 2023-08-30 RX ADMIN — ROPINIROLE HYDROCHLORIDE 0.25 MG: 0.25 TABLET, FILM COATED ORAL at 09:02

## 2023-08-30 RX ADMIN — ROPINIROLE HYDROCHLORIDE 0.25 MG: 0.25 TABLET, FILM COATED ORAL at 13:10

## 2023-08-30 RX ADMIN — ASPIRIN 81 MG: 81 TABLET, COATED ORAL at 09:02

## 2023-08-30 RX ADMIN — POTASSIUM CHLORIDE 20 MEQ: 1500 TABLET, EXTENDED RELEASE ORAL at 09:01

## 2023-08-30 RX ADMIN — PANTOPRAZOLE SODIUM 40 MG: 40 TABLET, DELAYED RELEASE ORAL at 06:57

## 2023-08-30 RX ADMIN — Medication 400 MG: at 09:02

## 2023-08-30 RX ADMIN — CARBIDOPA AND LEVODOPA 2 TABLET: 25; 100 TABLET ORAL at 13:10

## 2023-08-30 NOTE — PROGRESS NOTES
CLINICAL PHARMACY NOTE: MEDS TO BEDS    Total # of Prescriptions Filled: 4   The following medications were delivered to the patient:  POTASSIUM CHLORIDE THEODORE ER 20  MAGNESIUM OXIDE 400  FUROSEMIDE 40MG  FLUTICASONE PROPIONATE 50 SUSP    Additional Documentation:  Delivered to patients room = signed  Idalia Lieberman to be delivered per  [facility] [address]

## 2023-08-30 NOTE — PROGRESS NOTES
ARU Discharge Assessment    Transportation  \"Has lack of transportation kept you from medical appointments, meetings, work, or from getting things needed for daily living? \"Check all that apply:  [] A.  Yes, it has kept me from medical appointments or from getting my medications  [] B.  Yes, it has kept me from non-medical meetings, appointments, work, or from getting things that I need  [x] C.  No  [] X. Patient unable to respond  [] Y. Patient declines to respond    Provision of Current Reconciled Medication List to Subsequent Provider at Discharge  [] No, current reconciled medication list not provided to the subsequent provider. [x] Yes, current reconciled medication list provided to the subsequent provider. (**Select route of transmission below**)   [x] Via Electronic Health Record   [] [facility] [employer]  [] Verbal (e.g. in person, telephone, video conferencing)  [] Paper-based (e.g. fax, copies, printouts)   [] Other Methods (e.g. texting, email, CDs)    Provision of Current Reconciled Medication List to Patient at Discharge  [] No, current reconciled medication list not provided to the patient, family and/or caregiver. [x] Yes, current reconciled medication list provided to the patient, family and/or caregiver.  (**Select route of transmission below**)   [] Via Electronic Health Record (e.g., electronic access to patient portal)   [] Via [employer]  [x] Verbal (e.g. in person, telephone, video conferencing)  [x] Paper-based (e.g. fax, copies, printouts)   [] Other Methods (e.g. texting, email, CDs)    Health Literacy  \"How often do you need to have someone help you when you read instructions, pamphlets, or other written material from your doctor or pharmacy? \"  []  0. Never  [x]  1. Rarely  []  2. Sometimes  []  3. Often  []  4. Always  []  7. Patient declines to respond  []  8.   Patient unable to respond    BIMS - **Must be completed in the

## 2023-08-30 NOTE — CARE COORDINATION
08/30/23 1230   IMM Letter   IMM Letter given to Patient/Family/Significant other/Guardian/POA/by: 2nd IMM letter given to patient by ANGELO/ADDIE.    IMM Letter date given: 08/30/23   IMM Letter time given: 9018     Patient /family agreeable to receiving < 4 hours notice prior to discharge.

## 2023-08-30 NOTE — PROGRESS NOTES
The pt's wife at bedside. Teaching provided about leg bag and argueta bag. The wife verbalized understanding. Returned demonstration successfully cleaning argueta catheter and securing it with secure devise. Reviewed and given AVS.  The pt's wife verbalized understanding. The pt discharged to home.

## 2023-08-30 NOTE — PROGRESS NOTES
Alee Doran, MD Corrie Ferrer MD                  Office: [phone]                      Fax: [phone] [address] [employer]                   NEPHROLOGY ARU PROGRESS NOTE:     PATIENT NAME: Dennie Spalding  : 1950  MRN: [id number]     34  UMg 5  Ucrea 178      RECOMMENDATIONS:   - continue K and Mg replacement. Change to BID. Now WNL. Diarrhea has resolved. - increased Lasix to 40mg po bid to decrease medullary concentrating gradient. -  - 1.5L/24h fluid restriction. - VBG showing respiratory alkalosis  - Uosm 400, Chilo less than 20, albumin 3. Higher ADH state. - stopped po HCO3 TID  -  Diarrhea controlled     Have discussed plan with pt, wife at bedside too. D/C plan from renal stand point: improved. Okay for D/C from renal perspective. Can f/u with PCP      IMPRESSION:       Admitted on:  2023 12:43 PM   For:  CVA (cerebrovascular accident due to intracerebral hemorrhage) ([address]) [I61.9]  No diagnosis found. Hyponatremia, worsening, chronic, higher ADH state. May have 3rd spacing also. Better. Likely with underlying hypovolemia currently   No major neurological effect,  With underlying Parkinson, monitor closely    Recently admitted at [facility]  At that time-acute kidney injury, severe, had creatinine 8.9 on admission,  - With baseline creatinine 0.9, as of   - Thought to be due to obstructive uropathy, with BPH, bladder retention, and some component of hypovolemia  - Creatinine improved   -Currently creatinine around 0.9-1.1, without DEBO        Associated problems:   - Volume status: euvolemic  : HTN : no need for tight control. At goal.   - Azotemia: pre-renal +  - Electrolytes: Low K and Mg probably due to Lasix +diarrhea. Now better. Continue replacement.    - Acid-Base: Acidosis, non-anion gap, metabolic  - Anemia: Mild, likely chronic disease      Other major problems: Management per primary and other 65.0 08/04/2023 05:35 PM     Fibrinogen Level:  No components found for: FIB       BELOW MENTIONED RADIOLOGY STUDY RESULTS BY ME (AS NEEDED FOR MY EVALUATION AND MANAGEMENT). CT ABDOMEN PELVIS WO CONTRAST Additional Contrast? None    Result Date: 8/7/2023  EXAMINATION: CT OF THE ABDOMEN AND PELVIS WITHOUT CONTRAST; CT OF THE HEAD WITHOUT CONTRAST 8/4/2023 5:45 pm; 8/4/2023 5:46 pm TECHNIQUE: CT of the abdomen and pelvis was performed without the administration of intravenous contrast. Multiplanar reformatted images are provided for review. Automated exposure control, iterative reconstruction, and/or weight based adjustment of the mA/kV was utilized to reduce the radiation dose to as low as reasonably achievable.; CT of the head was performed without the administration of intravenous contrast. Automated exposure control, iterative reconstruction, and/or weight based adjustment of the mA/kV was utilized to reduce the radiation dose to as low as reasonably achievable. COMPARISON: None. HISTORY: ORDERING SYSTEM PROVIDED HISTORY: lower abdominal pain, renal failure TECHNOLOGIST PROVIDED HISTORY: Reason for exam:->lower abdominal pain, renal failure Additional Contrast?->None Decision Support Exception - unselect if not a suspected or confirmed emergency medical condition->Emergency Medical Condition (MA) Reason for Exam: Abnormal Lab (Called by family doc regarding lab work done this morning with critically high BUN and Cr levels. Recently completed treatment for shingles. Wife states pt has been confused, wetting self at night, diarrhea.); ORDERING SYSTEM PROVIDED HISTORY: altered mental status TECHNOLOGIST PROVIDED HISTORY: Reason for exam:->altered mental status Has a \"code stroke\" or \"stroke alert\" been called? ->No Decision Support Exception - unselect if not a suspected or confirmed emergency medical condition->Emergency Medical Condition (MA) Reason for Exam: Abnormal Lab (Called by family doc regarding lab [id number]        Room Number         0329   Corporate ID       [id number]          Sonographer         [address] [place] [address] [facility],                                                           RDMS, AB, OB/GYN   Ordering Physician Ronald FRANKLIN [id number] Vascular                                       Physician           Jones Dickerson MD,                                                           [facility]  Procedure Type of Study:   Cerebral:Carotid, VL CAROTID DUPLEX BILATERAL. Vascular Sonographer Report  Additional Indications:CVA Impressions Right Impression Difficult study due to vessel and patient movement. The right internal carotid artery appears to have a <50% diameter reducing stenosis based on velocity criteria. The right vertebral artery demonstrates normal antegrade flow. The right subclavian artery is visualized and demonstrates multiphasic flow. Left Impression Difficult study due to vessel and patient movement. The left internal carotid artery appears to have a <50% diameter reducing stenosis based on velocity criteria. The left vertebral artery demonstrates normal antegrade flow. The left subclavian artery is visualized and demonstrates multiphasic flow. The left distal common carotid artery demonstrates moderate focal plaque formation. Conclusions   Summary   -The right internal carotid artery appears to have a <50% diameter reducing  stenosis based on velocity criteria. -The left internal carotid artery appears to have a <50% diameter reducing  stenosis based on velocity criteria. Recommendations   Recommend follow up study in 12 months. Signature   ------------------------------------------------------------------  Electronically signed by Jones Dickerson MD, [facility] (Interpreting  physician) on 08/08/2023 at 05:28 PM  ------------------------------------------------------------------  Patient Status:Routine. [facility] - Vascular Lab.  Technical Quality:Limited

## 2023-08-30 NOTE — DISCHARGE SUMMARY
Physical Medicine & Rehabilitation  Discharge Summary     Patient Identification:  Khurram Taylor  : 1950  Admit date: 2023  Discharge date: 2023  Attending provider: Kim Angeles DO        Primary care provider: Shauna Guevara MD     Discharge Diagnoses:   Patient Active Problem List   Diagnosis    Dupuytren's contracture    Dementia due to Parkinson's disease without behavioral disturbance (HCC)    Carpal tunnel syndrome    Lesion of ulnar nerve    SVT (supraventricular tachycardia) (HCC)    PAF (paroxysmal atrial fibrillation) (HCC)    Typical atrial flutter (HCC)    Arthralgia of right hip    Coronary artery disease involving native heart without angina pectoris    Hypercholesteremia    Allergic rhinitis    BPH with obstruction/lower urinary tract symptoms    Tachycardia-bradycardia syndrome (HCC)    PLMD (periodic limb movement disorder)    Acute renal failure (HCC)    Bilateral hydronephrosis    Parsonage-Ho syndrome    Post herpetic neuralgia    Benign essential HTN    Arterial ischemic stroke, ICA, left, acute (HCC)    Anemia    Parkinson's disease ([address])    CVA (cerebrovascular accident due to intracerebral hemorrhage) ([address])       Discharge Functional Status:      Physical therapy:  Supine to Sit: Independent  Sit to Supine: Independent      Sit to Stand: Independent  Chair/Bed to Chair Transfer: Independent  Car Transfer: Independent     Ambulation 10 ft: Independent  Ambulation 50 ft: Independent  Ambulation 150 ft: Independent  Stairs - 1 Step: Independent  Stairs - 4 Step: Independent  Stairs - 12 Step: Independent    Occupational therapy:  Eating: Independent  Oral Hygiene: Independent  Bathing: Supervision or touching assistance  Upper Body Dressing: Supervision or touching assistance  Lower Body Dressing: Partial/moderate assistance     Toilet Transfer: Independent  Toilet Hygiene: Supervision or touching assistance    Speech therapy:    Pt presents with moderate stinging, sneezing     furosemide 40 MG tablet  Commonly known as: LASIX  Take 1 tablet by mouth in the morning and 1 tablet in the evening. Notes to patient: Uses:Management of edema associated with heart failure, cirrhosis of the liver (ie, ascites), or kidney disease (including nephrotic syndrome); acute pulmonary edema. Side Effects: Dizziness or headache, constipation, diarrhea, upset stomach, throwing up, or decreased appetite, stomach cramps     magnesium oxide 400 (240 Mg) MG tablet  Commonly known as: MAG-OX  Take 1 tablet by mouth 3 times daily  Notes to patient: Uses: Dietary magnesium supplement; relief of acid indigestion and upset stomach    Side effects: Diarrhea     potassium chloride 20 MEQ extended release tablet  Commonly known as: KLOR-CON M  Take 1 tablet by mouth 2 times daily  Notes to patient: Uses: Treatment of hypokalemia. Side effects: Abdominal pain, nausea, vomiting, diarrhea, passing gas, tablet wax in stool            CHANGE how you take these medications      carbidopa-levodopa  MG per tablet  Commonly known as: SINEMET  TAKE 1 TABLET AT 7:30 AM, 11:30 AM, 3:30 PM AND 7:30 PM  What changed:   how much to take  how to take this  when to take this  Notes to patient: Uses:  Treatment of Parkinson disease, treatment of motor fluctuations in advanced Parkinson disease (intestinal suspension [Duopa] only). Side effects: Bad dreams, constipation, feeling dizzy or sleepy, dry mouth, headache, trouble sleeping     sertraline 100 MG tablet  Commonly known as: ZOLOFT  Take 1 tablet by mouth daily  What changed: when to take this  Notes to patient: Uses: Treatment of unipolar major depressive disorder (MDD) in adults.     Side effects:  Feeling dizzy, sleepy, tired, or weak, constipation, diarrhea, stomach pain, upset stomach, throwing up, or feeling less hungry, dry mouth, trouble sleeping, sweating a lot, shakiness            CONTINUE taking these medications      aspirin 81 MG

## 2023-08-31 ENCOUNTER — CARE COORDINATION (OUTPATIENT)
Dept: CASE MANAGEMENT | Age: 73
End: 2023-08-31

## 2023-08-31 NOTE — CARE COORDINATION
Care Transitions Outreach Attempt    Call within 2 business days of discharge: Yes     Attempted to reach patient for transitions of care follow up. Unable to reach patient. HC verified. Patient: Julia Plasiam Patient : 1950 MRN: [id number]    Last Discharge [address]       Date Complaint Diagnosis Description Type Department Provider    23   Admission (Discharged) [address], DO              Was this an external facility discharge?  No Discharge Facility:     Noted following upcoming appointments from discharge chart review:   [facility] follow up appointment(s):   Future Appointments   Date Time Provider [address]   2023 10:30 AM Remington Guardado MD [address] [place] [address]   10/12/2023  9:00 AM Remington Guardado MD [address] [place] [address]   10/12/2023 11:10 AM Tamera Canas MD  NEURO Neurology -     Hernandez Little RN, BSN Care Coordinator  [phone]

## 2023-09-01 ENCOUNTER — CARE COORDINATION (OUTPATIENT)
Dept: CASE MANAGEMENT | Age: 73
End: 2023-09-01

## 2023-09-01 NOTE — CARE COORDINATION
Care Transitions Outreach Attempt    Call within 2 business days of discharge: Yes     Attempted to reach patient for transitions of care follow up x 2. Unable to reach patient. F/U appts listed below. Patient: Thor Rangel Patient : 1950 MRN: [id number]    Last Discharge [address]       Date Complaint Diagnosis Description Type Department Provider    23   Admission (Discharged) [address], DO              Was this an external facility discharge?  No Discharge Facility:     Noted following upcoming appointments from discharge chart review:   [facility] follow up appointment(s):   Future Appointments   Date Time Provider [address] [place] [address]   2023  1:30 PM Fermin Chu MD  NEURO Neurology -   2023 10:30 AM Julissa Peace MD [address] [place], [place] [address]HEMA   10/12/2023  9:00 AM Julissa Peace MD [address] [place] [address]     Zora Chase, RN, BSN Care Coordinator  [phone]

## 2023-09-05 ENCOUNTER — TELEPHONE (OUTPATIENT)
Dept: FAMILY MEDICINE CLINIC | Age: 73
End: 2023-09-05

## 2023-09-05 NOTE — TELEPHONE ENCOUNTER
Ismael Hdz from [facility] requesting verbal orders for:   Skilled nursing, PT, OT, and speech. She states she did open him yesterday.

## 2023-09-08 ENCOUNTER — TELEPHONE (OUTPATIENT)
Dept: FAMILY MEDICINE CLINIC | Age: 73
End: 2023-09-08

## 2023-09-08 NOTE — TELEPHONE ENCOUNTER
Patient wife is wanting to know if patient can stop taking Lasix. Per Olegario Walker patients BP & Pulse are running low, patient has lost 10 lbs in 1 week. Per Olegario Walker  it is ok to address when Dr. Kimberley Maguire returns on Monday. Please advise.

## 2023-09-11 ENCOUNTER — TELEPHONE (OUTPATIENT)
Dept: FAMILY MEDICINE CLINIC | Age: 73
End: 2023-09-11

## 2023-09-11 NOTE — TELEPHONE ENCOUNTER
Nicolasa from [facility] called in regards to letting WM know that they will be faxing over information about a speech therapy request due to receptive and expressive language deficits.

## 2023-09-12 ENCOUNTER — OFFICE VISIT (OUTPATIENT)
Dept: NEUROLOGY | Age: 73
End: 2023-09-12
Payer: MEDICARE

## 2023-09-12 VITALS
SYSTOLIC BLOOD PRESSURE: 90 MMHG | BODY MASS INDEX: 24.28 KG/M2 | WEIGHT: 179 LBS | HEART RATE: 50 BPM | DIASTOLIC BLOOD PRESSURE: 57 MMHG

## 2023-09-12 DIAGNOSIS — B02.29 POST HERPETIC NEURALGIA: ICD-10-CM

## 2023-09-12 DIAGNOSIS — G20 PARKINSON'S DISEASE (HCC): ICD-10-CM

## 2023-09-12 DIAGNOSIS — G54.5 PARSONAGE-TURNER SYNDROME: ICD-10-CM

## 2023-09-12 DIAGNOSIS — S49.91XS INJURY OF RIGHT SHOULDER, SEQUELA: Primary | ICD-10-CM

## 2023-09-12 PROBLEM — S49.91XA INJURY OF RIGHT SHOULDER: Status: ACTIVE | Noted: 2023-09-12

## 2023-09-12 PROCEDURE — G8420 CALC BMI NORM PARAMETERS: HCPCS | Performed by: PSYCHIATRY & NEUROLOGY

## 2023-09-12 PROCEDURE — 3078F DIAST BP <80 MM HG: CPT | Performed by: PSYCHIATRY & NEUROLOGY

## 2023-09-12 PROCEDURE — 3074F SYST BP LT 130 MM HG: CPT | Performed by: PSYCHIATRY & NEUROLOGY

## 2023-09-12 PROCEDURE — G8427 DOCREV CUR MEDS BY ELIG CLIN: HCPCS | Performed by: PSYCHIATRY & NEUROLOGY

## 2023-09-12 PROCEDURE — 99214 OFFICE O/P EST MOD 30 MIN: CPT | Performed by: PSYCHIATRY & NEUROLOGY

## 2023-09-12 PROCEDURE — 3017F COLORECTAL CA SCREEN DOC REV: CPT | Performed by: PSYCHIATRY & NEUROLOGY

## 2023-09-12 PROCEDURE — 1036F TOBACCO NON-USER: CPT | Performed by: PSYCHIATRY & NEUROLOGY

## 2023-09-12 PROCEDURE — 1111F DSCHRG MED/CURRENT MED MERGE: CPT | Performed by: PSYCHIATRY & NEUROLOGY

## 2023-09-12 PROCEDURE — 1123F ACP DISCUSS/DSCN MKR DOCD: CPT | Performed by: PSYCHIATRY & NEUROLOGY

## 2023-09-12 NOTE — PROGRESS NOTES
The patient came today for follow up regarding: Hospital follow-up and shoulder weakness    The patient came today with his wife    Was seen last month at [facility]. He was admitted for acute right shoulder pain and weakness. Symptoms started after pulling injury according to his wife. Further imaging with MRI brain and C-spine showed incidental small ischemic left MCA stroke. He was diagnosed with possible Parsonage-Ho syndrome. He was admitted to rehab for few days. He feels better compared to last visit. Still with shoulder weakness. Pain is moderate 5/10 per description sharp pain. Still have limitation with right shoulder range of motion and weakness. No weakness distally. Recent diagnosed with zoster. Has been having issue with bladder retention and scheduled for urodynamic testing next month. He has been seen by neurology in-house last month. He takes Sinemet  mg up to 8 times daily. He is to be on 4 times a day. No major changes according to his wife. Recent fall or injury. He is on aspirin. Other review of system was unremarkable. Exam:   Constitutional:   Vitals:    09/12/23 1356   BP: (!) 90/57   Pulse: 50   Weight: 179 lb (81.2 kg)       General appearance:  Normal development and appear in no acute distress. Mental Status:   Oriented to person, place, problem, and time. Memory: Good immediate recall. Intact remote memory  Normal attention span and concentration. Language: intact naming, repeating and fluency   Good fund of Knowledge. Aware of current events and vocabulary   Cranial Nerves:   II: Pupils: equal, round, reactive to light  III,IV,VI: Extra Ocular Movements are intact. No nystagmus  V: Facial sensation is intact   VII: Facial strength and movements: intact and symmetric  XII: Tongue movements are normal  Musculoskeletal:  5/5 in extremities except his right shoulder with weakness in shoulder abduction, external rotation 2/5.   No weakness

## 2023-09-14 ENCOUNTER — OFFICE VISIT (OUTPATIENT)
Dept: FAMILY MEDICINE CLINIC | Age: 73
End: 2023-09-14

## 2023-09-14 ENCOUNTER — TELEPHONE (OUTPATIENT)
Dept: FAMILY MEDICINE CLINIC | Age: 73
End: 2023-09-14

## 2023-09-14 VITALS
OXYGEN SATURATION: 97 % | DIASTOLIC BLOOD PRESSURE: 68 MMHG | BODY MASS INDEX: 23.12 KG/M2 | WEIGHT: 170.5 LBS | RESPIRATION RATE: 12 BRPM | TEMPERATURE: 97.1 F | HEART RATE: 52 BPM | SYSTOLIC BLOOD PRESSURE: 98 MMHG

## 2023-09-14 DIAGNOSIS — I47.1 SVT (SUPRAVENTRICULAR TACHYCARDIA) (HCC): ICD-10-CM

## 2023-09-14 DIAGNOSIS — E83.42 HYPOMAGNESEMIA: ICD-10-CM

## 2023-09-14 DIAGNOSIS — N31.9 NEUROGENIC BLADDER: ICD-10-CM

## 2023-09-14 DIAGNOSIS — N17.9 ACUTE RENAL FAILURE, UNSPECIFIED ACUTE RENAL FAILURE TYPE (HCC): Primary | ICD-10-CM

## 2023-09-14 DIAGNOSIS — I63.232 ARTERIAL ISCHEMIC STROKE, ICA, LEFT, ACUTE (HCC): ICD-10-CM

## 2023-09-14 RX ORDER — FUROSEMIDE 40 MG/1
20 TABLET ORAL 2 TIMES DAILY
Qty: 60 TABLET | Refills: 3 | Status: SHIPPED | OUTPATIENT
Start: 2023-09-14

## 2023-09-14 RX ORDER — DOXYCYCLINE HYCLATE 100 MG
100 TABLET ORAL 2 TIMES DAILY
COMMUNITY
Start: 2023-09-10

## 2023-09-14 NOTE — TELEPHONE ENCOUNTER
Venkata Dawson from [facility] called to request verbal orders for patient to have Occupational Therapy 1x per week for 9 weeks.     Please advise

## 2023-09-14 NOTE — PROGRESS NOTES
Comments: Patient utilizing a transport chair    Psychiatric:         Mood and Affect: Mood normal.         Speech: Speech normal.         Behavior: Behavior is cooperative. Cognition and Memory: Cognition normal.         Assessment:      Tani Garcia was seen today for follow-up from hospital.    Diagnoses and all orders for this visit:    Acute renal failure, unspecified acute renal failure type ([address])  -     CBC; Future  -     Basic Metabolic Panel; Future  -     Magnesium; Future    Arterial ischemic stroke, ICA, left, acute (HCC)    SVT (supraventricular tachycardia) (HCC)    Neurogenic bladder    Hypomagnesemia    Other orders  -     furosemide (LASIX) 40 MG tablet; Take 0.5 tablets by mouth in the morning and 0.5 tablets in the evening. Plan:      Hospital information reviewed with patient and wife    Advised no medication changes unless directed by physician care and we will go ahead and maintain the furosemide at 20 mg twice a day currently. Proceed with laboratory testing to determine next step. Encourage patient continue follow-up with urologist in regards to neurogenic bladder and he will probably need self-catheterization    Continue follow-up care with cardiology regards to SVT    RTC 1 month    Medical decision making of moderate complexity. Please note that this chart was generated using Dragon dictation software. Although every effort was made to ensure the accuracy of this automated transcription, some errors in transcription may have occurred.

## 2023-09-15 PROBLEM — E83.42 HYPOMAGNESEMIA: Status: ACTIVE | Noted: 2023-09-15

## 2023-09-15 PROBLEM — N17.9 ACUTE RENAL FAILURE (HCC): Status: RESOLVED | Noted: 2023-08-04 | Resolved: 2023-09-15

## 2023-09-15 PROBLEM — N31.9 NEUROGENIC BLADDER: Status: ACTIVE | Noted: 2023-09-15

## 2023-09-18 LAB
ANION GAP SERPL CALCULATED.3IONS-SCNC: 3 MMOL/L (ref 4–16)
BASOPHILS ABSOLUTE: 0.1 THOU/MCL (ref 0–0.2)
BASOPHILS ABSOLUTE: 1 %
BUN BLDV-MCNC: 22 MG/DL (ref 8–26)
CALCIUM SERPL-MCNC: 9.2 MG/DL (ref 8.5–10.4)
CHLORIDE BLD-SCNC: 105 MEQ/L (ref 98–111)
CO2: 30 MMOL/L (ref 21–31)
CREAT SERPL-MCNC: 1.57 MG/DL (ref 0.7–1.3)
EGFR (CKD-EPI): 47 ML/MIN/1.73 M2
EOSINOPHILS ABSOLUTE: 0.1 THOU/MCL (ref 0.03–0.45)
EOSINOPHILS RELATIVE PERCENT: 2 %
GLUCOSE BLD-MCNC: 114 MG/DL (ref 70–99)
HCT VFR BLD CALC: 38.6 % (ref 40–50)
HEMOGLOBIN: 12.7 G/DL (ref 13.5–16.5)
LYMPHOCYTES ABSOLUTE: 0.9 THOU/MCL (ref 1–4)
LYMPHOCYTES RELATIVE PERCENT: 15 %
MAGNESIUM: 2 MG/DL (ref 1.7–2.4)
MCH RBC QN AUTO: 30.2 PG (ref 27–33)
MCHC RBC AUTO-ENTMCNC: 33.1 G/DL (ref 32–36)
MCV RBC AUTO: 91.4 FL (ref 82–97)
MONOCYTES # BLD: 5 %
MONOCYTES ABSOLUTE: 0.3 THOU/MCL (ref 0.2–0.9)
NEUTROPHILS ABSOLUTE: 4.6 THOU/MCL (ref 1.8–7.7)
PDW BLD-RTO: 14.7 % (ref 12.3–17)
PLATELET # BLD: 270 THOU/MCL (ref 140–375)
PMV BLD AUTO: 8.1 FL (ref 7.4–11.5)
POTASSIUM SERPL-SCNC: 4.9 MEQ/L (ref 3.6–5.1)
RBC # BLD: 4.22 MIL/MCL (ref 4.4–5.8)
SEG NEUTROPHILS: 77 %
SODIUM BLD-SCNC: 138 MEQ/L (ref 135–145)
WBC # BLD: 6.1 THOU/MCL (ref 3.6–10.5)

## 2023-09-21 ENCOUNTER — HOSPITAL ENCOUNTER (OUTPATIENT)
Dept: MRI IMAGING | Age: 73
Discharge: HOME | End: 2023-09-21
Attending: PSYCHIATRY & NEUROLOGY
Payer: MEDICARE

## 2023-09-21 DIAGNOSIS — S49.91XS INJURY OF RIGHT SHOULDER, SEQUELA: ICD-10-CM

## 2023-09-21 DIAGNOSIS — I10 BENIGN ESSENTIAL HTN: Primary | ICD-10-CM

## 2023-09-21 PROCEDURE — 73221 MRI JOINT UPR EXTREM W/O DYE: CPT

## 2023-09-26 ENCOUNTER — TELEPHONE (OUTPATIENT)
Dept: FAMILY MEDICINE CLINIC | Age: 73
End: 2023-09-26

## 2023-09-26 NOTE — TELEPHONE ENCOUNTER
Pt went to have an MRI done on 9/21 for his shoulder and the results came back showing a tear. Dr. Moody Cockayne recommended getting an orthopedic referral from PCP. Pt wife was wondering if provider can send a referral to [facility]. Please advise. ..

## 2023-09-27 ENCOUNTER — TELEPHONE (OUTPATIENT)
Dept: FAMILY MEDICINE CLINIC | Age: 73
End: 2023-09-27

## 2023-09-27 DIAGNOSIS — S43.431A SUPERIOR GLENOID LABRUM LESION OF RIGHT SHOULDER, INITIAL ENCOUNTER: Primary | ICD-10-CM

## 2023-09-27 NOTE — TELEPHONE ENCOUNTER
Pt wants to be referred to Dr. Azeb Liao at [facility] in [facility]. Please advise. ..

## 2023-10-02 ENCOUNTER — TELEPHONE (OUTPATIENT)
Dept: FAMILY MEDICINE CLINIC | Age: 73
End: 2023-10-02

## 2023-10-02 NOTE — TELEPHONE ENCOUNTER
Per Marianela Cheung at [facility] patient was re-assessed for speech therapy today. It has been recommend to extend speech therapy 1 x per week for 1 week, then 2 x per week for 4 weeks. They will be continuing to assess communication deficits and cognitive impairment. Patient has began to also have swallowing problems as well and they will be adding swallowing treatment to his plan of care as well. Per wife patient has another UTI. Started on Antibiotic  but patient got worse after antibiotic. Stronger Antibiotic was order per his Urologist. Patient has appointment with Urology tomorrow. Please advise.

## 2023-10-04 RX ORDER — TAMSULOSIN HYDROCHLORIDE 0.4 MG/1
CAPSULE ORAL
Qty: 90 CAPSULE | Refills: 1 | Status: SHIPPED | OUTPATIENT
Start: 2023-10-04

## 2023-10-05 DIAGNOSIS — F32.A DEPRESSION, UNSPECIFIED DEPRESSION TYPE: ICD-10-CM

## 2023-10-05 RX ORDER — SERTRALINE HYDROCHLORIDE 100 MG/1
100 TABLET, FILM COATED ORAL NIGHTLY
OUTPATIENT
Start: 2023-10-05

## 2023-10-09 ENCOUNTER — OFFICE VISIT (OUTPATIENT)
Dept: FAMILY MEDICINE CLINIC | Age: 73
End: 2023-10-09

## 2023-10-09 VITALS
RESPIRATION RATE: 16 BRPM | TEMPERATURE: 97.3 F | HEART RATE: 60 BPM | BODY MASS INDEX: 22.11 KG/M2 | DIASTOLIC BLOOD PRESSURE: 60 MMHG | OXYGEN SATURATION: 97 % | WEIGHT: 163 LBS | SYSTOLIC BLOOD PRESSURE: 92 MMHG

## 2023-10-09 DIAGNOSIS — R41.0 ACUTE DELIRIUM: ICD-10-CM

## 2023-10-09 DIAGNOSIS — G54.5 PARSONAGE-TURNER SYNDROME: ICD-10-CM

## 2023-10-09 DIAGNOSIS — N30.01 ACUTE CYSTITIS WITH HEMATURIA: Primary | ICD-10-CM

## 2023-10-09 DIAGNOSIS — N31.9 NEUROGENIC BLADDER: ICD-10-CM

## 2023-10-09 DIAGNOSIS — N17.9 ACUTE RENAL FAILURE, UNSPECIFIED ACUTE RENAL FAILURE TYPE (HCC): ICD-10-CM

## 2023-10-09 RX ORDER — LANOLIN ALCOHOL/MO/W.PET/CERES
400 CREAM (GRAM) TOPICAL DAILY
Qty: 60 TABLET | Refills: 4 | Status: SHIPPED | OUTPATIENT
Start: 2023-10-09

## 2023-10-09 NOTE — PROGRESS NOTES
Subjective:      Patient ID: Mikhail Koroma is a 68 y.o. male. CC: Patient presents for hospital follow-up. HPI pt is here for a hospital follow up. Pt was seen at [facility] ER due to being dehydrated and urinary tract infection. Patient's wife states he became very confused the day after they were trying to a straight cath procedure in the office. Ultimately Baig catheter was replaced. He has to have bladder urodynamic studies performed in the near future. He was very confused according to wife and it took him several days before the confusion resolved. He is on antibiotic therapy at this time. Creatinine increased to 1.76 and he was treated with IV fluid rehydration and since that time has been eating and drinking well. Patient has been off the furosemide medication after last laboratory testing which demonstrated creatinine of 1.57.     Review of Systems  Patient Active Problem List   Diagnosis    Dupuytren's contracture    Dementia due to Parkinson's disease without behavioral disturbance (HCC)    Carpal tunnel syndrome    Lesion of ulnar nerve    SVT (supraventricular tachycardia)    PAF (paroxysmal atrial fibrillation) (HCC)    Typical atrial flutter (HCC)    Arthralgia of right hip    Coronary artery disease involving native heart without angina pectoris    Hypercholesteremia    Allergic rhinitis    BPH with obstruction/lower urinary tract symptoms    Tachycardia-bradycardia syndrome (HCC)    PLMD (periodic limb movement disorder)    Bilateral hydronephrosis    Parsonage-Ho syndrome    Post herpetic neuralgia    Benign essential HTN    Arterial ischemic stroke, ICA, left, acute ([address])    Anemia    Parkinson's disease    CVA (cerebrovascular accident due to intracerebral hemorrhage) ([address])    Injury of right shoulder    Neurogenic bladder    Hypomagnesemia       Outpatient Medications Marked as Taking for the 10/9/23 encounter (Office Visit) with Selena Padron MD   Medication Sig

## 2023-10-12 ENCOUNTER — OFFICE VISIT (OUTPATIENT)
Dept: FAMILY MEDICINE CLINIC | Age: 73
End: 2023-10-12

## 2023-10-12 ENCOUNTER — HOSPITAL ENCOUNTER (OUTPATIENT)
Age: 73
Discharge: HOME | End: 2023-10-12
Payer: MEDICARE

## 2023-10-12 VITALS
SYSTOLIC BLOOD PRESSURE: 98 MMHG | RESPIRATION RATE: 12 BRPM | OXYGEN SATURATION: 98 % | BODY MASS INDEX: 22.24 KG/M2 | HEART RATE: 56 BPM | WEIGHT: 164 LBS | DIASTOLIC BLOOD PRESSURE: 64 MMHG | TEMPERATURE: 97.5 F

## 2023-10-12 DIAGNOSIS — I10 BENIGN ESSENTIAL HTN: ICD-10-CM

## 2023-10-12 DIAGNOSIS — G20.B1 PARKINSON'S DISEASE WITH DYSKINESIA WITHOUT FLUCTUATING MANIFESTATIONS: ICD-10-CM

## 2023-10-12 DIAGNOSIS — N17.9 ACUTE RENAL FAILURE, UNSPECIFIED ACUTE RENAL FAILURE TYPE (HCC): ICD-10-CM

## 2023-10-12 DIAGNOSIS — N31.9 NEUROGENIC BLADDER: ICD-10-CM

## 2023-10-12 DIAGNOSIS — Z00.00 MEDICARE ANNUAL WELLNESS VISIT, SUBSEQUENT: Primary | ICD-10-CM

## 2023-10-12 DIAGNOSIS — Z12.11 COLON CANCER SCREENING: ICD-10-CM

## 2023-10-12 DIAGNOSIS — F32.A DEPRESSION, UNSPECIFIED DEPRESSION TYPE: ICD-10-CM

## 2023-10-12 DIAGNOSIS — D63.8 ANEMIA OF CHRONIC DISEASE: ICD-10-CM

## 2023-10-12 PROBLEM — D64.9 ANEMIA: Status: RESOLVED | Noted: 2023-08-09 | Resolved: 2023-10-12

## 2023-10-12 PROBLEM — S49.91XA INJURY OF RIGHT SHOULDER: Status: RESOLVED | Noted: 2023-09-12 | Resolved: 2023-10-12

## 2023-10-12 LAB
ANION GAP SERPL CALCULATED.3IONS-SCNC: 9 MMOL/L (ref 3–16)
BUN SERPL-MCNC: 19 MG/DL (ref 7–20)
CALCIUM SERPL-MCNC: 8.9 MG/DL (ref 8.3–10.6)
CHLORIDE SERPL-SCNC: 103 MMOL/L (ref 99–110)
CO2 SERPL-SCNC: 25 MMOL/L (ref 21–32)
CREAT SERPL-MCNC: 1.4 MG/DL (ref 0.8–1.3)
GFR SERPLBLD CREATININE-BSD FMLA CKD-EPI: 53 ML/MIN/{1.73_M2}
GLUCOSE SERPL-MCNC: 95 MG/DL (ref 70–99)
POTASSIUM SERPL-SCNC: 4.9 MMOL/L (ref 3.5–5.1)
SODIUM SERPL-SCNC: 137 MMOL/L (ref 136–145)

## 2023-10-12 PROCEDURE — 36415 COLL VENOUS BLD VENIPUNCTURE: CPT

## 2023-10-12 PROCEDURE — 80048 BASIC METABOLIC PNL TOTAL CA: CPT

## 2023-10-12 RX ORDER — SERTRALINE HYDROCHLORIDE 100 MG/1
100 TABLET, FILM COATED ORAL NIGHTLY
Qty: 90 TABLET | Refills: 1 | Status: SHIPPED | OUTPATIENT
Start: 2023-10-12

## 2023-10-12 ASSESSMENT — PATIENT HEALTH QUESTIONNAIRE - PHQ9
SUM OF ALL RESPONSES TO PHQ QUESTIONS 1-9: 0
9. THOUGHTS THAT YOU WOULD BE BETTER OFF DEAD, OR OF HURTING YOURSELF: 0
7. TROUBLE CONCENTRATING ON THINGS, SUCH AS READING THE NEWSPAPER OR WATCHING TELEVISION: 0
5. POOR APPETITE OR OVEREATING: 0
8. MOVING OR SPEAKING SO SLOWLY THAT OTHER PEOPLE COULD HAVE NOTICED. OR THE OPPOSITE, BEING SO FIGETY OR RESTLESS THAT YOU HAVE BEEN MOVING AROUND A LOT MORE THAN USUAL: 0
SUM OF ALL RESPONSES TO PHQ9 QUESTIONS 1 & 2: 0
4. FEELING TIRED OR HAVING LITTLE ENERGY: 0
3. TROUBLE FALLING OR STAYING ASLEEP: 0
2. FEELING DOWN, DEPRESSED OR HOPELESS: 0
SUM OF ALL RESPONSES TO PHQ QUESTIONS 1-9: 0
SUM OF ALL RESPONSES TO PHQ QUESTIONS 1-9: 0
6. FEELING BAD ABOUT YOURSELF - OR THAT YOU ARE A FAILURE OR HAVE LET YOURSELF OR YOUR FAMILY DOWN: 0
1. LITTLE INTEREST OR PLEASURE IN DOING THINGS: 0
SUM OF ALL RESPONSES TO PHQ QUESTIONS 1-9: 0
10. IF YOU CHECKED OFF ANY PROBLEMS, HOW DIFFICULT HAVE THESE PROBLEMS MADE IT FOR YOU TO DO YOUR WORK, TAKE CARE OF THINGS AT HOME, OR GET ALONG WITH OTHER PEOPLE: 0

## 2023-10-12 NOTE — PROGRESS NOTES
Medicare Annual Wellness Visit    Brigido Stinson is here for No chief complaint on file. Assessment & Plan   Medicare annual wellness visit, subsequent  Colon cancer screening  -     AFL - Laverne Dorman MD, Gastroenterology, [facility]  Depression, unspecified depression type  -     sertraline (ZOLOFT) 100 MG tablet; Take 1 tablet by mouth nightly, Disp-90 tablet, R-1DX Code Needed  . Normal  Benign essential HTN  -     Basic Metabolic Panel; Future  Neurogenic bladder  Anemia of chronic disease  Parkinson's disease with dyskinesia without fluctuating manifestations    Recommendations for Preventive Services Due: see orders and patient instructions/AVS.  Recommended screening schedule for the next 5-10 years is provided to the patient in written form: see Patient Instructions/AVS.     Return in about 3 months (around 1/12/2024). Subjective   The following acute and/or chronic problems were also addressed today:  Patient presents for annual Medicare visit and follow-up on chronic health issues in accompaniment of his wife. Patient has done much better ever since the last treatment for urinary tract fraction. He has completed antibiotic therapy. He did have urodynamic studies performed yesterday but the Baig catheter remains placed. Patient continues under care of neurology regards to Parkinson disease. His wife was concerned about the anemia that was discussed at his last hospitalization but his lab studies are consistent with anemia chronic disease. He feels her depression symptoms are controlled with the sertraline at 100 mg recommending that dosage. Diagnoses and all orders for this visit:    Medicare annual wellness visit, subsequent    Colon cancer screening  -     AFL - Laverne Dorman MD, Gastroenterology, [facility]    Depression, unspecified depression type  -     sertraline (ZOLOFT) 100 MG tablet;  Take 1 tablet by mouth nightly    Benign essential HTN  -     Basic Metabolic

## 2023-10-12 NOTE — PATIENT INSTRUCTIONS
appropriate. After reviewing your medical record and screening and assessments performed today your provider may have ordered immunizations, labs, imaging, and/or referrals for you. A list of these orders (if applicable) as well as your Preventive Care list are included within your After Visit Summary for your review. Other Preventive Recommendations:    A preventive eye exam performed by an eye specialist is recommended every 1-2 years to screen for glaucoma; cataracts, macular degeneration, and other eye disorders. A preventive dental visit is recommended every 6 months. Try to get at least 150 minutes of exercise per week or 10,000 steps per day on a pedometer . Order or download the FREE \"Exercise & Physical Activity: Your Everyday Guide\" from [employer]. Call 6-365.761.5176 or search [employer] online. You need 0192-6548 mg of calcium and 5763-0428 IU of vitamin D per day. It is possible to meet your calcium requirement with diet alone, but a vitamin D supplement is usually necessary to meet this goal.  When exposed to the sun, use a sunscreen that protects against both UVA and UVB radiation with an SPF of 30 or greater. Reapply every 2 to 3 hours or after sweating, drying off with a towel, or swimming. Always wear a seat belt when traveling in a car. Always wear a helmet when riding a bicycle or motorcycle.

## 2023-10-26 DIAGNOSIS — G20.A1 PARKINSON'S DISEASE: ICD-10-CM

## 2023-10-26 DIAGNOSIS — R25.1 TREMOR: ICD-10-CM

## 2023-10-26 RX ORDER — ROPINIROLE 0.25 MG/1
TABLET, FILM COATED ORAL
Qty: 270 TABLET | Refills: 1 | OUTPATIENT
Start: 2023-10-26

## 2023-10-31 ENCOUNTER — TELEPHONE (OUTPATIENT)
Dept: FAMILY MEDICINE CLINIC | Age: 73
End: 2023-10-31

## 2023-11-21 ENCOUNTER — TELEPHONE (OUTPATIENT)
Dept: FAMILY MEDICINE CLINIC | Age: 73
End: 2023-11-21

## 2023-11-21 NOTE — TELEPHONE ENCOUNTER
Javier Anderson from [facility] called to requested a 1 week extension for Speech Therapy. Patient canceled his appointment for today due to being to tired after being up all night urinating after having catheter removed. She is in need of a verbal order to extend Speech Therapy x 1 week. Her call back number is [phone]. Please advise.

## 2023-12-25 DIAGNOSIS — R25.1 TREMOR: ICD-10-CM

## 2023-12-25 DIAGNOSIS — G20.A1 PARKINSON'S DISEASE: ICD-10-CM

## 2024-02-05 ENCOUNTER — HOSPITAL ENCOUNTER (OUTPATIENT)
Age: 74
Discharge: HOME | End: 2024-02-05
Payer: MEDICARE

## 2024-02-05 DIAGNOSIS — N17.9 ACUTE RENAL FAILURE, UNSPECIFIED ACUTE RENAL FAILURE TYPE (HCC): ICD-10-CM

## 2024-02-05 LAB
DEPRECATED RDW RBC AUTO: 14 % (ref 12.4–15.4)
HCT VFR BLD AUTO: 38.3 % (ref 40.5–52.5)
HGB BLD-MCNC: 12.7 G/DL (ref 13.5–17.5)
MAGNESIUM SERPL-MCNC: 2.3 MG/DL (ref 1.8–2.4)
MCH RBC QN AUTO: 30 PG (ref 26–34)
MCHC RBC AUTO-ENTMCNC: 33.1 G/DL (ref 31–36)
MCV RBC AUTO: 90.7 FL (ref 80–100)
PLATELET # BLD AUTO: 252 K/UL (ref 135–450)
PMV BLD AUTO: 8.5 FL (ref 5–10.5)
RBC # BLD AUTO: 4.22 M/UL (ref 4.2–5.9)
WBC # BLD AUTO: 7.5 K/UL (ref 4–11)

## 2024-02-05 PROCEDURE — 85027 COMPLETE CBC AUTOMATED: CPT

## 2024-02-05 PROCEDURE — 36415 COLL VENOUS BLD VENIPUNCTURE: CPT

## 2024-02-05 PROCEDURE — 83735 ASSAY OF MAGNESIUM: CPT

## 2024-02-06 ENCOUNTER — OFFICE VISIT (OUTPATIENT)
Dept: FAMILY MEDICINE CLINIC | Age: 74
End: 2024-02-06

## 2024-02-06 VITALS
BODY MASS INDEX: 23.48 KG/M2 | HEART RATE: 70 BPM | DIASTOLIC BLOOD PRESSURE: 68 MMHG | SYSTOLIC BLOOD PRESSURE: 102 MMHG | WEIGHT: 173.13 LBS | OXYGEN SATURATION: 92 % | TEMPERATURE: 97.5 F | RESPIRATION RATE: 12 BRPM

## 2024-02-06 DIAGNOSIS — N40.1 BPH WITH OBSTRUCTION/LOWER URINARY TRACT SYMPTOMS: ICD-10-CM

## 2024-02-06 DIAGNOSIS — E78.00 HYPERCHOLESTEREMIA: Primary | Chronic | ICD-10-CM

## 2024-02-06 DIAGNOSIS — G20.A1 MODERATE DEMENTIA DUE TO PARKINSON'S DISEASE, WITHOUT BEHAVIORAL DISTURBANCE, PSYCHOTIC DISTURBANCE, MOOD DISTURBANCE, OR ANXIETY (HCC): ICD-10-CM

## 2024-02-06 DIAGNOSIS — I49.5 TACHYCARDIA-BRADYCARDIA SYNDROME (HCC): ICD-10-CM

## 2024-02-06 DIAGNOSIS — F32.0 CURRENT MILD EPISODE OF MAJOR DEPRESSIVE DISORDER WITHOUT PRIOR EPISODE (HCC): ICD-10-CM

## 2024-02-06 DIAGNOSIS — N13.8 BPH WITH OBSTRUCTION/LOWER URINARY TRACT SYMPTOMS: ICD-10-CM

## 2024-02-06 DIAGNOSIS — Z95.0 CARDIAC PACEMAKER IN SITU: ICD-10-CM

## 2024-02-06 DIAGNOSIS — N13.30 BILATERAL HYDRONEPHROSIS: ICD-10-CM

## 2024-02-06 DIAGNOSIS — F02.B0 MODERATE DEMENTIA DUE TO PARKINSON'S DISEASE, WITHOUT BEHAVIORAL DISTURBANCE, PSYCHOTIC DISTURBANCE, MOOD DISTURBANCE, OR ANXIETY (HCC): ICD-10-CM

## 2024-02-06 DIAGNOSIS — N18.31 CHRONIC RENAL FAILURE, STAGE 3A (HCC): ICD-10-CM

## 2024-02-06 DIAGNOSIS — G20.B1 PARKINSON'S DISEASE WITH DYSKINESIA WITHOUT FLUCTUATING MANIFESTATIONS: ICD-10-CM

## 2024-02-06 DIAGNOSIS — I48.91 ATRIAL FIBRILLATION, UNSPECIFIED TYPE (HCC): ICD-10-CM

## 2024-02-06 DIAGNOSIS — N31.9 NEUROGENIC BLADDER: ICD-10-CM

## 2024-02-06 RX ORDER — LANOLIN ALCOHOL/MO/W.PET/CERES
400 CREAM (GRAM) TOPICAL DAILY
Qty: 60 TABLET | Refills: 4 | Status: SHIPPED | OUTPATIENT
Start: 2024-02-06

## 2024-02-06 ASSESSMENT — PATIENT HEALTH QUESTIONNAIRE - PHQ9: DEPRESSION UNABLE TO ASSESS: FUNCTIONAL CAPACITY MOTIVATION LIMITS ACCURACY

## 2024-02-06 NOTE — PROGRESS NOTES
with obstruction/lower urinary tract symptoms    Bilateral hydronephrosis    Cardiac pacemaker in situ    Current mild episode of major depressive disorder without prior episode (HCC)    Other orders  -     magnesium oxide (MAG-OX) 400 (240 Mg) MG tablet; Take 1 tablet by mouth daily            Plan:      Proceed with laboratory testing.  Adjustments of medication will be done after laboratory testing results available.    Specialist notes and hospital information reviewed with patient and wife.    Clinically he is doing much better with depression and overall health from last visit.    Patient received counseling on the following healthy behaviors: nutrition and exercise     Patient given educational materials     Health maintenance updated    Discussed use, benefit, and side effects of prescribed medications.  Barriers to medication compliance addressed.  All patient questions answered.  Pt voiced understanding.     Patient needs RTC in 4 months.    Medical decision making of moderate complexity.    Please note that this chart was generated using Dragon dictation software. Although every effort was made to ensure the accuracy of this automated transcription, some errors in transcription may have occurred.

## 2024-02-09 PROBLEM — G20.A1 PARKINSON'S DISEASE: Status: RESOLVED | Noted: 2024-02-06 | Resolved: 2024-02-09

## 2024-02-09 PROBLEM — F32.0 CURRENT MILD EPISODE OF MAJOR DEPRESSIVE DISORDER (HCC): Status: ACTIVE | Noted: 2024-02-09

## 2024-02-09 PROBLEM — N13.30 BILATERAL HYDRONEPHROSIS: Status: RESOLVED | Noted: 2023-08-05 | Resolved: 2024-02-09

## 2024-02-09 PROBLEM — N18.31 CHRONIC RENAL FAILURE, STAGE 3A (HCC): Status: ACTIVE | Noted: 2024-02-09

## 2024-02-12 ENCOUNTER — TELEPHONE (OUTPATIENT)
Dept: NEUROLOGY | Age: 74
End: 2024-02-12

## 2024-02-12 DIAGNOSIS — G20.A1 PARKINSON'S DISEASE: ICD-10-CM

## 2024-02-12 DIAGNOSIS — R25.1 TREMOR: ICD-10-CM

## 2024-02-12 RX ORDER — ROPINIROLE 0.25 MG/1
TABLET, FILM COATED ORAL
Qty: 270 TABLET | Refills: 0 | Status: SHIPPED | OUTPATIENT
Start: 2024-02-12

## 2024-02-16 DIAGNOSIS — G20.A1 PARKINSON'S DISEASE: ICD-10-CM

## 2024-02-16 DIAGNOSIS — R25.1 TREMOR: ICD-10-CM

## 2024-02-16 NOTE — TELEPHONE ENCOUNTER
Last Filled: 12.26.23      Patient Phone Number: [phone] (home) [phone] (work)    Last appt: 9/12/2023   Next appt: 4/1/2024    Last OARRS:        No data to display

## 2024-04-01 ENCOUNTER — OFFICE VISIT (OUTPATIENT)
Dept: NEUROLOGY | Age: 74
End: 2024-04-01
Payer: MEDICARE

## 2024-04-01 VITALS — SYSTOLIC BLOOD PRESSURE: 100 MMHG | DIASTOLIC BLOOD PRESSURE: 59 MMHG | HEART RATE: 71 BPM

## 2024-04-01 DIAGNOSIS — G20.A1 PARKINSON'S DISEASE WITHOUT DYSKINESIA OR FLUCTUATING MANIFESTATIONS (HCC): Primary | ICD-10-CM

## 2024-04-01 DIAGNOSIS — G54.5 PARSONAGE-TURNER SYNDROME: ICD-10-CM

## 2024-04-01 DIAGNOSIS — F32.A DEPRESSION, UNSPECIFIED DEPRESSION TYPE: ICD-10-CM

## 2024-04-01 PROCEDURE — G8427 DOCREV CUR MEDS BY ELIG CLIN: HCPCS | Performed by: PSYCHIATRY & NEUROLOGY

## 2024-04-01 PROCEDURE — 3078F DIAST BP <80 MM HG: CPT | Performed by: PSYCHIATRY & NEUROLOGY

## 2024-04-01 PROCEDURE — G8420 CALC BMI NORM PARAMETERS: HCPCS | Performed by: PSYCHIATRY & NEUROLOGY

## 2024-04-01 PROCEDURE — 1036F TOBACCO NON-USER: CPT | Performed by: PSYCHIATRY & NEUROLOGY

## 2024-04-01 PROCEDURE — 3074F SYST BP LT 130 MM HG: CPT | Performed by: PSYCHIATRY & NEUROLOGY

## 2024-04-01 PROCEDURE — 99214 OFFICE O/P EST MOD 30 MIN: CPT | Performed by: PSYCHIATRY & NEUROLOGY

## 2024-04-01 PROCEDURE — 3017F COLORECTAL CA SCREEN DOC REV: CPT | Performed by: PSYCHIATRY & NEUROLOGY

## 2024-04-01 PROCEDURE — 1123F ACP DISCUSS/DSCN MKR DOCD: CPT | Performed by: PSYCHIATRY & NEUROLOGY

## 2024-04-01 RX ORDER — ROPINIROLE 0.25 MG/1
TABLET, FILM COATED ORAL
Qty: 270 TABLET | Refills: 0 | Status: SHIPPED | OUTPATIENT
Start: 2024-04-01

## 2024-04-01 NOTE — PROGRESS NOTES
Problems (any 1)    High:    [] Acute/Chronic Illness/injury posing threat to life or bodily function:    [] Severe exacerbation of chronic illness:      Moderate:    []     1 or more chronic illness with exacerbation, progression or side effect of treatment or  []     2 or more stable chronic illnesses or  []     1 acute illness with systemic symptoms       Mild:  []     1 stable chronic illness     ---------------------------------------------------------------------  B. Risk of Treatment (any 1)   [] Drugs/treatments that require intensive monitoring for toxicity include:      [] Prescription drug management  ----------------------------------------------------------------------  [] High (any 2)  [x] Moderate (any 1)    C. Data (any 2 for high and any 1 for moderate)  [x] Discussed management of the case with:    [x] Imaging personally reviewed and interpreted, includes:    [x] Data Review (any 3)  [x] Collateral history obtained from: His wife  [x] All available Consultant notes were reviewed  [x] All current labs were reviewed and interpreted for clinical significance   [] Appropriate follow-up labs were ordered    I personally reviewed and updated social history, past medical history, medications, allergy, surgical history, and family history as documented in the patient's electronic health records.       Labs and/or neuroimaging and other test results reviewed and discussed with the patient.    Reviewed notes from other physicians.    Provided patient education regarding risk, benefits and treatment options as well as adherence to medication regimen and side effect from these medications.           Assessment:   Diagnosis Orders   1. Parkinson's disease without dyskinesia or fluctuating manifestations (HCC)  Ambulatory referral to Physical Therapy    rOPINIRole (REQUIP) 0.25 MG tablet      2. Parsonage-Ho syndrome        3. Depression, unspecified depression type          Continue Sinemet the same dose

## 2024-04-01 NOTE — PATIENT INSTRUCTIONS

## 2024-05-18 DIAGNOSIS — F32.A DEPRESSION, UNSPECIFIED DEPRESSION TYPE: ICD-10-CM

## 2024-05-18 DIAGNOSIS — G20.A1 PARKINSON'S DISEASE (HCC): ICD-10-CM

## 2024-05-18 DIAGNOSIS — R25.1 TREMOR: ICD-10-CM

## 2024-05-18 DIAGNOSIS — G20.A1 PARKINSON'S DISEASE WITHOUT DYSKINESIA OR FLUCTUATING MANIFESTATIONS (HCC): ICD-10-CM

## 2024-05-20 RX ORDER — SERTRALINE HYDROCHLORIDE 100 MG/1
100 TABLET, FILM COATED ORAL NIGHTLY
Qty: 90 TABLET | Refills: 1 | Status: SHIPPED | OUTPATIENT
Start: 2024-05-20

## 2024-05-20 RX ORDER — ROPINIROLE 0.25 MG/1
TABLET, FILM COATED ORAL
Qty: 270 TABLET | Refills: 0 | Status: SHIPPED | OUTPATIENT
Start: 2024-05-20

## 2024-05-21 ENCOUNTER — HOSPITAL ENCOUNTER (OUTPATIENT)
Dept: PHYSICAL THERAPY | Age: 74
Setting detail: THERAPIES SERIES
Discharge: HOME | End: 2024-05-21
Payer: MEDICARE

## 2024-05-21 DIAGNOSIS — R29.3 ABNORMAL POSTURE: ICD-10-CM

## 2024-05-21 DIAGNOSIS — R26.89 BALANCE PROBLEMS: ICD-10-CM

## 2024-05-21 DIAGNOSIS — R26.9 GAIT DISORDER: ICD-10-CM

## 2024-05-21 DIAGNOSIS — G20.A1 PARKINSON'S DISEASE WITHOUT FLUCTUATING MANIFESTATIONS, UNSPECIFIED WHETHER DYSKINESIA PRESENT (HCC): Primary | ICD-10-CM

## 2024-05-21 PROCEDURE — 97530 THERAPEUTIC ACTIVITIES: CPT

## 2024-05-21 PROCEDURE — 97110 THERAPEUTIC EXERCISES: CPT

## 2024-05-21 PROCEDURE — 97161 PT EVAL LOW COMPLEX 20 MIN: CPT

## 2024-05-30 ENCOUNTER — HOSPITAL ENCOUNTER (OUTPATIENT)
Dept: PHYSICAL THERAPY | Age: 74
Setting detail: THERAPIES SERIES
Discharge: HOME | End: 2024-05-30
Payer: MEDICARE

## 2024-05-30 PROCEDURE — 97530 THERAPEUTIC ACTIVITIES: CPT

## 2024-05-30 NOTE — FLOWSHEET NOTE
Contacted patient in regard to patient appointment for Monday, 8/28/23 with Dr Zimmer.     Unfortunately, Dr. Zimmer will no longer be seeing patients on Mondays for an appointment for the month of June, July and August.     Patient appointment must be cancelled and reschedule to another day.     Patient must call the [phone] to reschedule appointment.     Sent text message, emails for patient to callback to reschedule appointment to another day.    patterns and AD training.   Manual Therapy (83810) as indicated to include: Soft Tissue Mobilization  Patient education on LSVT BIG    Plan: Cont POC- Continue emphasis/focus on large amp movement. Next visit plan to start LSVT formal program     Electronically Signed by Marlys Hoskins PT, DPT  Date: 05/30/2024      Note: Portions of this note have been templated and/or copied from initial evaluation, reassessments and prior notes for documentation efficiency.      Note: If patient does not return for scheduled/recommended follow up visits, this note will serve as a discharge from care along with the most recent update on progress.    Neuro Evaluation

## 2024-06-06 ENCOUNTER — APPOINTMENT (OUTPATIENT)
Dept: PHYSICAL THERAPY | Age: 74
End: 2024-06-06
Payer: MEDICARE

## 2024-06-07 ENCOUNTER — HOSPITAL ENCOUNTER (OUTPATIENT)
Age: 74
Discharge: HOME | End: 2024-06-07
Payer: MEDICARE

## 2024-06-07 ENCOUNTER — APPOINTMENT (OUTPATIENT)
Dept: PHYSICAL THERAPY | Age: 74
End: 2024-06-07
Payer: MEDICARE

## 2024-06-07 DIAGNOSIS — E78.00 HYPERCHOLESTEREMIA: Chronic | ICD-10-CM

## 2024-06-07 LAB
ALBUMIN SERPL-MCNC: 4.3 G/DL (ref 3.4–5)
ALBUMIN/GLOB SERPL: 1.5 {RATIO} (ref 1.1–2.2)
ALP SERPL-CCNC: 495 U/L (ref 40–129)
ALT SERPL-CCNC: <5 U/L (ref 10–40)
ANION GAP SERPL CALCULATED.3IONS-SCNC: 10 MMOL/L (ref 3–16)
AST SERPL-CCNC: 19 U/L (ref 15–37)
BILIRUB SERPL-MCNC: 0.4 MG/DL (ref 0–1)
BUN SERPL-MCNC: 24 MG/DL (ref 7–20)
CALCIUM SERPL-MCNC: 9.4 MG/DL (ref 8.3–10.6)
CHLORIDE SERPL-SCNC: 103 MMOL/L (ref 99–110)
CHOLEST SERPL-MCNC: 136 MG/DL (ref 0–199)
CO2 SERPL-SCNC: 27 MMOL/L (ref 21–32)
CREAT SERPL-MCNC: 0.9 MG/DL (ref 0.8–1.3)
GFR SERPLBLD CREATININE-BSD FMLA CKD-EPI: 90 ML/MIN/{1.73_M2}
GLUCOSE SERPL-MCNC: 93 MG/DL (ref 70–99)
HDLC SERPL-MCNC: 46 MG/DL (ref 40–60)
LDLC SERPL CALC-MCNC: 76 MG/DL
POTASSIUM SERPL-SCNC: 4.8 MMOL/L (ref 3.5–5.1)
PROT SERPL-MCNC: 7.2 G/DL (ref 6.4–8.2)
SODIUM SERPL-SCNC: 140 MMOL/L (ref 136–145)
TRIGL SERPL-MCNC: 72 MG/DL (ref 0–150)
VLDLC SERPL CALC-MCNC: 14 MG/DL

## 2024-06-07 PROCEDURE — 80053 COMPREHEN METABOLIC PANEL: CPT

## 2024-06-07 PROCEDURE — 80061 LIPID PANEL: CPT

## 2024-06-07 PROCEDURE — 36415 COLL VENOUS BLD VENIPUNCTURE: CPT

## 2024-06-10 ENCOUNTER — OFFICE VISIT (OUTPATIENT)
Dept: FAMILY MEDICINE CLINIC | Age: 74
End: 2024-06-10
Payer: MEDICARE

## 2024-06-10 ENCOUNTER — HOSPITAL ENCOUNTER (OUTPATIENT)
Dept: PHYSICAL THERAPY | Age: 74
Setting detail: THERAPIES SERIES
Discharge: HOME | End: 2024-06-10
Payer: MEDICARE

## 2024-06-10 VITALS
SYSTOLIC BLOOD PRESSURE: 100 MMHG | HEART RATE: 62 BPM | DIASTOLIC BLOOD PRESSURE: 68 MMHG | OXYGEN SATURATION: 96 % | WEIGHT: 171 LBS | BODY MASS INDEX: 23.19 KG/M2 | TEMPERATURE: 97.4 F | RESPIRATION RATE: 12 BRPM

## 2024-06-10 DIAGNOSIS — F02.80 DEMENTIA DUE TO PARKINSON'S DISEASE WITHOUT BEHAVIORAL DISTURBANCE (HCC): ICD-10-CM

## 2024-06-10 DIAGNOSIS — N18.31 CHRONIC RENAL FAILURE, STAGE 3A (HCC): ICD-10-CM

## 2024-06-10 DIAGNOSIS — N31.9 NEUROGENIC BLADDER: ICD-10-CM

## 2024-06-10 DIAGNOSIS — R74.8 ELEVATED LIVER ENZYMES: Primary | ICD-10-CM

## 2024-06-10 DIAGNOSIS — F32.0 CURRENT MILD EPISODE OF MAJOR DEPRESSIVE DISORDER WITHOUT PRIOR EPISODE (HCC): ICD-10-CM

## 2024-06-10 DIAGNOSIS — G20.A1 DEMENTIA DUE TO PARKINSON'S DISEASE WITHOUT BEHAVIORAL DISTURBANCE (HCC): ICD-10-CM

## 2024-06-10 PROCEDURE — 3074F SYST BP LT 130 MM HG: CPT | Performed by: FAMILY MEDICINE

## 2024-06-10 PROCEDURE — G8427 DOCREV CUR MEDS BY ELIG CLIN: HCPCS | Performed by: FAMILY MEDICINE

## 2024-06-10 PROCEDURE — G2211 COMPLEX E/M VISIT ADD ON: HCPCS | Performed by: FAMILY MEDICINE

## 2024-06-10 PROCEDURE — 97530 THERAPEUTIC ACTIVITIES: CPT

## 2024-06-10 PROCEDURE — G8420 CALC BMI NORM PARAMETERS: HCPCS | Performed by: FAMILY MEDICINE

## 2024-06-10 PROCEDURE — 1123F ACP DISCUSS/DSCN MKR DOCD: CPT | Performed by: FAMILY MEDICINE

## 2024-06-10 PROCEDURE — 97112 NEUROMUSCULAR REEDUCATION: CPT

## 2024-06-10 PROCEDURE — 3017F COLORECTAL CA SCREEN DOC REV: CPT | Performed by: FAMILY MEDICINE

## 2024-06-10 PROCEDURE — 1036F TOBACCO NON-USER: CPT | Performed by: FAMILY MEDICINE

## 2024-06-10 PROCEDURE — 99214 OFFICE O/P EST MOD 30 MIN: CPT | Performed by: FAMILY MEDICINE

## 2024-06-10 PROCEDURE — 3078F DIAST BP <80 MM HG: CPT | Performed by: FAMILY MEDICINE

## 2024-06-10 RX ORDER — LANOLIN ALCOHOL/MO/W.PET/CERES
400 CREAM (GRAM) TOPICAL DAILY
Qty: 60 TABLET | Refills: 4 | Status: SHIPPED | OUTPATIENT
Start: 2024-06-10

## 2024-06-10 SDOH — ECONOMIC STABILITY: FOOD INSECURITY: WITHIN THE PAST 12 MONTHS, THE FOOD YOU BOUGHT JUST DIDN'T LAST AND YOU DIDN'T HAVE MONEY TO GET MORE.: NEVER TRUE

## 2024-06-10 SDOH — ECONOMIC STABILITY: FOOD INSECURITY: WITHIN THE PAST 12 MONTHS, YOU WORRIED THAT YOUR FOOD WOULD RUN OUT BEFORE YOU GOT MONEY TO BUY MORE.: NEVER TRUE

## 2024-06-10 SDOH — ECONOMIC STABILITY: INCOME INSECURITY: HOW HARD IS IT FOR YOU TO PAY FOR THE VERY BASICS LIKE FOOD, HOUSING, MEDICAL CARE, AND HEATING?: NOT HARD AT ALL

## 2024-06-10 ASSESSMENT — PATIENT HEALTH QUESTIONNAIRE - PHQ9
SUM OF ALL RESPONSES TO PHQ QUESTIONS 1-9: 16
1. LITTLE INTEREST OR PLEASURE IN DOING THINGS: NEARLY EVERY DAY
SUM OF ALL RESPONSES TO PHQ QUESTIONS 1-9: 16
2. FEELING DOWN, DEPRESSED OR HOPELESS: SEVERAL DAYS
8. MOVING OR SPEAKING SO SLOWLY THAT OTHER PEOPLE COULD HAVE NOTICED. OR THE OPPOSITE - BEING SO FIDGETY OR RESTLESS THAT YOU HAVE BEEN MOVING AROUND A LOT MORE THAN USUAL: SEVERAL DAYS
4. FEELING TIRED OR HAVING LITTLE ENERGY: NEARLY EVERY DAY
6. FEELING BAD ABOUT YOURSELF - OR THAT YOU ARE A FAILURE OR HAVE LET YOURSELF OR YOUR FAMILY DOWN: MORE THAN HALF THE DAYS
1. LITTLE INTEREST OR PLEASURE IN DOING THINGS: NEARLY EVERY DAY
9. THOUGHTS THAT YOU WOULD BE BETTER OFF DEAD, OR OF HURTING YOURSELF: NOT AT ALL
2. FEELING DOWN, DEPRESSED OR HOPELESS: SEVERAL DAYS
5. POOR APPETITE OR OVEREATING: NEARLY EVERY DAY
5. POOR APPETITE OR OVEREATING: NEARLY EVERY DAY
SUM OF ALL RESPONSES TO PHQ QUESTIONS 1-9: 16
3. TROUBLE FALLING OR STAYING ASLEEP: SEVERAL DAYS
8. MOVING OR SPEAKING SO SLOWLY THAT OTHER PEOPLE COULD HAVE NOTICED. OR THE OPPOSITE, BEING SO FIGETY OR RESTLESS THAT YOU HAVE BEEN MOVING AROUND A LOT MORE THAN USUAL: SEVERAL DAYS
3. TROUBLE FALLING OR STAYING ASLEEP: SEVERAL DAYS
SUM OF ALL RESPONSES TO PHQ9 QUESTIONS 1 & 2: 4
6. FEELING BAD ABOUT YOURSELF - OR THAT YOU ARE A FAILURE OR HAVE LET YOURSELF OR YOUR FAMILY DOWN: MORE THAN HALF THE DAYS
7. TROUBLE CONCENTRATING ON THINGS, SUCH AS READING THE NEWSPAPER OR WATCHING TELEVISION: MORE THAN HALF THE DAYS
7. TROUBLE CONCENTRATING ON THINGS, SUCH AS READING THE NEWSPAPER OR WATCHING TELEVISION: MORE THAN HALF THE DAYS
10. IF YOU CHECKED OFF ANY PROBLEMS, HOW DIFFICULT HAVE THESE PROBLEMS MADE IT FOR YOU TO DO YOUR WORK, TAKE CARE OF THINGS AT HOME, OR GET ALONG WITH OTHER PEOPLE: NOT DIFFICULT AT ALL
10. IF YOU CHECKED OFF ANY PROBLEMS, HOW DIFFICULT HAVE THESE PROBLEMS MADE IT FOR YOU TO DO YOUR WORK, TAKE CARE OF THINGS AT HOME, OR GET ALONG WITH OTHER PEOPLE: NOT DIFFICULT AT ALL
9. THOUGHTS THAT YOU WOULD BE BETTER OFF DEAD, OR OF HURTING YOURSELF: NOT AT ALL
SUM OF ALL RESPONSES TO PHQ QUESTIONS 1-9: 16
SUM OF ALL RESPONSES TO PHQ QUESTIONS 1-9: 16
4. FEELING TIRED OR HAVING LITTLE ENERGY: NEARLY EVERY DAY

## 2024-06-10 NOTE — PROGRESS NOTES
Subjective   Patient ID: Bharat Zamora is a 74 y.o. male.  CC: Patient presents for re-evaluation of chronic health problems including chronic renal failure, dementia secondary to Parkinson disease, depression and elevated liver function test..    HPI pt is here for a follow up, med refill, test results and accompaniment of wife.  Patient was reevaluated by urology recently and continues to have urinary retention.  The plan is to do a different procedure to try to evaluate the bladder issues.  Patient started on physical therapy after his last appointment with neurology and plans to continue with that at this time.  There is no change in medications from neurology standpoint.  Patient also had evaluation with cardiology and pacemaker seems to be working well.  Patient feels mentally he is doing better with the Zoloft medication in regards to depression and anxiety would like to maintain that medication.    Review of Systems     Patient Active Problem List   Diagnosis    Dupuytren's contracture    Dementia due to Parkinson's disease without behavioral disturbance (HCC)    Carpal tunnel syndrome    SVT (supraventricular tachycardia) (HCC)    PAF (paroxysmal atrial fibrillation) (HCC)    Typical atrial flutter (HCC)    Arthralgia of right hip    Coronary artery disease involving native heart without angina pectoris    Hypercholesteremia    Allergic rhinitis    BPH with obstruction/lower urinary tract symptoms    Tachycardia-bradycardia syndrome (HCC)    PLMD (periodic limb movement disorder)    Parsonage-Ho syndrome    Post herpetic neuralgia    Benign essential HTN    Arterial ischemic stroke, ICA, left, acute (HCC)    Anemia of chronic disease    Parkinson's disease with dyskinesia without fluctuating manifestations (HCC)    CVA (cerebrovascular accident due to intracerebral hemorrhage) (HCC)    Neurogenic bladder    Hypomagnesemia    Chronic renal failure, stage 3a (HCC)    Current mild episode of major

## 2024-06-10 NOTE — FLOWSHEET NOTE
impact on the need for therapy.  (Significantly impacts the rate of recovery and is associated with a primary condition.)     Prognosis/Rehab Potential: Good    Patient requires continued skilled intervention: [x] Yes  [] No      CHARGE CAPTURE     PT CHARGE GRID   CPT Code (TIMED) minutes # CPT Code (UNTIMED) #     Therex (51551)     EVAL:LOW (39864 - Typically 20 minutes face-to-face)     Neuromusc. Re-ed (17283) 60 4  Re-Eval (97765)     Manual (58284)    Estim Unattended (06990)     Ther. Act (76075) 15 1  Mech. Traction (26519)     Gait (17034)    Dry Needle 1-2 muscle (30309)     Aquatic Therex (91789)    Dry Needle 3+ muscle (91381)     Iontophoresis (31251)    VASO (31256)     Ultrasound (64259)    Group Therapy (52496)     Estim Attended (42648)    Canalith Repositioning (75930)     Other:    Other:    Total Timed Code Tx Minutes 75 5       Total Treatment Minutes 75        Charge Justification:  (17583) NEUROMUSCULAR RE-EDUCATION - Therapeutic procedure, 1 or more areas, each 15 minutes; neuromuscular reeducation of movement, balance, coordination, kinesthetic sense, posture, and/or proprioception for sitting and/or standing activities  (00065) THERAPEUTIC ACTIVITY - use of dynamic activities to improve functional performance. (Ex include squatting, ascending/descending stairs, walking, bending, lifting, catching, throwing, pushing, pulling, jumping.)  Direct, one on one contact, billed in 15-minute increments.    GOALS     Patient stated goal: improve stability and walking  [] Progressing: [] Met: [] Not Met: [] Adjusted    Therapist goals for Patient:   Short Term Goals: To be achieved in: 2 weeks   1.  Independent in HEP and progression per patient tolerance.   [] Progressing: [] Met: [] Not Met: [] Adjusted  2. Pt will improve TUG to 25 sec or less to show increased amb speed.   [] Progressing: [] Met: [] Not Met: [] Adjusted      Long Term Goals: To be achieved in: 5 weeks  1. Pt will improve ABC

## 2024-06-11 ENCOUNTER — HOSPITAL ENCOUNTER (OUTPATIENT)
Dept: PHYSICAL THERAPY | Age: 74
Setting detail: THERAPIES SERIES
Discharge: HOME | End: 2024-06-11
Payer: MEDICARE

## 2024-06-11 PROBLEM — G20.A1 PARKINSON'S DISEASE (HCC): Status: RESOLVED | Noted: 2024-04-01 | Resolved: 2024-06-11

## 2024-06-11 PROCEDURE — 97530 THERAPEUTIC ACTIVITIES: CPT

## 2024-06-11 PROCEDURE — 97112 NEUROMUSCULAR REEDUCATION: CPT

## 2024-06-11 PROCEDURE — 97116 GAIT TRAINING THERAPY: CPT

## 2024-06-11 NOTE — FLOWSHEET NOTE
[facility] - Outpatient Rehabilitation and Therapy [address], [place] [address] office: [phone] fax: [phone]         Physical Therapy: TREATMENT/PROGRESS NOTE   Patient: Bharat Zamora (74 y.o. male)   Examination Date: 2024   :  1950 MRN: [id number]   Visit #:   Insurance Allowable Auth Needed   Med nec []Yes    [x]No    Insurance: Payor: MEDICARE / Plan: MEDICARE PART A AND B / Product Type: *No Product type* /   Insurance ID: [id number] - (Medicare)  Secondary Insurance (if applicable): [employer]   Treatment Diagnosis:     ICD-10-CM    1. Parkinson's disease without fluctuating manifestations, unspecified whether dyskinesia present (HCC)  G20.A1       2. Gait disorder  R26.9       3. Balance problems  R26.89       4. Abnormal posture  R29.3          Medical Diagnosis:  Parkinson's disease without dyskinesia, without mention of fluctuations [G20.A1]   Referring Physician: Ernesto Walker MD  PCP: Ismael Taylor MD       Plan of care signed (Y/N): Y, sent     Date of Patient follow up with Physician: ?     Progress Report/POC: NO  POC update due: (10 visits /OR AUTH LIMITS, whichever is less) visit  or  2024                                             Precautions/ Contra-indications:           Latex allergy:  NO  Pacemaker:    YES  Contraindications for Manipulation: None  Date of Surgery: none  Other:    Red Flags:  None    C-SSRS Triggered by Intake questionnaire:   Patient answered 'NO' to both behavioral questions on intake.  No further screening warranted    Preferred Language for Healthcare:   [x] English       [] other:    SUBJECTIVE EXAMINATION     Patient stated complaint/comment: Pt reports he did do MDEs at home once after last session. Wife is helping.         Test used Initial score  2024   Pain Summary VAS 0 0   Functional questionnaire Activities-Specific Balance Confidence  Scale See media

## 2024-06-13 ENCOUNTER — HOSPITAL ENCOUNTER (OUTPATIENT)
Dept: PHYSICAL THERAPY | Age: 74
Setting detail: THERAPIES SERIES
Discharge: HOME | End: 2024-06-13
Payer: MEDICARE

## 2024-06-13 PROCEDURE — 97116 GAIT TRAINING THERAPY: CPT

## 2024-06-13 PROCEDURE — 97112 NEUROMUSCULAR REEDUCATION: CPT

## 2024-06-13 PROCEDURE — 97530 THERAPEUTIC ACTIVITIES: CPT

## 2024-06-13 NOTE — FLOWSHEET NOTE
[facility] - Outpatient Rehabilitation and Therapy [address], [place] [address] office: [phone] fax: [phone]         Physical Therapy: TREATMENT/PROGRESS NOTE   Patient: Bharat Zamora (74 y.o. male)   Examination Date: 2024   :  1950 MRN: [id number]   Visit #:   Insurance Allowable Auth Needed   Med nec []Yes    [x]No    Insurance: Payor: MEDICARE / Plan: MEDICARE PART A AND B / Product Type: *No Product type* /   Insurance ID: [id number] - (Medicare)  Secondary Insurance (if applicable): [employer]   Treatment Diagnosis:     ICD-10-CM    1. Parkinson's disease without fluctuating manifestations, unspecified whether dyskinesia present (HCC)  G20.A1       2. Gait disorder  R26.9       3. Balance problems  R26.89       4. Abnormal posture  R29.3          Medical Diagnosis:  Parkinson's disease without dyskinesia, without mention of fluctuations [G20.A1]   Referring Physician: Ernesto Walker MD  PCP: Ismael Taylor MD       Plan of care signed (Y/N): Y, sent     Date of Patient follow up with Physician: ?     Progress Report/POC: NO  POC update due: (10 visits /OR AUTH LIMITS, whichever is less) visit  or  2024                                             Precautions/ Contra-indications:           Latex allergy:  NO  Pacemaker:    YES  Contraindications for Manipulation: None  Date of Surgery: none  Other:    Red Flags:  None    C-SSRS Triggered by Intake questionnaire:   Patient answered 'NO' to both behavioral questions on intake.  No further screening warranted    Preferred Language for Healthcare:   [x] English       [] other:    SUBJECTIVE EXAMINATION     Patient stated complaint/comment: Pt reports he got a ultrasound on his kidneys earlier today. Was able to do his exercises once yesterday on his off day.        Test used Initial score  2024   Pain Summary VAS 0 0   Functional questionnaire Activities-Specific

## 2024-06-14 ENCOUNTER — HOSPITAL ENCOUNTER (OUTPATIENT)
Dept: PHYSICAL THERAPY | Age: 74
Setting detail: THERAPIES SERIES
Discharge: HOME | End: 2024-06-14
Payer: MEDICARE

## 2024-06-14 PROCEDURE — 97530 THERAPEUTIC ACTIVITIES: CPT

## 2024-06-14 PROCEDURE — 97112 NEUROMUSCULAR REEDUCATION: CPT

## 2024-06-14 PROCEDURE — 97116 GAIT TRAINING THERAPY: CPT

## 2024-06-14 NOTE — FLOWSHEET NOTE
[facility] - Outpatient Rehabilitation and Therapy [address], [place] [address] office: [phone] fax: [phone]         Physical Therapy: TREATMENT/PROGRESS NOTE   Patient: Bharat Zamora (74 y.o. male)   Examination Date: 2024   :  1950 MRN: [id number]   Visit #:   Insurance Allowable Auth Needed   Med nec []Yes    [x]No    Insurance: Payor: MEDICARE / Plan: MEDICARE PART A AND B / Product Type: *No Product type* /   Insurance ID: [id number] - (Medicare)  Secondary Insurance (if applicable): [employer]   Treatment Diagnosis:     ICD-10-CM    1. Parkinson's disease without fluctuating manifestations, unspecified whether dyskinesia present (HCC)  G20.A1       2. Gait disorder  R26.9       3. Balance problems  R26.89       4. Abnormal posture  R29.3          Medical Diagnosis:  Parkinson's disease without dyskinesia, without mention of fluctuations [G20.A1]   Referring Physician: Ernesto Walker MD  PCP: Ismael Taylor MD       Plan of care signed (Y/N): Y, sent     Date of Patient follow up with Physician: ?     Progress Report/POC: NO  POC update due: (10 visits /OR AUTH LIMITS, whichever is less) visit  or  2024                                             Precautions/ Contra-indications:           Latex allergy:  NO  Pacemaker:    YES  Contraindications for Manipulation: None  Date of Surgery: none  Other:    Red Flags:  None    C-SSRS Triggered by Intake questionnaire:   Patient answered 'NO' to both behavioral questions on intake.  No further screening warranted    Preferred Language for Healthcare:   [x] English       [] other:    SUBJECTIVE EXAMINATION     Patient stated complaint/comment: Pt reports he felt okay after last visit. Has  telehealth visit with urologist on Monday.        Test used Initial score  2024   Pain Summary VAS 0 0   Functional questionnaire Activities-Specific Balance Confidence  Scale See

## 2024-06-17 ENCOUNTER — HOSPITAL ENCOUNTER (OUTPATIENT)
Dept: PHYSICAL THERAPY | Age: 74
Setting detail: THERAPIES SERIES
Discharge: HOME | End: 2024-06-17
Payer: MEDICARE

## 2024-06-17 PROCEDURE — 97112 NEUROMUSCULAR REEDUCATION: CPT

## 2024-06-17 PROCEDURE — 97530 THERAPEUTIC ACTIVITIES: CPT

## 2024-06-17 NOTE — FLOWSHEET NOTE
contact, billed in 15-minute increments.    GOALS     Patient stated goal: improve stability and walking  [] Progressing: [] Met: [] Not Met: [] Adjusted    Therapist goals for Patient:   Short Term Goals: To be achieved in: 2 weeks   1.  Independent in HEP and progression per patient tolerance.   [] Progressing: [] Met: [] Not Met: [] Adjusted  2. Pt will improve TUG to 25 sec or less to show increased amb speed.   [] Progressing: [] Met: [] Not Met: [] Adjusted      Long Term Goals: To be achieved in: 5 weeks  1. Pt will improve ABC score by 20% score to demo improved confidence with functional tasks.   [] Progressing: [] Met: [] Not Met: [] Adjusted  2. Pt to negotiate up/down 12 stairs with WNL w/ 0-1 handrail, reciprocal pattern, no falter Supervision   [] Progressing: [] Met: [] Not Met: [] Adjusted  3. Pt to ambulate 240 ft or more with use of no AD and ability to demo BIG pattern.   [] Progressing: [] Met: [] Not Met: [] Adjusted  4. Pt will improve 30 sec STS to 10 reps or more to demo increased endurance in LEs.  [] Progressing: [] Met: [] Not Met: [] Adjusted    Overall Progression Towards Functional goals/ Treatment Progress Update:  [] Patient is progressing as expected towards functional goals listed.    [] Progression is slowed due to complexities/Impairments listed.  [] Progression has been slowed due to co-morbidities.  [x] Plan just implemented, too soon (<30days) to assess goals progression   [] Goals require adjustment due to lack of progress  [] Patient is not progressing as expected and requires additional follow up with physician  [] Other:     TREATMENT PLAN     Frequency/Duration: 4x/week for 4 weeks for the following treatment interventions:    Interventions:  Therapeutic Exercise (39711) including: strength training, ROM, and functional mobility  Therapeutic Activities (30987) including: functional mobility training and education.  Neuromuscular Re-education (54688) activation and

## 2024-06-18 ENCOUNTER — HOSPITAL ENCOUNTER (OUTPATIENT)
Dept: PHYSICAL THERAPY | Age: 74
Setting detail: THERAPIES SERIES
Discharge: HOME | End: 2024-06-18
Payer: MEDICARE

## 2024-06-18 PROCEDURE — 97530 THERAPEUTIC ACTIVITIES: CPT

## 2024-06-18 PROCEDURE — 97112 NEUROMUSCULAR REEDUCATION: CPT

## 2024-06-18 NOTE — FLOWSHEET NOTE
[facility] - Outpatient Rehabilitation and Therapy [address], [place] [address] office: [phone] fax: [phone]         Physical Therapy: TREATMENT/PROGRESS NOTE   Patient: Bharat Zamora (74 y.o. male)   Examination Date: 2024   :  1950 MRN: [id number]   Visit #:   Insurance Allowable Auth Needed   Med nec []Yes    [x]No    Insurance: Payor: MEDICARE / Plan: MEDICARE PART A AND B / Product Type: *No Product type* /   Insurance ID: [id number] - (Medicare)  Secondary Insurance (if applicable): [employer]   Treatment Diagnosis:     ICD-10-CM    1. Parkinson's disease without fluctuating manifestations, unspecified whether dyskinesia present (HCC)  G20.A1       2. Gait disorder  R26.9       3. Balance problems  R26.89       4. Abnormal posture  R29.3          Medical Diagnosis:  Parkinson's disease without dyskinesia, without mention of fluctuations [G20.A1]   Referring Physician: Ernesto Walker MD  PCP: Ismael Taylor MD       Plan of care signed (Y/N): Y, sent     Date of Patient follow up with Physician: ?     Progress Report/POC: NO  POC update due: (10 visits /OR AUTH LIMITS, whichever is less) visit  or  2024                                             Precautions/ Contra-indications:           Latex allergy:  NO  Pacemaker:    YES  Contraindications for Manipulation: None  Date of Surgery: none  Other:    Red Flags:  None    C-SSRS Triggered by Intake questionnaire:   Patient answered 'NO' to both behavioral questions on intake.  No further screening warranted    Preferred Language for Healthcare:   [x] English       [] other:    SUBJECTIVE EXAMINATION     Patient stated complaint/comment:   Pt is doing well at home. Did do exercises last night after therapy. Gets confused on counting because they count backwards on the video.        Test used Initial score  2024   Pain Summary VAS 0 0/10   Functional

## 2024-06-20 ENCOUNTER — HOSPITAL ENCOUNTER (OUTPATIENT)
Dept: PHYSICAL THERAPY | Age: 74
Setting detail: THERAPIES SERIES
Discharge: HOME | End: 2024-06-20
Payer: MEDICARE

## 2024-06-20 PROCEDURE — 97530 THERAPEUTIC ACTIVITIES: CPT

## 2024-06-20 PROCEDURE — 97112 NEUROMUSCULAR REEDUCATION: CPT

## 2024-06-20 NOTE — FLOWSHEET NOTE
[facility] - Outpatient Rehabilitation and Therapy [address], [place] [address] office: [phone] fax: [phone]         Physical Therapy: TREATMENT/PROGRESS NOTE   Patient: Bharat Zamora (74 y.o. male)   Examination Date: 2024   :  1950 MRN: [id number]   Visit #:   Insurance Allowable Auth Needed   Med nec []Yes    [x]No    Insurance: Payor: MEDICARE / Plan: MEDICARE PART A AND B / Product Type: *No Product type* /   Insurance ID: [id number] - (Medicare)  Secondary Insurance (if applicable): MEDICAL M[employer]UAL   Treatment Diagnosis:     ICD-10-CM    1. Parkinson's disease without fluctuating manifestations, unspecified whether dyskinesia present (HCC)  G20.A1       2. Gait disorder  R26.9       3. Balance problems  R26.89       4. Abnormal posture  R29.3          Medical Diagnosis:  Parkinson's disease without dyskinesia, without mention of fluctuations [G20.A1]   Referring Physician: Ernesto Walker MD  PCP: Ismael Taylor MD       Plan of care signed (Y/N): Y, sent     Date of Patient follow up with Physician: ?     Progress Report/POC: NO  POC update due: (10 visits /OR AUTH LIMITS, whichever is less) visit  or  2024                                             Precautions/ Contra-indications:           Latex allergy:  NO  Pacemaker:    YES  Contraindications for Manipulation: None  Date of Surgery: none  Other:    Red Flags:  None    C-SSRS Triggered by Intake questionnaire:   Patient answered 'NO' to both behavioral questions on intake.  No further screening warranted    Preferred Language for Healthcare:   [x] English       [] other:    SUBJECTIVE EXAMINATION     Patient stated complaint/comment:   Pt is having a scope done on Tuesday of next week - need to change to Wednesday apt here. Did do exercises twice yesterday. Put his shirt on 3 times yesterday too. Wife reports he seems more confused today.        Test used Initial

## 2024-06-21 ENCOUNTER — HOSPITAL ENCOUNTER (OUTPATIENT)
Dept: PHYSICAL THERAPY | Age: 74
Setting detail: THERAPIES SERIES
Discharge: HOME | End: 2024-06-21
Payer: MEDICARE

## 2024-06-21 PROCEDURE — 97530 THERAPEUTIC ACTIVITIES: CPT

## 2024-06-21 PROCEDURE — 97112 NEUROMUSCULAR REEDUCATION: CPT

## 2024-06-21 NOTE — PLAN OF CARE
[facility] - Outpatient Rehabilitation and Therapy [address], [place] [address] office: [phone] fax: [phone]     Physical Therapy Re-Certification Plan of Care    Dear Ernesto Walker MD  ,    We had the pleasure of treating the following patient for physical therapy services at [facility]. A summary of our findings can be found in the updated assessment below.  This includes our plan of care.  If you have any questions or concerns regarding these findings, please do not hesitate to contact me at the office phone number checked above.  Thank you for the referral.     Physician Signature:________________________________Date:__________________  By signing above (or electronic signature), therapist's plan is approved by physician      Functional Outcome:    Test used Initial score  24   Pain Summary VAS 0 10   Functional questionnaire Activities-Specific Balance Confidence  Scale See media 44.38 / 55.62%       30\" STS:  8  TU\"      Assessment:  Bharat Zamora 1950 continues to present with functional deficits in endurance of strength, eccentric control, and muscle activation  limiting ability with managing community ambulation, walking up/down stairs, transitions between positions, and managing bed mobility .  During therapy this date, patient required visual cueing, verbal cueing, tactile cueing, and modification of technique for improving proper muscle recruitment and activation/motor control patterns, kinesthetic sense and proprioception, and improving postural awareness. Patient will continue to benefit from ongoing evaluation and advanced clinical decision from a Physical Therapist to improve neuromuscular control, balance and proprioception, and proper body mechanics to safely return to OF without symptoms or restrictions.    Overall Response to Treatment:  Patient is responding well to treatment and

## 2024-06-24 ENCOUNTER — HOSPITAL ENCOUNTER (OUTPATIENT)
Dept: PHYSICAL THERAPY | Age: 74
Setting detail: THERAPIES SERIES
Discharge: HOME | End: 2024-06-24
Payer: MEDICARE

## 2024-06-24 PROCEDURE — 97116 GAIT TRAINING THERAPY: CPT

## 2024-06-24 PROCEDURE — 97112 NEUROMUSCULAR REEDUCATION: CPT

## 2024-06-24 PROCEDURE — 97530 THERAPEUTIC ACTIVITIES: CPT

## 2024-06-24 NOTE — PLAN OF CARE
Other:     TREATMENT PLAN     Frequency/Duration: 4x/week for 4 weeks for the following treatment interventions:    Interventions:  Therapeutic Exercise (39817) including: strength training, ROM, and functional mobility  Therapeutic Activities (86318) including: functional mobility training and education.  Neuromuscular Re-education (77427) activation and proprioception, including postural re-education.    Gait Training (28683) for normalization of ambulation patterns and AD training.   Manual Therapy (99773) as indicated to include: Soft Tissue Mobilization  Patient education on LSVT BIG    Plan: Cont POC- Continue emphasis/focus on static and dynamic balance, kinesthetic sense and proprioception, and improving postural awareness. Next visit plan to continue to progress LSVT Big as tolerated.     Electronically Signed by Marlys Hoskins, PT, DPT, CNS, LSVT BIG    Date: 06/24/2024      Note: Portions of this note have been templated and/or copied from initial evaluation, reassessments and prior notes for documentation efficiency.      Note: If patient does not return for scheduled/recommended follow up visits, this note will serve as a discharge from care along with the most recent update on progress.    Neuro Evaluation

## 2024-06-26 ENCOUNTER — HOSPITAL ENCOUNTER (OUTPATIENT)
Dept: PHYSICAL THERAPY | Age: 74
Setting detail: THERAPIES SERIES
Discharge: HOME | End: 2024-06-26
Payer: MEDICARE

## 2024-06-26 PROCEDURE — 97112 NEUROMUSCULAR REEDUCATION: CPT

## 2024-06-26 PROCEDURE — 97530 THERAPEUTIC ACTIVITIES: CPT

## 2024-06-26 NOTE — FLOWSHEET NOTE
[facility] - Outpatient Rehabilitation and Therapy [address], [place] [address] office: [phone] fax: [phone]         Physical Therapy: TREATMENT/PROGRESS NOTE   Patient: Bharat Zamora (74 y.o. male)   Examination Date: 2024   :  1950 MRN: [id number]   Visit #:   Insurance Allowable Auth Needed   Med nec []Yes    [x]No    Insurance: Payor: MEDICARE / Plan: MEDICARE PART A AND B / Product Type: *No Product type* /   Insurance ID: [id number] - (Medicare)  Secondary Insurance (if applicable): MED[employer]   Treatment Diagnosis:     ICD-10-CM    1. Parkinson's disease without fluctuating manifestations, unspecified whether dyskinesia present (HCC)  G20.A1       2. Gait disorder  R26.9       3. Balance problems  R26.89       4. Abnormal posture  R29.3          Medical Diagnosis:  Parkinson's disease without dyskinesia, without mention of fluctuations [G20.A1]   Referring Physician: Ernesto Walker MD  PCP: Ismael Taylor MD       Plan of care signed (Y/N): Y, sent     Date of Patient follow up with Physician: ?     Progress Report/POC: NO  POC update due: (10 visits /OR AUTH LIMITS, whichever is less) visit  or  2024                                             Precautions/ Contra-indications:           Latex allergy:  NO  Pacemaker:    YES  Contraindications for Manipulation: None  Date of Surgery: none  Other:    Red Flags:  None    C-SSRS Triggered by Intake questionnaire:   Patient answered 'NO' to both behavioral questions on intake.  No further screening warranted    Preferred Language for Healthcare:   [x] English       [] other:    SUBJECTIVE EXAMINATION     Patient stated complaint/comment:   Doing well today following procedure yesterday.  Feel urinary difficulties are due to prostate constriction and was given medications.  MDEs continue to go well at home, performing with DVD and is able to easily follow along.         Test

## 2024-06-27 ENCOUNTER — HOSPITAL ENCOUNTER (OUTPATIENT)
Dept: PHYSICAL THERAPY | Age: 74
Setting detail: THERAPIES SERIES
Discharge: HOME | End: 2024-06-27
Payer: MEDICARE

## 2024-06-27 PROCEDURE — 97530 THERAPEUTIC ACTIVITIES: CPT

## 2024-06-27 PROCEDURE — 97112 NEUROMUSCULAR REEDUCATION: CPT

## 2024-06-27 NOTE — FLOWSHEET NOTE
expected and requires additional follow up with physician  [] Other:     TREATMENT PLAN     Frequency/Duration: 4x/week for 4 weeks for the following treatment interventions:    Interventions:  Therapeutic Exercise (43485) including: strength training, ROM, and functional mobility  Therapeutic Activities (64001) including: functional mobility training and education.  Neuromuscular Re-education (38345) activation and proprioception, including postural re-education.    Gait Training (73265) for normalization of ambulation patterns and AD training.   Manual Therapy (70930) as indicated to include: Soft Tissue Mobilization  Patient education on LSVT BIG    Plan: Cont POC- Continue emphasis/focus on static and dynamic balance, kinesthetic sense and proprioception, and improving postural awareness. Next visit plan to continue to progress LSVT Big as tolerated.     Electronically Signed by Marlys Hoskins, PT, DPT, CNS, LSVT BIG    Date: 06/27/2024      Note: Portions of this note have been templated and/or copied from initial evaluation, reassessments and prior notes for documentation efficiency.      Note: If patient does not return for scheduled/recommended follow up visits, this note will serve as a discharge from care along with the most recent update on progress.    Neuro Evaluation

## 2024-06-28 ENCOUNTER — HOSPITAL ENCOUNTER (OUTPATIENT)
Dept: PHYSICAL THERAPY | Age: 74
Setting detail: THERAPIES SERIES
Discharge: HOME | End: 2024-06-28
Payer: MEDICARE

## 2024-06-28 PROCEDURE — 97530 THERAPEUTIC ACTIVITIES: CPT

## 2024-06-28 PROCEDURE — 97112 NEUROMUSCULAR REEDUCATION: CPT

## 2024-06-28 NOTE — FLOWSHEET NOTE
[facility] - Outpatient Rehabilitation and Therapy [address], [place] [address] office: [phone] fax: [phone]         Physical Therapy: TREATMENT/PROGRESS NOTE   Patient: Bharat Zamora (74 y.o. male)   Examination Date: 2024   :  1950 MRN: [id number]   Visit #:   Insurance Allowable Auth Needed   Med nec []Yes    [x]No    Insurance: Payor: MEDICARE / Plan: MEDICARE PART A AND B / Product Type: *No Product type* /   Insurance ID: [id number] - (Medicare)  Secondary Insurance (if applicable): MEDICAL M[employer]UAL   Treatment Diagnosis:     ICD-10-CM    1. Parkinson's disease without fluctuating manifestations, unspecified whether dyskinesia present (HCC)  G20.A1       2. Gait disorder  R26.9       3. Balance problems  R26.89       4. Abnormal posture  R29.3          Medical Diagnosis:  Parkinson's disease without dyskinesia, without mention of fluctuations [G20.A1]   Referring Physician: Ernesto Walker MD  PCP: Ismael Taylor MD       Plan of care signed (Y/N): Y, sent     Date of Patient follow up with Physician: ?     Progress Report/POC: NO  POC update due: (10 visits /OR AUTH LIMITS, whichever is less) visit  or 2024                                             Precautions/ Contra-indications:           Latex allergy:  NO  Pacemaker:    YES  Contraindications for Manipulation: None  Date of Surgery: none  Other:    Red Flags:  None    C-SSRS Triggered by Intake questionnaire:   Patient answered 'NO' to both behavioral questions on intake.  No further screening warranted    Preferred Language for Healthcare:   [x] English       [] other:    SUBJECTIVE EXAMINATION     Patient stated complaint/comment:      Doing okay today, everything is going well at home.  Continues to perform MDEs while watching video at home, may only watch one movement at start of each MDE, rather than throughout, to improve pt's memory of MDEs.         Test used

## 2024-07-01 ENCOUNTER — HOSPITAL ENCOUNTER (OUTPATIENT)
Age: 74
Discharge: HOME | End: 2024-07-01
Payer: MEDICARE

## 2024-07-01 ENCOUNTER — HOSPITAL ENCOUNTER (OUTPATIENT)
Dept: PHYSICAL THERAPY | Age: 74
Setting detail: THERAPIES SERIES
Discharge: HOME | End: 2024-07-01
Payer: MEDICARE

## 2024-07-01 DIAGNOSIS — R74.8 ELEVATED LIVER ENZYMES: ICD-10-CM

## 2024-07-01 LAB
ALBUMIN SERPL-MCNC: 4.3 G/DL (ref 3.4–5)
ALBUMIN/GLOB SERPL: 1.4 {RATIO} (ref 1.1–2.2)
ALP SERPL-CCNC: 561 U/L (ref 40–129)
ALT SERPL-CCNC: 6 U/L (ref 10–40)
ANION GAP SERPL CALCULATED.3IONS-SCNC: 9 MMOL/L (ref 3–16)
AST SERPL-CCNC: 23 U/L (ref 15–37)
BILIRUB SERPL-MCNC: 0.5 MG/DL (ref 0–1)
BUN SERPL-MCNC: 26 MG/DL (ref 7–20)
CALCIUM SERPL-MCNC: 9.5 MG/DL (ref 8.3–10.6)
CHLORIDE SERPL-SCNC: 105 MMOL/L (ref 99–110)
CO2 SERPL-SCNC: 28 MMOL/L (ref 21–32)
CREAT SERPL-MCNC: 1 MG/DL (ref 0.8–1.3)
GFR SERPLBLD CREATININE-BSD FMLA CKD-EPI: 79 ML/MIN/{1.73_M2}
GGT SERPL-CCNC: 20 U/L (ref 8–61)
GLUCOSE SERPL-MCNC: 79 MG/DL (ref 70–99)
POTASSIUM SERPL-SCNC: 4.6 MMOL/L (ref 3.5–5.1)
PROT SERPL-MCNC: 7.3 G/DL (ref 6.4–8.2)
SODIUM SERPL-SCNC: 142 MMOL/L (ref 136–145)

## 2024-07-01 PROCEDURE — 82977 ASSAY OF GGT: CPT

## 2024-07-01 PROCEDURE — 36415 COLL VENOUS BLD VENIPUNCTURE: CPT

## 2024-07-01 PROCEDURE — 80053 COMPREHEN METABOLIC PANEL: CPT

## 2024-07-01 PROCEDURE — 97112 NEUROMUSCULAR REEDUCATION: CPT

## 2024-07-01 PROCEDURE — 97530 THERAPEUTIC ACTIVITIES: CPT

## 2024-07-01 NOTE — FLOWSHEET NOTE
remaining sessions then DC to HEP.     Medical Necessity Documentation:  I certify that this patient meets the below criteria necessary for medical necessity for care and/or justification of therapy services:  The patient has a musculoskeletal condition(s) with a corresponding ICD-10 code that is of complexity and severity that require skilled therapeutic intervention. This has a direct and significant impact on the need for therapy and significantly impacts the rate of recovery.   The patient has a complexity identified by an ICD-10 code that has a direct and significant impact on the need for therapy.  (Significantly impacts the rate of recovery and is associated with a primary condition.)     Prognosis/Rehab Potential: Good    Patient requires continued skilled intervention: [x] Yes  [] No      CHARGE CAPTURE     PT CHARGE GRID   CPT Code (TIMED) minutes # CPT Code (UNTIMED) #     Therex (03492)     EVAL:LOW (19951 - Typically 20 minutes face-to-face)     Neuromusc. Re-ed (21521) 32 2  Re-Eval (82868)     Manual (20437)    Estim Unattended (27855)     Ther. Act (14802) 28 2  Mech. Traction (06159)     Gait (90269)    Dry Needle 1-2 muscle (89004)     Aquatic Therex (17070)    Dry Needle 3+ muscle (20561)     Iontophoresis (32699)    VASO (08200)     Ultrasound (13829)    Group Therapy (78511)     Estim Attended (79374)    Canalith Repositioning (09194)     Other:    Other:    Total Timed Code Tx Minutes 60 4       Total Treatment Minutes 60        Charge Justification:  (64086) NEUROMUSCULAR RE-EDUCATION - Therapeutic procedure, 1 or more areas, each 15 minutes; neuromuscular reeducation of movement, balance, coordination, kinesthetic sense, posture, and/or proprioception for sitting and/or standing activities  (84811) THERAPEUTIC ACTIVITY - use of dynamic activities to improve functional performance. (Ex include squatting, ascending/descending stairs, walking, bending, lifting, catching, throwing, pushing,

## 2024-07-02 ENCOUNTER — TELEPHONE (OUTPATIENT)
Dept: FAMILY MEDICINE CLINIC | Age: 74
End: 2024-07-02

## 2024-07-02 ENCOUNTER — HOSPITAL ENCOUNTER (OUTPATIENT)
Dept: PHYSICAL THERAPY | Age: 74
Setting detail: THERAPIES SERIES
Discharge: HOME | End: 2024-07-02
Payer: MEDICARE

## 2024-07-02 DIAGNOSIS — R74.8 ELEVATED ALKALINE PHOSPHATASE LEVEL: Primary | ICD-10-CM

## 2024-07-02 PROCEDURE — 97112 NEUROMUSCULAR REEDUCATION: CPT

## 2024-07-02 PROCEDURE — 97530 THERAPEUTIC ACTIVITIES: CPT

## 2024-07-02 NOTE — FLOWSHEET NOTE
more visits.       Medical Necessity Documentation:  I certify that this patient meets the below criteria necessary for medical necessity for care and/or justification of therapy services:  The patient has a musculoskeletal condition(s) with a corresponding ICD-10 code that is of complexity and severity that require skilled therapeutic intervention. This has a direct and significant impact on the need for therapy and significantly impacts the rate of recovery.   The patient has a complexity identified by an ICD-10 code that has a direct and significant impact on the need for therapy.  (Significantly impacts the rate of recovery and is associated with a primary condition.)     Prognosis/Rehab Potential: Good    Patient requires continued skilled intervention: [x] Yes  [] No      CHARGE CAPTURE     PT CHARGE GRID   CPT Code (TIMED) minutes # CPT Code (UNTIMED) #     Therex (46361)     EVAL:LOW (18673 - Typically 20 minutes face-to-face)     Neuromusc. Re-ed (91856) 38 3  Re-Eval (22833)     Manual (12413)    Estim Unattended (11897)     Ther. Act (58272) 20 1  Mech. Traction (11847)     Gait (96816)    Dry Needle 1-2 muscle (89674)     Aquatic Therex (35412)    Dry Needle 3+ muscle (20561)     Iontophoresis (81097)    VASO (58106)     Ultrasound (09250)    Group Therapy (09444)     Estim Attended (01797)    Canalith Repositioning (28565)     Other:    Other:    Total Timed Code Tx Minutes 58 4       Total Treatment Minutes 58        Charge Justification:  (25745) NEUROMUSCULAR RE-EDUCATION - Therapeutic procedure, 1 or more areas, each 15 minutes; neuromuscular reeducation of movement, balance, coordination, kinesthetic sense, posture, and/or proprioception for sitting and/or standing activities  (40199) THERAPEUTIC ACTIVITY - use of dynamic activities to improve functional performance. (Ex include squatting, ascending/descending stairs, walking, bending, lifting, catching, throwing, pushing, pulling, jumping.)

## 2024-07-02 NOTE — TELEPHONE ENCOUNTER
----- Message from Ismael Taylor MD sent at 7/2/2024  2:15 PM EDT -----  Discussed with wife the lab test report.  With the persistent alkaline phosphatase would recommend bone scan-nuclear medicine test.  Wife will  the requisition

## 2024-07-03 ENCOUNTER — HOSPITAL ENCOUNTER (OUTPATIENT)
Dept: PHYSICAL THERAPY | Age: 74
Setting detail: THERAPIES SERIES
Discharge: HOME | End: 2024-07-03
Payer: MEDICARE

## 2024-07-03 PROCEDURE — 97112 NEUROMUSCULAR REEDUCATION: CPT

## 2024-07-03 PROCEDURE — 97530 THERAPEUTIC ACTIVITIES: CPT

## 2024-07-03 NOTE — FLOWSHEET NOTE
[facility] - Outpatient Rehabilitation and Therapy [address], [place] [address] office: [phone] fax: [phone]         Physical Therapy: TREATMENT/PROGRESS NOTE   Patient: Bharat Zamora (74 y.o. male)   Examination Date: 2024   :  1950 MRN: [id number]   Visit #:   Insurance Allowable Auth Needed   Med nec []Yes    [x]No    Insurance: Payor: MEDICARE / Plan: MEDICARE PART A AND B / Product Type: *No Product type* /   Insurance ID: [id number] - (Medicare)  Secondary Insurance (if applicable): MEDICAL M[employer]UAL   Treatment Diagnosis:     ICD-10-CM    1. Parkinson's disease without fluctuating manifestations, unspecified whether dyskinesia present (HCC)  G20.A1       2. Gait disorder  R26.9       3. Balance problems  R26.89       4. Abnormal posture  R29.3          Medical Diagnosis:  Parkinson's disease without dyskinesia, without mention of fluctuations [G20.A1]   Referring Physician: Ernesto Walker MD  PCP: Ismael Taylor MD       Plan of care signed (Y/N): Y, sent     Date of Patient follow up with Physician: ?     Progress Report/POC: NO  POC update due: (10 visits /OR AUTH LIMITS, whichever is less) visit  or 2024                                             Precautions/ Contra-indications:           Latex allergy:  NO  Pacemaker:    YES  Contraindications for Manipulation: None  Date of Surgery: none  Other:    Red Flags:  None    C-SSRS Triggered by Intake questionnaire:   Patient answered 'NO' to both behavioral questions on intake.  No further screening warranted    Preferred Language for Healthcare:   [x] English       [] other:    SUBJECTIVE EXAMINATION     Patient stated complaint/comment: Pt reports he is feeling better today compared to yesterday. Having a bone test next Tuesday. Is going to take about 3 hours.        Test used Initial score  2024   Pain Summary VAS 0 1/10 0/10   Functional questionnaire

## 2024-07-05 ENCOUNTER — HOSPITAL ENCOUNTER (OUTPATIENT)
Dept: PHYSICAL THERAPY | Age: 74
Setting detail: THERAPIES SERIES
Discharge: HOME | End: 2024-07-05
Payer: MEDICARE

## 2024-07-05 PROCEDURE — 97530 THERAPEUTIC ACTIVITIES: CPT

## 2024-07-05 PROCEDURE — 97112 NEUROMUSCULAR REEDUCATION: CPT

## 2024-07-05 NOTE — FLOWSHEET NOTE
[facility] - Outpatient Rehabilitation and Therapy [address], [place] [address] office: [phone] fax: [phone]         Physical Therapy: TREATMENT/PROGRESS NOTE   Patient: Bharat Zamora (74 y.o. male)   Examination Date: 2024   :  1950 MRN: [id number]   Visit #:   Insurance Allowable Auth Needed   Med nec []Yes    [x]No    Insurance: Payor: MEDICARE / Plan: MEDICARE PART A AND B / Product Type: *No Product type* /   Insurance ID: [id number] - (Medicare)  Secondary Insurance (if applicable): MEDICAL M[employer]UAL   Treatment Diagnosis:     ICD-10-CM    1. Parkinson's disease without fluctuating manifestations, unspecified whether dyskinesia present (HCC)  G20.A1       2. Gait disorder  R26.9       3. Balance problems  R26.89       4. Abnormal posture  R29.3          Medical Diagnosis:  Parkinson's disease without dyskinesia, without mention of fluctuations [G20.A1]   Referring Physician: Ernesto Walker MD  PCP: Ismael Taylor MD       Plan of care signed (Y/N): Y, sent     Date of Patient follow up with Physician: ?     Progress Report/POC: NO  POC update due: (10 visits /OR AUTH LIMITS, whichever is less) visit  or 2024                                             Precautions/ Contra-indications:           Latex allergy:  NO  Pacemaker:    YES  Contraindications for Manipulation: None  Date of Surgery: none  Other:    Red Flags:  None    C-SSRS Triggered by Intake questionnaire:   Patient answered 'NO' to both behavioral questions on intake.  No further screening warranted    Preferred Language for Healthcare:   [x] English       [] other:    SUBJECTIVE EXAMINATION     Patient stated complaint/comment:    Doing well today, feels that everything has gone well, biggest concern is memory, has been trying to think Big at all times however.  Wife reports pt is shaving more since beginning LSVT Big program.           Test used Initial

## 2024-07-08 ENCOUNTER — HOSPITAL ENCOUNTER (OUTPATIENT)
Dept: PHYSICAL THERAPY | Age: 74
Setting detail: THERAPIES SERIES
Discharge: HOME | End: 2024-07-08
Payer: MEDICARE

## 2024-07-08 ENCOUNTER — APPOINTMENT (OUTPATIENT)
Dept: PHYSICAL THERAPY | Age: 74
End: 2024-07-08
Payer: MEDICARE

## 2024-07-08 PROCEDURE — 97530 THERAPEUTIC ACTIVITIES: CPT

## 2024-07-08 NOTE — PLAN OF CARE
[facility] - Outpatient Rehabilitation and Therapy [address], [place] [address] office: [phone] fax: [phone]        Physical Therapy Discharge Summary    Dear Ernesto Walker MD  ,    We had the pleasure of treating the following patient for physical therapy services at [facility].  A summary of our findings can be found in the discharge summary below.  If you have any questions or concerns regarding these findings, please do not hesitate to contact me at the office phone number checked above.  Thank you for the referral.       Functional Outcome:  see chart below    Total Visits: 19     Plan of Care: Discharge from Physical Therapy    Reason for Discharge:   Completed formal LSVT BIG program, great progress made          Physical Therapy: TREATMENT/PROGRESS NOTE   Patient: Bharat Zamora (74 y.o. male)   Examination Date: 2024   :  1950 MRN: [id number]   Visit #:   Insurance Allowable Auth Needed   Med nec []Yes    [x]No    Insurance: Payor: MEDICARE / Plan: MEDICARE PART A AND B / Product Type: *No Product type* /   Insurance ID: [id number] - (Medicare)  Secondary Insurance (if applicable): MEDICAL MUTUAL   Treatment Diagnosis:     ICD-10-CM    1. Parkinson's disease without fluctuating manifestations, unspecified whether dyskinesia present (HCC)  G20.A1       2. Gait disorder  R26.9       3. Balance problems  R26.89       4. Abnormal posture  R29.3          Medical Diagnosis:  Parkinson's disease without dyskinesia, without mention of fluctuations [G20.A1]   Referring Physician: Ernesto Walker MD  PCP: Ismael Taylor MD       Plan of care signed (Y/N): Y, sent     Date of Patient follow up with Physician: ?     Progress Report/POC: YES                                              Precautions/ Contra-indications:           Latex allergy:  NO  Pacemaker:    YES  Contraindications for Manipulation:

## 2024-07-09 ENCOUNTER — APPOINTMENT (OUTPATIENT)
Dept: PHYSICAL THERAPY | Age: 74
End: 2024-07-09
Payer: MEDICARE

## 2024-07-09 ENCOUNTER — HOSPITAL ENCOUNTER (OUTPATIENT)
Dept: NUCLEAR MEDICINE | Age: 74
Discharge: HOME | End: 2024-07-09
Attending: FAMILY MEDICINE
Payer: MEDICARE

## 2024-07-09 DIAGNOSIS — R74.8 ALKALINE PHOSPHATASE ELEVATION: Primary | ICD-10-CM

## 2024-07-09 DIAGNOSIS — R74.8 ELEVATED ALKALINE PHOSPHATASE LEVEL: ICD-10-CM

## 2024-07-09 PROCEDURE — A9503 TC99M MEDRONATE: HCPCS | Performed by: FAMILY MEDICINE

## 2024-07-09 PROCEDURE — 3430000000 HC RX DIAGNOSTIC RADIOPHARMACEUTICAL: Performed by: FAMILY MEDICINE

## 2024-07-09 PROCEDURE — 78306 BONE IMAGING WHOLE BODY: CPT | Performed by: FAMILY MEDICINE

## 2024-07-09 RX ORDER — TC 99M MEDRONATE 20 MG/10ML
24.48 INJECTION, POWDER, LYOPHILIZED, FOR SOLUTION INTRAVENOUS
Status: COMPLETED | OUTPATIENT
Start: 2024-07-09 | End: 2024-07-09

## 2024-07-09 RX ADMIN — TC 99M MEDRONATE 24.48 MILLICURIE: 20 INJECTION, POWDER, LYOPHILIZED, FOR SOLUTION INTRAVENOUS at 09:17

## 2024-07-10 ENCOUNTER — HOSPITAL ENCOUNTER (OUTPATIENT)
Dept: GENERAL RADIOLOGY | Age: 74
Discharge: HOME | End: 2024-07-10
Payer: MEDICARE

## 2024-07-10 ENCOUNTER — HOSPITAL ENCOUNTER (OUTPATIENT)
Age: 74
Discharge: HOME | End: 2024-07-10
Payer: MEDICARE

## 2024-07-10 DIAGNOSIS — R74.8 ALKALINE PHOSPHATASE ELEVATION: ICD-10-CM

## 2024-07-10 PROCEDURE — 72072 X-RAY EXAM THORAC SPINE 3VWS: CPT

## 2024-07-10 PROCEDURE — 73502 X-RAY EXAM HIP UNI 2-3 VIEWS: CPT

## 2024-07-11 ENCOUNTER — APPOINTMENT (OUTPATIENT)
Dept: PHYSICAL THERAPY | Age: 74
End: 2024-07-11
Payer: MEDICARE

## 2024-07-12 ENCOUNTER — APPOINTMENT (OUTPATIENT)
Dept: PHYSICAL THERAPY | Age: 74
End: 2024-07-12
Payer: MEDICARE

## 2024-07-16 DIAGNOSIS — R74.8 ELEVATED ALKALINE PHOSPHATASE LEVEL: Primary | ICD-10-CM

## 2024-07-16 DIAGNOSIS — Q79.9 BONY ABNORMALITY: ICD-10-CM

## 2024-07-22 DIAGNOSIS — G20.A1 PARKINSON'S DISEASE (HCC): ICD-10-CM

## 2024-07-22 DIAGNOSIS — R25.1 TREMOR: ICD-10-CM

## 2024-07-30 ENCOUNTER — HOSPITAL ENCOUNTER (OUTPATIENT)
Dept: CT IMAGING | Age: 74
Discharge: HOME | End: 2024-07-30
Attending: FAMILY MEDICINE
Payer: MEDICARE

## 2024-07-30 DIAGNOSIS — Q79.9 BONY ABNORMALITY: ICD-10-CM

## 2024-07-30 DIAGNOSIS — R74.8 ELEVATED ALKALINE PHOSPHATASE LEVEL: ICD-10-CM

## 2024-07-30 PROCEDURE — 72192 CT PELVIS W/O DYE: CPT

## 2024-07-30 PROCEDURE — 72131 CT LUMBAR SPINE W/O DYE: CPT

## 2024-08-01 ENCOUNTER — TELEPHONE (OUTPATIENT)
Dept: FAMILY MEDICINE CLINIC | Age: 74
End: 2024-08-01

## 2024-08-01 DIAGNOSIS — C41.4 MALIGNANT NEOPLASM OF PELVIC BONE (HCC): Primary | ICD-10-CM

## 2024-08-01 NOTE — TELEPHONE ENCOUNTER
----- Message from Ismael Taylor MD sent at 8/1/2024  6:57 AM EDT -----  The CT scan is concerning for possible bone cancer of the left hip and pelvis.  Would recommend a consultation with oncology-Dr. Simon

## 2024-08-27 ENCOUNTER — HOSPITAL ENCOUNTER (OUTPATIENT)
Dept: CT IMAGING | Age: 74
Discharge: HOME | End: 2024-08-27
Attending: INTERNAL MEDICINE
Payer: MEDICARE

## 2024-08-27 DIAGNOSIS — C61 MALIGNANT NEOPLASM OF PROSTATE (HCC): ICD-10-CM

## 2024-08-27 PROCEDURE — 74177 CT ABD & PELVIS W/CONTRAST: CPT

## 2024-08-27 PROCEDURE — 71260 CT THORAX DX C+: CPT

## 2024-08-27 PROCEDURE — 6360000004 HC RX CONTRAST MEDICATION: Performed by: INTERNAL MEDICINE

## 2024-08-27 RX ORDER — IOPAMIDOL 755 MG/ML
75 INJECTION, SOLUTION INTRAVASCULAR
Status: COMPLETED | OUTPATIENT
Start: 2024-08-27 | End: 2024-08-27

## 2024-08-27 RX ADMIN — IOPAMIDOL 75 ML: 755 INJECTION, SOLUTION INTRAVENOUS at 12:42

## 2024-08-27 RX ADMIN — IOHEXOL 25 ML: 350 INJECTION, SOLUTION INTRAVENOUS at 12:42

## 2024-09-16 RX ORDER — FUROSEMIDE 40 MG
TABLET ORAL
Qty: 60 TABLET | Refills: 0 | Status: SHIPPED | OUTPATIENT
Start: 2024-09-16

## 2024-09-19 ENCOUNTER — OFFICE VISIT (OUTPATIENT)
Dept: NEUROLOGY | Age: 74
End: 2024-09-19
Payer: MEDICARE

## 2024-09-19 VITALS
WEIGHT: 167 LBS | HEART RATE: 82 BPM | DIASTOLIC BLOOD PRESSURE: 54 MMHG | BODY MASS INDEX: 22.62 KG/M2 | HEIGHT: 72 IN | SYSTOLIC BLOOD PRESSURE: 84 MMHG

## 2024-09-19 DIAGNOSIS — N31.9 NEUROGENIC BLADDER: ICD-10-CM

## 2024-09-19 DIAGNOSIS — F32.A DEPRESSION, UNSPECIFIED DEPRESSION TYPE: ICD-10-CM

## 2024-09-19 DIAGNOSIS — G20.A1 PARKINSON'S DISEASE WITHOUT DYSKINESIA OR FLUCTUATING MANIFESTATIONS (HCC): Primary | ICD-10-CM

## 2024-09-19 PROCEDURE — 1123F ACP DISCUSS/DSCN MKR DOCD: CPT | Performed by: PSYCHIATRY & NEUROLOGY

## 2024-09-19 PROCEDURE — G8427 DOCREV CUR MEDS BY ELIG CLIN: HCPCS | Performed by: PSYCHIATRY & NEUROLOGY

## 2024-09-19 PROCEDURE — 3017F COLORECTAL CA SCREEN DOC REV: CPT | Performed by: PSYCHIATRY & NEUROLOGY

## 2024-09-19 PROCEDURE — 1036F TOBACCO NON-USER: CPT | Performed by: PSYCHIATRY & NEUROLOGY

## 2024-09-19 PROCEDURE — 3074F SYST BP LT 130 MM HG: CPT | Performed by: PSYCHIATRY & NEUROLOGY

## 2024-09-19 PROCEDURE — 3078F DIAST BP <80 MM HG: CPT | Performed by: PSYCHIATRY & NEUROLOGY

## 2024-09-19 PROCEDURE — 99213 OFFICE O/P EST LOW 20 MIN: CPT | Performed by: PSYCHIATRY & NEUROLOGY

## 2024-09-19 PROCEDURE — G8420 CALC BMI NORM PARAMETERS: HCPCS | Performed by: PSYCHIATRY & NEUROLOGY

## 2024-09-19 RX ORDER — FINASTERIDE 5 MG/1
TABLET, FILM COATED ORAL
COMMUNITY

## 2024-09-19 RX ORDER — CARBIDOPA AND LEVODOPA 25; 100 MG/1; MG/1
1 TABLET ORAL 4 TIMES DAILY
Qty: 360 TABLET | Refills: 1 | Status: SHIPPED | OUTPATIENT
Start: 2024-09-19

## 2024-09-19 RX ORDER — ROPINIROLE 0.25 MG/1
TABLET, FILM COATED ORAL
Qty: 270 TABLET | Refills: 1 | Status: SHIPPED | OUTPATIENT
Start: 2024-09-19

## 2024-10-14 ENCOUNTER — OFFICE VISIT (OUTPATIENT)
Dept: FAMILY MEDICINE CLINIC | Age: 74
End: 2024-10-14

## 2024-10-14 VITALS
BODY MASS INDEX: 22.27 KG/M2 | HEART RATE: 58 BPM | SYSTOLIC BLOOD PRESSURE: 98 MMHG | TEMPERATURE: 97.8 F | RESPIRATION RATE: 12 BRPM | DIASTOLIC BLOOD PRESSURE: 60 MMHG | OXYGEN SATURATION: 99 % | WEIGHT: 164.25 LBS

## 2024-10-14 DIAGNOSIS — C79.9 METASTASIS FROM MALIGNANT NEOPLASM OF PROSTATE (HCC): ICD-10-CM

## 2024-10-14 DIAGNOSIS — Z71.89 ACP (ADVANCE CARE PLANNING): ICD-10-CM

## 2024-10-14 DIAGNOSIS — E78.00 HYPERCHOLESTEREMIA: Chronic | ICD-10-CM

## 2024-10-14 DIAGNOSIS — I49.5 TACHYCARDIA-BRADYCARDIA SYNDROME (HCC): ICD-10-CM

## 2024-10-14 DIAGNOSIS — F32.A DEPRESSION, UNSPECIFIED DEPRESSION TYPE: ICD-10-CM

## 2024-10-14 DIAGNOSIS — I25.10 CORONARY ARTERY DISEASE INVOLVING NATIVE HEART WITHOUT ANGINA PECTORIS, UNSPECIFIED VESSEL OR LESION TYPE: Chronic | ICD-10-CM

## 2024-10-14 DIAGNOSIS — Z00.00 MEDICARE ANNUAL WELLNESS VISIT, SUBSEQUENT: Primary | ICD-10-CM

## 2024-10-14 DIAGNOSIS — I63.232 ARTERIAL ISCHEMIC STROKE, ICA, LEFT, ACUTE (HCC): ICD-10-CM

## 2024-10-14 DIAGNOSIS — C61 METASTASIS FROM MALIGNANT NEOPLASM OF PROSTATE (HCC): ICD-10-CM

## 2024-10-14 PROBLEM — I61.9 CVA (CEREBROVASCULAR ACCIDENT DUE TO INTRACEREBRAL HEMORRHAGE) (HCC): Status: RESOLVED | Noted: 2023-08-13 | Resolved: 2024-10-14

## 2024-10-14 RX ORDER — CALCIUM CARBONATE/VITAMIN D3 500 MG-10
2 TABLET ORAL DAILY
COMMUNITY
Start: 2024-10-09

## 2024-10-14 RX ORDER — ATORVASTATIN CALCIUM 80 MG/1
80 TABLET, FILM COATED ORAL NIGHTLY
Qty: 90 TABLET | Refills: 3 | Status: SHIPPED | OUTPATIENT
Start: 2024-10-14

## 2024-10-14 RX ORDER — SERTRALINE HYDROCHLORIDE 100 MG/1
100 TABLET, FILM COATED ORAL NIGHTLY
Qty: 90 TABLET | Refills: 3 | Status: SHIPPED | OUTPATIENT
Start: 2024-10-14

## 2024-10-14 ASSESSMENT — PATIENT HEALTH QUESTIONNAIRE - PHQ9
4. FEELING TIRED OR HAVING LITTLE ENERGY: NOT AT ALL
2. FEELING DOWN, DEPRESSED OR HOPELESS: NOT AT ALL
SUM OF ALL RESPONSES TO PHQ QUESTIONS 1-9: 0
SUM OF ALL RESPONSES TO PHQ QUESTIONS 1-9: 0
SUM OF ALL RESPONSES TO PHQ9 QUESTIONS 1 & 2: 0
7. TROUBLE CONCENTRATING ON THINGS, SUCH AS READING THE NEWSPAPER OR WATCHING TELEVISION: NOT AT ALL
SUM OF ALL RESPONSES TO PHQ QUESTIONS 1-9: 0
5. POOR APPETITE OR OVEREATING: NOT AT ALL
SUM OF ALL RESPONSES TO PHQ QUESTIONS 1-9: 0
3. TROUBLE FALLING OR STAYING ASLEEP: NOT AT ALL
10. IF YOU CHECKED OFF ANY PROBLEMS, HOW DIFFICULT HAVE THESE PROBLEMS MADE IT FOR YOU TO DO YOUR WORK, TAKE CARE OF THINGS AT HOME, OR GET ALONG WITH OTHER PEOPLE: NOT DIFFICULT AT ALL
1. LITTLE INTEREST OR PLEASURE IN DOING THINGS: NOT AT ALL
6. FEELING BAD ABOUT YOURSELF - OR THAT YOU ARE A FAILURE OR HAVE LET YOURSELF OR YOUR FAMILY DOWN: NOT AT ALL
8. MOVING OR SPEAKING SO SLOWLY THAT OTHER PEOPLE COULD HAVE NOTICED. OR THE OPPOSITE, BEING SO FIGETY OR RESTLESS THAT YOU HAVE BEEN MOVING AROUND A LOT MORE THAN USUAL: NOT AT ALL
9. THOUGHTS THAT YOU WOULD BE BETTER OFF DEAD, OR OF HURTING YOURSELF: NOT AT ALL

## 2024-10-14 ASSESSMENT — LIFESTYLE VARIABLES
HOW MANY STANDARD DRINKS CONTAINING ALCOHOL DO YOU HAVE ON A TYPICAL DAY: PATIENT DOES NOT DRINK
HOW OFTEN DO YOU HAVE A DRINK CONTAINING ALCOHOL: NEVER

## 2024-10-14 NOTE — PATIENT INSTRUCTIONS
Learning About Being Active as an Older Adult  Why is being active important as you get older?     Being active is one of the best things you can do for your health. And it's never too late to start. Being active--or getting active, if you aren't already--has definite benefits. It can:  Give you more energy,  Keep your mind sharp.  Improve balance to reduce your risk of falls.  Help you manage chronic illness with fewer medicines.  No matter how old you are, how fit you are, or what health problems you have, there is a form of activity that will work for you. And the more physical activity you can do, the better your overall health will be.  What kinds of activity can help you stay healthy?  Being more active will make your daily activities easier. Physical activity includes planned exercise and things you do in daily life. There are four types of activity:  Aerobic.  Doing aerobic activity makes your heart and lungs strong.  Includes walking, dancing, and gardening.  Aim for at least 2½ hours spread throughout the week.  It improves your energy and can help you sleep better.  Muscle-strengthening.  This type of activity can help maintain muscle and strengthen bones.  Includes climbing stairs, using resistance bands, and lifting or carrying heavy loads.  Aim for at least twice a week.  It can help protect the knees and other joints.  Stretching.  Stretching gives you better range of motion in joints and muscles.  Includes upper arm stretches, calf stretches, and gentle yoga.  Aim for at least twice a week, preferably after your muscles are warmed up from other activities.  It can help you function better in daily life.  Balancing.  This helps you stay coordinated and have good posture.  Includes heel-to-toe walking, mariella chi, and certain types of yoga.  Aim for at least 3 days a week.  It can reduce your risk of falling.  Even if you have a hard time meeting the recommendations, it's better to be more active

## 2024-10-14 NOTE — PROGRESS NOTES
tablet Take 1 tablet by mouth daily Yes Provider, MD Savanna Singletonam (Including outside providers/suppliers regularly involved in providing care):   Patient Care Team:  Ismael Taylor MD as PCP - General (Family Medicine)  Ismael Taylor MD as PCP - Empaneled Provider  Josué Forrester Jr., MD as Consulting Physician (Cardiology)      Reviewed and updated this visit:  Tobacco  Allergies  Meds  Med Hx  Surg Hx  Soc Hx  Fam Hx            Advance Care Planning   Discussed the patient’s choices for care and treatment preferences in case of a health event that adversely affects decision-making abilities or is life-limiting. Recommended the patient document care preferences in state-specific advance directives. Also reviewed the process of designating a trusted capable adult as an Agent (or Health Care Power of ) to make health care decisions for the patient if the patient becomes unable due to incapacity. Patient was asked to complete advance directive forms, if not already done, and to provide a dated, signed and witnessed (or notarized) copy, per the forms' requirements, to the practice office.    Time spent (minutes): <16 minutes (Non-Billable)

## 2024-10-25 RX ORDER — FLUTICASONE PROPIONATE 50 MCG
2 SPRAY, SUSPENSION (ML) NASAL DAILY
Qty: 16 G | Refills: 3 | Status: SHIPPED | OUTPATIENT
Start: 2024-10-25

## 2024-10-25 NOTE — TELEPHONE ENCOUNTER
Medication:   Requested Prescriptions     Pending Prescriptions Disp Refills    fluticasone (FLONASE) 50 MCG/ACT nasal spray 16 g 3     Si sprays by Each Nostril route daily      Provider out of office.     Patient Phone Number: [phone] (home) [phone] (work)    Last appt: 10/14/2024   Next appt: Visit date not found    Last OARRS:        No data to display              PDMP Monitoring:    Last PDMP [place] as Reviewed ([place]):  Review User Review Instant Review Result          Preferred Pharmacy:   [facility]/pharmacy #[id number] - [place], [place] - [address] - P [phone] - F [phone]  [address] [facility] [address]  Phone: [phone] Fax: [phone]    [facility] #[id number] - [place], [place] - [address] - P [phone] - F [phone]  [address] [address]  Phone: [phone] Fax: [phone]

## 2024-10-25 NOTE — TELEPHONE ENCOUNTER
fluticasone (FLONASE) 50 MCG/ACT nasal spray     [facility] #[id number] - [place], [place] - [address] - P [phone] - F [phone]  [address] [facility] [address]  Phone: [phone]  Fax: [phone]

## 2024-11-27 ENCOUNTER — TELEPHONE (OUTPATIENT)
Dept: FAMILY MEDICINE CLINIC | Age: 74
End: 2024-11-27

## 2024-12-30 ENCOUNTER — OFFICE VISIT (OUTPATIENT)
Dept: NEUROLOGY | Age: 74
End: 2024-12-30
Payer: MEDICARE

## 2024-12-30 VITALS
SYSTOLIC BLOOD PRESSURE: 84 MMHG | WEIGHT: 157 LBS | BODY MASS INDEX: 21.26 KG/M2 | DIASTOLIC BLOOD PRESSURE: 56 MMHG | HEIGHT: 72 IN

## 2024-12-30 DIAGNOSIS — I95.1 HYPOTENSION, POSTURAL: ICD-10-CM

## 2024-12-30 DIAGNOSIS — G20.A1 PARKINSON'S DISEASE WITHOUT DYSKINESIA OR FLUCTUATING MANIFESTATIONS (HCC): Primary | ICD-10-CM

## 2024-12-30 PROCEDURE — 3078F DIAST BP <80 MM HG: CPT | Performed by: PSYCHIATRY & NEUROLOGY

## 2024-12-30 PROCEDURE — 1036F TOBACCO NON-USER: CPT | Performed by: PSYCHIATRY & NEUROLOGY

## 2024-12-30 PROCEDURE — 3017F COLORECTAL CA SCREEN DOC REV: CPT | Performed by: PSYCHIATRY & NEUROLOGY

## 2024-12-30 PROCEDURE — 3074F SYST BP LT 130 MM HG: CPT | Performed by: PSYCHIATRY & NEUROLOGY

## 2024-12-30 PROCEDURE — G8484 FLU IMMUNIZE NO ADMIN: HCPCS | Performed by: PSYCHIATRY & NEUROLOGY

## 2024-12-30 PROCEDURE — G8420 CALC BMI NORM PARAMETERS: HCPCS | Performed by: PSYCHIATRY & NEUROLOGY

## 2024-12-30 PROCEDURE — 99214 OFFICE O/P EST MOD 30 MIN: CPT | Performed by: PSYCHIATRY & NEUROLOGY

## 2024-12-30 PROCEDURE — 1160F RVW MEDS BY RX/DR IN RCRD: CPT | Performed by: PSYCHIATRY & NEUROLOGY

## 2024-12-30 PROCEDURE — 1159F MED LIST DOCD IN RCRD: CPT | Performed by: PSYCHIATRY & NEUROLOGY

## 2024-12-30 PROCEDURE — G8427 DOCREV CUR MEDS BY ELIG CLIN: HCPCS | Performed by: PSYCHIATRY & NEUROLOGY

## 2024-12-30 PROCEDURE — 1123F ACP DISCUSS/DSCN MKR DOCD: CPT | Performed by: PSYCHIATRY & NEUROLOGY

## 2024-12-30 RX ORDER — MIDODRINE HYDROCHLORIDE 5 MG/1
5 TABLET ORAL 3 TIMES DAILY
COMMUNITY

## 2024-12-30 NOTE — PATIENT INSTRUCTIONS
YOU MUST CONFIRM YOUR APPOINTMENT 1 DAY PRIOR OR IT WILL BE CANCELLED!!   Our office will call you 3 times the day prior to your appointment in an attempt to confirm.  Please return our call ASAP or confirm your appt through [facility] no later than 3 pm the day before your appointment.  If we do not hear back from you by 3 pm to confirm, your appointment will be cancelled & someone will be added into that slot from our wait list.

## 2024-12-30 NOTE — PROGRESS NOTES
The patient came today for follow up regarding: hx of PD     The patient came today with his wife    Since his last visit, he continues to have significant issue with daily hypotension especially when he started his new medication for prostate cancer Erleada 240 Mg daily.  Pressure has been since 6070 systolic despite changing his medication.  He was started recently on midodrine 5 mg x 3.  No significant changes.  He feels fatigue and tired.  Wife helps with ADL.  No falling or injury.  He is on the same dose of Sinemet  mg, 4 times daily and Requip 0.25 mg x 3.  No side effect of these medications.  Tremors are minimal.  Waxing and waning.  Does have chronic insomnia.  No hallucination.  No suicidal ideation.  Other review of system was unremarkable.  Exam:   Constitutional:   Vitals:    12/30/24 1308   BP: (!) 84/56   Weight: 71.2 kg (157 lb)   Height: 1.829 m (6' 0.01\")       General appearance:  Normal development and appear in no acute distress.   Mental Status: Fatigue and tired, worse compared to last visit  Oriented to person, place, problem, and time.    Memory: Good immediate recall.  Intact remote memory  Normal attention span and concentration.  Language: intact naming, repeating and fluency   Good fund of Knowledge. Aware of current events and vocabulary   Cranial Nerves: Impaired range of motion bilateral shoulder.  II: Pupils: equal, round, reactive to light  III,IV,VI: Extra Ocular Movements are intact. No nystagmus  V: Facial sensation is intact   VII: Facial strength and movements: intact and symmetric  XII: Tongue movements are normal  Musculoskeletal:  poor effort, generalized fatigue, generalized weakness/wife.  Intact DTRs 2+ throughout upper extremity and 1+ in the legs  Mild bradykinesia right more than the left but no tremors  Unable to stand due to hypotension and fatigue      ROS : A 10-14 system review of constitutional, cardiovascular, respiratory, GI, eyes, , ENT,

## 2025-01-18 DIAGNOSIS — G20.A1 PARKINSON'S DISEASE WITHOUT DYSKINESIA OR FLUCTUATING MANIFESTATIONS (HCC): ICD-10-CM

## 2025-01-20 RX ORDER — CARBIDOPA AND LEVODOPA 25; 100 MG/1; MG/1
TABLET ORAL
Qty: 360 TABLET | Refills: 1 | OUTPATIENT
Start: 2025-01-20

## 2025-04-16 DIAGNOSIS — G20.A1 PARKINSON'S DISEASE WITHOUT DYSKINESIA OR FLUCTUATING MANIFESTATIONS (HCC): ICD-10-CM

## 2025-04-17 RX ORDER — CARBIDOPA AND LEVODOPA 25; 100 MG/1; MG/1
TABLET ORAL
Qty: 360 TABLET | Refills: 0 | Status: SHIPPED | OUTPATIENT
Start: 2025-04-17

## 2025-04-24 ENCOUNTER — OFFICE VISIT (OUTPATIENT)
Dept: NEUROLOGY | Age: 75
End: 2025-04-24
Payer: MEDICARE

## 2025-04-24 VITALS
BODY MASS INDEX: 21.26 KG/M2 | WEIGHT: 157 LBS | HEIGHT: 72 IN | SYSTOLIC BLOOD PRESSURE: 126 MMHG | HEART RATE: 62 BPM | DIASTOLIC BLOOD PRESSURE: 72 MMHG

## 2025-04-24 DIAGNOSIS — F02.B0 MODERATE DEMENTIA DUE TO PARKINSON'S DISEASE, WITHOUT BEHAVIORAL DISTURBANCE, PSYCHOTIC DISTURBANCE, MOOD DISTURBANCE, OR ANXIETY (HCC): ICD-10-CM

## 2025-04-24 DIAGNOSIS — I95.1 ORTHOSTATIC HYPOTENSION: ICD-10-CM

## 2025-04-24 DIAGNOSIS — G20.A1 PARKINSON'S DISEASE WITHOUT DYSKINESIA OR FLUCTUATING MANIFESTATIONS (HCC): Primary | ICD-10-CM

## 2025-04-24 DIAGNOSIS — C79.9 METASTASIS FROM MALIGNANT NEOPLASM OF PROSTATE (HCC): ICD-10-CM

## 2025-04-24 DIAGNOSIS — C61 METASTASIS FROM MALIGNANT NEOPLASM OF PROSTATE (HCC): ICD-10-CM

## 2025-04-24 DIAGNOSIS — G20.A1 MODERATE DEMENTIA DUE TO PARKINSON'S DISEASE, WITHOUT BEHAVIORAL DISTURBANCE, PSYCHOTIC DISTURBANCE, MOOD DISTURBANCE, OR ANXIETY (HCC): ICD-10-CM

## 2025-04-24 PROCEDURE — 3074F SYST BP LT 130 MM HG: CPT | Performed by: PSYCHIATRY & NEUROLOGY

## 2025-04-24 PROCEDURE — 1123F ACP DISCUSS/DSCN MKR DOCD: CPT | Performed by: PSYCHIATRY & NEUROLOGY

## 2025-04-24 PROCEDURE — G8427 DOCREV CUR MEDS BY ELIG CLIN: HCPCS | Performed by: PSYCHIATRY & NEUROLOGY

## 2025-04-24 PROCEDURE — 1159F MED LIST DOCD IN RCRD: CPT | Performed by: PSYCHIATRY & NEUROLOGY

## 2025-04-24 PROCEDURE — 1036F TOBACCO NON-USER: CPT | Performed by: PSYCHIATRY & NEUROLOGY

## 2025-04-24 PROCEDURE — G8420 CALC BMI NORM PARAMETERS: HCPCS | Performed by: PSYCHIATRY & NEUROLOGY

## 2025-04-24 PROCEDURE — 99214 OFFICE O/P EST MOD 30 MIN: CPT | Performed by: PSYCHIATRY & NEUROLOGY

## 2025-04-24 PROCEDURE — 3078F DIAST BP <80 MM HG: CPT | Performed by: PSYCHIATRY & NEUROLOGY

## 2025-04-24 PROCEDURE — 1160F RVW MEDS BY RX/DR IN RCRD: CPT | Performed by: PSYCHIATRY & NEUROLOGY

## 2025-04-24 PROCEDURE — 3017F COLORECTAL CA SCREEN DOC REV: CPT | Performed by: PSYCHIATRY & NEUROLOGY

## 2025-04-24 RX ORDER — CARBIDOPA AND LEVODOPA 25; 100 MG/1; MG/1
1 TABLET ORAL 4 TIMES DAILY
Qty: 360 TABLET | Refills: 1 | Status: SHIPPED | OUTPATIENT
Start: 2025-04-24

## 2025-04-24 RX ORDER — DROXIDOPA 100 MG/1
100 CAPSULE ORAL 2 TIMES DAILY
COMMUNITY

## 2025-04-24 NOTE — PATIENT INSTRUCTIONS

## 2025-04-24 NOTE — PROGRESS NOTES
The patient came today for follow up regarding: hx of PD     The patient came today with his wife    Since his last visit, he continues to take Sinemet same dose of  mg, x 1 and Requip 0.25 mg x 3.  He had some issues with orthostatic hypotension and started on Northera.  Symptoms improved after such medication.  No more lightheadedness or dizziness with standing.  No recent falling or injury.  History of prostate cancer with mets.  Finished his immunotherapy yesterday.  Feels fatigued.  Memory is about the same.  Waxing and waning.  Occasional insomnia.  No hallucination.  Other review of system was unremarkable.      Exam:   Constitutional:   Vitals:    04/24/25 1412   BP: 126/72   Pulse: 62   Weight: 71.2 kg (157 lb)   Height: 1.829 m (6' 0.01\")       General appearance:  Normal development and appear in no acute distress.   Mental Status: Fatigue and tired, worse compared to last visit  Oriented to person, place, problem, and time.    Memory: Good immediate recall.  Intact remote memory  Normal attention span and concentration.  Language: intact naming, repeating and fluency   Good fund of Knowledge. Aware of current events and vocabulary   Cranial Nerves: Impaired range of motion bilateral shoulder.  II: Pupils: equal, round, reactive to light  III,IV,VI: Extra Ocular Movements are intact. No nystagmus  V: Facial sensation is intact   VII: Facial strength and movements: intact and symmetric  XII: Tongue movements are normal  Musculoskeletal:  poor effort, generalized fatigue, generalized weakness/wife.  Intact DTRs 2+ throughout upper extremity and 1+ in the legs  Mild bradykinesia right more than the left but no tremors  Slow gait but no significant shuffling.      ROS : A 10-14 system review of constitutional, cardiovascular, respiratory, GI, eyes, , ENT, musculoskeletal, endocrine, hematological, skin, SHEENT, genitourinary, psychiatric and neurologic systems was obtained and updated today which is

## 2025-04-28 DIAGNOSIS — E78.00 HYPERCHOLESTEREMIA: Primary | ICD-10-CM

## 2025-04-29 ENCOUNTER — HOSPITAL ENCOUNTER (OUTPATIENT)
Age: 75
Discharge: HOME | End: 2025-04-29
Payer: MEDICARE

## 2025-04-29 DIAGNOSIS — E78.00 HYPERCHOLESTEREMIA: ICD-10-CM

## 2025-04-29 LAB
ALBUMIN SERPL-MCNC: 4.4 G/DL (ref 3.4–5)
ALBUMIN/GLOB SERPL: 1.6 {RATIO} (ref 1.1–2.2)
ALP SERPL-CCNC: 84 U/L (ref 40–129)
ALT SERPL-CCNC: <5 U/L (ref 10–40)
ANION GAP SERPL CALCULATED.3IONS-SCNC: 10 MMOL/L (ref 3–16)
AST SERPL-CCNC: 17 U/L (ref 15–37)
BASOPHILS # BLD: 0.1 K/UL (ref 0–0.2)
BASOPHILS NFR BLD: 1.2 %
BILIRUB SERPL-MCNC: <0.2 MG/DL (ref 0–1)
BUN SERPL-MCNC: 21 MG/DL (ref 7–20)
CALCIUM SERPL-MCNC: 9.8 MG/DL (ref 8.3–10.6)
CHLORIDE SERPL-SCNC: 104 MMOL/L (ref 99–110)
CHOLEST SERPL-MCNC: 293 MG/DL (ref 0–199)
CO2 SERPL-SCNC: 26 MMOL/L (ref 21–32)
CREAT SERPL-MCNC: 1 MG/DL (ref 0.8–1.3)
DEPRECATED RDW RBC AUTO: 13.3 % (ref 12.4–15.4)
EOSINOPHIL # BLD: 0.1 K/UL (ref 0–0.6)
EOSINOPHIL NFR BLD: 2 %
GFR SERPLBLD CREATININE-BSD FMLA CKD-EPI: 79 ML/MIN/{1.73_M2}
GLUCOSE SERPL-MCNC: 96 MG/DL (ref 70–99)
HCT VFR BLD AUTO: 39.6 % (ref 40.5–52.5)
HDLC SERPL-MCNC: 54 MG/DL (ref 40–60)
HGB BLD-MCNC: 13.4 G/DL (ref 13.5–17.5)
LDL CHOLESTEROL: 215 MG/DL
LYMPHOCYTES # BLD: 1 K/UL (ref 1–5.1)
LYMPHOCYTES NFR BLD: 22.8 %
MCH RBC QN AUTO: 31.8 PG (ref 26–34)
MCHC RBC AUTO-ENTMCNC: 33.8 G/DL (ref 31–36)
MCV RBC AUTO: 93.9 FL (ref 80–100)
MONOCYTES # BLD: 0.4 K/UL (ref 0–1.3)
MONOCYTES NFR BLD: 8.4 %
NEUTROPHILS # BLD: 3 K/UL (ref 1.7–7.7)
NEUTROPHILS NFR BLD: 65.6 %
PLATELET # BLD AUTO: 240 K/UL (ref 135–450)
PMV BLD AUTO: 8.5 FL (ref 5–10.5)
POTASSIUM SERPL-SCNC: 4.2 MMOL/L (ref 3.5–5.1)
PROT SERPL-MCNC: 7.1 G/DL (ref 6.4–8.2)
RBC # BLD AUTO: 4.21 M/UL (ref 4.2–5.9)
SODIUM SERPL-SCNC: 140 MMOL/L (ref 136–145)
T4 FREE SERPL-MCNC: 0.8 NG/DL (ref 0.9–1.8)
TRIGL SERPL-MCNC: 118 MG/DL (ref 0–150)
TSH SERPL DL<=0.005 MIU/L-ACNC: 5.29 UIU/ML (ref 0.27–4.2)
VLDLC SERPL CALC-MCNC: 24 MG/DL
WBC # BLD AUTO: 4.6 K/UL (ref 4–11)

## 2025-04-29 PROCEDURE — 84439 ASSAY OF FREE THYROXINE: CPT

## 2025-04-29 PROCEDURE — 80053 COMPREHEN METABOLIC PANEL: CPT

## 2025-04-29 PROCEDURE — 84443 ASSAY THYROID STIM HORMONE: CPT

## 2025-04-29 PROCEDURE — 36415 COLL VENOUS BLD VENIPUNCTURE: CPT

## 2025-04-29 PROCEDURE — 85025 COMPLETE CBC W/AUTO DIFF WBC: CPT

## 2025-04-29 PROCEDURE — 80061 LIPID PANEL: CPT

## 2025-04-30 ENCOUNTER — OFFICE VISIT (OUTPATIENT)
Dept: FAMILY MEDICINE CLINIC | Age: 75
End: 2025-04-30

## 2025-04-30 VITALS
WEIGHT: 162.8 LBS | OXYGEN SATURATION: 98 % | TEMPERATURE: 97 F | BODY MASS INDEX: 22.07 KG/M2 | DIASTOLIC BLOOD PRESSURE: 70 MMHG | HEART RATE: 80 BPM | SYSTOLIC BLOOD PRESSURE: 110 MMHG

## 2025-04-30 DIAGNOSIS — I49.5 TACHYCARDIA-BRADYCARDIA SYNDROME (HCC): ICD-10-CM

## 2025-04-30 DIAGNOSIS — F02.B0 MODERATE DEMENTIA DUE TO PARKINSON'S DISEASE, WITHOUT BEHAVIORAL DISTURBANCE, PSYCHOTIC DISTURBANCE, MOOD DISTURBANCE, OR ANXIETY (HCC): ICD-10-CM

## 2025-04-30 DIAGNOSIS — I48.0 PAF (PAROXYSMAL ATRIAL FIBRILLATION) (HCC): ICD-10-CM

## 2025-04-30 DIAGNOSIS — E78.00 HYPERCHOLESTEREMIA: Chronic | ICD-10-CM

## 2025-04-30 DIAGNOSIS — C61 METASTASIS FROM MALIGNANT NEOPLASM OF PROSTATE (HCC): ICD-10-CM

## 2025-04-30 DIAGNOSIS — E03.9 ACQUIRED HYPOTHYROIDISM: Primary | ICD-10-CM

## 2025-04-30 DIAGNOSIS — G20.B1 PARKINSON'S DISEASE WITH DYSKINESIA WITHOUT FLUCTUATING MANIFESTATIONS (HCC): ICD-10-CM

## 2025-04-30 DIAGNOSIS — C79.9 METASTASIS FROM MALIGNANT NEOPLASM OF PROSTATE (HCC): ICD-10-CM

## 2025-04-30 DIAGNOSIS — I10 BENIGN ESSENTIAL HTN: ICD-10-CM

## 2025-04-30 DIAGNOSIS — G20.A1 MODERATE DEMENTIA DUE TO PARKINSON'S DISEASE, WITHOUT BEHAVIORAL DISTURBANCE, PSYCHOTIC DISTURBANCE, MOOD DISTURBANCE, OR ANXIETY (HCC): ICD-10-CM

## 2025-04-30 PROBLEM — B02.29 POST HERPETIC NEURALGIA: Status: RESOLVED | Noted: 2023-08-07 | Resolved: 2025-04-30

## 2025-04-30 PROBLEM — N18.31 CHRONIC RENAL FAILURE, STAGE 3A (HCC): Status: RESOLVED | Noted: 2024-02-09 | Resolved: 2025-04-30

## 2025-04-30 RX ORDER — LEVOTHYROXINE SODIUM 25 UG/1
25 TABLET ORAL DAILY
Qty: 90 TABLET | Refills: 1 | Status: SHIPPED | OUTPATIENT
Start: 2025-04-30

## 2025-04-30 SDOH — ECONOMIC STABILITY: FOOD INSECURITY: WITHIN THE PAST 12 MONTHS, THE FOOD YOU BOUGHT JUST DIDN'T LAST AND YOU DIDN'T HAVE MONEY TO GET MORE.: NEVER TRUE

## 2025-04-30 SDOH — ECONOMIC STABILITY: FOOD INSECURITY: WITHIN THE PAST 12 MONTHS, YOU WORRIED THAT YOUR FOOD WOULD RUN OUT BEFORE YOU GOT MONEY TO BUY MORE.: NEVER TRUE

## 2025-04-30 ASSESSMENT — PATIENT HEALTH QUESTIONNAIRE - PHQ9
SUM OF ALL RESPONSES TO PHQ QUESTIONS 1-9: 7
6. FEELING BAD ABOUT YOURSELF - OR THAT YOU ARE A FAILURE OR HAVE LET YOURSELF OR YOUR FAMILY DOWN: NOT AT ALL
SUM OF ALL RESPONSES TO PHQ QUESTIONS 1-9: 7
SUM OF ALL RESPONSES TO PHQ QUESTIONS 1-9: 7
10. IF YOU CHECKED OFF ANY PROBLEMS, HOW DIFFICULT HAVE THESE PROBLEMS MADE IT FOR YOU TO DO YOUR WORK, TAKE CARE OF THINGS AT HOME, OR GET ALONG WITH OTHER PEOPLE: SOMEWHAT DIFFICULT
4. FEELING TIRED OR HAVING LITTLE ENERGY: MORE THAN HALF THE DAYS
9. THOUGHTS THAT YOU WOULD BE BETTER OFF DEAD, OR OF HURTING YOURSELF: NOT AT ALL
8. MOVING OR SPEAKING SO SLOWLY THAT OTHER PEOPLE COULD HAVE NOTICED. OR THE OPPOSITE, BEING SO FIGETY OR RESTLESS THAT YOU HAVE BEEN MOVING AROUND A LOT MORE THAN USUAL: MORE THAN HALF THE DAYS
7. TROUBLE CONCENTRATING ON THINGS, SUCH AS READING THE NEWSPAPER OR WATCHING TELEVISION: NOT AT ALL
2. FEELING DOWN, DEPRESSED OR HOPELESS: NOT AT ALL
3. TROUBLE FALLING OR STAYING ASLEEP: NOT AT ALL
2. FEELING DOWN, DEPRESSED OR HOPELESS: NOT AT ALL
1. LITTLE INTEREST OR PLEASURE IN DOING THINGS: MORE THAN HALF THE DAYS
SUM OF ALL RESPONSES TO PHQ QUESTIONS 1-9: 7
3. TROUBLE FALLING OR STAYING ASLEEP: NOT AT ALL
5. POOR APPETITE OR OVEREATING: SEVERAL DAYS
5. POOR APPETITE OR OVEREATING: SEVERAL DAYS
6. FEELING BAD ABOUT YOURSELF - OR THAT YOU ARE A FAILURE OR HAVE LET YOURSELF OR YOUR FAMILY DOWN: NOT AT ALL
10. IF YOU CHECKED OFF ANY PROBLEMS, HOW DIFFICULT HAVE THESE PROBLEMS MADE IT FOR YOU TO DO YOUR WORK, TAKE CARE OF THINGS AT HOME, OR GET ALONG WITH OTHER PEOPLE: SOMEWHAT DIFFICULT
SUM OF ALL RESPONSES TO PHQ QUESTIONS 1-9: 7
1. LITTLE INTEREST OR PLEASURE IN DOING THINGS: MORE THAN HALF THE DAYS
7. TROUBLE CONCENTRATING ON THINGS, SUCH AS READING THE NEWSPAPER OR WATCHING TELEVISION: NOT AT ALL
8. MOVING OR SPEAKING SO SLOWLY THAT OTHER PEOPLE COULD HAVE NOTICED. OR THE OPPOSITE - BEING SO FIDGETY OR RESTLESS THAT YOU HAVE BEEN MOVING AROUND A LOT MORE THAN USUAL: MORE THAN HALF THE DAYS
9. THOUGHTS THAT YOU WOULD BE BETTER OFF DEAD, OR OF HURTING YOURSELF: NOT AT ALL
4. FEELING TIRED OR HAVING LITTLE ENERGY: MORE THAN HALF THE DAYS

## 2025-04-30 ASSESSMENT — ENCOUNTER SYMPTOMS
SHORTNESS OF BREATH: 0
CHEST TIGHTNESS: 0
RHINORRHEA: 1
CONSTIPATION: 1
BACK PAIN: 0
DIARRHEA: 0

## 2025-04-30 NOTE — PROGRESS NOTES
SUBJECTIVE:    Bharat Zamora is a 75 y.o. male who presents for a follow up visit.    Chief Complaint   Patient presents with    Follow-up        Hyperlipidemia  This is a chronic problem. The current episode started more than 1 year ago. Lipid results: Total cholesterol 293, triglycerides 118, HDL 54, . Pertinent negatives include no chest pain or shortness of breath. He is currently on no antihyperlipidemic treatment. Compliance problems include adherence to exercise and adherence to diet.  Risk factors for coronary artery disease include dyslipidemia, male sex and a sedentary lifestyle.   Benign Prostatic Hypertrophy  This is a chronic problem. The current episode started more than 1 year ago. Irritative symptoms include frequency. (Patient had a possible TURP and was diagnosed with cancer)   Neurologic Problem  The patient's primary symptoms include a loss of balance. This is a chronic problem. The current episode started more than 1 year ago. The neurological problem developed gradually. Associated symptoms include palpitations (tachycardia). Pertinent negatives include no back pain, chest pain, dizziness, light-headedness or shortness of breath.   Constipation  This is a chronic problem. The current episode started more than 1 year ago. His stool frequency is 2 to 3 times per week. The stool is described as firm. The patient is not on a high fiber diet. He Does not exercise regularly. There has Been adequate water intake. Pertinent negatives include no back pain, diarrhea or difficulty urinating. Risk factors include recent illness and immobility. He has tried stool softeners (Prune Juice) for the symptoms. The treatment provided significant relief. His past medical history is significant for neurologic disease and neuromuscular disease.        Patient's medications, allergies, past medical,surgical, social and family histories were reviewed and updated as appropriate.     Past Medical History:

## 2025-08-01 PROBLEM — Z86.73 HISTORY OF STROKE: Status: ACTIVE | Noted: 2023-08-08

## 2025-08-11 ENCOUNTER — HOSPITAL ENCOUNTER (OUTPATIENT)
Dept: OTHER | Age: 75
Discharge: HOME | End: 2025-08-11
Payer: MEDICARE

## 2025-08-11 DIAGNOSIS — E03.9 ACQUIRED HYPOTHYROIDISM: ICD-10-CM

## 2025-08-11 LAB — TSH SERPL DL<=0.005 MIU/L-ACNC: 3.3 UIU/ML (ref 0.27–4.2)

## 2025-08-11 PROCEDURE — 84443 ASSAY THYROID STIM HORMONE: CPT

## 2025-08-11 PROCEDURE — 36415 COLL VENOUS BLD VENIPUNCTURE: CPT

## 2025-08-13 ENCOUNTER — TELEPHONE (OUTPATIENT)
Dept: FAMILY MEDICINE CLINIC | Age: 75
End: 2025-08-13

## 2025-08-13 ENCOUNTER — OFFICE VISIT (OUTPATIENT)
Dept: FAMILY MEDICINE CLINIC | Age: 75
End: 2025-08-13

## 2025-08-13 VITALS
BODY MASS INDEX: 20.61 KG/M2 | SYSTOLIC BLOOD PRESSURE: 112 MMHG | WEIGHT: 152 LBS | TEMPERATURE: 97 F | DIASTOLIC BLOOD PRESSURE: 60 MMHG

## 2025-08-13 DIAGNOSIS — I48.0 PAF (PAROXYSMAL ATRIAL FIBRILLATION) (HCC): ICD-10-CM

## 2025-08-13 DIAGNOSIS — F32.0 CURRENT MILD EPISODE OF MAJOR DEPRESSIVE DISORDER WITHOUT PRIOR EPISODE: ICD-10-CM

## 2025-08-13 DIAGNOSIS — I10 BENIGN ESSENTIAL HTN: ICD-10-CM

## 2025-08-13 DIAGNOSIS — G20.A1 MODERATE DEMENTIA DUE TO PARKINSON'S DISEASE, WITHOUT BEHAVIORAL DISTURBANCE, PSYCHOTIC DISTURBANCE, MOOD DISTURBANCE, OR ANXIETY (HCC): ICD-10-CM

## 2025-08-13 DIAGNOSIS — F02.B0 MODERATE DEMENTIA DUE TO PARKINSON'S DISEASE, WITHOUT BEHAVIORAL DISTURBANCE, PSYCHOTIC DISTURBANCE, MOOD DISTURBANCE, OR ANXIETY (HCC): ICD-10-CM

## 2025-08-13 DIAGNOSIS — H61.23 BILATERAL IMPACTED CERUMEN: ICD-10-CM

## 2025-08-13 DIAGNOSIS — G20.B1 PARKINSON'S DISEASE WITH DYSKINESIA, UNSPECIFIED WHETHER MANIFESTATIONS FLUCTUATE (HCC): Primary | ICD-10-CM

## 2025-08-13 RX ORDER — LANOLIN ALCOHOL/MO/W.PET/CERES
400 CREAM (GRAM) TOPICAL DAILY
Qty: 90 TABLET | Refills: 1 | Status: SHIPPED | OUTPATIENT
Start: 2025-08-13

## 2025-08-13 RX ORDER — MIDODRINE HYDROCHLORIDE 10 MG/1
10 TABLET ORAL 3 TIMES DAILY
COMMUNITY

## 2025-08-13 ASSESSMENT — ENCOUNTER SYMPTOMS
CONSTIPATION: 1
COUGH: 0